# Patient Record
Sex: FEMALE | Race: BLACK OR AFRICAN AMERICAN | NOT HISPANIC OR LATINO | URBAN - METROPOLITAN AREA
[De-identification: names, ages, dates, MRNs, and addresses within clinical notes are randomized per-mention and may not be internally consistent; named-entity substitution may affect disease eponyms.]

---

## 2017-01-19 ENCOUNTER — OUTPATIENT (OUTPATIENT)
Dept: OUTPATIENT SERVICES | Facility: HOSPITAL | Age: 67
LOS: 1 days | End: 2017-01-19
Payer: MEDICARE

## 2017-01-19 PROCEDURE — 74174 CTA ABD&PLVS W/CONTRAST: CPT | Mod: 26

## 2017-01-19 PROCEDURE — 74174 CTA ABD&PLVS W/CONTRAST: CPT

## 2017-02-03 ENCOUNTER — OUTPATIENT (OUTPATIENT)
Dept: OUTPATIENT SERVICES | Facility: HOSPITAL | Age: 67
LOS: 1 days | End: 2017-02-03
Payer: MEDICARE

## 2017-02-03 PROCEDURE — 71046 X-RAY EXAM CHEST 2 VIEWS: CPT

## 2017-02-03 PROCEDURE — 71020: CPT | Mod: 26

## 2017-02-14 ENCOUNTER — APPOINTMENT (OUTPATIENT)
Dept: VASCULAR SURGERY | Facility: CLINIC | Age: 67
End: 2017-02-14

## 2017-02-14 DIAGNOSIS — I25.810 ATHEROSCLEROSIS OF CORONARY ARTERY BYPASS GRAFT(S) W/OUT ANGINA PECTORIS: ICD-10-CM

## 2017-02-16 PROBLEM — I25.810 CORONARY ARTERY DISEASE INVOLVING CORONARY BYPASS GRAFT OF NATIVE HEART WITHOUT ANGINA PECTORIS: Status: ACTIVE | Noted: 2017-02-16

## 2017-03-07 ENCOUNTER — FORM ENCOUNTER (OUTPATIENT)
Age: 67
End: 2017-03-07

## 2017-03-08 ENCOUNTER — OUTPATIENT (OUTPATIENT)
Dept: OUTPATIENT SERVICES | Facility: HOSPITAL | Age: 67
LOS: 1 days | End: 2017-03-08
Payer: MEDICARE

## 2017-03-08 PROCEDURE — 75574 CT ANGIO HRT W/3D IMAGE: CPT

## 2017-03-08 PROCEDURE — 75574 CT ANGIO HRT W/3D IMAGE: CPT | Mod: 26

## 2017-03-28 VITALS
HEART RATE: 92 BPM | SYSTOLIC BLOOD PRESSURE: 115 MMHG | DIASTOLIC BLOOD PRESSURE: 67 MMHG | OXYGEN SATURATION: 98 % | RESPIRATION RATE: 16 BRPM | WEIGHT: 113.98 LBS | HEIGHT: 64 IN | TEMPERATURE: 98 F

## 2017-03-28 NOTE — PATIENT PROFILE ADULT. - PMH
AAA (abdominal aortic aneurysm)    CAD (coronary artery disease)    COPD (chronic obstructive pulmonary disease)    History of seizures  1980  HLD (hyperlipidemia)    HTN (hypertension)

## 2017-03-29 ENCOUNTER — INPATIENT (INPATIENT)
Facility: HOSPITAL | Age: 67
LOS: 0 days | Discharge: HOME | DRG: 269 | End: 2017-03-30
Attending: SURGERY | Admitting: SURGERY
Payer: MEDICARE

## 2017-03-29 DIAGNOSIS — I71.4 ABDOMINAL AORTIC ANEURYSM, WITHOUT RUPTURE: ICD-10-CM

## 2017-03-29 DIAGNOSIS — Z86.79 PERSONAL HISTORY OF OTHER DISEASES OF THE CIRCULATORY SYSTEM: Chronic | ICD-10-CM

## 2017-03-29 DIAGNOSIS — Z90.49 ACQUIRED ABSENCE OF OTHER SPECIFIED PARTS OF DIGESTIVE TRACT: Chronic | ICD-10-CM

## 2017-03-29 LAB
ANION GAP SERPL CALC-SCNC: 8 MMOL/L — LOW (ref 9–16)
APTT BLD: 31.5 SEC — SIGNIFICANT CHANGE UP (ref 27.5–37.4)
BASE EXCESS BLDA CALC-SCNC: -3.2 MMOL/L — LOW (ref -2–3)
BASE EXCESS BLDA CALC-SCNC: -3.4 MMOL/L — LOW (ref -2–3)
BUN SERPL-MCNC: 12 MG/DL — SIGNIFICANT CHANGE UP (ref 7–23)
CA-I BLDA-SCNC: 0.99 MMOL/L — LOW (ref 1.1–1.3)
CA-I BLDA-SCNC: 1 MMOL/L — LOW (ref 1.1–1.3)
CALCIUM SERPL-MCNC: 7.6 MG/DL — LOW (ref 8.5–10.5)
CHLORIDE SERPL-SCNC: 106 MMOL/L — SIGNIFICANT CHANGE UP (ref 96–108)
CO2 SERPL-SCNC: 24 MMOL/L — SIGNIFICANT CHANGE UP (ref 22–31)
COHGB MFR BLDA: 1.2 % — SIGNIFICANT CHANGE UP
COHGB MFR BLDA: 1.5 % — SIGNIFICANT CHANGE UP
CREAT SERPL-MCNC: 0.71 MG/DL — SIGNIFICANT CHANGE UP (ref 0.5–1.3)
GAS PNL BLDA: SIGNIFICANT CHANGE UP
GAS PNL BLDA: SIGNIFICANT CHANGE UP
GLUCOSE SERPL-MCNC: 113 MG/DL — HIGH (ref 70–99)
HCO3 BLDA-SCNC: 21 MMOL/L — SIGNIFICANT CHANGE UP (ref 21–28)
HCO3 BLDA-SCNC: 21 MMOL/L — SIGNIFICANT CHANGE UP (ref 21–28)
HCT VFR BLD CALC: 27.5 % — LOW (ref 34.5–45)
HGB BLD-MCNC: 9.5 G/DL — LOW (ref 11.5–15.5)
HGB BLDA-MCNC: 9.1 G/DL — LOW (ref 11.5–15.5)
HGB BLDA-MCNC: 9.7 G/DL — LOW (ref 11.5–15.5)
INR BLD: 1.26 — HIGH (ref 0.88–1.16)
MCHC RBC-ENTMCNC: 31.1 PG — SIGNIFICANT CHANGE UP (ref 27–34)
MCHC RBC-ENTMCNC: 34.5 G/DL — SIGNIFICANT CHANGE UP (ref 32–36)
MCV RBC AUTO: 90.2 FL — SIGNIFICANT CHANGE UP (ref 80–100)
METHGB MFR BLDA: 0.3 % — SIGNIFICANT CHANGE UP
METHGB MFR BLDA: 0.6 % — SIGNIFICANT CHANGE UP
O2 CT VFR BLDA CALC: 13.3 ML/DL — SIGNIFICANT CHANGE UP (ref 15–23)
O2 CT VFR BLDA CALC: 14.3 ML/DL — SIGNIFICANT CHANGE UP (ref 15–23)
OXYHGB MFR BLDA: 97 % — SIGNIFICANT CHANGE UP (ref 94–100)
OXYHGB MFR BLDA: 98 % — SIGNIFICANT CHANGE UP (ref 94–100)
PCO2 BLDA: 33 MMHG — SIGNIFICANT CHANGE UP (ref 32–45)
PCO2 BLDA: 37 MMHG — SIGNIFICANT CHANGE UP (ref 32–45)
PH BLDA: 7.38 — SIGNIFICANT CHANGE UP (ref 7.35–7.45)
PH BLDA: 7.42 — SIGNIFICANT CHANGE UP (ref 7.35–7.45)
PLATELET # BLD AUTO: 146 K/UL — LOW (ref 150–400)
PO2 BLDA: 311 MMHG — HIGH (ref 83–108)
PO2 BLDA: 353 MMHG — HIGH (ref 83–108)
POTASSIUM BLDA-SCNC: 3.2 MMOL/L — LOW (ref 3.5–4.9)
POTASSIUM BLDA-SCNC: 3.3 MMOL/L — LOW (ref 3.5–4.9)
POTASSIUM SERPL-MCNC: 3.6 MMOL/L — SIGNIFICANT CHANGE UP (ref 3.5–5.3)
POTASSIUM SERPL-SCNC: 3.6 MMOL/L — SIGNIFICANT CHANGE UP (ref 3.5–5.3)
PROTHROM AB SERPL-ACNC: 14 SEC — HIGH (ref 9.8–12.7)
RBC # BLD: 3.05 M/UL — LOW (ref 3.8–5.2)
RBC # FLD: 14.4 % — SIGNIFICANT CHANGE UP (ref 10.3–16.9)
SAO2 % BLDA: 99 % — SIGNIFICANT CHANGE UP (ref 95–100)
SAO2 % BLDA: 99 % — SIGNIFICANT CHANGE UP (ref 95–100)
SODIUM BLDA-SCNC: 132 MMOL/L — LOW (ref 138–146)
SODIUM BLDA-SCNC: 137 MMOL/L — LOW (ref 138–146)
SODIUM SERPL-SCNC: 138 MMOL/L — SIGNIFICANT CHANGE UP (ref 135–145)
WBC # BLD: 8.7 K/UL — SIGNIFICANT CHANGE UP (ref 3.8–10.5)
WBC # FLD AUTO: 8.7 K/UL — SIGNIFICANT CHANGE UP (ref 3.8–10.5)

## 2017-03-29 PROCEDURE — 34825: CPT | Mod: GC

## 2017-03-29 PROCEDURE — 34803: CPT | Mod: GC

## 2017-03-29 PROCEDURE — 34812 OPN FEM ART EXPOS: CPT | Mod: 50,GC

## 2017-03-29 PROCEDURE — 75952: CPT | Mod: 26,GC

## 2017-03-29 PROCEDURE — 36200 PLACE CATHETER IN AORTA: CPT | Mod: 50,GC

## 2017-03-29 PROCEDURE — 93010 ELECTROCARDIOGRAM REPORT: CPT

## 2017-03-29 RX ORDER — DOCUSATE SODIUM 100 MG
100 CAPSULE ORAL THREE TIMES A DAY
Qty: 0 | Refills: 0 | Status: DISCONTINUED | OUTPATIENT
Start: 2017-03-29 | End: 2017-03-30

## 2017-03-29 RX ORDER — ALBUTEROL 90 UG/1
2 AEROSOL, METERED ORAL EVERY 6 HOURS
Qty: 0 | Refills: 0 | Status: DISCONTINUED | OUTPATIENT
Start: 2017-03-29 | End: 2017-03-30

## 2017-03-29 RX ORDER — SIMVASTATIN 20 MG/1
20 TABLET, FILM COATED ORAL AT BEDTIME
Qty: 0 | Refills: 0 | Status: DISCONTINUED | OUTPATIENT
Start: 2017-03-29 | End: 2017-03-30

## 2017-03-29 RX ORDER — MORPHINE SULFATE 50 MG/1
4 CAPSULE, EXTENDED RELEASE ORAL EVERY 4 HOURS
Qty: 0 | Refills: 0 | Status: DISCONTINUED | OUTPATIENT
Start: 2017-03-29 | End: 2017-03-29

## 2017-03-29 RX ORDER — POTASSIUM CHLORIDE 20 MEQ
40 PACKET (EA) ORAL ONCE
Qty: 0 | Refills: 0 | Status: COMPLETED | OUTPATIENT
Start: 2017-03-29 | End: 2017-03-29

## 2017-03-29 RX ORDER — AMLODIPINE BESYLATE 2.5 MG/1
5 TABLET ORAL DAILY
Qty: 0 | Refills: 0 | Status: DISCONTINUED | OUTPATIENT
Start: 2017-03-29 | End: 2017-03-30

## 2017-03-29 RX ORDER — FLUTICASONE PROPIONATE AND SALMETEROL 50; 250 UG/1; UG/1
1 POWDER ORAL; RESPIRATORY (INHALATION)
Qty: 0 | Refills: 0 | Status: DISCONTINUED | OUTPATIENT
Start: 2017-03-29 | End: 2017-03-30

## 2017-03-29 RX ORDER — ACETAMINOPHEN WITH CODEINE 300MG-30MG
2 TABLET ORAL EVERY 4 HOURS
Qty: 0 | Refills: 0 | Status: DISCONTINUED | OUTPATIENT
Start: 2017-03-29 | End: 2017-03-30

## 2017-03-29 RX ORDER — SODIUM CHLORIDE 9 MG/ML
1000 INJECTION, SOLUTION INTRAVENOUS
Qty: 0 | Refills: 0 | Status: DISCONTINUED | OUTPATIENT
Start: 2017-03-29 | End: 2017-03-30

## 2017-03-29 RX ORDER — CEFAZOLIN SODIUM 1 G
1000 VIAL (EA) INJECTION EVERY 8 HOURS
Qty: 0 | Refills: 0 | Status: COMPLETED | OUTPATIENT
Start: 2017-03-29 | End: 2017-03-29

## 2017-03-29 RX ORDER — ONDANSETRON 8 MG/1
4 TABLET, FILM COATED ORAL EVERY 6 HOURS
Qty: 0 | Refills: 0 | Status: DISCONTINUED | OUTPATIENT
Start: 2017-03-29 | End: 2017-03-30

## 2017-03-29 RX ORDER — ASPIRIN/CALCIUM CARB/MAGNESIUM 324 MG
81 TABLET ORAL DAILY
Qty: 0 | Refills: 0 | Status: DISCONTINUED | OUTPATIENT
Start: 2017-03-29 | End: 2017-03-30

## 2017-03-29 RX ADMIN — MORPHINE SULFATE 4 MILLIGRAM(S): 50 CAPSULE, EXTENDED RELEASE ORAL at 14:57

## 2017-03-29 RX ADMIN — Medication 100 MILLIGRAM(S): at 22:39

## 2017-03-29 RX ADMIN — Medication 100 MILLIGRAM(S): at 22:41

## 2017-03-29 RX ADMIN — MORPHINE SULFATE 4 MILLIGRAM(S): 50 CAPSULE, EXTENDED RELEASE ORAL at 15:12

## 2017-03-29 RX ADMIN — Medication 40 MILLIEQUIVALENT(S): at 18:17

## 2017-03-29 RX ADMIN — Medication 100 MILLIGRAM(S): at 15:59

## 2017-03-29 RX ADMIN — Medication 2 TABLET(S): at 22:56

## 2017-03-29 RX ADMIN — FLUTICASONE PROPIONATE AND SALMETEROL 1 DOSE(S): 50; 250 POWDER ORAL; RESPIRATORY (INHALATION) at 22:40

## 2017-03-29 NOTE — H&P ADULT - HISTORY OF PRESENT ILLNESS
68 yo F with PMH HTn, COPD, HLD, AAA (s/p prior tube graft repair) with L LIDYA aneurysm measuring 4.5cm.

## 2017-03-29 NOTE — PROGRESS NOTE ADULT - SUBJECTIVE AND OBJECTIVE BOX
Vascular Surgery Post-Op Note    Procedure: EVAR with coverage of left hypogastric artery and graft extension into the left EIA    Diagnosis/Indication: Left iliac aneurysm, previous open AAA repair    Surgeon: Dr. Burch      S: Pt has no complaints. Denies CP, SOB, LARSON, calf tenderness. Pain controlled with medication.    O:  T(C): 36.8, Max: 36.8 (03-29 @ 13:00)  T(F): 98.3, Max: 98.3 (03-29 @ 13:00)  HR: 79 (69 - 79)  BP: 141/78 (110/71 - 141/78)  RR: 16 (16 - 16)  SpO2: 100% (98% - 100%)                            9.5    8.7   )-----------( 146      ( 29 Mar 2017 13:32 )             27.5     29 Mar 2017 13:32    138    |  106    |  12     ----------------------------<  113    3.6     |  24     |  0.71     Ca    7.6        29 Mar 2017 13:32        Gen: NAD, resting comfortably in bed  C/V: NSR  Pulm: Non labored breathing, no respiratory distress  Abd: soft, non tender, non distended  Groins: bilateral incisions, C/D/I, no hematoma, no bleeding. Right groin dressing with small 1cm serosanguinous saturation at the center.   Extrem: warm, 2+DP pulse, 1+PT pulse bilat. Calfs soft, non-tender. Motor/sensory intact without deficits      A/P: 67y Female s/p above procedure  Diet: clears tonight  IVF - LR @85bpm  Pain/nausea control  ASA  Ancef x 2 post op  Home meds  Bedrest tonight  Solorio to gravity until midnight, then d/c, TOV in AM  AM labs

## 2017-03-29 NOTE — BRIEF OPERATIVE NOTE - OPERATION/FINDINGS
Procedure: EVAR with [place] [id number] main body, with 14x12 extension on R and 14x 10 extension on L into CORDELL [id number]. with coverage of hypogastric and extension into L EIA    Fluoro time 23 min  10gY    operative time 3 hours

## 2017-03-30 ENCOUNTER — TRANSCRIPTION ENCOUNTER (OUTPATIENT)
Age: 67
End: 2017-03-30

## 2017-03-30 VITALS
SYSTOLIC BLOOD PRESSURE: 133 MMHG | HEART RATE: 81 BPM | OXYGEN SATURATION: 97 % | RESPIRATION RATE: 16 BRPM | DIASTOLIC BLOOD PRESSURE: 71 MMHG

## 2017-03-30 LAB
ANION GAP SERPL CALC-SCNC: 7 MMOL/L — LOW (ref 9–16)
BUN SERPL-MCNC: 6 MG/DL — LOW (ref 7–23)
CALCIUM SERPL-MCNC: 8.4 MG/DL — LOW (ref 8.5–10.5)
CHLORIDE SERPL-SCNC: 104 MMOL/L — SIGNIFICANT CHANGE UP (ref 96–108)
CO2 SERPL-SCNC: 29 MMOL/L — SIGNIFICANT CHANGE UP (ref 22–31)
CREAT SERPL-MCNC: 0.66 MG/DL — SIGNIFICANT CHANGE UP (ref 0.5–1.3)
GLUCOSE SERPL-MCNC: 85 MG/DL — SIGNIFICANT CHANGE UP (ref 70–99)
HCT VFR BLD CALC: 31 % — LOW (ref 34.5–45)
HGB BLD-MCNC: 10.5 G/DL — LOW (ref 11.5–15.5)
MAGNESIUM SERPL-MCNC: 1.3 MG/DL — LOW (ref 1.6–2.4)
MCHC RBC-ENTMCNC: 31 PG — SIGNIFICANT CHANGE UP (ref 27–34)
MCHC RBC-ENTMCNC: 33.9 G/DL — SIGNIFICANT CHANGE UP (ref 32–36)
MCV RBC AUTO: 91.4 FL — SIGNIFICANT CHANGE UP (ref 80–100)
PHOSPHATE SERPL-MCNC: 2.7 MG/DL — SIGNIFICANT CHANGE UP (ref 2.5–4.5)
PLATELET # BLD AUTO: 173 K/UL — SIGNIFICANT CHANGE UP (ref 150–400)
POTASSIUM SERPL-MCNC: 3.7 MMOL/L — SIGNIFICANT CHANGE UP (ref 3.5–5.3)
POTASSIUM SERPL-SCNC: 3.7 MMOL/L — SIGNIFICANT CHANGE UP (ref 3.5–5.3)
RBC # BLD: 3.39 M/UL — LOW (ref 3.8–5.2)
RBC # FLD: 14.6 % — SIGNIFICANT CHANGE UP (ref 10.3–16.9)
SODIUM SERPL-SCNC: 140 MMOL/L — SIGNIFICANT CHANGE UP (ref 135–145)
WBC # BLD: 6.4 K/UL — SIGNIFICANT CHANGE UP (ref 3.8–10.5)
WBC # FLD AUTO: 6.4 K/UL — SIGNIFICANT CHANGE UP (ref 3.8–10.5)

## 2017-03-30 PROCEDURE — 36430 TRANSFUSION BLD/BLD COMPNT: CPT

## 2017-03-30 PROCEDURE — 83735 ASSAY OF MAGNESIUM: CPT

## 2017-03-30 PROCEDURE — C1768: CPT

## 2017-03-30 PROCEDURE — C1725: CPT

## 2017-03-30 PROCEDURE — 86901 BLOOD TYPING SEROLOGIC RH(D): CPT

## 2017-03-30 PROCEDURE — 84295 ASSAY OF SERUM SODIUM: CPT

## 2017-03-30 PROCEDURE — 85730 THROMBOPLASTIN TIME PARTIAL: CPT

## 2017-03-30 PROCEDURE — C1769: CPT

## 2017-03-30 PROCEDURE — C1889: CPT

## 2017-03-30 PROCEDURE — P9016: CPT

## 2017-03-30 PROCEDURE — 94640 AIRWAY INHALATION TREATMENT: CPT

## 2017-03-30 PROCEDURE — 82330 ASSAY OF CALCIUM: CPT

## 2017-03-30 PROCEDURE — 76000 FLUOROSCOPY <1 HR PHYS/QHP: CPT

## 2017-03-30 PROCEDURE — 93005 ELECTROCARDIOGRAM TRACING: CPT

## 2017-03-30 PROCEDURE — 85027 COMPLETE CBC AUTOMATED: CPT

## 2017-03-30 PROCEDURE — C1887: CPT

## 2017-03-30 PROCEDURE — 36415 COLL VENOUS BLD VENIPUNCTURE: CPT

## 2017-03-30 PROCEDURE — 86850 RBC ANTIBODY SCREEN: CPT

## 2017-03-30 PROCEDURE — C1874: CPT

## 2017-03-30 PROCEDURE — 84132 ASSAY OF SERUM POTASSIUM: CPT

## 2017-03-30 PROCEDURE — 86923 COMPATIBILITY TEST ELECTRIC: CPT

## 2017-03-30 PROCEDURE — 85018 HEMOGLOBIN: CPT

## 2017-03-30 PROCEDURE — C1894: CPT

## 2017-03-30 PROCEDURE — C1773: CPT

## 2017-03-30 PROCEDURE — 86900 BLOOD TYPING SEROLOGIC ABO: CPT

## 2017-03-30 PROCEDURE — 84100 ASSAY OF PHOSPHORUS: CPT

## 2017-03-30 PROCEDURE — 80048 BASIC METABOLIC PNL TOTAL CA: CPT

## 2017-03-30 PROCEDURE — 85610 PROTHROMBIN TIME: CPT

## 2017-03-30 RX ORDER — POTASSIUM CHLORIDE 20 MEQ
40 PACKET (EA) ORAL ONCE
Qty: 0 | Refills: 0 | Status: COMPLETED | OUTPATIENT
Start: 2017-03-30 | End: 2017-03-30

## 2017-03-30 RX ORDER — ACETAMINOPHEN WITH CODEINE 300MG-30MG
1 TABLET ORAL
Qty: 18 | Refills: 0 | OUTPATIENT
Start: 2017-03-30 | End: 2017-04-02

## 2017-03-30 RX ORDER — MAGNESIUM SULFATE 500 MG/ML
2 VIAL (ML) INJECTION ONCE
Qty: 0 | Refills: 0 | Status: COMPLETED | OUTPATIENT
Start: 2017-03-30 | End: 2017-03-30

## 2017-03-30 RX ADMIN — AMLODIPINE BESYLATE 5 MILLIGRAM(S): 2.5 TABLET ORAL at 06:28

## 2017-03-30 RX ADMIN — FLUTICASONE PROPIONATE AND SALMETEROL 1 DOSE(S): 50; 250 POWDER ORAL; RESPIRATORY (INHALATION) at 06:28

## 2017-03-30 RX ADMIN — Medication 100 MILLIGRAM(S): at 06:28

## 2017-03-30 RX ADMIN — Medication 50 GRAM(S): at 08:43

## 2017-03-30 RX ADMIN — Medication 2 TABLET(S): at 00:05

## 2017-03-30 RX ADMIN — Medication 2 TABLET(S): at 06:51

## 2017-03-30 RX ADMIN — Medication 81 MILLIGRAM(S): at 11:12

## 2017-03-30 RX ADMIN — Medication 2 TABLET(S): at 07:34

## 2017-03-30 RX ADMIN — Medication 40 MILLIEQUIVALENT(S): at 08:43

## 2017-03-30 NOTE — DISCHARGE NOTE ADULT - MEDICATION SUMMARY - MEDICATIONS TO TAKE
I will START or STAY ON the medications listed below when I get home from the hospital:    aspirin 81 mg oral tablet  -- 1 tab(s) by mouth once a day  -- Indication: For Circulation    acetaminophen-codeine 300 mg-30 mg oral tablet  -- 1 tab(s) by mouth every 4 hours, As Needed, Severe Pain (7 - 10) -for severe pain MDD:6 tabs  -- Indication: For pain-eprescribed    simvastatin 20 mg oral tablet  -- 1 tab(s) by mouth once a day (at bedtime)  -- Indication: For Cholesterol    Ventolin HFA 90 mcg/inh inhalation aerosol  -- 2 puff(s) inhaled 4 times a day, As Needed  -- Indication: For respiratory    Advair Diskus 100 mcg-50 mcg inhalation powder  -- 1 puff(s) inhaled 2 times a day  -- Indication: For respiratory    amLODIPine 5 mg oral tablet  -- 1 tab(s) by mouth once a day  -- Indication: For BP

## 2017-03-30 NOTE — PROGRESS NOTE ADULT - SUBJECTIVE AND OBJECTIVE BOX
o/n: ivan removed at midnight passed TOV 200cc  3/29: s/p EVAR with left hypo occlusion, POC ok    67yoF s/p EVAR with coverage of left hypogastric and graft extension into the left EIA 3/29  regular diet  asa  pain control  cont home meds  f/u am labs o/n: ivan removed at midnight passed TOV 200cc  3/29: s/p EVAR with left hypo occlusion, POC ok    Pt seen and examined at bedside, currently without complaint.  She reports that she has 20 steps to climb at home, and that she will try to walk this morning to see how she feels.  She notes that she's voided overnight without difficulty.    amLODIPine   Tablet 5  aspirin enteric coated 81        Vital Signs Last 24 Hrs  T(C): 36.4, Max: 36.8 (03-29 @ 13:00)  T(F): 97.5, Max: 98.3 (03-29 @ 13:00)  HR: 70 (65 - 89)  BP: 142/81 (110/71 - 153/75)  BP(mean): --  RR: 16 (16 - 16)  SpO2: 98% (98% - 100%)  I&O's Summary    I & Os for current day (as of 30 Mar 2017 08:32)  =============================================  IN: 3425 ml / OUT: 4620 ml / NET: -1195 ml        Gen: NAD, resting comfortably in bed  C/V: NSR  Pulm: Non labored breathing, no respiratory distress  Groins: bilateral incisions, with minimal s/s saturation on right groin dressing. Dressing removed, cleaned with chlorhexidine and replaced , no hematoma, no bleeding.   Extrem: warm, 2+DP pulse, 1+PT pulse bilat. Calfs soft, non-tender. Motor/sensory intact without deficits    LABS:                        10.5   6.4   )-----------( 173      ( 30 Mar 2017 06:39 )             31.0     30 Mar 2017 06:39    140    |  104    |  6      ----------------------------<  85     3.7     |  29     |  0.66     Ca    8.4        30 Mar 2017 06:39  Phos  2.7       30 Mar 2017 06:39  Mg     1.3       30 Mar 2017 06:39      PT/INR - ( 29 Mar 2017 13:32 )   PT: 14.0 sec;   INR: 1.26          PTT - ( 29 Mar 2017 13:32 )  PTT:31.5 sec    Radiology and Additional Studies:    Assessment and Plan:     67yoF s/p EVAR with coverage of left hypogastric and graft extension into the left EIA 3/29  regular diet  asa  pain control  cont home meds  f/u am labs  Home today

## 2017-03-30 NOTE — DISCHARGE NOTE ADULT - CARE PLAN
Principal Discharge DX:	AAA (abdominal aortic aneurysm)  Goal:	see below  Instructions for follow-up, activity and diet:	see below  Secondary Diagnosis:	CAD (coronary artery disease)  Secondary Diagnosis:	COPD (chronic obstructive pulmonary disease)  Secondary Diagnosis:	HLD (hyperlipidemia)  Secondary Diagnosis:	HTN (hypertension)  Secondary Diagnosis:	History of cholecystectomy

## 2017-03-30 NOTE — DISCHARGE NOTE ADULT - HOSPITAL COURSE
66 yo F with PMH HTn, COPD, HLD, AAA (s/p prior tube graft repair) with L LIDYA aneurysm measuring 4.5cm.     Had EVAR on 3/29/17. She did well post-op and was discharged when medically stable

## 2017-03-30 NOTE — DISCHARGE NOTE ADULT - PATIENT PORTAL LINK FT
“You can access the Follow[facility] Patient Portal, offered by [facility], by registering with the following website: [email or web address]”

## 2017-03-30 NOTE — DISCHARGE NOTE ADULT - CARE PROVIDER_API CALL
Priyank Burch), Surgery; Vascular Surgery  [address] [place] [address]  Phone: [phone]  Fax: [phone]

## 2017-03-30 NOTE — DISCHARGE NOTE ADULT - ADDITIONAL INSTRUCTIONS
Follow up with Dr. Burch in 1-2 weeks in office at [phone]. Remove dressings to shower with soap and water. Dry well. Dry gauze to incisions daily. Please call your doctor if you have a fever of over 101.9 or swelling/bleeding at incisions or puncture sites. NO heavy lifting. Eat small meals

## 2017-03-30 NOTE — DISCHARGE NOTE ADULT - SECONDARY DIAGNOSIS.
CAD (coronary artery disease) COPD (chronic obstructive pulmonary disease) HLD (hyperlipidemia) HTN (hypertension) History of cholecystectomy

## 2017-04-03 ENCOUNTER — MEDICATION RENEWAL (OUTPATIENT)
Age: 67
End: 2017-04-03

## 2017-04-03 DIAGNOSIS — Z86.79 PERSONAL HISTORY OF OTHER DISEASES OF THE CIRCULATORY SYSTEM: ICD-10-CM

## 2017-04-03 DIAGNOSIS — I71.4 ABDOMINAL AORTIC ANEURYSM, WITHOUT RUPTURE: ICD-10-CM

## 2017-04-03 DIAGNOSIS — I25.10 ATHEROSCLEROTIC HEART DISEASE OF NATIVE CORONARY ARTERY WITHOUT ANGINA PECTORIS: ICD-10-CM

## 2017-04-03 DIAGNOSIS — K59.09 OTHER CONSTIPATION: ICD-10-CM

## 2017-04-03 DIAGNOSIS — Z28.21 IMMUNIZATION NOT CARRIED OUT BECAUSE OF PATIENT REFUSAL: ICD-10-CM

## 2017-04-03 DIAGNOSIS — Z79.82 LONG TERM (CURRENT) USE OF ASPIRIN: ICD-10-CM

## 2017-04-03 DIAGNOSIS — Z88.0 ALLERGY STATUS TO PENICILLIN: ICD-10-CM

## 2017-04-03 DIAGNOSIS — I72.3 ANEURYSM OF ILIAC ARTERY: ICD-10-CM

## 2017-04-03 DIAGNOSIS — E78.5 HYPERLIPIDEMIA, UNSPECIFIED: ICD-10-CM

## 2017-04-03 DIAGNOSIS — I10 ESSENTIAL (PRIMARY) HYPERTENSION: ICD-10-CM

## 2017-04-03 DIAGNOSIS — F17.200 NICOTINE DEPENDENCE, UNSPECIFIED, UNCOMPLICATED: ICD-10-CM

## 2017-04-03 DIAGNOSIS — J44.9 CHRONIC OBSTRUCTIVE PULMONARY DISEASE, UNSPECIFIED: ICD-10-CM

## 2017-04-03 PROBLEM — Z87.898 PERSONAL HISTORY OF OTHER SPECIFIED CONDITIONS: Chronic | Status: ACTIVE | Noted: 2017-03-28

## 2017-04-11 ENCOUNTER — APPOINTMENT (OUTPATIENT)
Dept: VASCULAR SURGERY | Facility: CLINIC | Age: 67
End: 2017-04-11

## 2017-04-11 VITALS — DIASTOLIC BLOOD PRESSURE: 74 MMHG | HEART RATE: 84 BPM | OXYGEN SATURATION: 97 % | SYSTOLIC BLOOD PRESSURE: 110 MMHG

## 2017-08-15 ENCOUNTER — FORM ENCOUNTER (OUTPATIENT)
Age: 67
End: 2017-08-15

## 2017-08-16 ENCOUNTER — OUTPATIENT (OUTPATIENT)
Dept: OUTPATIENT SERVICES | Facility: HOSPITAL | Age: 67
LOS: 1 days | End: 2017-08-16
Payer: MEDICARE

## 2017-08-16 DIAGNOSIS — Z90.49 ACQUIRED ABSENCE OF OTHER SPECIFIED PARTS OF DIGESTIVE TRACT: Chronic | ICD-10-CM

## 2017-08-16 DIAGNOSIS — Z86.79 PERSONAL HISTORY OF OTHER DISEASES OF THE CIRCULATORY SYSTEM: Chronic | ICD-10-CM

## 2017-08-16 PROCEDURE — 71275 CT ANGIOGRAPHY CHEST: CPT

## 2017-08-16 PROCEDURE — 71275 CT ANGIOGRAPHY CHEST: CPT | Mod: 26

## 2017-10-24 ENCOUNTER — APPOINTMENT (OUTPATIENT)
Dept: VASCULAR SURGERY | Facility: CLINIC | Age: 67
End: 2017-10-24
Payer: COMMERCIAL

## 2017-10-24 VITALS — OXYGEN SATURATION: 98 % | DIASTOLIC BLOOD PRESSURE: 80 MMHG | HEART RATE: 82 BPM | SYSTOLIC BLOOD PRESSURE: 122 MMHG

## 2017-10-24 PROCEDURE — 93978 VASCULAR STUDY: CPT

## 2017-10-24 PROCEDURE — 99213 OFFICE O/P EST LOW 20 MIN: CPT | Mod: 25

## 2017-11-08 ENCOUNTER — OUTPATIENT (OUTPATIENT)
Dept: OUTPATIENT SERVICES | Facility: HOSPITAL | Age: 67
LOS: 1 days | End: 2017-11-08
Payer: MEDICARE

## 2017-11-08 DIAGNOSIS — Z86.79 PERSONAL HISTORY OF OTHER DISEASES OF THE CIRCULATORY SYSTEM: Chronic | ICD-10-CM

## 2017-11-08 DIAGNOSIS — Z90.49 ACQUIRED ABSENCE OF OTHER SPECIFIED PARTS OF DIGESTIVE TRACT: Chronic | ICD-10-CM

## 2017-11-08 PROCEDURE — 71275 CT ANGIOGRAPHY CHEST: CPT

## 2017-11-08 PROCEDURE — 71275 CT ANGIOGRAPHY CHEST: CPT | Mod: 26

## 2017-11-08 PROCEDURE — 74174 CTA ABD&PLVS W/CONTRAST: CPT | Mod: 26

## 2017-11-08 PROCEDURE — 74174 CTA ABD&PLVS W/CONTRAST: CPT

## 2018-04-11 ENCOUNTER — OTHER (OUTPATIENT)
Age: 68
End: 2018-04-11

## 2018-04-16 ENCOUNTER — FORM ENCOUNTER (OUTPATIENT)
Age: 68
End: 2018-04-16

## 2018-04-17 ENCOUNTER — APPOINTMENT (OUTPATIENT)
Dept: CT IMAGING | Facility: HOSPITAL | Age: 68
End: 2018-04-17
Payer: MEDICARE

## 2018-04-17 ENCOUNTER — OUTPATIENT (OUTPATIENT)
Dept: OUTPATIENT SERVICES | Facility: HOSPITAL | Age: 68
LOS: 1 days | End: 2018-04-17
Payer: MEDICARE

## 2018-04-17 ENCOUNTER — APPOINTMENT (OUTPATIENT)
Dept: CT IMAGING | Facility: HOSPITAL | Age: 68
End: 2018-04-17

## 2018-04-17 ENCOUNTER — APPOINTMENT (OUTPATIENT)
Dept: VASCULAR SURGERY | Facility: CLINIC | Age: 68
End: 2018-04-17
Payer: MEDICARE

## 2018-04-17 DIAGNOSIS — Z86.79 PERSONAL HISTORY OF OTHER DISEASES OF THE CIRCULATORY SYSTEM: Chronic | ICD-10-CM

## 2018-04-17 DIAGNOSIS — Z90.49 ACQUIRED ABSENCE OF OTHER SPECIFIED PARTS OF DIGESTIVE TRACT: Chronic | ICD-10-CM

## 2018-04-17 PROCEDURE — 74174 CTA ABD&PLVS W/CONTRAST: CPT

## 2018-04-17 PROCEDURE — 71275 CT ANGIOGRAPHY CHEST: CPT

## 2018-04-17 PROCEDURE — 74174 CTA ABD&PLVS W/CONTRAST: CPT | Mod: 26

## 2018-04-17 PROCEDURE — 71275 CT ANGIOGRAPHY CHEST: CPT | Mod: 26

## 2018-04-17 PROCEDURE — 99214 OFFICE O/P EST MOD 30 MIN: CPT

## 2018-05-02 ENCOUNTER — APPOINTMENT (OUTPATIENT)
Dept: CARDIOTHORACIC SURGERY | Facility: CLINIC | Age: 68
End: 2018-05-02
Payer: MEDICARE

## 2018-05-02 VITALS
HEART RATE: 91 BPM | WEIGHT: 115 LBS | OXYGEN SATURATION: 99 % | BODY MASS INDEX: 20.38 KG/M2 | SYSTOLIC BLOOD PRESSURE: 109 MMHG | HEIGHT: 63 IN | DIASTOLIC BLOOD PRESSURE: 77 MMHG | TEMPERATURE: 97.9 F | RESPIRATION RATE: 20 BRPM

## 2018-05-02 DIAGNOSIS — Z87.09 PERSONAL HISTORY OF OTHER DISEASES OF THE RESPIRATORY SYSTEM: ICD-10-CM

## 2018-05-02 DIAGNOSIS — F17.200 NICOTINE DEPENDENCE, UNSPECIFIED, UNCOMPLICATED: ICD-10-CM

## 2018-05-02 DIAGNOSIS — Z60.2 PROBLEMS RELATED TO LIVING ALONE: ICD-10-CM

## 2018-05-02 DIAGNOSIS — Z95.828 PRESENCE OF OTHER VASCULAR IMPLANTS AND GRAFTS: ICD-10-CM

## 2018-05-02 DIAGNOSIS — Z86.79 PERSONAL HISTORY OF OTHER DISEASES OF THE CIRCULATORY SYSTEM: ICD-10-CM

## 2018-05-02 PROCEDURE — 99205 OFFICE O/P NEW HI 60 MIN: CPT

## 2018-05-02 SDOH — SOCIAL STABILITY - SOCIAL INSECURITY: PROBLEMS RELATED TO LIVING ALONE: Z60.2

## 2018-05-03 PROBLEM — Z86.79 HISTORY OF CORONARY ARTERY DISEASE: Status: RESOLVED | Noted: 2018-05-03 | Resolved: 2018-05-03

## 2018-05-03 PROBLEM — Z87.09 HISTORY OF CHRONIC OBSTRUCTIVE LUNG DISEASE: Status: RESOLVED | Noted: 2018-05-03 | Resolved: 2018-05-03

## 2018-05-03 PROBLEM — Z95.828 HISTORY OF REPAIR OF ANEURYSM OF ABDOMINAL AORTA USING ENDOVASCULAR STENT GRAFT: Status: ACTIVE | Noted: 2018-05-03

## 2018-05-03 PROBLEM — F17.200 CURRENT SMOKER: Status: ACTIVE | Noted: 2018-05-03

## 2018-05-03 PROBLEM — Z60.2 LIVES ALONE WITH HELP AVAILABLE: Status: ACTIVE | Noted: 2018-05-03

## 2018-05-09 ENCOUNTER — APPOINTMENT (OUTPATIENT)
Dept: CARDIOTHORACIC SURGERY | Facility: CLINIC | Age: 68
End: 2018-05-09

## 2018-05-09 VITALS
BODY MASS INDEX: 20.55 KG/M2 | RESPIRATION RATE: 20 BRPM | OXYGEN SATURATION: 97 % | HEART RATE: 94 BPM | SYSTOLIC BLOOD PRESSURE: 141 MMHG | WEIGHT: 116 LBS | TEMPERATURE: 97.4 F | DIASTOLIC BLOOD PRESSURE: 71 MMHG

## 2018-05-17 VITALS
HEART RATE: 85 BPM | DIASTOLIC BLOOD PRESSURE: 74 MMHG | OXYGEN SATURATION: 100 % | RESPIRATION RATE: 18 BRPM | WEIGHT: 139.99 LBS | HEIGHT: 63 IN | SYSTOLIC BLOOD PRESSURE: 129 MMHG | TEMPERATURE: 98 F

## 2018-05-17 RX ORDER — CHLORHEXIDINE GLUCONATE 213 G/1000ML
1 SOLUTION TOPICAL ONCE
Qty: 0 | Refills: 0 | Status: DISCONTINUED | OUTPATIENT
Start: 2018-05-21 | End: 2018-05-21

## 2018-05-17 NOTE — H&P ADULT - NSHPLABSRESULTS_GEN_ALL_CORE
12.7   5.1   )-----------( 263      ( 21 May 2018 07:12 )             36.5   05-21    136  |  95<L>  |  13  ----------------------------<  118<H>  4.3   |  27  |  0.79    Ca    10.0      21 May 2018 07:12    TPro  9.5<H>  /  Alb  4.6  /  TBili  0.4  /  DBili  x   /  AST  24  /  ALT  15  /  AlkPhos  124<H>  05-21  PT/INR - ( 21 May 2018 07:12 )   PT: 11.0 sec;   INR: 0.99          PTT - ( 21 May 2018 07:12 )  PTT:31.4 sec    EKG: Sinus @ 83 BPM with Q waves in III and aVF, no acute ST-T wave changes

## 2018-05-17 NOTE — H&P ADULT - HISTORY OF PRESENT ILLNESS
SKELETON   68 Y F current smoker with pmh HTN, HLD, CAD, TIA (1999- Residual?), COPD (home O2?), AAA s/p open AAA repair by Dr Roberto in 2004 with further complications and degeneration, most recently s/p EVAR/AAA/CIAA with Dr Dickinson 3/29/2017 who presents to Dr. 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 Pt c/o LARSON after climbing 1-2 flights of stairs x _____  Pt denies CP, cough, hemoptysis, orthopnea, PND, N/V, weight changes, fever, chills.   CTA chest/abdomen/pelvix 4/17/2018 revealed descending ThAA with degeneration of the aorta at mid-descending thoracic segment measuring 5.7 cm with significant mural thrombus (previously measured 5.5 cm), widely patent celiac/SMA/renal arteries.  CTA coronaries 3/8/2017 showing Severely elevated calcium score (1191) 98th percentile with Nonobstructive coronary artery disease (see body of report for details). Normal left ventricular systolic function.     In light of patient’s risk factors, CCS angina class III symptoms, known CAD, pt is recommended for cardiac catheterization with possible intervention if medically indicated as part of pre-op testing for TAAA repair with Dr. Monson and Dr. Burch. 68 Y F current smoker with pmh HTN, HLD, CAD, TIA (1999- Residual L hand weakness), COPD (no home O2, no hospitalizations or intubations), AAA s/p open AAA repair by Dr Roberto in 2004 with further complications and degeneration, most recently s/p EVAR/AAA/CIAA with Dr Dickinson 3/29/2017 who presents to Dr. 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 Pt c/o LARSON after climbing 1-2 flights of stairs x few years.  Pt denies CP, cough, hemoptysis, orthopnea, PND, N/V, palpitations, LE edema, weight changes, fever, chills.  CTA chest/abdomen/pelvix 4/17/2018 revealed descending ThAA with degeneration of the aorta at mid-descending thoracic segment measuring 5.7 cm with significant mural thrombus (previously measured 5.5 cm), widely patent celiac/SMA/renal arteries. CTA coronaries 3/8/2017 showing Severely elevated calcium score (1191) 98th percentile with Nonobstructive coronary artery disease (see body of report for details). Normal left ventricular systolic function.  In light of patient’s risk factors, CCS angina class III symptoms, known CAD (non obstructive by CTA), pt is recommended for cardiac catheterization with possible intervention if medically indicated as part of pre-op testing for TAAA repair with Dr. Monson and Dr. Burch. 68 Y F current smoker with pmh HTN, HLD, CAD, TIA (1999- Residual L hand weakness), COPD (no home O2, no hospitalizations or intubations), AAA s/p open AAA repair by Dr Roberto in 2004 with further complications and degeneration, most recently s/p EVAR/AAA/CIAA with Dr Dickinson 3/29/2017 who presents to Dr. 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 CTA chest/abdomen/pelvix 4/17/2018 revealed descending ThAA with degeneration of the aorta at mid-descending thoracic segment measuring 5.7 cm with significant mural thrombus (previously measured 5.5 cm), widely patent celiac/SMA/renal arteries.   Pt c/o LARSON after climbing 1-2 flights of stairs x few years.  Resolved after resting for a few minutes.  Pt denies CP, cough, hemoptysis, orthopnea, PND, N/V, palpitations, LE edema, weight changes, fever, chills.   CTA coronaries 3/8/2017 showing Severely elevated calcium score (1191) 98th percentile with Nonobstructive coronary artery disease. Normal left ventricular systolic function.  In light of patient’s risk factors, CCS angina class III symptoms, known CAD (non obstructive by CTA), pt is recommended for cardiac catheterization with possible intervention if medically indicated as part of pre-op testing for TAAA repair with Dr. Monson and Dr. Burch.

## 2018-05-17 NOTE — H&P ADULT - NSHPSOCIALHISTORY_GEN_ALL_CORE
Current smoker, smokes ____  Denies ETOH and elicit drug use.   Lives alone and has home health aid. Current smoker, smokes 1ppd x 46 years (currently trying to quit w/ nicotine lozenges)   Denies ETOH and elicit drug use.   Lives alone and has home health aid.

## 2018-05-17 NOTE — H&P ADULT - PSH
History of abdominal aortic aneurysm (AAA)  2004  History of cholecystectomy    S/P AAA (abdominal aortic aneurysm) repair

## 2018-05-17 NOTE — H&P ADULT - ASSESSMENT
68 Y F current smoker with pmh HTN, HLD, CAD, TIA (1999- Residual L hand weakness), COPD (no home O2, no hospitalizations or intubations), AAA s/p open AAA repair by Dr Roberto in 2004 with further complications and degeneration, most recently s/p EVAR/AAA/CIAA with Dr Dickinson 3/29/2017who presents today for recommended cardiac catheterization with possible intervention if medically indicated as part of pre-op testing for TAAA repair with Dr. Monson and Dr. Burch.  Precath/consented  IVF NS @ 75 cc/hr  Loading to be d/w Dr. Tipton    Risks & benefits of procedure and alternative therapy have been explained to the patient including but not limited to: allergic reaction, bleeding w/possible need for blood transfusion, infection, renal and vascular compromise, limb damage, arrhythmia, stroke, vessel dissection/perforation, Myocardial infarction, emergent CABG. Informed consent obtained and in chart. 68 Y F current smoker with pmh HTN, HLD, CAD, TIA (1999- Residual L hand weakness), COPD (no home O2, no hospitalizations or intubations), AAA s/p open AAA repair by Dr Roberto in 2004 with further complications and degeneration, most recently s/p EVAR/AAA/CIAA with Dr Dickinson 3/29/2017who presents today for recommended cardiac catheterization with possible intervention if medically indicated as part of pre-op testing for TAAA repair with Dr. Monson and Dr. Burch.  Precath/consented  IVF NS @ 100 cc/hr  Loaded with ASA 81 mg PO x 1  Case d/w Dr. Tipton    Risks & benefits of procedure and alternative therapy have been explained to the patient including but not limited to: allergic reaction, bleeding w/possible need for blood transfusion, infection, renal and vascular compromise, limb damage, arrhythmia, stroke, vessel dissection/perforation, Myocardial infarction, emergent CABG. Informed consent obtained and in chart. 68 Y F current smoker with pmh HTN, HLD, CAD, TIA (1999- Residual L hand weakness), COPD (no home O2, no hospitalizations or intubations), AAA s/p open AAA repair by Dr Roberto in 2004 with further complications and degeneration, most recently s/p EVAR/AAA/CIAA with Dr Dickinson 3/29/2017who presents today for recommended cardiac catheterization with possible intervention if medically indicated as part of pre-op testing for TAAA repair with Dr. Monson and Dr. Burch.  ASA: II     Mallampati: IV  Precath/consented  IVF NS @ 100 cc/hr  Loaded with ASA 81 mg PO x 1  Case d/w Dr. Tipton    Risks & benefits of procedure and alternative therapy have been explained to the patient including but not limited to: allergic reaction, bleeding w/possible need for blood transfusion, infection, renal and vascular compromise, limb damage, arrhythmia, stroke, vessel dissection/perforation, Myocardial infarction, emergent CABG. Informed consent obtained and in chart.

## 2018-05-21 ENCOUNTER — OUTPATIENT (OUTPATIENT)
Dept: OUTPATIENT SERVICES | Facility: HOSPITAL | Age: 68
LOS: 1 days | Discharge: NURSING, REHAB OR LONG-TERM CARE | End: 2018-05-21
Payer: MEDICARE

## 2018-05-21 DIAGNOSIS — Z90.49 ACQUIRED ABSENCE OF OTHER SPECIFIED PARTS OF DIGESTIVE TRACT: Chronic | ICD-10-CM

## 2018-05-21 DIAGNOSIS — Z86.79 PERSONAL HISTORY OF OTHER DISEASES OF THE CIRCULATORY SYSTEM: Chronic | ICD-10-CM

## 2018-05-21 DIAGNOSIS — Z98.890 OTHER SPECIFIED POSTPROCEDURAL STATES: Chronic | ICD-10-CM

## 2018-05-21 LAB
ALBUMIN SERPL ELPH-MCNC: 4.6 G/DL — SIGNIFICANT CHANGE UP (ref 3.3–5)
ALP SERPL-CCNC: 124 U/L — HIGH (ref 40–120)
ALT FLD-CCNC: 15 U/L — SIGNIFICANT CHANGE UP (ref 10–45)
ANION GAP SERPL CALC-SCNC: 14 MMOL/L — SIGNIFICANT CHANGE UP (ref 5–17)
APPEARANCE UR: CLEAR — SIGNIFICANT CHANGE UP
APTT BLD: 31.4 SEC — SIGNIFICANT CHANGE UP (ref 27.5–37.4)
AST SERPL-CCNC: 24 U/L — SIGNIFICANT CHANGE UP (ref 10–40)
BASOPHILS NFR BLD AUTO: 0.8 % — SIGNIFICANT CHANGE UP (ref 0–2)
BILIRUB SERPL-MCNC: 0.4 MG/DL — SIGNIFICANT CHANGE UP (ref 0.2–1.2)
BILIRUB UR-MCNC: NEGATIVE — SIGNIFICANT CHANGE UP
BLD GP AB SCN SERPL QL: NEGATIVE — SIGNIFICANT CHANGE UP
BUN SERPL-MCNC: 13 MG/DL — SIGNIFICANT CHANGE UP (ref 7–23)
CALCIUM SERPL-MCNC: 10 MG/DL — SIGNIFICANT CHANGE UP (ref 8.4–10.5)
CHLORIDE SERPL-SCNC: 95 MMOL/L — LOW (ref 96–108)
CHOLEST SERPL-MCNC: 197 MG/DL — SIGNIFICANT CHANGE UP (ref 10–199)
CK MB CFR SERPL CALC: 2.9 NG/ML — SIGNIFICANT CHANGE UP (ref 0–6.7)
CO2 SERPL-SCNC: 27 MMOL/L — SIGNIFICANT CHANGE UP (ref 22–31)
COLOR SPEC: YELLOW — SIGNIFICANT CHANGE UP
CREAT SERPL-MCNC: 0.79 MG/DL — SIGNIFICANT CHANGE UP (ref 0.5–1.3)
CRP SERPL-MCNC: 1.27 MG/DL — HIGH (ref 0–0.4)
DIFF PNL FLD: (no result)
EOSINOPHIL NFR BLD AUTO: 1.4 % — SIGNIFICANT CHANGE UP (ref 0–6)
GLUCOSE SERPL-MCNC: 118 MG/DL — HIGH (ref 70–99)
GLUCOSE UR QL: NEGATIVE — SIGNIFICANT CHANGE UP
HBA1C BLD-MCNC: 5.6 % — SIGNIFICANT CHANGE UP (ref 4–5.6)
HCT VFR BLD CALC: 36.5 % — SIGNIFICANT CHANGE UP (ref 34.5–45)
HDLC SERPL-MCNC: 87 MG/DL — SIGNIFICANT CHANGE UP (ref 40–125)
HGB BLD-MCNC: 12.7 G/DL — SIGNIFICANT CHANGE UP (ref 11.5–15.5)
INR BLD: 0.99 — SIGNIFICANT CHANGE UP (ref 0.88–1.16)
KETONES UR-MCNC: NEGATIVE — SIGNIFICANT CHANGE UP
LEUKOCYTE ESTERASE UR-ACNC: NEGATIVE — SIGNIFICANT CHANGE UP
LIPID PNL WITH DIRECT LDL SERPL: 98 MG/DL — SIGNIFICANT CHANGE UP
LYMPHOCYTES # BLD AUTO: 34.9 % — SIGNIFICANT CHANGE UP (ref 13–44)
MCHC RBC-ENTMCNC: 31.6 PG — SIGNIFICANT CHANGE UP (ref 27–34)
MCHC RBC-ENTMCNC: 34.8 G/DL — SIGNIFICANT CHANGE UP (ref 32–36)
MCV RBC AUTO: 90.8 FL — SIGNIFICANT CHANGE UP (ref 80–100)
MONOCYTES NFR BLD AUTO: 5.1 % — SIGNIFICANT CHANGE UP (ref 2–14)
NEUTROPHILS NFR BLD AUTO: 57.8 % — SIGNIFICANT CHANGE UP (ref 43–77)
NITRITE UR-MCNC: NEGATIVE — SIGNIFICANT CHANGE UP
PH UR: 5 — SIGNIFICANT CHANGE UP (ref 5–8)
PLATELET # BLD AUTO: 263 K/UL — SIGNIFICANT CHANGE UP (ref 150–400)
POTASSIUM SERPL-MCNC: 4.3 MMOL/L — SIGNIFICANT CHANGE UP (ref 3.5–5.3)
POTASSIUM SERPL-SCNC: 4.3 MMOL/L — SIGNIFICANT CHANGE UP (ref 3.5–5.3)
PROT SERPL-MCNC: 9.5 G/DL — HIGH (ref 6–8.3)
PROT UR-MCNC: NEGATIVE MG/DL — SIGNIFICANT CHANGE UP
PROTHROM AB SERPL-ACNC: 11 SEC — SIGNIFICANT CHANGE UP (ref 9.8–12.7)
RBC # BLD: 4.02 M/UL — SIGNIFICANT CHANGE UP (ref 3.8–5.2)
RBC # FLD: 13.7 % — SIGNIFICANT CHANGE UP (ref 10.3–16.9)
RH IG SCN BLD-IMP: NEGATIVE — SIGNIFICANT CHANGE UP
SODIUM SERPL-SCNC: 136 MMOL/L — SIGNIFICANT CHANGE UP (ref 135–145)
SP GR SPEC: <=1.005 — SIGNIFICANT CHANGE UP (ref 1–1.03)
TOTAL CHOLESTEROL/HDL RATIO MEASUREMENT: 2.3 RATIO — LOW (ref 3.3–7.1)
TRIGL SERPL-MCNC: 60 MG/DL — SIGNIFICANT CHANGE UP (ref 10–149)
TSH SERPL-MCNC: 1.61 UIU/ML — SIGNIFICANT CHANGE UP (ref 0.35–4.94)
UROBILINOGEN FLD QL: 0.2 E.U./DL — SIGNIFICANT CHANGE UP
WBC # BLD: 5.1 K/UL — SIGNIFICANT CHANGE UP (ref 3.8–10.5)
WBC # FLD AUTO: 5.1 K/UL — SIGNIFICANT CHANGE UP (ref 3.8–10.5)

## 2018-05-21 PROCEDURE — 80061 LIPID PANEL: CPT

## 2018-05-21 PROCEDURE — 71045 X-RAY EXAM CHEST 1 VIEW: CPT | Mod: 26

## 2018-05-21 PROCEDURE — 86850 RBC ANTIBODY SCREEN: CPT

## 2018-05-21 PROCEDURE — 93010 ELECTROCARDIOGRAM REPORT: CPT

## 2018-05-21 PROCEDURE — C1887: CPT

## 2018-05-21 PROCEDURE — C1769: CPT

## 2018-05-21 PROCEDURE — C1894: CPT

## 2018-05-21 PROCEDURE — 85025 COMPLETE CBC W/AUTO DIFF WBC: CPT

## 2018-05-21 PROCEDURE — 80053 COMPREHEN METABOLIC PANEL: CPT

## 2018-05-21 PROCEDURE — 85730 THROMBOPLASTIN TIME PARTIAL: CPT

## 2018-05-21 PROCEDURE — 82553 CREATINE MB FRACTION: CPT

## 2018-05-21 PROCEDURE — C1889: CPT

## 2018-05-21 PROCEDURE — 86140 C-REACTIVE PROTEIN: CPT

## 2018-05-21 PROCEDURE — 85610 PROTHROMBIN TIME: CPT

## 2018-05-21 PROCEDURE — 83036 HEMOGLOBIN GLYCOSYLATED A1C: CPT

## 2018-05-21 PROCEDURE — 86901 BLOOD TYPING SEROLOGIC RH(D): CPT

## 2018-05-21 PROCEDURE — 86900 BLOOD TYPING SEROLOGIC ABO: CPT

## 2018-05-21 PROCEDURE — 71045 X-RAY EXAM CHEST 1 VIEW: CPT

## 2018-05-21 PROCEDURE — 84443 ASSAY THYROID STIM HORMONE: CPT

## 2018-05-21 PROCEDURE — 81001 URINALYSIS AUTO W/SCOPE: CPT

## 2018-05-21 PROCEDURE — 82550 ASSAY OF CK (CPK): CPT

## 2018-05-21 PROCEDURE — 93458 L HRT ARTERY/VENTRICLE ANGIO: CPT

## 2018-05-21 PROCEDURE — 93005 ELECTROCARDIOGRAM TRACING: CPT

## 2018-05-21 RX ORDER — SODIUM CHLORIDE 9 MG/ML
500 INJECTION INTRAMUSCULAR; INTRAVENOUS; SUBCUTANEOUS
Qty: 0 | Refills: 0 | Status: DISCONTINUED | OUTPATIENT
Start: 2018-05-21 | End: 2018-05-21

## 2018-05-21 RX ORDER — ALBUTEROL 90 UG/1
2 AEROSOL, METERED ORAL
Qty: 0 | Refills: 0 | COMMUNITY

## 2018-05-21 RX ORDER — TIOTROPIUM BROMIDE 18 UG/1
2 CAPSULE ORAL; RESPIRATORY (INHALATION)
Qty: 0 | Refills: 0 | COMMUNITY

## 2018-05-21 RX ORDER — ASPIRIN/CALCIUM CARB/MAGNESIUM 324 MG
81 TABLET ORAL ONCE
Qty: 0 | Refills: 0 | Status: COMPLETED | OUTPATIENT
Start: 2018-05-21 | End: 2018-05-21

## 2018-05-21 RX ORDER — FLUTICASONE PROPIONATE AND SALMETEROL 50; 250 UG/1; UG/1
1 POWDER ORAL; RESPIRATORY (INHALATION)
Qty: 0 | Refills: 0 | COMMUNITY

## 2018-05-21 RX ADMIN — Medication 81 MILLIGRAM(S): at 08:07

## 2018-05-21 RX ADMIN — SODIUM CHLORIDE 100 MILLILITER(S): 9 INJECTION INTRAMUSCULAR; INTRAVENOUS; SUBCUTANEOUS at 08:07

## 2018-05-21 NOTE — PROGRESS NOTE ADULT - SUBJECTIVE AND OBJECTIVE BOX
Interventional Cardiology PA SDA Discharge Note    Patient without complaints. Ambulated and voided without difficulties    Afebrile, VSS    Ext:    		Right Groin:   no    hematoma,    no bruit, dressing; C/D/I        Pulses:    intact RAD/DP/PT to baseline     A/P:    68 Y F current smoker with pmh HTN, HLD, CAD, TIA (1999- Residual L hand weakness), COPD (no home O2, no hospitalizations or intubations), AAA s/p open AAA repair by Dr Roberto in 2004 with further complications and degeneration, most recently s/p EVAR/AAA/CIAA with Dr Dickinson 3/29/2017 who presents to Dr. 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 CTA chest/abdomen/pelvix 4/17/2018 revealed descending ThAA with degeneration of the aorta at mid-descending thoracic segment measuring 5.7 cm with significant mural thrombus (previously measured 5.5 cm), widely patent celiac/SMA/renal arteries. Pt c/o LARSON after climbing 1-2 flights of stairs x few years.  Resolved after resting for a few minutes.  Pt denies CP, cough, hemoptysis, orthopnea, PND, N/V, palpitations, LE edema, weight changes, fever, chills.   CTA coronaries 3/8/2017 showing Severely elevated calcium score (1191) 98th percentile with Nonobstructive coronary artery disease. Normal left ventricular systolic function.In light of patient’s risk factors, CCS angina class III symptoms, known CAD (non obstructive by CTA), pt is recommended for cardiac catheterization with possible intervention if medically indicated as part of pre-op testing for TAAA repair with Dr. Monson and Dr. Burch. Pt is s/p cath which revealed LM normal, LCx patent fistual to LV, RCA patent fistula to LV EF 55%, EDP 10mmHg                  1.	Stable for discharge as per attending Dr. Tipton after bed rest, pt voids, groin stable and 30 minutes of ambulation.  2.	Follow-up with Cardiologist Dr. Tipton in 1-2 weeks  3.	Discharged forms signed and copies in chart

## 2018-05-22 DIAGNOSIS — R06.00 DYSPNEA, UNSPECIFIED: ICD-10-CM

## 2018-05-22 DIAGNOSIS — I10 ESSENTIAL (PRIMARY) HYPERTENSION: ICD-10-CM

## 2018-05-22 DIAGNOSIS — I69.934 MONOPLEGIA OF UPPER LIMB FOLLOWING UNSPECIFIED CEREBROVASCULAR DISEASE AFFECTING LEFT NON-DOMINANT SIDE: ICD-10-CM

## 2018-05-22 DIAGNOSIS — Z79.82 LONG TERM (CURRENT) USE OF ASPIRIN: ICD-10-CM

## 2018-05-22 DIAGNOSIS — Z88.5 ALLERGY STATUS TO NARCOTIC AGENT: ICD-10-CM

## 2018-05-22 DIAGNOSIS — Z88.0 ALLERGY STATUS TO PENICILLIN: ICD-10-CM

## 2018-05-22 DIAGNOSIS — R93.1 ABNORMAL FINDINGS ON DIAGNOSTIC IMAGING OF HEART AND CORONARY CIRCULATION: ICD-10-CM

## 2018-05-22 DIAGNOSIS — I20.8 OTHER FORMS OF ANGINA PECTORIS: ICD-10-CM

## 2018-05-22 DIAGNOSIS — E78.5 HYPERLIPIDEMIA, UNSPECIFIED: ICD-10-CM

## 2018-05-22 DIAGNOSIS — Z95.5 PRESENCE OF CORONARY ANGIOPLASTY IMPLANT AND GRAFT: ICD-10-CM

## 2018-05-22 DIAGNOSIS — I71.2 THORACIC AORTIC ANEURYSM, WITHOUT RUPTURE: ICD-10-CM

## 2018-05-31 ENCOUNTER — OUTPATIENT (OUTPATIENT)
Dept: OUTPATIENT SERVICES | Facility: HOSPITAL | Age: 68
LOS: 1 days | End: 2018-05-31
Payer: MEDICARE

## 2018-05-31 DIAGNOSIS — Z86.79 PERSONAL HISTORY OF OTHER DISEASES OF THE CIRCULATORY SYSTEM: Chronic | ICD-10-CM

## 2018-05-31 DIAGNOSIS — I71.6 THORACOABDOMINAL AORTIC ANEURYSM, WITHOUT RUPTURE: ICD-10-CM

## 2018-05-31 DIAGNOSIS — Z98.890 OTHER SPECIFIED POSTPROCEDURAL STATES: Chronic | ICD-10-CM

## 2018-05-31 DIAGNOSIS — Z90.49 ACQUIRED ABSENCE OF OTHER SPECIFIED PARTS OF DIGESTIVE TRACT: Chronic | ICD-10-CM

## 2018-05-31 PROCEDURE — 94726 PLETHYSMOGRAPHY LUNG VOLUMES: CPT

## 2018-05-31 PROCEDURE — 94760 N-INVAS EAR/PLS OXIMETRY 1: CPT

## 2018-05-31 PROCEDURE — 94729 DIFFUSING CAPACITY: CPT | Mod: 26

## 2018-05-31 PROCEDURE — 94726 PLETHYSMOGRAPHY LUNG VOLUMES: CPT | Mod: 26

## 2018-05-31 PROCEDURE — 94729 DIFFUSING CAPACITY: CPT

## 2018-05-31 PROCEDURE — 94010 BREATHING CAPACITY TEST: CPT | Mod: 26

## 2018-05-31 PROCEDURE — 94060 EVALUATION OF WHEEZING: CPT

## 2018-06-18 ENCOUNTER — FORM ENCOUNTER (OUTPATIENT)
Age: 68
End: 2018-06-18

## 2018-06-19 ENCOUNTER — OUTPATIENT (OUTPATIENT)
Dept: OUTPATIENT SERVICES | Facility: HOSPITAL | Age: 68
LOS: 1 days | End: 2018-06-19
Payer: MEDICARE

## 2018-06-19 ENCOUNTER — APPOINTMENT (OUTPATIENT)
Dept: CT IMAGING | Facility: HOSPITAL | Age: 68
End: 2018-06-19
Payer: MEDICARE

## 2018-06-19 DIAGNOSIS — Z90.49 ACQUIRED ABSENCE OF OTHER SPECIFIED PARTS OF DIGESTIVE TRACT: Chronic | ICD-10-CM

## 2018-06-19 DIAGNOSIS — Z86.79 PERSONAL HISTORY OF OTHER DISEASES OF THE CIRCULATORY SYSTEM: Chronic | ICD-10-CM

## 2018-06-19 DIAGNOSIS — Z98.890 OTHER SPECIFIED POSTPROCEDURAL STATES: Chronic | ICD-10-CM

## 2018-06-19 PROCEDURE — 70450 CT HEAD/BRAIN W/O DYE: CPT | Mod: 26

## 2018-06-19 PROCEDURE — 72100 X-RAY EXAM L-S SPINE 2/3 VWS: CPT

## 2018-06-19 PROCEDURE — 72100 X-RAY EXAM L-S SPINE 2/3 VWS: CPT | Mod: 26

## 2018-06-19 PROCEDURE — 70450 CT HEAD/BRAIN W/O DYE: CPT

## 2018-06-19 PROCEDURE — 71046 X-RAY EXAM CHEST 2 VIEWS: CPT

## 2018-06-19 PROCEDURE — 71046 X-RAY EXAM CHEST 2 VIEWS: CPT | Mod: 26

## 2018-07-25 ENCOUNTER — INPATIENT (INPATIENT)
Facility: HOSPITAL | Age: 68
LOS: 27 days | Discharge: NURSING, REHAB OR LONG-TERM CARE | DRG: 219 | End: 2018-08-22
Attending: THORACIC SURGERY (CARDIOTHORACIC VASCULAR SURGERY) | Admitting: THORACIC SURGERY (CARDIOTHORACIC VASCULAR SURGERY)
Payer: MEDICARE

## 2018-07-25 VITALS
WEIGHT: 112.22 LBS | DIASTOLIC BLOOD PRESSURE: 83 MMHG | SYSTOLIC BLOOD PRESSURE: 127 MMHG | HEIGHT: 63 IN | RESPIRATION RATE: 16 BRPM | HEART RATE: 86 BPM | OXYGEN SATURATION: 98 %

## 2018-07-25 DIAGNOSIS — Z86.79 PERSONAL HISTORY OF OTHER DISEASES OF THE CIRCULATORY SYSTEM: Chronic | ICD-10-CM

## 2018-07-25 DIAGNOSIS — Z90.49 ACQUIRED ABSENCE OF OTHER SPECIFIED PARTS OF DIGESTIVE TRACT: Chronic | ICD-10-CM

## 2018-07-25 DIAGNOSIS — Z98.890 OTHER SPECIFIED POSTPROCEDURAL STATES: Chronic | ICD-10-CM

## 2018-07-25 LAB
ALBUMIN SERPL ELPH-MCNC: 4.1 G/DL — SIGNIFICANT CHANGE UP (ref 3.3–5)
ALBUMIN SERPL ELPH-MCNC: 4.1 G/DL — SIGNIFICANT CHANGE UP (ref 3.3–5)
ALP SERPL-CCNC: 103 U/L — SIGNIFICANT CHANGE UP (ref 40–120)
ALP SERPL-CCNC: 103 U/L — SIGNIFICANT CHANGE UP (ref 40–120)
ALT FLD-CCNC: 9 U/L — LOW (ref 10–45)
ALT FLD-CCNC: 9 U/L — LOW (ref 10–45)
ANION GAP SERPL CALC-SCNC: 12 MMOL/L — SIGNIFICANT CHANGE UP (ref 5–17)
ANION GAP SERPL CALC-SCNC: 13 MMOL/L — SIGNIFICANT CHANGE UP (ref 5–17)
APTT BLD: 32.5 SEC — SIGNIFICANT CHANGE UP (ref 27.5–37.4)
AST SERPL-CCNC: 16 U/L — SIGNIFICANT CHANGE UP (ref 10–40)
AST SERPL-CCNC: 16 U/L — SIGNIFICANT CHANGE UP (ref 10–40)
BASOPHILS NFR BLD AUTO: 0.7 % — SIGNIFICANT CHANGE UP (ref 0–2)
BILIRUB DIRECT SERPL-MCNC: <0.2 MG/DL — SIGNIFICANT CHANGE UP (ref 0–0.2)
BILIRUB INDIRECT FLD-MCNC: >0.1 MG/DL — LOW (ref 0.2–1)
BILIRUB SERPL-MCNC: 0.3 MG/DL — SIGNIFICANT CHANGE UP (ref 0.2–1.2)
BILIRUB SERPL-MCNC: 0.3 MG/DL — SIGNIFICANT CHANGE UP (ref 0.2–1.2)
BUN SERPL-MCNC: 13 MG/DL — SIGNIFICANT CHANGE UP (ref 7–23)
BUN SERPL-MCNC: 14 MG/DL — SIGNIFICANT CHANGE UP (ref 7–23)
CALCIUM SERPL-MCNC: 9 MG/DL — SIGNIFICANT CHANGE UP (ref 8.4–10.5)
CALCIUM SERPL-MCNC: 9.3 MG/DL — SIGNIFICANT CHANGE UP (ref 8.4–10.5)
CHLORIDE SERPL-SCNC: 97 MMOL/L — SIGNIFICANT CHANGE UP (ref 96–108)
CHLORIDE SERPL-SCNC: 98 MMOL/L — SIGNIFICANT CHANGE UP (ref 96–108)
CK MB CFR SERPL CALC: 1.6 NG/ML — SIGNIFICANT CHANGE UP (ref 0–6.7)
CK SERPL-CCNC: 81 U/L — SIGNIFICANT CHANGE UP (ref 25–170)
CK SERPL-CCNC: 84 U/L — SIGNIFICANT CHANGE UP (ref 25–170)
CO2 SERPL-SCNC: 26 MMOL/L — SIGNIFICANT CHANGE UP (ref 22–31)
CO2 SERPL-SCNC: 27 MMOL/L — SIGNIFICANT CHANGE UP (ref 22–31)
CREAT SERPL-MCNC: 0.66 MG/DL — SIGNIFICANT CHANGE UP (ref 0.5–1.3)
CREAT SERPL-MCNC: 0.72 MG/DL — SIGNIFICANT CHANGE UP (ref 0.5–1.3)
EOSINOPHIL NFR BLD AUTO: 1.7 % — SIGNIFICANT CHANGE UP (ref 0–6)
GLUCOSE SERPL-MCNC: 147 MG/DL — HIGH (ref 70–99)
GLUCOSE SERPL-MCNC: 69 MG/DL — LOW (ref 70–99)
HCT VFR BLD CALC: 33.8 % — LOW (ref 34.5–45)
HGB BLD-MCNC: 11.5 G/DL — SIGNIFICANT CHANGE UP (ref 11.5–15.5)
INR BLD: 1.08 — SIGNIFICANT CHANGE UP (ref 0.88–1.16)
LYMPHOCYTES # BLD AUTO: 33.5 % — SIGNIFICANT CHANGE UP (ref 13–44)
MAGNESIUM SERPL-MCNC: 1.9 MG/DL — SIGNIFICANT CHANGE UP (ref 1.6–2.6)
MCHC RBC-ENTMCNC: 32.6 PG — SIGNIFICANT CHANGE UP (ref 27–34)
MCHC RBC-ENTMCNC: 34 G/DL — SIGNIFICANT CHANGE UP (ref 32–36)
MCV RBC AUTO: 95.8 FL — SIGNIFICANT CHANGE UP (ref 80–100)
MONOCYTES NFR BLD AUTO: 6.6 % — SIGNIFICANT CHANGE UP (ref 2–14)
NEUTROPHILS NFR BLD AUTO: 57.5 % — SIGNIFICANT CHANGE UP (ref 43–77)
NT-PROBNP SERPL-SCNC: 140 PG/ML — SIGNIFICANT CHANGE UP (ref 0–300)
PHOSPHATE SERPL-MCNC: 3.6 MG/DL — SIGNIFICANT CHANGE UP (ref 2.5–4.5)
PLATELET # BLD AUTO: 291 K/UL — SIGNIFICANT CHANGE UP (ref 150–400)
POTASSIUM SERPL-MCNC: 3.7 MMOL/L — SIGNIFICANT CHANGE UP (ref 3.5–5.3)
POTASSIUM SERPL-MCNC: 4.2 MMOL/L — SIGNIFICANT CHANGE UP (ref 3.5–5.3)
POTASSIUM SERPL-SCNC: 3.7 MMOL/L — SIGNIFICANT CHANGE UP (ref 3.5–5.3)
POTASSIUM SERPL-SCNC: 4.2 MMOL/L — SIGNIFICANT CHANGE UP (ref 3.5–5.3)
PROT SERPL-MCNC: 8.1 G/DL — SIGNIFICANT CHANGE UP (ref 6–8.3)
PROT SERPL-MCNC: 8.1 G/DL — SIGNIFICANT CHANGE UP (ref 6–8.3)
PROTHROM AB SERPL-ACNC: 12 SEC — SIGNIFICANT CHANGE UP (ref 9.8–12.7)
RBC # BLD: 3.53 M/UL — LOW (ref 3.8–5.2)
RBC # FLD: 13.8 % — SIGNIFICANT CHANGE UP (ref 10.3–16.9)
SODIUM SERPL-SCNC: 136 MMOL/L — SIGNIFICANT CHANGE UP (ref 135–145)
SODIUM SERPL-SCNC: 137 MMOL/L — SIGNIFICANT CHANGE UP (ref 135–145)
TROPONIN T SERPL-MCNC: <0.01 NG/ML — SIGNIFICANT CHANGE UP (ref 0–0.01)
WBC # BLD: 5.8 K/UL — SIGNIFICANT CHANGE UP (ref 3.8–10.5)
WBC # FLD AUTO: 5.8 K/UL — SIGNIFICANT CHANGE UP (ref 3.8–10.5)

## 2018-07-25 PROCEDURE — 62272 THER SPI PNXR DRG CSF: CPT | Mod: 53

## 2018-07-25 PROCEDURE — 93880 EXTRACRANIAL BILAT STUDY: CPT | Mod: 26

## 2018-07-25 PROCEDURE — 99223 1ST HOSP IP/OBS HIGH 75: CPT

## 2018-07-25 PROCEDURE — 62272 THER SPI PNXR DRG CSF: CPT | Mod: 59

## 2018-07-25 PROCEDURE — 71045 X-RAY EXAM CHEST 1 VIEW: CPT | Mod: 26

## 2018-07-25 RX ORDER — POTASSIUM CHLORIDE 20 MEQ
40 PACKET (EA) ORAL ONCE
Qty: 0 | Refills: 0 | Status: COMPLETED | OUTPATIENT
Start: 2018-07-25 | End: 2018-07-25

## 2018-07-25 RX ORDER — FENTANYL CITRATE 50 UG/ML
25 INJECTION INTRAVENOUS ONCE
Qty: 0 | Refills: 0 | Status: DISCONTINUED | OUTPATIENT
Start: 2018-07-25 | End: 2018-07-25

## 2018-07-25 RX ORDER — MIDAZOLAM HYDROCHLORIDE 1 MG/ML
1 INJECTION, SOLUTION INTRAMUSCULAR; INTRAVENOUS ONCE
Qty: 0 | Refills: 0 | Status: DISCONTINUED | OUTPATIENT
Start: 2018-07-25 | End: 2018-07-25

## 2018-07-25 RX ORDER — CHLORHEXIDINE GLUCONATE 213 G/1000ML
1 SOLUTION TOPICAL ONCE
Qty: 0 | Refills: 0 | Status: COMPLETED | OUTPATIENT
Start: 2018-07-25 | End: 2018-07-25

## 2018-07-25 RX ORDER — FAMOTIDINE 10 MG/ML
20 INJECTION INTRAVENOUS DAILY
Qty: 0 | Refills: 0 | Status: DISCONTINUED | OUTPATIENT
Start: 2018-07-25 | End: 2018-07-26

## 2018-07-25 RX ORDER — VANCOMYCIN HCL 1 G
1000 VIAL (EA) INTRAVENOUS ONCE
Qty: 0 | Refills: 0 | Status: COMPLETED | OUTPATIENT
Start: 2018-07-25 | End: 2018-07-25

## 2018-07-25 RX ORDER — DOCUSATE SODIUM 100 MG
100 CAPSULE ORAL THREE TIMES A DAY
Qty: 0 | Refills: 0 | Status: DISCONTINUED | OUTPATIENT
Start: 2018-07-25 | End: 2018-07-26

## 2018-07-25 RX ORDER — SENNA PLUS 8.6 MG/1
2 TABLET ORAL AT BEDTIME
Qty: 0 | Refills: 0 | Status: DISCONTINUED | OUTPATIENT
Start: 2018-07-25 | End: 2018-07-26

## 2018-07-25 RX ORDER — IPRATROPIUM/ALBUTEROL SULFATE 18-103MCG
3 AEROSOL WITH ADAPTER (GRAM) INHALATION EVERY 6 HOURS
Qty: 0 | Refills: 0 | Status: DISCONTINUED | OUTPATIENT
Start: 2018-07-25 | End: 2018-07-26

## 2018-07-25 RX ORDER — BUDESONIDE AND FORMOTEROL FUMARATE DIHYDRATE 160; 4.5 UG/1; UG/1
2 AEROSOL RESPIRATORY (INHALATION)
Qty: 0 | Refills: 0 | Status: DISCONTINUED | OUTPATIENT
Start: 2018-07-25 | End: 2018-07-26

## 2018-07-25 RX ORDER — CHLORHEXIDINE GLUCONATE 213 G/1000ML
1 SOLUTION TOPICAL ONCE
Qty: 0 | Refills: 0 | Status: COMPLETED | OUTPATIENT
Start: 2018-07-26 | End: 2018-07-26

## 2018-07-25 RX ORDER — MAGNESIUM OXIDE 400 MG ORAL TABLET 241.3 MG
400 TABLET ORAL ONCE
Qty: 0 | Refills: 0 | Status: COMPLETED | OUTPATIENT
Start: 2018-07-25 | End: 2018-07-25

## 2018-07-25 RX ORDER — CHLORHEXIDINE GLUCONATE 213 G/1000ML
10 SOLUTION TOPICAL ONCE
Qty: 0 | Refills: 0 | Status: COMPLETED | OUTPATIENT
Start: 2018-07-25 | End: 2018-07-26

## 2018-07-25 RX ORDER — SIMVASTATIN 20 MG/1
20 TABLET, FILM COATED ORAL AT BEDTIME
Qty: 0 | Refills: 0 | Status: DISCONTINUED | OUTPATIENT
Start: 2018-07-25 | End: 2018-07-26

## 2018-07-25 RX ADMIN — MIDAZOLAM HYDROCHLORIDE 1 MILLIGRAM(S): 1 INJECTION, SOLUTION INTRAMUSCULAR; INTRAVENOUS at 16:45

## 2018-07-25 RX ADMIN — BUDESONIDE AND FORMOTEROL FUMARATE DIHYDRATE 2 PUFF(S): 160; 4.5 AEROSOL RESPIRATORY (INHALATION) at 22:03

## 2018-07-25 RX ADMIN — Medication 100 MILLIGRAM(S): at 22:03

## 2018-07-25 RX ADMIN — Medication 40 MILLIEQUIVALENT(S): at 20:44

## 2018-07-25 RX ADMIN — Medication 250 MILLIGRAM(S): at 16:00

## 2018-07-25 RX ADMIN — CHLORHEXIDINE GLUCONATE 1 APPLICATION(S): 213 SOLUTION TOPICAL at 20:40

## 2018-07-25 RX ADMIN — FENTANYL CITRATE 25 MICROGRAM(S): 50 INJECTION INTRAVENOUS at 17:03

## 2018-07-25 RX ADMIN — MAGNESIUM OXIDE 400 MG ORAL TABLET 400 MILLIGRAM(S): 241.3 TABLET ORAL at 20:45

## 2018-07-25 RX ADMIN — SENNA PLUS 2 TABLET(S): 8.6 TABLET ORAL at 22:03

## 2018-07-25 RX ADMIN — FENTANYL CITRATE 25 MICROGRAM(S): 50 INJECTION INTRAVENOUS at 17:48

## 2018-07-25 NOTE — H&P ADULT - ASSESSMENT
68 year old female ith history of CAD, TIA in 1998, COPD, AAA s/p open AAA repair by Dr. Roberto in 2004, EVAR/AAA/CIAA with Dr. Burch on 3/29/17 who was referred to Dr. 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  She underwent CTA chest/abdomen/pelvis in 4/2018 which demonstrated descending thoracoabdominal aortic aneurysm wtih degeneration of the aorta at the mid-descending thoracic segment, measuring 5.7cm with significant mural thrombus (previously measuring 5.5cm) with widely patent celiac/SMA/renal arteries.  She completed outpatient pre-operative evaluation and was deemed a surgical candidate for thoracoabdominal aneurysm repair and admitted to [facility] 7[facility]18 under the care of Dr. Monson in anticipated of planned procedure on 7/26/18.     Neurovascular: No delirium, pain well managed on current regimen  -C/w PRNs for Pain control  -Monitor neuro status    Respiratory: Saturates well on room air.   -AM CXR stable, repeat in AM  -Encourage IS 10x/hour while awake, Cough and deep breathing exercises  -Monitor respiratory status via SpO2  -Continue to wean NC as tolerated    Cardiovascular:   -Monitor HR/BP/Tele    GI: Tolerating PO  -Prophylaxis: Protonix / Pepcid  -C/w bowel regimen    /Renal:   -BUN/Cr:   -Trend Cr on AM labs  -Replete electrolytes as needed    ID: Afebrile, asymptomatic  -WCC:  -Continue to monitor for SIRS/Sepsis syndrome while inpatient    Endo:  -A1C:  -TSH:    Heme:   -H/H:  -CBC, chem in AM  -DVT ppx: HSQ 5000 / 7500 u q8h / q12h and SCDs    Disposition: Home when medically appropriate. 68 year old female ith history of CAD, TIA in 1998, COPD, AAA s/p open AAA repair by Dr. Roberto in 2004, EVAR/AAA/CIAA with Dr. Burch on 3/29/17 who was referred to Dr. 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  She underwent CTA chest/abdomen/pelvis in 4/2018 which demonstrated descending thoracoabdominal aortic aneurysm wtih degeneration of the aorta at the mid-descending thoracic segment, measuring 5.7cm with significant mural thrombus (previously measuring 5.5cm) with widely patent celiac/SMA/renal arteries.  She completed outpatient pre-operative evaluation and was deemed a surgical candidate for thoracoabdominal aneurysm repair and admitted to [facility] on 7/25/18 under the care of Dr. Monson in anticipated of planned procedure on 7/26/18.     Plan:   -Admit to 9E for pre-op optimization.   -Unable to place lumbar drain at bedside, will place while in OR tomorrow AM.    -Pre-op blood on hold  -Consent signed and in chart  -Orders placed  -Resumed appropriate home medications  -DVT/GI ppx  -OR tomorrow 68 year old female ith history of CAD, TIA in 1998, COPD, AAA s/p open AAA repair by Dr. Roberto in 2004, EVAR/AAA/CIAA with Dr. Burch on 3/29/17 who was referred to Dr. 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  She underwent CTA chest/abdomen/pelvis in 4/2018 which demonstrated descending thoracoabdominal aortic aneurysm wtih degeneration of the aorta at the mid-descending thoracic segment, measuring 5.7cm with significant mural thrombus (previously measuring 5.5cm) with widely patent celiac/SMA/renal arteries.  She completed outpatient pre-operative evaluation and was deemed a surgical candidate for thoracoabdominal aneurysm repair and admitted to [facility] on 7/25/18 under the care of Dr. Monson in anticipated of planned procedure on 7/26/18.     Plan:   -Admit to 9E for pre-op optimization.   -Unable to place lumbar drain at bedside, will place while in OR tomorrow AM.    -Pre-op blood on hold  -Consent signed and in chart  -Orders placed  -Resumed appropriate home medications  -DVT ppx: SCDs placed.  Holding HSQ 2/2 pre-operative status  -GI ppx  -OR tomorrow

## 2018-07-25 NOTE — H&P ADULT - NSHPREVIEWOFSYSTEMS_GEN_ALL_CORE
Review of Systems  CONSTITUTIONAL:  Denies Fevers / chills, sweats, fatigue, weight loss, weight gain                                      NEURO:  Denies paresthesia, seizures, syncope, confusion                                                                                EYES:  Denies Blurry vision, discharge, pain, loss of vision                                                                                    ENMT:  Denies Difficulty hearing, vertigo, dysphagia, epistaxis, recent dental work                                       CV:  +LARSON, intermittent chest pain, see HPI; Denies palpitations, orthopnea                                                                                          RESPIRATORY:  Denies Wheezing, SOB, cough / sputum, hemoptysis                                                                GI:  Denies Nausea, vomiting, diarrhea, constipation, melena, difficulty swallowing                                               : Denies Hematuria, dysuria, urgency, incontinence                                                                                         MUSCULOSKELETAL:  Denies arthritis, joint swelling, muscle weakness                                                             SKIN/BREAST:  Denies rash, itching, hair loss, masses                                                                                            PSYCH:  Denies depression, anxiety, suicidal ideation                                                                                               HEME/LYMPH:  Denies bruises easily, enlarged lymph nodes, tender lymph nodes                                        ENDOCRINE:  Denies cold intolerance, heat intolerance, polydipsia

## 2018-07-25 NOTE — H&P ADULT - HISTORY OF PRESENT ILLNESS
This is a 68 year old female ith history of CAD, TIA in 1998, COPD, AAA s/p open This is a 68 year old female ith history of CAD, TIA in 1998, COPD, AAA s/p open AAA repair by Dr. Roberto in 2004 with complicated post-operative course and degenerations, s/p EVAR/AAA/CIAA This is a 68 year old female ith history of CAD, TIA in 1998, COPD, AAA s/p open AAA repair by Dr. Roberto in 2004, EVAR/AAA/CIAA with Dr. Burch on 3/29/17 who was referred to Dr. 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  She underwent CTA chest/abdomen/pelvis in 4/2018 which demonstrated descending thoracoabdominal aortic aneurysm wtih degeneration of the aorta at the mid-descending thoracic segment, measuring 5.7cm with significant mural thrombus (previously measuring 5.5cm) with widely patent celiac/SMA/renal arteries.  She completed outpatient pre-operative evaluation and was deemed a surgical candidate for thoracoabdominal aneurysm repair and admitted to [facility] on 7/25/18 under the care of Dr. Monson in anticipated of planned procedure on 7/26.  On admission, she states that she feels well overall without acute complaints at this time, though she continues to endorse LARSON, NYHA Class III (dyspneic after 1-2 flights of stairs) and intermittent chest discomfort, non-radiating, spontaneous in onset and resolution without exacerbating factors and relieved with rest.  Denies HA, AMS, active CP, palpitations, SOB, cough, hemoptysis, n/v/d, fever, chills, or recent hospitalizations.

## 2018-07-25 NOTE — PRE-OP CHECKLIST - SELECT TESTS ORDERED
CXR/CMP/Hepatic Function/Type and Screen/PT/PTT/INR/Urinalysis/EKG/POCT Blood Glucose CXR/Urinalysis/108/CMP/POCT Blood Glucose/EKG/Type and Screen/INR/Hepatic Function/PT/PTT

## 2018-07-25 NOTE — H&P ADULT - NSHPSOCIALHISTORY_GEN_ALL_CORE
SOCIAL HISTORY:  Smoker:  Yes- 1/2 ppd x 40 years   ETOH use:  NO    Ilicit Drug use:  YES / NO  Occupation:  Assisted device use (Cane / Walker):  Live with:

## 2018-07-25 NOTE — H&P ADULT - NSHPPHYSICALEXAM_GEN_ALL_CORE
Appearance: No acute distress.  Neurologic: AAOx3, no AMS or focal deficits.  Responds appropriately to verbal and physical stimuli; exhibits purposeful movement in all extremities.  Strength 5/5 in b/l UE/LE.   HEENT:   MMM, PERRLA, EOMI b/l  Neck: Supple, - JVD; - Carotid Bruit   Cardiovascular: RRR, S1/S2. No m/r/g.  Respiratory: No acute respiratory distress. CTA b/l, no w/r/r.   Gastrointestinal:  Soft, non-tender, non-distended, + BS.	  Skin: No rashes. No ecchymoses. No cyanosis.  Extremities: Exhibits normal range of motion, no clubbing, cyanosis or edema.  Vascular: Peripheral pulses palpable 2+ bilaterally.

## 2018-07-25 NOTE — H&P ADULT - NSHPLABSRESULTS_GEN_ALL_CORE
< from: CT Head No Cont (06.19.18 @ 14:16) >      IMPRESSION:-    1.  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    2.  No intracranial hemorrhage or mass.    < end of copied text >    < from: CT Angio Chest w/ IV Cont (04.17.18 @ 10:46) >    IMPRESSION:    Diffuse aneurysmal dilatation of the descending thoracic aorta, slightly   enlarged now measuring up to 5.7 cm, previously measured 5.5 cm.    Infrarenal abdominal aortic stent in place similar to the prior studies.    < end of copied text >

## 2018-07-25 NOTE — PROGRESS NOTE ADULT - SUBJECTIVE AND OBJECTIVE BOX
69yo with history of CAD, TIA, AAA s/p reair in 2004, EVAR/AAA/CIAA with Dr Solorzano 2017 now imaging showing descending aortic aneurysm with degenerated aorta with significant mural thrombus.  Pt's recent imaging showing enlarging descending aneurysm.      Admitted to ICU for pre-op workup Lumbar drain insertion.  Neurosurg attempted lumbar drain but was unable to place.   Pt now in ICU for awaiting surgery in AM. 69yo with history of CAD, TIA, AAA s/p repair in 2004  3/2017 form managment of left iliac artery aneurysm underwent with Dr Solorzano 2017  Recent imaging showing descending aortic aneurysm with degenerated aorta with significant mural thrombus.    Admitted to ICU for pre-op Lumbar drain insertion.  Neurosurg attempted lumbar drain but was unable to place.   Pt now in ICU for awaiting surgery in AM and lumbar drain insertion is planned to be completed in the OR 67yo with history of CAD, TIA, AAA s/p repair in 2004  3/2017 form managment of left iliac artery aneurysm underwent EVAR with coverage of left hypogastric artery and graft extension into the left EIA with Dr Solorzano  Recent imaging showing descending aortic aneurysm with degenerated aorta with significant mural thrombus.    Admitted to ICU for pre-op Lumbar drain insertion.  Neurosurg attempted lumbar drain but was unable to place.   Pt now in ICU for awaiting surgery in AM and lumbar drain insertion is planned to be completed in the OR

## 2018-07-26 ENCOUNTER — RESULT REVIEW (OUTPATIENT)
Age: 68
End: 2018-07-26

## 2018-07-26 ENCOUNTER — APPOINTMENT (OUTPATIENT)
Dept: CARDIOTHORACIC SURGERY | Facility: HOSPITAL | Age: 68
End: 2018-07-26
Payer: MEDICARE

## 2018-07-26 LAB
ALBUMIN SERPL ELPH-MCNC: 1.8 G/DL — LOW (ref 3.3–5)
ALBUMIN SERPL ELPH-MCNC: 2.6 G/DL — LOW (ref 3.3–5)
ALBUMIN SERPL ELPH-MCNC: 3.9 G/DL — SIGNIFICANT CHANGE UP (ref 3.3–5)
ALP SERPL-CCNC: 101 U/L — SIGNIFICANT CHANGE UP (ref 40–120)
ALP SERPL-CCNC: 47 U/L — SIGNIFICANT CHANGE UP (ref 40–120)
ALP SERPL-CCNC: 60 U/L — SIGNIFICANT CHANGE UP (ref 40–120)
ALT FLD-CCNC: 48 U/L — HIGH (ref 10–45)
ALT FLD-CCNC: 49 U/L — HIGH (ref 10–45)
ALT FLD-CCNC: SIGNIFICANT CHANGE UP U/L (ref 10–45)
ANION GAP SERPL CALC-SCNC: 14 MMOL/L — SIGNIFICANT CHANGE UP (ref 5–17)
ANION GAP SERPL CALC-SCNC: 14 MMOL/L — SIGNIFICANT CHANGE UP (ref 5–17)
ANION GAP SERPL CALC-SCNC: 16 MMOL/L — SIGNIFICANT CHANGE UP (ref 5–17)
APTT BLD: 28.9 SEC — SIGNIFICANT CHANGE UP (ref 27.5–37.4)
APTT BLD: 33.2 SEC — SIGNIFICANT CHANGE UP (ref 27.5–37.4)
APTT BLD: 38.7 SEC — HIGH (ref 27.5–37.4)
AST SERPL-CCNC: 108 U/L — HIGH (ref 10–40)
AST SERPL-CCNC: 108 U/L — HIGH (ref 10–40)
AST SERPL-CCNC: SIGNIFICANT CHANGE UP U/L (ref 10–40)
BASE EXCESS BLDV CALC-SCNC: -0.5 MMOL/L — SIGNIFICANT CHANGE UP
BASE EXCESS BLDV CALC-SCNC: 0.5 MMOL/L — SIGNIFICANT CHANGE UP
BILIRUB SERPL-MCNC: 0.4 MG/DL — SIGNIFICANT CHANGE UP (ref 0.2–1.2)
BILIRUB SERPL-MCNC: 2.9 MG/DL — HIGH (ref 0.2–1.2)
BILIRUB SERPL-MCNC: 3.1 MG/DL — HIGH (ref 0.2–1.2)
BUN SERPL-MCNC: 10 MG/DL — SIGNIFICANT CHANGE UP (ref 7–23)
BUN SERPL-MCNC: 13 MG/DL — SIGNIFICANT CHANGE UP (ref 7–23)
BUN SERPL-MCNC: 13 MG/DL — SIGNIFICANT CHANGE UP (ref 7–23)
CALCIUM SERPL-MCNC: 8 MG/DL — LOW (ref 8.4–10.5)
CALCIUM SERPL-MCNC: 8.2 MG/DL — LOW (ref 8.4–10.5)
CALCIUM SERPL-MCNC: 9.4 MG/DL — SIGNIFICANT CHANGE UP (ref 8.4–10.5)
CHLORIDE SERPL-SCNC: 102 MMOL/L — SIGNIFICANT CHANGE UP (ref 96–108)
CHLORIDE SERPL-SCNC: 104 MMOL/L — SIGNIFICANT CHANGE UP (ref 96–108)
CHLORIDE SERPL-SCNC: 99 MMOL/L — SIGNIFICANT CHANGE UP (ref 96–108)
CO2 SERPL-SCNC: 23 MMOL/L — SIGNIFICANT CHANGE UP (ref 22–31)
CO2 SERPL-SCNC: 24 MMOL/L — SIGNIFICANT CHANGE UP (ref 22–31)
CO2 SERPL-SCNC: 25 MMOL/L — SIGNIFICANT CHANGE UP (ref 22–31)
CREAT SERPL-MCNC: 0.62 MG/DL — SIGNIFICANT CHANGE UP (ref 0.5–1.3)
CREAT SERPL-MCNC: 0.85 MG/DL — SIGNIFICANT CHANGE UP (ref 0.5–1.3)
CREAT SERPL-MCNC: 0.91 MG/DL — SIGNIFICANT CHANGE UP (ref 0.5–1.3)
FIBRINOGEN PPP-MCNC: 239 MG/DL — LOW (ref 258–438)
FIBRINOGEN PPP-MCNC: 271 MG/DL — SIGNIFICANT CHANGE UP (ref 258–438)
GAS PNL BLDA: SIGNIFICANT CHANGE UP
GAS PNL BLDA: SIGNIFICANT CHANGE UP
GAS PNL BLDV: SIGNIFICANT CHANGE UP
GLUCOSE BLDC GLUCOMTR-MCNC: 108 MG/DL — HIGH (ref 70–99)
GLUCOSE BLDC GLUCOMTR-MCNC: 182 MG/DL — HIGH (ref 70–99)
GLUCOSE SERPL-MCNC: 112 MG/DL — HIGH (ref 70–99)
GLUCOSE SERPL-MCNC: 130 MG/DL — HIGH (ref 70–99)
GLUCOSE SERPL-MCNC: 186 MG/DL — HIGH (ref 70–99)
HCO3 BLDV-SCNC: 26 MMOL/L — SIGNIFICANT CHANGE UP (ref 20–27)
HCO3 BLDV-SCNC: 27 MMOL/L — SIGNIFICANT CHANGE UP (ref 20–27)
HCT VFR BLD CALC: 32.1 % — LOW (ref 34.5–45)
HCT VFR BLD CALC: 34.1 % — LOW (ref 34.5–45)
HCT VFR BLD CALC: 34.6 % — SIGNIFICANT CHANGE UP (ref 34.5–45)
HGB BLD-MCNC: 11.1 G/DL — LOW (ref 11.5–15.5)
HGB BLD-MCNC: 11.7 G/DL — SIGNIFICANT CHANGE UP (ref 11.5–15.5)
HGB BLD-MCNC: 12 G/DL — SIGNIFICANT CHANGE UP (ref 11.5–15.5)
INR BLD: 1.07 — SIGNIFICANT CHANGE UP (ref 0.88–1.16)
INR BLD: 1.18 — HIGH (ref 0.88–1.16)
INR BLD: 1.35 — HIGH (ref 0.88–1.16)
LACTATE SERPL-SCNC: 3 MMOL/L — HIGH (ref 0.5–2)
LACTATE SERPL-SCNC: 4.2 MMOL/L — CRITICAL HIGH (ref 0.5–2)
MAGNESIUM SERPL-MCNC: 1.1 MG/DL — LOW (ref 1.6–2.6)
MAGNESIUM SERPL-MCNC: 1.1 MG/DL — LOW (ref 1.6–2.6)
MAGNESIUM SERPL-MCNC: 1.9 MG/DL — SIGNIFICANT CHANGE UP (ref 1.6–2.6)
MCHC RBC-ENTMCNC: 29.8 PG — SIGNIFICANT CHANGE UP (ref 27–34)
MCHC RBC-ENTMCNC: 30.2 PG — SIGNIFICANT CHANGE UP (ref 27–34)
MCHC RBC-ENTMCNC: 32.1 PG — SIGNIFICANT CHANGE UP (ref 27–34)
MCHC RBC-ENTMCNC: 33.8 G/DL — SIGNIFICANT CHANGE UP (ref 32–36)
MCHC RBC-ENTMCNC: 34.6 G/DL — SIGNIFICANT CHANGE UP (ref 32–36)
MCHC RBC-ENTMCNC: 35.2 G/DL — SIGNIFICANT CHANGE UP (ref 32–36)
MCV RBC AUTO: 85.7 FL — SIGNIFICANT CHANGE UP (ref 80–100)
MCV RBC AUTO: 86.1 FL — SIGNIFICANT CHANGE UP (ref 80–100)
MCV RBC AUTO: 95.1 FL — SIGNIFICANT CHANGE UP (ref 80–100)
PCO2 BLDV: 47 MMHG — SIGNIFICANT CHANGE UP (ref 41–51)
PCO2 BLDV: 52 MMHG — HIGH (ref 41–51)
PH BLDV: 7.33 — SIGNIFICANT CHANGE UP (ref 7.32–7.43)
PH BLDV: 7.35 — SIGNIFICANT CHANGE UP (ref 7.32–7.43)
PHOSPHATE SERPL-MCNC: 3.8 MG/DL — SIGNIFICANT CHANGE UP (ref 2.5–4.5)
PHOSPHATE SERPL-MCNC: 4.5 MG/DL — SIGNIFICANT CHANGE UP (ref 2.5–4.5)
PHOSPHATE SERPL-MCNC: 4.7 MG/DL — HIGH (ref 2.5–4.5)
PLATELET # BLD AUTO: 270 K/UL — SIGNIFICANT CHANGE UP (ref 150–400)
PLATELET # BLD AUTO: 58 K/UL — LOW (ref 150–400)
PLATELET # BLD AUTO: 98 K/UL — LOW (ref 150–400)
PO2 BLDV: 52 MMHG — SIGNIFICANT CHANGE UP
PO2 BLDV: 61 MMHG — SIGNIFICANT CHANGE UP
POTASSIUM SERPL-MCNC: 3.6 MMOL/L — SIGNIFICANT CHANGE UP (ref 3.5–5.3)
POTASSIUM SERPL-MCNC: 3.7 MMOL/L — SIGNIFICANT CHANGE UP (ref 3.5–5.3)
POTASSIUM SERPL-MCNC: SIGNIFICANT CHANGE UP MMOL/L (ref 3.5–5.3)
POTASSIUM SERPL-SCNC: 3.6 MMOL/L — SIGNIFICANT CHANGE UP (ref 3.5–5.3)
POTASSIUM SERPL-SCNC: 3.7 MMOL/L — SIGNIFICANT CHANGE UP (ref 3.5–5.3)
POTASSIUM SERPL-SCNC: SIGNIFICANT CHANGE UP MMOL/L (ref 3.5–5.3)
PROT SERPL-MCNC: 3.5 G/DL — LOW (ref 6–8.3)
PROT SERPL-MCNC: 4.6 G/DL — LOW (ref 6–8.3)
PROT SERPL-MCNC: 8.1 G/DL — SIGNIFICANT CHANGE UP (ref 6–8.3)
PROTHROM AB SERPL-ACNC: 11.9 SEC — SIGNIFICANT CHANGE UP (ref 9.8–12.7)
PROTHROM AB SERPL-ACNC: 13.1 SEC — HIGH (ref 9.8–12.7)
PROTHROM AB SERPL-ACNC: 15.1 SEC — HIGH (ref 9.8–12.7)
RBC # BLD: 3.64 M/UL — LOW (ref 3.8–5.2)
RBC # BLD: 3.73 M/UL — LOW (ref 3.8–5.2)
RBC # BLD: 3.98 M/UL — SIGNIFICANT CHANGE UP (ref 3.8–5.2)
RBC # FLD: 13.7 % — SIGNIFICANT CHANGE UP (ref 10.3–16.9)
RBC # FLD: 14.9 % — SIGNIFICANT CHANGE UP (ref 10.3–16.9)
RBC # FLD: 14.9 % — SIGNIFICANT CHANGE UP (ref 10.3–16.9)
SAO2 % BLDV: 81 % — SIGNIFICANT CHANGE UP
SAO2 % BLDV: 89 % — SIGNIFICANT CHANGE UP
SODIUM SERPL-SCNC: 138 MMOL/L — SIGNIFICANT CHANGE UP (ref 135–145)
SODIUM SERPL-SCNC: 141 MMOL/L — SIGNIFICANT CHANGE UP (ref 135–145)
SODIUM SERPL-SCNC: 142 MMOL/L — SIGNIFICANT CHANGE UP (ref 135–145)
WBC # BLD: 4.3 K/UL — SIGNIFICANT CHANGE UP (ref 3.8–10.5)
WBC # BLD: 4.6 K/UL — SIGNIFICANT CHANGE UP (ref 3.8–10.5)
WBC # BLD: 4.9 K/UL — SIGNIFICANT CHANGE UP (ref 3.8–10.5)
WBC # FLD AUTO: 4.3 K/UL — SIGNIFICANT CHANGE UP (ref 3.8–10.5)
WBC # FLD AUTO: 4.6 K/UL — SIGNIFICANT CHANGE UP (ref 3.8–10.5)
WBC # FLD AUTO: 4.9 K/UL — SIGNIFICANT CHANGE UP (ref 3.8–10.5)

## 2018-07-26 PROCEDURE — 35631 BPG AOR-CELIAC-MSN-RENAL: CPT | Mod: 62,GC

## 2018-07-26 PROCEDURE — 99292 CRITICAL CARE ADDL 30 MIN: CPT

## 2018-07-26 PROCEDURE — 71045 X-RAY EXAM CHEST 1 VIEW: CPT | Mod: 26,77

## 2018-07-26 PROCEDURE — 35226 REPAIR BLOOD VESSEL DIR LXTR: CPT | Mod: 62,59

## 2018-07-26 PROCEDURE — 35226 REPAIR BLOOD VESSEL DIR LXTR: CPT | Mod: 62,59,GC

## 2018-07-26 PROCEDURE — 99291 CRITICAL CARE FIRST HOUR: CPT

## 2018-07-26 PROCEDURE — 35631 BPG AOR-CELIAC-MSN-RENAL: CPT | Mod: 50,62,59

## 2018-07-26 PROCEDURE — 74018 RADEX ABDOMEN 1 VIEW: CPT | Mod: 26

## 2018-07-26 PROCEDURE — 71045 X-RAY EXAM CHEST 1 VIEW: CPT | Mod: 26

## 2018-07-26 PROCEDURE — 33877 THORACOABDOMINAL GRAFT: CPT | Mod: 62

## 2018-07-26 PROCEDURE — 33877 THORACOABDOMINAL GRAFT: CPT | Mod: 62,GC

## 2018-07-26 RX ORDER — POTASSIUM CHLORIDE 20 MEQ
20 PACKET (EA) ORAL
Qty: 0 | Refills: 0 | Status: COMPLETED | OUTPATIENT
Start: 2018-07-26 | End: 2018-07-26

## 2018-07-26 RX ORDER — DEXTROSE 50 % IN WATER 50 %
25 SYRINGE (ML) INTRAVENOUS
Qty: 0 | Refills: 0 | Status: DISCONTINUED | OUTPATIENT
Start: 2018-07-26 | End: 2018-08-12

## 2018-07-26 RX ORDER — CHLORHEXIDINE GLUCONATE 213 G/1000ML
5 SOLUTION TOPICAL EVERY 4 HOURS
Qty: 0 | Refills: 0 | Status: DISCONTINUED | OUTPATIENT
Start: 2018-07-26 | End: 2018-08-06

## 2018-07-26 RX ORDER — PROPOFOL 10 MG/ML
5 INJECTION, EMULSION INTRAVENOUS
Qty: 1000 | Refills: 0 | Status: DISCONTINUED | OUTPATIENT
Start: 2018-07-26 | End: 2018-07-30

## 2018-07-26 RX ORDER — CIPROFLOXACIN LACTATE 400MG/40ML
VIAL (ML) INTRAVENOUS
Qty: 0 | Refills: 0 | Status: DISCONTINUED | OUTPATIENT
Start: 2018-07-27 | End: 2018-07-30

## 2018-07-26 RX ORDER — DEXMEDETOMIDINE HYDROCHLORIDE IN 0.9% SODIUM CHLORIDE 4 UG/ML
0.2 INJECTION INTRAVENOUS
Qty: 200 | Refills: 0 | Status: DISCONTINUED | OUTPATIENT
Start: 2018-07-26 | End: 2018-07-31

## 2018-07-26 RX ORDER — ATORVASTATIN CALCIUM 80 MG/1
20 TABLET, FILM COATED ORAL AT BEDTIME
Qty: 0 | Refills: 0 | Status: DISCONTINUED | OUTPATIENT
Start: 2018-07-26 | End: 2018-08-22

## 2018-07-26 RX ORDER — SODIUM CHLORIDE 9 MG/ML
1000 INJECTION INTRAMUSCULAR; INTRAVENOUS; SUBCUTANEOUS
Qty: 0 | Refills: 0 | Status: DISCONTINUED | OUTPATIENT
Start: 2018-07-26 | End: 2018-08-15

## 2018-07-26 RX ORDER — VANCOMYCIN HCL 1 G
1000 VIAL (EA) INTRAVENOUS EVERY 12 HOURS
Qty: 0 | Refills: 0 | Status: COMPLETED | OUTPATIENT
Start: 2018-07-26 | End: 2018-07-28

## 2018-07-26 RX ORDER — MAGNESIUM SULFATE 500 MG/ML
2 VIAL (ML) INJECTION ONCE
Qty: 0 | Refills: 0 | Status: COMPLETED | OUTPATIENT
Start: 2018-07-26 | End: 2018-07-26

## 2018-07-26 RX ORDER — BUPIVACAINE 13.3 MG/ML
20 INJECTION, SUSPENSION, LIPOSOMAL INFILTRATION ONCE
Qty: 0 | Refills: 0 | Status: DISCONTINUED | OUTPATIENT
Start: 2018-07-26 | End: 2018-07-26

## 2018-07-26 RX ORDER — CIPROFLOXACIN LACTATE 400MG/40ML
400 VIAL (ML) INTRAVENOUS EVERY 12 HOURS
Qty: 0 | Refills: 0 | Status: DISCONTINUED | OUTPATIENT
Start: 2018-07-27 | End: 2018-07-30

## 2018-07-26 RX ORDER — DEXTROSE 50 % IN WATER 50 %
50 SYRINGE (ML) INTRAVENOUS
Qty: 0 | Refills: 0 | Status: DISCONTINUED | OUTPATIENT
Start: 2018-07-26 | End: 2018-08-12

## 2018-07-26 RX ORDER — METRONIDAZOLE 500 MG
500 TABLET ORAL EVERY 8 HOURS
Qty: 0 | Refills: 0 | Status: DISCONTINUED | OUTPATIENT
Start: 2018-07-26 | End: 2018-07-27

## 2018-07-26 RX ORDER — INSULIN HUMAN 100 [IU]/ML
1 INJECTION, SOLUTION SUBCUTANEOUS
Qty: 100 | Refills: 0 | Status: DISCONTINUED | OUTPATIENT
Start: 2018-07-26 | End: 2018-07-27

## 2018-07-26 RX ORDER — CIPROFLOXACIN LACTATE 400MG/40ML
400 VIAL (ML) INTRAVENOUS ONCE
Qty: 0 | Refills: 0 | Status: COMPLETED | OUTPATIENT
Start: 2018-07-26 | End: 2018-07-27

## 2018-07-26 RX ORDER — NOREPINEPHRINE BITARTRATE/D5W 8 MG/250ML
0.05 PLASTIC BAG, INJECTION (ML) INTRAVENOUS
Qty: 8 | Refills: 0 | Status: DISCONTINUED | OUTPATIENT
Start: 2018-07-26 | End: 2018-07-27

## 2018-07-26 RX ORDER — HEPARIN SODIUM 5000 [USP'U]/ML
5000 INJECTION INTRAVENOUS; SUBCUTANEOUS EVERY 12 HOURS
Qty: 0 | Refills: 0 | Status: DISCONTINUED | OUTPATIENT
Start: 2018-07-27 | End: 2018-07-27

## 2018-07-26 RX ORDER — CALCIUM GLUCONATE 100 MG/ML
2 VIAL (ML) INTRAVENOUS ONCE
Qty: 0 | Refills: 0 | Status: COMPLETED | OUTPATIENT
Start: 2018-07-26 | End: 2018-07-26

## 2018-07-26 RX ORDER — PANTOPRAZOLE SODIUM 20 MG/1
40 TABLET, DELAYED RELEASE ORAL DAILY
Qty: 0 | Refills: 0 | Status: DISCONTINUED | OUTPATIENT
Start: 2018-07-26 | End: 2018-08-07

## 2018-07-26 RX ORDER — CLEVIDIPINE BUTYRATE 50MG/100ML
1 VIAL (ML) INTRAVENOUS
Qty: 25 | Refills: 0 | Status: DISCONTINUED | OUTPATIENT
Start: 2018-07-26 | End: 2018-07-28

## 2018-07-26 RX ADMIN — Medication 250 MILLIGRAM(S): at 23:25

## 2018-07-26 RX ADMIN — CHLORHEXIDINE GLUCONATE 5 MILLILITER(S): 213 SOLUTION TOPICAL at 23:15

## 2018-07-26 RX ADMIN — CHLORHEXIDINE GLUCONATE 10 MILLILITER(S): 213 SOLUTION TOPICAL at 05:14

## 2018-07-26 RX ADMIN — CHLORHEXIDINE GLUCONATE 1 APPLICATION(S): 213 SOLUTION TOPICAL at 05:15

## 2018-07-26 RX ADMIN — Medication 3 MILLILITER(S): at 00:12

## 2018-07-26 RX ADMIN — Medication 200 GRAM(S): at 21:00

## 2018-07-26 RX ADMIN — Medication 100 MILLIGRAM(S): at 05:14

## 2018-07-26 RX ADMIN — Medication 3 MILLILITER(S): at 05:14

## 2018-07-26 RX ADMIN — Medication 50 GRAM(S): at 23:20

## 2018-07-26 RX ADMIN — Medication 100 MILLIEQUIVALENT(S): at 23:25

## 2018-07-26 NOTE — PROGRESS NOTE ADULT - SUBJECTIVE AND OBJECTIVE BOX
Date of surgery : 7/26/2018    Surgeon : Rashad Monson    Anesthesia : Miguelina    Procedure :  Open Thoraco-abdominal aortic replacement with separate re- implantation of meso-renals.  (Bypass to bilateral renal, SMA, and celiacs    Pump Time :      201 min Aortic X -Clamp :         173min                               EF :     Pre OP :         nl                         Post OP :      nl                                           Airway :      Difficult Airway :                   [ ] Yes     [x ] No      ETT             Size :                         Lip Line :                           Ventilator setting :              [x ] ON          [ x] BS +           [40 ETCO2       Mode: AC/ CMV (Assist Control/ Continuous Mandatory Ventilation)  RR (machine): 14  TV (machine): 450  FiO2: 50  PEEP: 5  ITime: 1  MAP: 10  PIP: 28          Lines :      [ x] PA catheter      [x ] Radial Arterial Line              [ x ] Right              [  ] Left       [ x] Femoral Arterial Line          [x  ] Right              [  ] Left      [x] Central Line   IJ                     [ x ] Right              [  ] Left      [ ] Femoral Central line            [  ] Right              [  ] Left     Tubes :      Blakes :                                      [  ] Med.             [  ] Pleural      Chest Tubes :                           [  ] Med.             [ 3 ] Pleural   (2 pleural, 1 subdiaphragm    Pacing     Wires :             [   ] Atrial                  [   ]  Venticular      Pacer Setting :                   Threshold  :                Sensitivity :       Intake     Drips :   Levo 0.02 --OFF      IVF : 6000ml      Albumin :     Product :             [ 13  ]  PRBC       [ 2 ] Platelet     [ 5] FFP          [  20 ] Cryoprecipitate                                  [   ]  Cell saver                                  [   ]  Hemostatic agent :                                  [   ]   Factors :       Output      EBL :      Urine : 1400      Antibiotics : Vancomycin   ICU Vital Signs Last 24 Hrs  T(C): 36.6 (07-26-18 @ 05:00), Max: 36.6 (07-25-18 @ 22:35)  T(F): 97.9 (07-26-18 @ 05:00), Max: 97.9 (07-25-18 @ 22:35)  HR: 92 (07-26-18 @ 22:00) (68 - 92)  BP: 131/74 (07-26-18 @ 07:00) (131/74 - 151/87)  BP(mean): 112 (07-26-18 @ 06:00) (89 - 113)  ABP: 130/60 (07-26-18 @ 22:00) (126/56 - 134/60)  ABP(mean): 92 (07-26-18 @ 22:00) (82 - 92)  RR: 14 (07-26-18 @ 22:00) (12 - 18)  SpO2: 100% (07-26-18 @ 22:00) (94% - 100%)    I&O's Summary    25 Jul 2018 07:01  -  26 Jul 2018 07:00  --------------------------------------------------------  IN: 590 mL / OUT: 2160 mL / NET: -1570 mL    26 Jul 2018 07:01  -  26 Jul 2018 22:12  --------------------------------------------------------  IN: 839 mL / OUT: 680 mL / NET: 159 mL      ABG - ( 26 Jul 2018 20:28 )  pH: 7.37  /  pCO2: 42    /  pO2: 240   / HCO3: 24    / Base Excess: -1.1  /  SaO2: 99                            11.1   4.3   )-----------( 58       ( 26 Jul 2018 20:29 )             32.1     26 Jul 2018 20:29    141    |  104    |  13     ----------------------------<  130    3.6     |  23     |  0.85     Ca    8.0        26 Jul 2018 20:29  Phos  4.5       26 Jul 2018 20:29  Mg     1.1       26 Jul 2018 20:29    TPro  3.5    /  Alb  1.8    /  TBili  2.9    /  DBili  x      /  AST  108    /  ALT  48     /  AlkPhos  47     26 Jul 2018 20:29    PT/INR - ( 26 Jul 2018 20:29 )   PT: 15.1 sec;   INR: 1.35     PTT - ( 26 Jul 2018 20:29 )  PTT:38.7 sec    MEDICATIONS  (STANDING):  atorvastatin 20 milliGRAM(s) Oral at bedtime  calcium gluconate IVPB 2 Gram(s) IV Intermittent once  chlorhexidine 0.12% Liquid 5 milliLiter(s) Swish and Spit every 4 hours  clevidipine Infusion 1 mG/Hr IV Continuous <Continuous>  insulin Infusion 1 Unit(s)/Hr IV Continuous <Continuous>  magnesium sulfate  IVPB 2 Gram(s) IV Intermittent once  norepinephrine Infusion 0.05 MICROgram(s)/kG/Min IV Continuous <Continuous>  pantoprazole  Injectable 40 milliGRAM(s) IV Push daily  potassium chloride  20 mEq/100 mL IVPB 20 milliEquivalent(s) IV Intermittent every 1 hour  propofol Infusion 5 MICROgram(s)/kG/Min IV Continuous <Continuous>  vancomycin  IVPB 1000 milliGRAM(s) IV Intermittent every 12 hours    THORACOABDOMINAL ANEURYS  THORACOABDOMINAL ANEURYSM  COPD (chronic obstructive pulmonary disease)  HLD (hyperlipidemia)  HTN (hypertension)  AAA (abdominal aortic aneurysm)  CAD (coronary artery disease)  Thoracic aortic aneurysm without rupture  Thoracoabdominal surgical exposure  S/P AAA (abdominal aortic aneurysm) repair  History of abdominal aortic aneurysm (AAA)  History of cholecystectomy      PHYSICAL EXAM:  Gen : intubated sedated   Respiratory: decreased in the bases  Cardiovascular: S1 and S2, RRR, no M/G/R  Gastrointestinal: BS+, soft, NT/ND  Extremities: No peripheral edema  Vascular: 2+ peripheral pulses  Incision: clean dry/ no sign of infection  Lines: no sign of infection

## 2018-07-26 NOTE — BRIEF OPERATIVE NOTE - PRE-OP DX
Thoracic aortic aneurysm without rupture  07/26/2018    Active  Alie Fagan Thoracoabdominal aortic aneurysm  07/26/2018    Active  Alie Fagan

## 2018-07-26 NOTE — PROGRESS NOTE ADULT - ASSESSMENT
69yo with history of CAD, TIA,  open AAA s/p repair in 2004 and 3/2017  EVAR with coverage of left hypogastric artery and graft extension into the left EIA with Dr Solorzano for management of left iliac artery aneurysm.   Recent imaging showing descending aortic aneurysm with degenerated aorta with significant mural thrombus.        CT done in April 2018 : Diffusely aneurysmal descending thoracic aorta increased in size, with thick atheromatous plaques and mural thrombus.  Suprarenal abdominal aorta was also aneurysmal and increased in size. Patent Celiac and SMA and renals.  Infra-renal endoluminal stent with non-occlusive thrombus.     Pt today underwent open TAAA repair with descending and supra-renal aorta, bypass of SMA, Celiac and bilateral renals.  Multiple blood products.  Lumbar drain inserted pre-op for ISP monitoring      Problems  1. s/p TAAA repair with bypass of SMA, celiac and bilateral renals  2. Hemodynamic instability   3. High risk for spinal cord ischemia -- LD management      Plan   Neuro -- lumbar drain management per protocol  Draining 10cc/hr to keep ISP < 10  Avoiding sedation so we can assess neuro status  Goal MAP 80-90 first few hours if bleeding not observe will increase MAP per protocol   CVS - hemodynamic support, monitor for arrhythmia.  Monitor for bleeding  wean pressors as tolerated  chest tube management  Pulm - vent weaning as tolerated   CXR with left lung contusion as expected.  S/p Bronch in the OR.   GI - GI proph., concern for abdominal malperfusion in the OR, but on arrival abdominal exam shows + BS, lactate improving.    - monitor UOP  Endo - glycemic control  Heme - correct coagulapathy, monitor for bleeding  ID - periop antibiotics.       Critical post op.    Critical care time spent  70 min

## 2018-07-26 NOTE — BRIEF OPERATIVE NOTE - PROCEDURE
<<-----Click on this checkbox to enter Procedure Thoracoabdominal surgical exposure  07/26/2018  for repair of thoracoabdominal arotic aneurysm with bypass of celiac , SMA, and bilateral renal arteries.  Active  [id number]

## 2018-07-27 LAB
ALBUMIN SERPL ELPH-MCNC: 2.4 G/DL — LOW (ref 3.3–5)
ALBUMIN SERPL ELPH-MCNC: 2.7 G/DL — LOW (ref 3.3–5)
ALBUMIN SERPL ELPH-MCNC: 2.7 G/DL — LOW (ref 3.3–5)
ALBUMIN SERPL ELPH-MCNC: 2.8 G/DL — LOW (ref 3.3–5)
ALBUMIN SERPL ELPH-MCNC: 2.9 G/DL — LOW (ref 3.3–5)
ALBUMIN SERPL ELPH-MCNC: 3.1 G/DL — LOW (ref 3.3–5)
ALP SERPL-CCNC: 66 U/L — SIGNIFICANT CHANGE UP (ref 40–120)
ALP SERPL-CCNC: 71 U/L — SIGNIFICANT CHANGE UP (ref 40–120)
ALP SERPL-CCNC: 71 U/L — SIGNIFICANT CHANGE UP (ref 40–120)
ALP SERPL-CCNC: 75 U/L — SIGNIFICANT CHANGE UP (ref 40–120)
ALT FLD-CCNC: 44 U/L — SIGNIFICANT CHANGE UP (ref 10–45)
ALT FLD-CCNC: 50 U/L — HIGH (ref 10–45)
ALT FLD-CCNC: 53 U/L — HIGH (ref 10–45)
ALT FLD-CCNC: 54 U/L — HIGH (ref 10–45)
ALT FLD-CCNC: SIGNIFICANT CHANGE UP U/L (ref 10–45)
ALT FLD-CCNC: SIGNIFICANT CHANGE UP U/L (ref 10–45)
ANION GAP SERPL CALC-SCNC: 12 MMOL/L — SIGNIFICANT CHANGE UP (ref 5–17)
ANION GAP SERPL CALC-SCNC: 13 MMOL/L — SIGNIFICANT CHANGE UP (ref 5–17)
ANION GAP SERPL CALC-SCNC: 13 MMOL/L — SIGNIFICANT CHANGE UP (ref 5–17)
ANION GAP SERPL CALC-SCNC: 14 MMOL/L — SIGNIFICANT CHANGE UP (ref 5–17)
ANION GAP SERPL CALC-SCNC: 15 MMOL/L — SIGNIFICANT CHANGE UP (ref 5–17)
APPEARANCE UR: CLEAR — SIGNIFICANT CHANGE UP
APTT BLD: 29.2 SEC — SIGNIFICANT CHANGE UP (ref 27.5–37.4)
APTT BLD: 29.5 SEC — SIGNIFICANT CHANGE UP (ref 27.5–37.4)
APTT BLD: 30.4 SEC — SIGNIFICANT CHANGE UP (ref 27.5–37.4)
APTT BLD: 30.9 SEC — SIGNIFICANT CHANGE UP (ref 27.5–37.4)
AST SERPL-CCNC: 102 U/L — HIGH (ref 10–40)
AST SERPL-CCNC: 126 U/L — HIGH (ref 10–40)
AST SERPL-CCNC: 126 U/L — HIGH (ref 10–40)
AST SERPL-CCNC: 129 U/L — HIGH (ref 10–40)
AST SERPL-CCNC: SIGNIFICANT CHANGE UP U/L (ref 10–40)
AST SERPL-CCNC: SIGNIFICANT CHANGE UP U/L (ref 10–40)
BASE EXCESS BLDA CALC-SCNC: -1.4 MMOL/L — SIGNIFICANT CHANGE UP (ref -2–3)
BASE EXCESS BLDV CALC-SCNC: -2.3 MMOL/L — SIGNIFICANT CHANGE UP
BASE EXCESS BLDV CALC-SCNC: -2.4 MMOL/L — SIGNIFICANT CHANGE UP
BASE EXCESS BLDV CALC-SCNC: 1.4 MMOL/L — SIGNIFICANT CHANGE UP
BILIRUB SERPL-MCNC: 1.8 MG/DL — HIGH (ref 0.2–1.2)
BILIRUB SERPL-MCNC: 2.3 MG/DL — HIGH (ref 0.2–1.2)
BILIRUB SERPL-MCNC: 2.4 MG/DL — HIGH (ref 0.2–1.2)
BILIRUB SERPL-MCNC: 2.4 MG/DL — HIGH (ref 0.2–1.2)
BILIRUB SERPL-MCNC: 2.8 MG/DL — HIGH (ref 0.2–1.2)
BILIRUB SERPL-MCNC: 3.2 MG/DL — HIGH (ref 0.2–1.2)
BILIRUB UR-MCNC: NEGATIVE — SIGNIFICANT CHANGE UP
BUN SERPL-MCNC: 16 MG/DL — SIGNIFICANT CHANGE UP (ref 7–23)
BUN SERPL-MCNC: 17 MG/DL — SIGNIFICANT CHANGE UP (ref 7–23)
BUN SERPL-MCNC: 18 MG/DL — SIGNIFICANT CHANGE UP (ref 7–23)
BUN SERPL-MCNC: 19 MG/DL — SIGNIFICANT CHANGE UP (ref 7–23)
BUN SERPL-MCNC: 20 MG/DL — SIGNIFICANT CHANGE UP (ref 7–23)
BUN SERPL-MCNC: 22 MG/DL — SIGNIFICANT CHANGE UP (ref 7–23)
BUN SERPL-MCNC: 27 MG/DL — HIGH (ref 7–23)
CALCIUM SERPL-MCNC: 8.2 MG/DL — LOW (ref 8.4–10.5)
CALCIUM SERPL-MCNC: 8.5 MG/DL — SIGNIFICANT CHANGE UP (ref 8.4–10.5)
CALCIUM SERPL-MCNC: 8.5 MG/DL — SIGNIFICANT CHANGE UP (ref 8.4–10.5)
CALCIUM SERPL-MCNC: 8.7 MG/DL — SIGNIFICANT CHANGE UP (ref 8.4–10.5)
CALCIUM SERPL-MCNC: 8.8 MG/DL — SIGNIFICANT CHANGE UP (ref 8.4–10.5)
CHLORIDE SERPL-SCNC: 100 MMOL/L — SIGNIFICANT CHANGE UP (ref 96–108)
CHLORIDE SERPL-SCNC: 96 MMOL/L — SIGNIFICANT CHANGE UP (ref 96–108)
CHLORIDE SERPL-SCNC: 97 MMOL/L — SIGNIFICANT CHANGE UP (ref 96–108)
CHLORIDE SERPL-SCNC: 98 MMOL/L — SIGNIFICANT CHANGE UP (ref 96–108)
CHLORIDE SERPL-SCNC: 98 MMOL/L — SIGNIFICANT CHANGE UP (ref 96–108)
CHLORIDE SERPL-SCNC: 99 MMOL/L — SIGNIFICANT CHANGE UP (ref 96–108)
CHLORIDE SERPL-SCNC: 99 MMOL/L — SIGNIFICANT CHANGE UP (ref 96–108)
CO2 SERPL-SCNC: 23 MMOL/L — SIGNIFICANT CHANGE UP (ref 22–31)
CO2 SERPL-SCNC: 24 MMOL/L — SIGNIFICANT CHANGE UP (ref 22–31)
CO2 SERPL-SCNC: 25 MMOL/L — SIGNIFICANT CHANGE UP (ref 22–31)
COLOR SPEC: YELLOW — SIGNIFICANT CHANGE UP
CREAT SERPL-MCNC: 0.99 MG/DL — SIGNIFICANT CHANGE UP (ref 0.5–1.3)
CREAT SERPL-MCNC: 1.09 MG/DL — SIGNIFICANT CHANGE UP (ref 0.5–1.3)
CREAT SERPL-MCNC: 1.19 MG/DL — SIGNIFICANT CHANGE UP (ref 0.5–1.3)
CREAT SERPL-MCNC: 1.24 MG/DL — SIGNIFICANT CHANGE UP (ref 0.5–1.3)
CREAT SERPL-MCNC: 1.33 MG/DL — HIGH (ref 0.5–1.3)
CREAT SERPL-MCNC: 1.4 MG/DL — HIGH (ref 0.5–1.3)
CREAT SERPL-MCNC: 1.71 MG/DL — HIGH (ref 0.5–1.3)
DIFF PNL FLD: ABNORMAL
GAS PNL BLDA: SIGNIFICANT CHANGE UP
GAS PNL BLDV: SIGNIFICANT CHANGE UP
GLUCOSE BLDC GLUCOMTR-MCNC: 106 MG/DL — HIGH (ref 70–99)
GLUCOSE BLDC GLUCOMTR-MCNC: 107 MG/DL — HIGH (ref 70–99)
GLUCOSE BLDC GLUCOMTR-MCNC: 115 MG/DL — HIGH (ref 70–99)
GLUCOSE BLDC GLUCOMTR-MCNC: 120 MG/DL — HIGH (ref 70–99)
GLUCOSE BLDC GLUCOMTR-MCNC: 122 MG/DL — HIGH (ref 70–99)
GLUCOSE BLDC GLUCOMTR-MCNC: 129 MG/DL — HIGH (ref 70–99)
GLUCOSE BLDC GLUCOMTR-MCNC: 189 MG/DL — HIGH (ref 70–99)
GLUCOSE BLDC GLUCOMTR-MCNC: 216 MG/DL — HIGH (ref 70–99)
GLUCOSE BLDC GLUCOMTR-MCNC: 235 MG/DL — HIGH (ref 70–99)
GLUCOSE BLDC GLUCOMTR-MCNC: 271 MG/DL — HIGH (ref 70–99)
GLUCOSE BLDC GLUCOMTR-MCNC: 49 MG/DL — LOW (ref 70–99)
GLUCOSE BLDC GLUCOMTR-MCNC: 61 MG/DL — LOW (ref 70–99)
GLUCOSE BLDC GLUCOMTR-MCNC: 62 MG/DL — LOW (ref 70–99)
GLUCOSE SERPL-MCNC: 108 MG/DL — HIGH (ref 70–99)
GLUCOSE SERPL-MCNC: 117 MG/DL — HIGH (ref 70–99)
GLUCOSE SERPL-MCNC: 124 MG/DL — HIGH (ref 70–99)
GLUCOSE SERPL-MCNC: 137 MG/DL — HIGH (ref 70–99)
GLUCOSE SERPL-MCNC: 206 MG/DL — HIGH (ref 70–99)
GLUCOSE SERPL-MCNC: 286 MG/DL — HIGH (ref 70–99)
GLUCOSE SERPL-MCNC: 65 MG/DL — LOW (ref 70–99)
GLUCOSE UR QL: NEGATIVE — SIGNIFICANT CHANGE UP
HBV SURFACE AG SER-ACNC: SIGNIFICANT CHANGE UP
HCO3 BLDA-SCNC: 24 MMOL/L — SIGNIFICANT CHANGE UP (ref 21–28)
HCO3 BLDV-SCNC: 24 MMOL/L — SIGNIFICANT CHANGE UP (ref 20–27)
HCO3 BLDV-SCNC: 25 MMOL/L — SIGNIFICANT CHANGE UP (ref 20–27)
HCO3 BLDV-SCNC: 27 MMOL/L — SIGNIFICANT CHANGE UP (ref 20–27)
HCT VFR BLD CALC: 35.2 % — SIGNIFICANT CHANGE UP (ref 34.5–45)
HCT VFR BLD CALC: 36.4 % — SIGNIFICANT CHANGE UP (ref 34.5–45)
HCT VFR BLD CALC: 37.3 % — SIGNIFICANT CHANGE UP (ref 34.5–45)
HCT VFR BLD CALC: 37.3 % — SIGNIFICANT CHANGE UP (ref 34.5–45)
HCV AB S/CO SERPL IA: 0.12 S/CO — SIGNIFICANT CHANGE UP
HCV AB SERPL-IMP: SIGNIFICANT CHANGE UP
HGB BLD-MCNC: 12.6 G/DL — SIGNIFICANT CHANGE UP (ref 11.5–15.5)
HGB BLD-MCNC: 13 G/DL — SIGNIFICANT CHANGE UP (ref 11.5–15.5)
HGB BLD-MCNC: 13.3 G/DL — SIGNIFICANT CHANGE UP (ref 11.5–15.5)
HGB BLD-MCNC: 13.4 G/DL — SIGNIFICANT CHANGE UP (ref 11.5–15.5)
HIV 1+2 AB+HIV1 P24 AG SERPL QL IA: SIGNIFICANT CHANGE UP
INR BLD: 0.98 — SIGNIFICANT CHANGE UP (ref 0.88–1.16)
INR BLD: 1.02 — SIGNIFICANT CHANGE UP (ref 0.88–1.16)
INR BLD: 1.04 — SIGNIFICANT CHANGE UP (ref 0.88–1.16)
INR BLD: 1.16 — SIGNIFICANT CHANGE UP (ref 0.88–1.16)
KETONES UR-MCNC: NEGATIVE — SIGNIFICANT CHANGE UP
LACTATE SERPL-SCNC: 1.4 MMOL/L — SIGNIFICANT CHANGE UP (ref 0.5–2)
LACTATE SERPL-SCNC: 1.9 MMOL/L — SIGNIFICANT CHANGE UP (ref 0.5–2)
LACTATE SERPL-SCNC: 2.3 MMOL/L — HIGH (ref 0.5–2)
LACTATE SERPL-SCNC: 2.8 MMOL/L — HIGH (ref 0.5–2)
LACTATE SERPL-SCNC: 3.4 MMOL/L — HIGH (ref 0.5–2)
LEUKOCYTE ESTERASE UR-ACNC: NEGATIVE — SIGNIFICANT CHANGE UP
MAGNESIUM SERPL-MCNC: 1.8 MG/DL — SIGNIFICANT CHANGE UP (ref 1.6–2.6)
MAGNESIUM SERPL-MCNC: 2 MG/DL — SIGNIFICANT CHANGE UP (ref 1.6–2.6)
MAGNESIUM SERPL-MCNC: 2.2 MG/DL — SIGNIFICANT CHANGE UP (ref 1.6–2.6)
MAGNESIUM SERPL-MCNC: 2.5 MG/DL — SIGNIFICANT CHANGE UP (ref 1.6–2.6)
MCHC RBC-ENTMCNC: 29.8 PG — SIGNIFICANT CHANGE UP (ref 27–34)
MCHC RBC-ENTMCNC: 30.4 PG — SIGNIFICANT CHANGE UP (ref 27–34)
MCHC RBC-ENTMCNC: 30.4 PG — SIGNIFICANT CHANGE UP (ref 27–34)
MCHC RBC-ENTMCNC: 30.7 PG — SIGNIFICANT CHANGE UP (ref 27–34)
MCHC RBC-ENTMCNC: 35.7 G/DL — SIGNIFICANT CHANGE UP (ref 32–36)
MCHC RBC-ENTMCNC: 35.7 G/DL — SIGNIFICANT CHANGE UP (ref 32–36)
MCHC RBC-ENTMCNC: 35.8 G/DL — SIGNIFICANT CHANGE UP (ref 32–36)
MCHC RBC-ENTMCNC: 35.9 G/DL — SIGNIFICANT CHANGE UP (ref 32–36)
MCV RBC AUTO: 83.5 FL — SIGNIFICANT CHANGE UP (ref 80–100)
MCV RBC AUTO: 85 FL — SIGNIFICANT CHANGE UP (ref 80–100)
MCV RBC AUTO: 85.4 FL — SIGNIFICANT CHANGE UP (ref 80–100)
MCV RBC AUTO: 85.4 FL — SIGNIFICANT CHANGE UP (ref 80–100)
NITRITE UR-MCNC: NEGATIVE — SIGNIFICANT CHANGE UP
PCO2 BLDA: 42 MMHG — SIGNIFICANT CHANGE UP (ref 32–45)
PCO2 BLDV: 46 MMHG — SIGNIFICANT CHANGE UP (ref 41–51)
PCO2 BLDV: 48 MMHG — SIGNIFICANT CHANGE UP (ref 41–51)
PCO2 BLDV: 55 MMHG — HIGH (ref 41–51)
PH BLDA: 7.37 — SIGNIFICANT CHANGE UP (ref 7.35–7.45)
PH BLDV: 7.28 — LOW (ref 7.32–7.43)
PH BLDV: 7.33 — SIGNIFICANT CHANGE UP (ref 7.32–7.43)
PH BLDV: 7.37 — SIGNIFICANT CHANGE UP (ref 7.32–7.43)
PH UR: 6.5 — SIGNIFICANT CHANGE UP (ref 5–8)
PHOSPHATE SERPL-MCNC: 4.6 MG/DL — HIGH (ref 2.5–4.5)
PHOSPHATE SERPL-MCNC: 5.4 MG/DL — HIGH (ref 2.5–4.5)
PHOSPHATE SERPL-MCNC: 5.6 MG/DL — HIGH (ref 2.5–4.5)
PHOSPHATE SERPL-MCNC: 5.7 MG/DL — HIGH (ref 2.5–4.5)
PLATELET # BLD AUTO: 27 K/UL — LOW (ref 150–400)
PLATELET # BLD AUTO: 30 K/UL — LOW (ref 150–400)
PLATELET # BLD AUTO: 38 K/UL — LOW (ref 150–400)
PLATELET # BLD AUTO: 70 K/UL — LOW (ref 150–400)
PO2 BLDA: 194 MMHG — HIGH (ref 83–108)
PO2 BLDV: 40 MMHG — SIGNIFICANT CHANGE UP
PO2 BLDV: 44 MMHG — SIGNIFICANT CHANGE UP
PO2 BLDV: 58 MMHG — SIGNIFICANT CHANGE UP
POTASSIUM SERPL-MCNC: 3.9 MMOL/L — SIGNIFICANT CHANGE UP (ref 3.5–5.3)
POTASSIUM SERPL-MCNC: 4.2 MMOL/L — SIGNIFICANT CHANGE UP (ref 3.5–5.3)
POTASSIUM SERPL-MCNC: 4.4 MMOL/L — SIGNIFICANT CHANGE UP (ref 3.5–5.3)
POTASSIUM SERPL-MCNC: 4.5 MMOL/L — SIGNIFICANT CHANGE UP (ref 3.5–5.3)
POTASSIUM SERPL-MCNC: SIGNIFICANT CHANGE UP MMOL/L (ref 3.5–5.3)
POTASSIUM SERPL-SCNC: 3.9 MMOL/L — SIGNIFICANT CHANGE UP (ref 3.5–5.3)
POTASSIUM SERPL-SCNC: 4.2 MMOL/L — SIGNIFICANT CHANGE UP (ref 3.5–5.3)
POTASSIUM SERPL-SCNC: 4.4 MMOL/L — SIGNIFICANT CHANGE UP (ref 3.5–5.3)
POTASSIUM SERPL-SCNC: 4.5 MMOL/L — SIGNIFICANT CHANGE UP (ref 3.5–5.3)
POTASSIUM SERPL-SCNC: SIGNIFICANT CHANGE UP MMOL/L (ref 3.5–5.3)
PROT SERPL-MCNC: 5.1 G/DL — LOW (ref 6–8.3)
PROT SERPL-MCNC: 5.1 G/DL — LOW (ref 6–8.3)
PROT SERPL-MCNC: 5.4 G/DL — LOW (ref 6–8.3)
PROT SERPL-MCNC: 5.6 G/DL — LOW (ref 6–8.3)
PROT UR-MCNC: 100 MG/DL
PROTHROM AB SERPL-ACNC: 10.9 SEC — SIGNIFICANT CHANGE UP (ref 9.8–12.7)
PROTHROM AB SERPL-ACNC: 11.3 SEC — SIGNIFICANT CHANGE UP (ref 9.8–12.7)
PROTHROM AB SERPL-ACNC: 11.5 SEC — SIGNIFICANT CHANGE UP (ref 9.8–12.7)
PROTHROM AB SERPL-ACNC: 12.9 SEC — HIGH (ref 9.8–12.7)
RBC # BLD: 4.14 M/UL — SIGNIFICANT CHANGE UP (ref 3.8–5.2)
RBC # BLD: 4.36 M/UL — SIGNIFICANT CHANGE UP (ref 3.8–5.2)
RBC # BLD: 4.37 M/UL — SIGNIFICANT CHANGE UP (ref 3.8–5.2)
RBC # BLD: 4.37 M/UL — SIGNIFICANT CHANGE UP (ref 3.8–5.2)
RBC # FLD: 15.4 % — SIGNIFICANT CHANGE UP (ref 10.3–16.9)
RBC # FLD: 15.5 % — SIGNIFICANT CHANGE UP (ref 10.3–16.9)
RBC # FLD: 15.5 % — SIGNIFICANT CHANGE UP (ref 10.3–16.9)
RBC # FLD: 15.7 % — SIGNIFICANT CHANGE UP (ref 10.3–16.9)
SAO2 % BLDA: 99 % — SIGNIFICANT CHANGE UP (ref 95–100)
SAO2 % BLDV: 74 % — SIGNIFICANT CHANGE UP
SAO2 % BLDV: 77 % — SIGNIFICANT CHANGE UP
SAO2 % BLDV: 85 % — SIGNIFICANT CHANGE UP
SODIUM SERPL-SCNC: 134 MMOL/L — LOW (ref 135–145)
SODIUM SERPL-SCNC: 134 MMOL/L — LOW (ref 135–145)
SODIUM SERPL-SCNC: 135 MMOL/L — SIGNIFICANT CHANGE UP (ref 135–145)
SODIUM SERPL-SCNC: 135 MMOL/L — SIGNIFICANT CHANGE UP (ref 135–145)
SODIUM SERPL-SCNC: 137 MMOL/L — SIGNIFICANT CHANGE UP (ref 135–145)
SODIUM SERPL-SCNC: 137 MMOL/L — SIGNIFICANT CHANGE UP (ref 135–145)
SODIUM SERPL-SCNC: 138 MMOL/L — SIGNIFICANT CHANGE UP (ref 135–145)
SP GR SPEC: 1.01 — SIGNIFICANT CHANGE UP (ref 1–1.03)
UROBILINOGEN FLD QL: 0.2 E.U./DL — SIGNIFICANT CHANGE UP
WBC # BLD: 5.9 K/UL — SIGNIFICANT CHANGE UP (ref 3.8–10.5)
WBC # BLD: 6.2 K/UL — SIGNIFICANT CHANGE UP (ref 3.8–10.5)
WBC # BLD: 8.2 K/UL — SIGNIFICANT CHANGE UP (ref 3.8–10.5)
WBC # BLD: 8.7 K/UL — SIGNIFICANT CHANGE UP (ref 3.8–10.5)
WBC # FLD AUTO: 5.9 K/UL — SIGNIFICANT CHANGE UP (ref 3.8–10.5)
WBC # FLD AUTO: 6.2 K/UL — SIGNIFICANT CHANGE UP (ref 3.8–10.5)
WBC # FLD AUTO: 8.2 K/UL — SIGNIFICANT CHANGE UP (ref 3.8–10.5)
WBC # FLD AUTO: 8.7 K/UL — SIGNIFICANT CHANGE UP (ref 3.8–10.5)

## 2018-07-27 PROCEDURE — 71045 X-RAY EXAM CHEST 1 VIEW: CPT | Mod: 26

## 2018-07-27 PROCEDURE — 99291 CRITICAL CARE FIRST HOUR: CPT

## 2018-07-27 PROCEDURE — 93306 TTE W/DOPPLER COMPLETE: CPT | Mod: 26

## 2018-07-27 PROCEDURE — 74018 RADEX ABDOMEN 1 VIEW: CPT | Mod: 26

## 2018-07-27 PROCEDURE — 99292 CRITICAL CARE ADDL 30 MIN: CPT

## 2018-07-27 RX ORDER — POTASSIUM CHLORIDE 20 MEQ
20 PACKET (EA) ORAL ONCE
Qty: 0 | Refills: 0 | Status: COMPLETED | OUTPATIENT
Start: 2018-07-27 | End: 2018-07-27

## 2018-07-27 RX ORDER — SODIUM CHLORIDE 9 MG/ML
1000 INJECTION, SOLUTION INTRAVENOUS
Qty: 0 | Refills: 0 | Status: DISCONTINUED | OUTPATIENT
Start: 2018-07-27 | End: 2018-08-04

## 2018-07-27 RX ORDER — METRONIDAZOLE 500 MG
TABLET ORAL
Qty: 0 | Refills: 0 | Status: DISCONTINUED | OUTPATIENT
Start: 2018-07-27 | End: 2018-08-03

## 2018-07-27 RX ORDER — ONDANSETRON 8 MG/1
4 TABLET, FILM COATED ORAL ONCE
Qty: 0 | Refills: 0 | Status: COMPLETED | OUTPATIENT
Start: 2018-07-27 | End: 2018-07-27

## 2018-07-27 RX ORDER — INSULIN LISPRO 100/ML
VIAL (ML) SUBCUTANEOUS EVERY 4 HOURS
Qty: 0 | Refills: 0 | Status: DISCONTINUED | OUTPATIENT
Start: 2018-07-27 | End: 2018-07-29

## 2018-07-27 RX ORDER — MIDAZOLAM HYDROCHLORIDE 1 MG/ML
1 INJECTION, SOLUTION INTRAMUSCULAR; INTRAVENOUS ONCE
Qty: 0 | Refills: 0 | Status: DISCONTINUED | OUTPATIENT
Start: 2018-07-27 | End: 2018-07-27

## 2018-07-27 RX ORDER — FENTANYL CITRATE 50 UG/ML
12.5 INJECTION INTRAVENOUS ONCE
Qty: 0 | Refills: 0 | Status: DISCONTINUED | OUTPATIENT
Start: 2018-07-27 | End: 2018-07-27

## 2018-07-27 RX ORDER — DEXTROSE 50 % IN WATER 50 %
25 SYRINGE (ML) INTRAVENOUS ONCE
Qty: 0 | Refills: 0 | Status: COMPLETED | OUTPATIENT
Start: 2018-07-27 | End: 2018-07-27

## 2018-07-27 RX ORDER — DEXTROSE 50 % IN WATER 50 %
15 SYRINGE (ML) INTRAVENOUS ONCE
Qty: 0 | Refills: 0 | Status: DISCONTINUED | OUTPATIENT
Start: 2018-07-27 | End: 2018-08-12

## 2018-07-27 RX ORDER — INSULIN LISPRO 100/ML
VIAL (ML) SUBCUTANEOUS
Qty: 0 | Refills: 0 | Status: DISCONTINUED | OUTPATIENT
Start: 2018-07-27 | End: 2018-07-27

## 2018-07-27 RX ORDER — MAGNESIUM SULFATE 500 MG/ML
2 VIAL (ML) INJECTION ONCE
Qty: 0 | Refills: 0 | Status: COMPLETED | OUTPATIENT
Start: 2018-07-27 | End: 2018-07-27

## 2018-07-27 RX ORDER — METRONIDAZOLE 500 MG
500 TABLET ORAL ONCE
Qty: 0 | Refills: 0 | Status: COMPLETED | OUTPATIENT
Start: 2018-07-27 | End: 2018-07-27

## 2018-07-27 RX ORDER — METRONIDAZOLE 500 MG
500 TABLET ORAL EVERY 8 HOURS
Qty: 0 | Refills: 0 | Status: DISCONTINUED | OUTPATIENT
Start: 2018-07-28 | End: 2018-08-03

## 2018-07-27 RX ORDER — ACETAMINOPHEN 500 MG
1000 TABLET ORAL ONCE
Qty: 0 | Refills: 0 | Status: COMPLETED | OUTPATIENT
Start: 2018-07-27 | End: 2018-07-27

## 2018-07-27 RX ORDER — GLUCAGON INJECTION, SOLUTION 0.5 MG/.1ML
1 INJECTION, SOLUTION SUBCUTANEOUS ONCE
Qty: 0 | Refills: 0 | Status: DISCONTINUED | OUTPATIENT
Start: 2018-07-27 | End: 2018-08-12

## 2018-07-27 RX ORDER — FENTANYL CITRATE 50 UG/ML
25 INJECTION INTRAVENOUS ONCE
Qty: 0 | Refills: 0 | Status: DISCONTINUED | OUTPATIENT
Start: 2018-07-27 | End: 2018-07-27

## 2018-07-27 RX ADMIN — CHLORHEXIDINE GLUCONATE 5 MILLILITER(S): 213 SOLUTION TOPICAL at 06:19

## 2018-07-27 RX ADMIN — Medication 1 ENEMA: at 09:11

## 2018-07-27 RX ADMIN — Medication 400 MILLIGRAM(S): at 19:56

## 2018-07-27 RX ADMIN — FENTANYL CITRATE 12.5 MICROGRAM(S): 50 INJECTION INTRAVENOUS at 01:22

## 2018-07-27 RX ADMIN — ONDANSETRON 4 MILLIGRAM(S): 8 TABLET, FILM COATED ORAL at 17:33

## 2018-07-27 RX ADMIN — Medication 100 MILLIEQUIVALENT(S): at 10:00

## 2018-07-27 RX ADMIN — FENTANYL CITRATE 25 MICROGRAM(S): 50 INJECTION INTRAVENOUS at 13:00

## 2018-07-27 RX ADMIN — Medication 250 MILLIGRAM(S): at 09:33

## 2018-07-27 RX ADMIN — Medication 25 MILLILITER(S): at 08:30

## 2018-07-27 RX ADMIN — Medication 1000 MILLIGRAM(S): at 06:19

## 2018-07-27 RX ADMIN — CHLORHEXIDINE GLUCONATE 5 MILLILITER(S): 213 SOLUTION TOPICAL at 22:08

## 2018-07-27 RX ADMIN — Medication 200 MILLIGRAM(S): at 00:47

## 2018-07-27 RX ADMIN — PANTOPRAZOLE SODIUM 40 MILLIGRAM(S): 20 TABLET, DELAYED RELEASE ORAL at 11:59

## 2018-07-27 RX ADMIN — Medication 400 MILLIGRAM(S): at 05:32

## 2018-07-27 RX ADMIN — CHLORHEXIDINE GLUCONATE 5 MILLILITER(S): 213 SOLUTION TOPICAL at 11:59

## 2018-07-27 RX ADMIN — Medication 100 MILLIGRAM(S): at 23:58

## 2018-07-27 RX ADMIN — FENTANYL CITRATE 12.5 MICROGRAM(S): 50 INJECTION INTRAVENOUS at 00:33

## 2018-07-27 RX ADMIN — Medication 1000 MILLIGRAM(S): at 20:25

## 2018-07-27 RX ADMIN — Medication 100 MILLIEQUIVALENT(S): at 00:43

## 2018-07-27 RX ADMIN — Medication 50 GRAM(S): at 04:53

## 2018-07-27 RX ADMIN — Medication 200 MILLIGRAM(S): at 11:59

## 2018-07-27 RX ADMIN — DEXMEDETOMIDINE HYDROCHLORIDE IN 0.9% SODIUM CHLORIDE 2.54 MICROGRAM(S)/KG/HR: 4 INJECTION INTRAVENOUS at 21:14

## 2018-07-27 RX ADMIN — Medication 200 MILLIGRAM(S): at 23:00

## 2018-07-27 RX ADMIN — CHLORHEXIDINE GLUCONATE 5 MILLILITER(S): 213 SOLUTION TOPICAL at 18:32

## 2018-07-27 RX ADMIN — CHLORHEXIDINE GLUCONATE 5 MILLILITER(S): 213 SOLUTION TOPICAL at 13:12

## 2018-07-27 RX ADMIN — MIDAZOLAM HYDROCHLORIDE 1 MILLIGRAM(S): 1 INJECTION, SOLUTION INTRAMUSCULAR; INTRAVENOUS at 10:00

## 2018-07-27 RX ADMIN — Medication 2 MG/HR: at 04:44

## 2018-07-27 RX ADMIN — Medication 500 MILLIGRAM(S): at 06:20

## 2018-07-27 RX ADMIN — Medication 500 MILLIGRAM(S): at 13:13

## 2018-07-27 RX ADMIN — Medication 250 MILLIGRAM(S): at 21:00

## 2018-07-27 RX ADMIN — DEXMEDETOMIDINE HYDROCHLORIDE IN 0.9% SODIUM CHLORIDE 2.54 MICROGRAM(S)/KG/HR: 4 INJECTION INTRAVENOUS at 00:30

## 2018-07-27 RX ADMIN — CHLORHEXIDINE GLUCONATE 5 MILLILITER(S): 213 SOLUTION TOPICAL at 06:18

## 2018-07-27 RX ADMIN — FENTANYL CITRATE 25 MICROGRAM(S): 50 INJECTION INTRAVENOUS at 12:30

## 2018-07-27 NOTE — PROGRESS NOTE ADULT - ASSESSMENT
Patient with descending thoracoabdominal aortic aneurysm with significant mural thrombus (previously measuring 5.5cm). Now s/p Open thoraco-abdominal aortic aneurysm repair, with separate reimplantation of Celiac, SMA, & Renals. General Surgery consulted for suspicion of ischemic colitis. No acute changes to exam.    Plan per CTICU with vascular  Will continue serial abdominal exams and monitoring  If exam significantly changes, lactate rises or new need for pressors, will consider surgical evaluation for ischemic colitis  Continuing to plan for sigmoidoscopy tomorrow at bedside for further evaluation    Case discussed with chief resident

## 2018-07-27 NOTE — PROGRESS NOTE ADULT - SUBJECTIVE AND OBJECTIVE BOX
INTERVAL HPI/OVERNIGHT EVENTS:    POD# 1 Open TAAA w/replacement of descending aorta - w/separate reimplantation of mesorenals (celiac/SMA/bilateral renal bypass)  Prior open & endovascular abdominal aortic repair  Lumbar drain placed intraop 7/26  EF 60%    Abx: Cipro/Flagyl; Vanco periop prophy    69yo Female with Hx CAD, TIA, HTN, COPD, seizures, AAA s/p open AAA repair (Dr. Roberto '04), EVAR/AAA/CIAA (Dr. 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    CTa C/A/P 4/2018: descending thoracoabdominal aortic aneurysm w/degeneration of the aorta at the mid-descending thoracic segment, (5.7cm) w/significant mural thrombus (previously 5.5cm) with widely patent celiac/SMA/renal arteries.      for elective procedure 7/26 - lumbar drain placed on OR - intraop 13 U pRBC/2 platelets/5 FFP/20 Cryo  post-op - awake and following simple commands - moving all extremities   lumbar drain 10cc/h per protocol; mean BP 90  s/p Flex sig today - no ischemia of colon    patient remains intubated  no acute events reported through the day  handoff/signout received from daytime intensivist    PMHx includes but is not limited to:   Hx seizures: 1980  COPD   HLD   HTN   AAA (abdominal aortic aneurysm) s/p interventions as above  CAD  History of cholecystectomy    ICU Vital Signs Last 24 Hrs  T(C): 36.6 (27 Jul 2018 19:00), Max: 36.6 (27 Jul 2018 17:00)  T(F): 97.9 (27 Jul 2018 19:00), Max: 97.9 (27 Jul 2018 19:00)  HR: 74 (27 Jul 2018 20:00) (54 - 98) sinus  BP: 148/68 (27 Jul 2018 00:31) (148/68 - 148/68)  BP(mean): 94 (27 Jul 2018 00:31) (94 - 94)  ABP: 144/66 (27 Jul 2018 20:00) (124/56 - 160/70)  ABP(mean): 96 (27 Jul 2018 20:00) (82 - 114)  SpO2: 100% (27 Jul 2018 20:00) (100% - 100%) Vent 40%/PEEP 5    Qtts:   Cleviprex 1  Precedex 0.3    I&O's Summary    26 Jul 2018 07:01  -  27 Jul 2018 07:00  --------------------------------------------------------  IN: 2192.1 mL / OUT: 2880 mL / NET: -687.9 mL    27 Jul 2018 07:01  -  27 Jul 2018 20:42  --------------------------------------------------------  IN: 1050.4 mL / OUT: 2130 mL / NET: -1079.6 mL    Vent settings: AC 18/450/40/8    Physical Exam    Heart - regular (-)rub/gallop  Lungs - CTA anterior (-)rales/rhonchi/wheeze;   Abd - (+)BS but dec; soft NTND (-)r/r/g  Ext - warm to touch; no cyanosis/clubbing  Chest - op bandage in place  Back - op bandage in place; recent change by NS  Neuro - pupils reactive; following simple commands - moving all extremities  Skin - no rash    LABS:                        13.4   8.7   )-----------( 30       ( 27 Jul 2018 16:10 )             37.3     07-27    134<L>  |  96  |  22  ----------------------------<  108<H>  4.4   |  25  |  1.40<H>    Ca    8.8      27 Jul 2018 16:10  Phos  5.6     07-27  Mg     2.2     07-27    TPro  5.4<L>  /  Alb  2.7<L>  /  TBili  2.4<H>  /  DBili  x   /  AST  126<H>  /  ALT  50<H>  /  AlkPhos  75  07-27    PT/INR - ( 27 Jul 2018 16:10 )   PT: 11.5 sec;   INR: 1.04     PTT - ( 27 Jul 2018 16:10 )  PTT:29.5 sec    ABG - ( 27 Jul 2018 16:07 ) 7.44/38/133/99    RADIOLOGY & ADDITIONAL STUDIES and Chart reviewed    remains intubated - to remain intubated for the next 24-48hours    General surgery and GI consulted and following  suspicion of ischemic colitis noted - flex sig performed and negative  rectal tube initially placed - out now (no output)  I have spent/provided stated minutes of critical care time to this patient: 60 min INTERVAL HPI/OVERNIGHT EVENTS:    POD# 1 Open TAAA w/replacement of descending aorta - w/separate reimplantation of mesorenals (celiac/SMA/bilateral renal bypass)  Prior open & endovascular abdominal aortic repair  Lumbar drain placed intraop 7/26  EF 60%    Abx: Cipro/Flagyl; Vanco periop prophy    69yo Female with Hx CAD, TIA, HTN, COPD, seizures, AAA s/p open AAA repair (Dr. Roberto '04), EVAR/AAA/CIAA (Dr. Burhc 3/29/17) with known descending thoracic aortic aneurysm (5.4cm).    CTa C/A/P 4/2018: descending thoracoabdominal aortic aneurysm w/degeneration of the aorta at the mid-descending thoracic segment, (5.7cm) w/significant mural thrombus (previously 5.5cm) with widely patent celiac/SMA/renal arteries.      for elective procedure 7/26 - lumbar drain placed on OR - intraop 13 U pRBC/2 platelets/5 FFP/20 Cryo  post-op - awake and following simple commands - moving all extremities   lumbar drain 10cc/h per protocol; mean BP 90  s/p Flex sig today - no ischemia of colon    patient remains intubated  no acute events reported through the day  handoff/signout received from daytime intensivist    PMHx includes but is not limited to:   Hx seizures: 1980  COPD   HLD   HTN   AAA (abdominal aortic aneurysm) s/p interventions as above  CAD  History of cholecystectomy    ICU Vital Signs Last 24 Hrs  T(C): 36.6 (27 Jul 2018 19:00), Max: 36.6 (27 Jul 2018 17:00)  T(F): 97.9 (27 Jul 2018 19:00), Max: 97.9 (27 Jul 2018 19:00)  HR: 74 (27 Jul 2018 20:00) (54 - 98) sinus  BP: 148/68 (27 Jul 2018 00:31) (148/68 - 148/68)  BP(mean): 94 (27 Jul 2018 00:31) (94 - 94)  ABP: 144/66 (27 Jul 2018 20:00) (124/56 - 160/70)  ABP(mean): 96 (27 Jul 2018 20:00) (82 - 114)  SpO2: 100% (27 Jul 2018 20:00) (100% - 100%) Vent 40%/PEEP 5    Qtts:   Cleviprex 1  Precedex 0.3    I&O's Summary    26 Jul 2018 07:01  -  27 Jul 2018 07:00  --------------------------------------------------------  IN: 2192.1 mL / OUT: 2880 mL / NET: -687.9 mL    27 Jul 2018 07:01  -  27 Jul 2018 20:42  --------------------------------------------------------  IN: 1050.4 mL / OUT: 2130 mL / NET: -1079.6 mL    Vent settings: AC 18/450/40/8    Physical Exam    Heart - regular (-)rub/gallop  Lungs - CTA anterior (-)rales/rhonchi/wheeze;   Abd - (+)BS but dec; soft NTND (-)r/r/g  Ext - warm to touch; no cyanosis/clubbing  Chest - op bandage in place  Back - op bandage in place; recent change by NS  Neuro - pupils reactive; following simple commands - moving all extremities  Skin - no rash    LABS:                        13.4   8.7   )-----------( 30       ( 27 Jul 2018 16:10 )             37.3     07-27    134<L>  |  96  |  22  ----------------------------<  108<H>  4.4   |  25  |  1.40<H>    Ca    8.8      27 Jul 2018 16:10  Phos  5.6     07-27  Mg     2.2     07-27    TPro  5.4<L>  /  Alb  2.7<L>  /  TBili  2.4<H>  /  DBili  x   /  AST  126<H>  /  ALT  50<H>  /  AlkPhos  75  07-27    PT/INR - ( 27 Jul 2018 16:10 )   PT: 11.5 sec;   INR: 1.04     PTT - ( 27 Jul 2018 16:10 )  PTT:29.5 sec    ABG - ( 27 Jul 2018 16:07 ) 7.44/38/133/99    RADIOLOGY & ADDITIONAL STUDIES and Chart reviewed    Patient with enlarging thoracoabdominal aneurysm on serial imaging s/p OR as above    1. Vascular/neuro  awake/alert and following simple commands  ongoing neuro/vascular checks   lumbar drain - 10cc/hour per protocol and lumbar pressure <10 (currently 7)  Maintain HOB 30 degrees; OGT to ILWS  lumbar drain to remain in place/draining (not capped) for 24-48 hours per CTS    2. CV  maintain MAP 90 per protocol  currently on Cleviprex  goals of BP/MAP reviewed with staff    3. Pulm  remains intubated - per directive of CTS - to remain intubated for the next 24-48hours  plan BIPAP for 3 nights once extubated (per protocol)    3. GI - (+)BM intraop  concern for possible  ischemic colitis noted - flex sig performed and negative  General surgery and GI consulted and following  rectal tube initially placed - out now (no output)  presumptively on Cipro/Flagyl in light of concerns of ischemic colitis  LA normalized 1.4    4. Heme  thrombocytopenia 30 - prior 38  coags normal  serial labs and monitor closely  d/w CTS - will transfuse 2 platelet  No ASA/SC heparin while lumbar drain in place    d/w patient/staff/neurosurgery PA/CTS    I have spent/provided stated minutes of additional critical care time to this patient: 60 min

## 2018-07-27 NOTE — PROGRESS NOTE ADULT - ASSESSMENT
Imp: POD 1 thoracoabdominal aneurysm repair with reimplantation of visceral vessels.  Concern for ischemic colitis with BM on OR table.  Rec:  1. sigmoidoscopy today  2. If no signs of ischemia, continue ICU care  3. If ischemia present, may need colectomy.  4. On vanco, cipro, flagyl.

## 2018-07-27 NOTE — PROGRESS NOTE ADULT - SUBJECTIVE AND OBJECTIVE BOX
HPI:  This is a 68 year old female ith history of CAD, TIA in 1998, COPD, AAA s/p open AAA repair by Dr. Roberto in 2004, EVAR/AAA/CIAA with Dr. Burch on 3/29/17 who was referred to Dr. 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  She underwent CTA chest/abdomen/pelvis in 4/2018 which demonstrated descending thoracoabdominal aortic aneurysm wtih degeneration of the aorta at the mid-descending thoracic segment, measuring 5.7cm with significant mural thrombus (previously measuring 5.5cm) with widely patent celiac/SMA/renal arteries.  She completed outpatient pre-operative evaluation and was deemed a surgical candidate for thoracoabdominal aneurysm repair and admitted to [facility] on 7/25/18 under the care of Dr. Monson s/p surgery.  Called to do flex sig to rule out ischemic colitis      REVIEW OF SYSTEMS:  intubated alert and responsive but unable to speak    PAST MEDICAL & SURGICAL HISTORY:  History of seizures: 1980  COPD (chronic obstructive pulmonary disease)  HLD (hyperlipidemia)  HTN (hypertension)  AAA (abdominal aortic aneurysm)  CAD (coronary artery disease)  S/P AAA (abdominal aortic aneurysm) repair  History of abdominal aortic aneurysm (AAA): 2004  History of cholecystectomy      FAMILY HISTORY:  No pertinent family history in first degree relatives      SOCIAL HISTORY:  Smoking Status: [ ] Current, [ ] Former, [ ] Never  Pack Years:    MEDICATIONS:  MEDICATIONS  (STANDING):  atorvastatin 20 milliGRAM(s) Oral at bedtime  chlorhexidine 0.12% Liquid 5 milliLiter(s) Swish and Spit every 4 hours  ciprofloxacin   IVPB      ciprofloxacin   IVPB 400 milliGRAM(s) IV Intermittent every 12 hours  clevidipine Infusion 1 mG/Hr (2 mL/Hr) IV Continuous <Continuous>  dexmedetomidine Infusion 0.2 MICROgram(s)/kG/Hr (2.545 mL/Hr) IV Continuous <Continuous>  dextrose 50% Injectable 50 milliLiter(s) IV Push every 15 minutes  dextrose 50% Injectable 25 milliLiter(s) IV Push every 15 minutes  dextrose 50% Injectable 50 milliLiter(s) IV Push every 15 minutes  dextrose 50% Injectable 25 milliLiter(s) IV Push every 15 minutes  insulin Infusion 1 Unit(s)/Hr (1 mL/Hr) IV Continuous <Continuous>  metroNIDAZOLE    Tablet 500 milliGRAM(s) Oral every 8 hours  midazolam Injectable 1 milliGRAM(s) IV Push once  norepinephrine Infusion 0.05 MICROgram(s)/kG/Min (4.772 mL/Hr) IV Continuous <Continuous>  pantoprazole  Injectable 40 milliGRAM(s) IV Push daily  propofol Infusion 5 MICROgram(s)/kG/Min (1.527 mL/Hr) IV Continuous <Continuous>  sodium chloride 0.9%. 1000 milliLiter(s) (10 mL/Hr) IV Continuous <Continuous>  vancomycin  IVPB 1000 milliGRAM(s) IV Intermittent every 12 hours    MEDICATIONS  (PRN):      Allergies    morphine (Hives; Urticaria)  penicillin (Hives)    Intolerances        Vital Signs Last 24 Hrs  T(C): 35.9 (27 Jul 2018 09:22), Max: 35.9 (26 Jul 2018 22:00)  T(F): 96.6 (27 Jul 2018 09:22), Max: 96.6 (26 Jul 2018 22:00)  HR: 54 (27 Jul 2018 09:00) (54 - 98)  BP: 148/68 (27 Jul 2018 00:31) (148/68 - 148/68)  BP(mean): 94 (27 Jul 2018 00:31) (94 - 94)  RR: 18 (27 Jul 2018 09:00) (14 - 18)  SpO2: 100% (27 Jul 2018 09:00) (100% - 100%)    07-26 @ 07:01 - 07-27 @ 07:00  --------------------------------------------------------  IN: 2192.1 mL / OUT: 2880 mL / NET: -687.9 mL    07-27 @ 07:01 - 07-27 @ 09:58  --------------------------------------------------------  IN: 49.2 mL / OUT: 350 mL / NET: -300.8 mL      Mode: AC/ CMV (Assist Control/ Continuous Mandatory Ventilation)  RR (machine): 18  TV (machine): 450  FiO2: 60  PEEP: 10  ITime: 1  MAP: 15  PIP: 23      PHYSICAL EXAM:    General: Well developed; well nourished; in no acute distress intubated, on vent  HEENT: MMM, conjunctiva and sclera clear  Gastrointestinal: Soft, non-tender non-distended; Normal bowel sounds; No rebound or guarding  Extremities: Normal range of motion, No clubbing, cyanosis or edema  Neurological: Alert   Skin: Warm and dry. No obvious rash      LABS:                        13.3   5.9   )-----------( 70       ( 27 Jul 2018 02:18 )             37.3     07-27    138  |  99  |  19  ----------------------------<  65<L>  3.9   |  25  |  1.24    Ca    8.5      27 Jul 2018 07:41  Phos  4.6     07-27  Mg     1.8     07-27    TPro  5.4<L>  /  Alb  2.7<L>  /  TBili  2.4<H>  /  DBili  x   /  AST  129<H>  /  ALT  54<H>  /  AlkPhos  71  07-27          RADIOLOGY & ADDITIONAL STUDIES:

## 2018-07-27 NOTE — PROGRESS NOTE ADULT - SUBJECTIVE AND OBJECTIVE BOX
Subjective:  Intubated, On Precedex Following verbal orders, Complaining from LLQ pain    Vital Signs Last 24 Hrs  T(C): 35.9 (27 Jul 2018 05:00), Max: 35.9 (26 Jul 2018 22:00)  T(F): 96.6 (27 Jul 2018 05:00), Max: 96.6 (26 Jul 2018 22:00)  HR: 56 (27 Jul 2018 06:00) (56 - 98)  BP: 148/68 (27 Jul 2018 00:31) (131/74 - 148/68)  BP(mean): 94 (27 Jul 2018 00:31) (94 - 94)  RR: 18 (27 Jul 2018 06:00) (14 - 18)  SpO2: 100% (27 Jul 2018 06:00) (98% - 100%)    Physical Exam:  General: Intubated,   Pulmonary: Nonlabored breathing, no respiratory distress  Cardiovascular: NSR  Abdominal: soft, NT/ND, obese      LABS:                        13.3   5.9   )-----------( 70       ( 27 Jul 2018 02:18 )             37.3     07-27    137  |  99  |  17  ----------------------------<  206<H>  see note   |  23  |  1.09    Ca    8.8      27 Jul 2018 04:04  Phos  4.6     07-27  Mg     1.8     07-27    TPro  5.4<L>  /  Alb  3.1<L>  /  TBili  2.8<H>  /  DBili  x   /  AST  see note  /  ALT  see note  /  AlkPhos  66  07-27    PT/INR - ( 27 Jul 2018 02:19 )   PT: 11.3 sec;   INR: 1.02          PTT - ( 27 Jul 2018 02:19 )  PTT:30.9 sec  CAPILLARY BLOOD GLUCOSE      POCT Blood Glucose.: 115 mg/dL (27 Jul 2018 05:48)  POCT Blood Glucose.: 189 mg/dL (27 Jul 2018 03:55)  POCT Blood Glucose.: 216 mg/dL (27 Jul 2018 03:07)  POCT Blood Glucose.: 271 mg/dL (27 Jul 2018 01:47)  POCT Blood Glucose.: 235 mg/dL (27 Jul 2018 00:02)  POCT Blood Glucose.: 182 mg/dL (26 Jul 2018 21:57)      LIVER FUNCTIONS - ( 27 Jul 2018 04:04 )  Alb: 3.1 g/dL / Pro: 5.4 g/dL / ALK PHOS: 66 U/L / ALT: see note U/L / AST: see note U/L / GGT: x Subjective:  Intubated, On Precedex Following verbal orders and able to communicate, pointing at LLQ when asked if in pain    Vital Signs Last 24 Hrs  T(C): 35.9 (27 Jul 2018 05:00), Max: 35.9 (26 Jul 2018 22:00)  T(F): 96.6 (27 Jul 2018 05:00), Max: 96.6 (26 Jul 2018 22:00)  HR: 56 (27 Jul 2018 06:00) (56 - 98)  BP: 148/68 (27 Jul 2018 00:31) (131/74 - 148/68)  BP(mean): 94 (27 Jul 2018 00:31) (94 - 94)  RR: 18 (27 Jul 2018 06:00) (14 - 18)  SpO2: 100% (27 Jul 2018 06:00) (98% - 100%)    Physical Exam:  General: Intubated, NG tube in place with 100ml Biliary drainage  Pulmonary:  Mechanically ventilated, 3 Chest tube in place (Left side) with serosanguinous output    Abdominal:   Soft, Not distended, LLQ tenderness  Rectal tube in place with no output  Solorio's catheter in place, UOP 75-250ml/hr for the past 6 hours      LABS:                        13.3   5.9   )-----------( 70       ( 27 Jul 2018 02:18 )             37.3     07-27    137  |  99  |  17  ----------------------------<  206<H>  see note   |  23  |  1.09    Ca    8.8      27 Jul 2018 04:04  Phos  4.6     07-27  Mg     1.8     07-27    TPro  5.4<L>  /  Alb  3.1<L>  /  TBili  2.8<H>  /  DBili  x   /  AST  see note  /  ALT  see note  /  AlkPhos  66  07-27    PT/INR - ( 27 Jul 2018 02:19 )   PT: 11.3 sec;   INR: 1.02          PTT - ( 27 Jul 2018 02:19 )  PTT:30.9 sec  CAPILLARY BLOOD GLUCOSE      POCT Blood Glucose.: 115 mg/dL (27 Jul 2018 05:48)  POCT Blood Glucose.: 189 mg/dL (27 Jul 2018 03:55)  POCT Blood Glucose.: 216 mg/dL (27 Jul 2018 03:07)  POCT Blood Glucose.: 271 mg/dL (27 Jul 2018 01:47)  POCT Blood Glucose.: 235 mg/dL (27 Jul 2018 00:02)  POCT Blood Glucose.: 182 mg/dL (26 Jul 2018 21:57)      LIVER FUNCTIONS - ( 27 Jul 2018 04:04 )  Alb: 3.1 g/dL / Pro: 5.4 g/dL / ALK PHOS: 66 U/L / ALT: see note U/L / AST: see note U/L / GGT: x

## 2018-07-27 NOTE — PROGRESS NOTE ADULT - SUBJECTIVE AND OBJECTIVE BOX
INTERVAL EVENTS: No acute changes since prior surgical note. Off precedex, still on clevidipine.     ciprofloxacin   IVPB      ciprofloxacin   IVPB 400 milliGRAM(s) IV Intermittent every 12 hours  clevidipine Infusion 1 mG/Hr IV Continuous <Continuous>  metroNIDAZOLE    Tablet 500 milliGRAM(s) Oral every 8 hours  norepinephrine Infusion 0.05 MICROgram(s)/kG/Min IV Continuous <Continuous>  vancomycin  IVPB 1000 milliGRAM(s) IV Intermittent every 12 hours      Vital Signs Last 24 Hrs  T(C): 35.8 (27 Jul 2018 01:00), Max: 36.6 (26 Jul 2018 05:00)  T(F): 96.4 (27 Jul 2018 01:00), Max: 97.9 (26 Jul 2018 05:00)  HR: 72 (27 Jul 2018 02:00) (68 - 98)  BP: 148/68 (27 Jul 2018 00:30) (131/74 - 148/68)  BP(mean): 94 (27 Jul 2018 00:30) (89 - 112)  RR: 14 (27 Jul 2018 02:00) (14 - 18)  SpO2: 100% (27 Jul 2018 02:00) (94% - 100%)  I&O's Detail    25 Jul 2018 07:01  -  26 Jul 2018 07:00  --------------------------------------------------------  IN:    IV PiggyBack: 250 mL    Oral Fluid: 340 mL  Total IN: 590 mL    OUT:    Indwelling Catheter - Urethral: 2160 mL  Total OUT: 2160 mL    Total NET: -1570 mL      26 Jul 2018 07:01  -  27 Jul 2018 02:37  --------------------------------------------------------  IN:    clevidipine Infusion: 115 mL    FFP (Fresh Frozen Plasma): 319 mL    IV PiggyBack: 800 mL    Packed Red Blood Cells: 300 mL    Platelets - Single Donor: 200 mL  Total IN: 1734 mL    OUT:    Chest Tube: 290 mL    Chest Tube: 130 mL    Drain: 75 mL    Indwelling Catheter - Urethral: 1225 mL    Voided: 250 mL  Total OUT: 1970 mL    Total NET: -236 mL          General: NAD, resting comfortably in bed, intubated  HEENT: PORSCHE  CV: NSR  Pulm: ventilated, no respiratory distress, 3 chest tubes in place with serosanguinous drainage  Abd: soft, nontender, nondistended, no grimacing, rectal tube in place, NGT with dark brown output  : Solorio in place with straw colored urine  Extrem: WWP, no edema, no calf tenderness, R radial a line, R femoral a line      LABS:                        12.0   4.9   )-----------( 98       ( 26 Jul 2018 22:02 )             34.1     07-26    142  |  102  |  13  ----------------------------<  186<H>  3.7   |  24  |  0.91    Ca    8.2<L>      26 Jul 2018 22:02  Phos  4.7     07-26  Mg     1.1     07-26    TPro  4.6<L>  /  Alb  2.6<L>  /  TBili  3.1<H>  /  DBili  x   /  AST  108<H>  /  ALT  49<H>  /  AlkPhos  60  07-26    PT/INR - ( 26 Jul 2018 22:02 )   PT: 13.1 sec;   INR: 1.18          PTT - ( 26 Jul 2018 22:02 )  PTT:33.2 sec      RADIOLOGY & ADDITIONAL STUDIES:

## 2018-07-27 NOTE — PROGRESS NOTE ADULT - ASSESSMENT
68 year old female ith history of CAD, TIA in 1998, COPD, AAA s/p open AAA repair in 2004, EVAR/CIAA, with extension into L hypogastric & DIONNE in 3/29/17, CTA chest/abdomen/pelvis in 4/2018 which demonstrated descending thoracoabdominal aortic aneurysm with degeneration of the aorta at the mid-descending thoracic segment, measuring 5.7cm with significant mural thrombus (previously measuring 5.5cm) with widely patent celiac/SMA/renal arteries 68 year old female with history of CAD, TIA in 1998, COPD, AAA s/p open AAA repair in 2004, EVAR/CIAA, with extension into L hypogastric & DIONNE in 3/29/17, Presented for a descending thoracoabdominal aortic aneurysm repair (aneurysm 5.7 with mural thrombus and widely patent Celiac and SMA), Day 1 S/p Thoracoabdominal aortic aneurysm repair, patient had a bowel movements on the table, post op labs are remarkable for elevated lactate, General surgery consulted for concern of ischemic colitis.    She was left intubated after the procedure, Still blood pressure with no pressors requirement over night, Abdomen is Soft, not distended with LLQ tenderness, Rectal tube in place with no output, Lactate is 3.4 this morning.    Plan:  - Keep NG tube to LWS  - Cont IV antibiotic, Patient is currently on Ciprofloxacin, Flagyl and Vancomycin  - Keep rectal tube in place  - Planned for Sigmoidoscopy with  today to evaluate for ischemic colitis and the thickness of the involved segment.  - Rest of plan by the ICU team  - Will discuss with Dr.Ansari Mistry [facility]  Gen Surg 68 year old female with history of CAD, TIA in 1998, COPD, AAA s/p open AAA repair in 2004, EVAR/CIAA, with extension into L hypogastric & DIONNE in 3/29/17, Presented for a descending thoracoabdominal aortic aneurysm repair (aneurysm 5.7 with mural thrombus and widely patent Celiac and SMA), Day 1 S/p Thoracoabdominal aortic aneurysm repair with reimplantation of mesenteric vessels , post op labs are remarkable for elevated lactate, General surgery consulted for concern of ischemic colitis.    She was left intubated after the procedure, Still blood pressure with no pressors requirement over night, Abdomen is Soft, not distended with LLQ tenderness, Rectal tube in place with no output, Lactate is 3.4 this morning.    Plan:  - Keep NG tube to LWS  - Cont IV antibiotic, Patient is currently on Ciprofloxacin, Flagyl and Vancomycin  - Keep rectal tube in place  - Planned for Sigmoidoscopy with  today to evaluate for ischemic colitis and the thickness of the involved segment.  - Rest of plan by the ICU team  - Will discuss with Dr.Ansari Mistry [facility]  Gen Surg

## 2018-07-27 NOTE — PROGRESS NOTE ADULT - SUBJECTIVE AND OBJECTIVE BOX
In CTICU, intubated but awake and communicative.  Denies abdominal pain.  Rectal tube scan non-bloody stool  AFVSS No pressors  Abd soft, ND. Mild TTP LLQ    Lactate slightly increased from rosie overnight  Cr. slightly increased

## 2018-07-27 NOTE — PROGRESS NOTE ADULT - ASSESSMENT
Flex sig done = normal stools and normal mucosa from mid descending colon , + hemorrhoids  no evidence for ischemia

## 2018-07-27 NOTE — PROGRESS NOTE ADULT - SUBJECTIVE AND OBJECTIVE BOX
CTICU  CRITICAL  CARE  attending     Hand off received @ 7a					   Pertinent clinical, laboratory, radiographic, hemodynamic, echocardiographic, respiratory data, microbiologic data and chart were reviewed and analyzed frequently throughout the course of the day and night  Patient seen and examined with CTS/ SH attending at bedside    Pt is a 68y , Female, post op day # 1 s/p Open TAAA with replacement of descending aorta; separete re-implantation for meso-renals; celiac/SMA/Bilateral renal bypass;  LD in the OR    today:    stays intubated  ISP kept around 10;  Flex sig in the am; no ischemia up to the level of proximal kimberly colon  Cleviprex  maintaining MAP>90  Slight bump in S Cr to 1.3        FAMILY HISTORY:  No pertinent family history in first degree relatives  PAST MEDICAL & SURGICAL HISTORY:  History of seizures: 1980  COPD (chronic obstructive pulmonary disease)  HLD (hyperlipidemia)  HTN (hypertension)  AAA (abdominal aortic aneurysm)  CAD (coronary artery disease)  S/P AAA (abdominal aortic aneurysm) repair  History of abdominal aortic aneurysm (AAA): 2004  History of cholecystectomy    Patient is a 68y old  Female who presents with a chief complaint of Descending thoracic aortic aneurysm (25 Jul 2018 17:14)      14 system review was unremarkable  acute changes include acute respiratory failure  Vital signs, hemodynamic and respiratory parameters were reviewed from the bedside nursing flowsheet.  ICU Vital Signs Last 24 Hrs  T(C): 35.9 (27 Jul 2018 09:22), Max: 35.9 (26 Jul 2018 22:00)  T(F): 96.6 (27 Jul 2018 09:22), Max: 96.6 (26 Jul 2018 22:00)  HR: 64 (27 Jul 2018 16:00) (54 - 98)  BP: 148/68 (27 Jul 2018 00:31) (148/68 - 148/68)  BP(mean): 94 (27 Jul 2018 00:31) (94 - 94)  ABP: 144/66 (27 Jul 2018 16:00) (124/56 - 160/70)  ABP(mean): 102 (27 Jul 2018 16:00) (82 - 114)  RR: 18 (27 Jul 2018 16:00) (14 - 18)  SpO2: 100% (27 Jul 2018 16:00) (100% - 100%)    Adult Advanced Hemodynamics Last 24 Hrs  CVP(mm Hg): 11 (27 Jul 2018 16:00) (9 - 15)  CVP(cm H2O): --  CO: 2.8 (27 Jul 2018 05:00) (2.8 - 6.4)  CI: 1.6 (27 Jul 2018 05:00) (1.6 - 3.6)  PA: --  PA(mean): --  PCWP: --  SVR: 2111 (27 Jul 2018 05:00) (2111 - 2111)  SVRI: 3695 (27 Jul 2018 05:00) (3695 - 3695)  PVR: --  PVRI: --, ABG - ( 27 Jul 2018 16:07 )  pH, Arterial: 7.44  pH, Blood: x     /  pCO2: 38    /  pO2: 133   / HCO3: 26    / Base Excess: 1.5   /  SaO2: 99                Mode: AC/ CMV (Assist Control/ Continuous Mandatory Ventilation)  RR (machine): 18  TV (machine): 450  FiO2: 50  PEEP: 8  ITime: 1  MAP: 16  PIP: 25    Intake and output was reviewed and the fluid balance was calculated  Daily     Daily   I&O's Summary    26 Jul 2018 07:01  -  27 Jul 2018 07:00  --------------------------------------------------------  IN: 2192.1 mL / OUT: 2880 mL / NET: -687.9 mL    27 Jul 2018 07:01  -  27 Jul 2018 16:35  --------------------------------------------------------  IN: 742.8 mL / OUT: 1565 mL / NET: -822.2 mL        All lines and drain sites were assessed  Glycemic trend was reviewedCAPILLARY BLOOD GLUCOSE      POCT Blood Glucose.: 106 mg/dL (27 Jul 2018 15:59)    No acute change in mental status  Auscultation of the chest reveals equal bs  Abdomen is soft  Extremities are warm and well perfused  Wounds appear clean and unremarkable  Antibiotics are periop    labs  CBC Full  -  ( 27 Jul 2018 10:54 )  WBC Count : 6.2 K/uL  Hemoglobin : 13.0 g/dL  Hematocrit : 36.4 %  Platelet Count - Automated : 38 K/uL  Mean Cell Volume : 83.5 fL  Mean Cell Hemoglobin : 29.8 pg  Mean Cell Hemoglobin Concentration : 35.7 g/dL  Auto Neutrophil # : x  Auto Lymphocyte # : x  Auto Monocyte # : x  Auto Eosinophil # : x  Auto Basophil # : x  Auto Neutrophil % : x  Auto Lymphocyte % : x  Auto Monocyte % : x  Auto Eosinophil % : x  Auto Basophil % : x    07-27    135  |  98  |  20  ----------------------------<  117<H>  4.2   |  25  |  1.33<H>    Ca    8.8      27 Jul 2018 10:54  Phos  5.4     07-27  Mg     2.5     07-27    TPro  5.1<L>  /  Alb  2.9<L>  /  TBili  2.3<H>  /  DBili  x   /  AST  126<H>  /  ALT  53<H>  /  AlkPhos  66  07-27    PT/INR - ( 27 Jul 2018 10:54 )   PT: 10.9 sec;   INR: 0.98          PTT - ( 27 Jul 2018 10:54 )  PTT:30.4 sec  The current medications were reviewed   MEDICATIONS  (STANDING):  atorvastatin 20 milliGRAM(s) Oral at bedtime  chlorhexidine 0.12% Liquid 5 milliLiter(s) Swish and Spit every 4 hours  ciprofloxacin   IVPB      ciprofloxacin   IVPB 400 milliGRAM(s) IV Intermittent every 12 hours  clevidipine Infusion 1 mG/Hr (2 mL/Hr) IV Continuous <Continuous>  dexmedetomidine Infusion 0.2 MICROgram(s)/kG/Hr (2.545 mL/Hr) IV Continuous <Continuous>  dextrose 5%. 1000 milliLiter(s) (50 mL/Hr) IV Continuous <Continuous>  dextrose 50% Injectable 50 milliLiter(s) IV Push every 15 minutes  dextrose 50% Injectable 25 milliLiter(s) IV Push every 15 minutes  dextrose 50% Injectable 50 milliLiter(s) IV Push every 15 minutes  dextrose 50% Injectable 25 milliLiter(s) IV Push every 15 minutes  insulin Infusion 1 Unit(s)/Hr (1 mL/Hr) IV Continuous <Continuous>  insulin lispro (HumaLOG) corrective regimen sliding scale   SubCutaneous every 2 hours  metroNIDAZOLE    Tablet 500 milliGRAM(s) Oral every 8 hours  norepinephrine Infusion 0.05 MICROgram(s)/kG/Min (4.772 mL/Hr) IV Continuous <Continuous>  pantoprazole  Injectable 40 milliGRAM(s) IV Push daily  propofol Infusion 5 MICROgram(s)/kG/Min (1.527 mL/Hr) IV Continuous <Continuous>  sodium chloride 0.9%. 1000 milliLiter(s) (10 mL/Hr) IV Continuous <Continuous>  vancomycin  IVPB 1000 milliGRAM(s) IV Intermittent every 12 hours    MEDICATIONS  (PRN):  dextrose 40% Gel 15 Gram(s) Oral once PRN Blood Glucose LESS THAN 70 milliGRAM(s)/deciliter  glucagon  Injectable 1 milliGRAM(s) IntraMuscular once PRN Glucose LESS THAN 70 milligrams/deciliter       PROBLEM LIST/ ASSESSMENT:  HEALTH ISSUES - PROBLEM Dx:    hemodynamic instability  acute renal injury  fluid overload ( TACO)        ,   Patient is a 68y old  Female who presents with a chief complaint of Descending thoracic aortic aneurysm (25 Jul 2018 17:14)     s/p Open TAAA with replacement of descending aorta; separete re-implantation for meso-renals; celiac/SMA/Bilateral renal bypass;        My plan includes :  close hemodynamic, ventilatory and drain monitoring and management per post op routine    Monitor for arrhythmias and monitor parameters for organ perfusion  monitor neurologic status  Head of the bed should remain elevated to 45 deg .   chest PT and IS will be encouraged  monitor adequacy of oxygenation and ventilation and attempt to wean oxygen  Nutritional goals will be met using po eventually , ensure adequate caloric intake and montior the same  Stress ulcer and VTE prophylaxis will be achieved    Glycemic control is satisfactory  Electrolytes have been repleted as necessary and wound care has been carried out. Pain control has been achieved.   agressive physical therapy and early mobility and ambulation goals will be met   The family was updated about the course and plan  CRITICAL CARE TIME SPENT in evaluation and management, reassessments, review and interpretation of labs and x-rays, ventilator and hemodynamic management, formulating a plan and coordinating care: ___90____ MIN.  Time does not include procedural time.    CTICU ATTENDING     					    Roby Mueller MD

## 2018-07-28 LAB
ALBUMIN SERPL ELPH-MCNC: 2.8 G/DL — LOW (ref 3.3–5)
ALBUMIN SERPL ELPH-MCNC: 3.1 G/DL — LOW (ref 3.3–5)
ALBUMIN SERPL ELPH-MCNC: 3.2 G/DL — LOW (ref 3.3–5)
ALBUMIN SERPL ELPH-MCNC: 3.5 G/DL — SIGNIFICANT CHANGE UP (ref 3.3–5)
ALP SERPL-CCNC: 69 U/L — SIGNIFICANT CHANGE UP (ref 40–120)
ALP SERPL-CCNC: 70 U/L — SIGNIFICANT CHANGE UP (ref 40–120)
ALP SERPL-CCNC: 75 U/L — SIGNIFICANT CHANGE UP (ref 40–120)
ALP SERPL-CCNC: 87 U/L — SIGNIFICANT CHANGE UP (ref 40–120)
ALT FLD-CCNC: 40 U/L — SIGNIFICANT CHANGE UP (ref 10–45)
ALT FLD-CCNC: 40 U/L — SIGNIFICANT CHANGE UP (ref 10–45)
ALT FLD-CCNC: 41 U/L — SIGNIFICANT CHANGE UP (ref 10–45)
ALT FLD-CCNC: 42 U/L — SIGNIFICANT CHANGE UP (ref 10–45)
ANION GAP SERPL CALC-SCNC: 13 MMOL/L — SIGNIFICANT CHANGE UP (ref 5–17)
ANION GAP SERPL CALC-SCNC: 15 MMOL/L — SIGNIFICANT CHANGE UP (ref 5–17)
ANION GAP SERPL CALC-SCNC: 17 MMOL/L — SIGNIFICANT CHANGE UP (ref 5–17)
ANION GAP SERPL CALC-SCNC: 18 MMOL/L — HIGH (ref 5–17)
APTT BLD: 28.9 SEC — SIGNIFICANT CHANGE UP (ref 27.5–37.4)
APTT BLD: 31.1 SEC — SIGNIFICANT CHANGE UP (ref 27.5–37.4)
APTT BLD: 31.3 SEC — SIGNIFICANT CHANGE UP (ref 27.5–37.4)
APTT BLD: 31.8 SEC — SIGNIFICANT CHANGE UP (ref 27.5–37.4)
AST SERPL-CCNC: 84 U/L — HIGH (ref 10–40)
AST SERPL-CCNC: 85 U/L — HIGH (ref 10–40)
AST SERPL-CCNC: 91 U/L — HIGH (ref 10–40)
AST SERPL-CCNC: 91 U/L — HIGH (ref 10–40)
BILIRUB DIRECT SERPL-MCNC: 0.4 MG/DL — HIGH (ref 0–0.2)
BILIRUB DIRECT SERPL-MCNC: 0.6 MG/DL — HIGH (ref 0–0.2)
BILIRUB INDIRECT FLD-MCNC: 1.4 MG/DL — HIGH (ref 0.2–1)
BILIRUB INDIRECT FLD-MCNC: 2 MG/DL — HIGH (ref 0.2–1)
BILIRUB SERPL-MCNC: 1.8 MG/DL — HIGH (ref 0.2–1.2)
BILIRUB SERPL-MCNC: 2 MG/DL — HIGH (ref 0.2–1.2)
BILIRUB SERPL-MCNC: 2.6 MG/DL — HIGH (ref 0.2–1.2)
BILIRUB SERPL-MCNC: 4.7 MG/DL — HIGH (ref 0.2–1.2)
BLD GP AB SCN SERPL QL: NEGATIVE — SIGNIFICANT CHANGE UP
BUN SERPL-MCNC: 30 MG/DL — HIGH (ref 7–23)
BUN SERPL-MCNC: 34 MG/DL — HIGH (ref 7–23)
BUN SERPL-MCNC: 34 MG/DL — HIGH (ref 7–23)
BUN SERPL-MCNC: 39 MG/DL — HIGH (ref 7–23)
CALCIUM SERPL-MCNC: 8.5 MG/DL — SIGNIFICANT CHANGE UP (ref 8.4–10.5)
CALCIUM SERPL-MCNC: 8.9 MG/DL — SIGNIFICANT CHANGE UP (ref 8.4–10.5)
CALCIUM SERPL-MCNC: 8.9 MG/DL — SIGNIFICANT CHANGE UP (ref 8.4–10.5)
CALCIUM SERPL-MCNC: 9.2 MG/DL — SIGNIFICANT CHANGE UP (ref 8.4–10.5)
CHLORIDE SERPL-SCNC: 90 MMOL/L — LOW (ref 96–108)
CHLORIDE SERPL-SCNC: 91 MMOL/L — LOW (ref 96–108)
CHLORIDE SERPL-SCNC: 94 MMOL/L — LOW (ref 96–108)
CHLORIDE SERPL-SCNC: 94 MMOL/L — LOW (ref 96–108)
CO2 SERPL-SCNC: 24 MMOL/L — SIGNIFICANT CHANGE UP (ref 22–31)
CO2 SERPL-SCNC: 24 MMOL/L — SIGNIFICANT CHANGE UP (ref 22–31)
CO2 SERPL-SCNC: 25 MMOL/L — SIGNIFICANT CHANGE UP (ref 22–31)
CO2 SERPL-SCNC: 25 MMOL/L — SIGNIFICANT CHANGE UP (ref 22–31)
CREAT SERPL-MCNC: 1.94 MG/DL — HIGH (ref 0.5–1.3)
CREAT SERPL-MCNC: 2.06 MG/DL — HIGH (ref 0.5–1.3)
CREAT SERPL-MCNC: 2.1 MG/DL — HIGH (ref 0.5–1.3)
CREAT SERPL-MCNC: 2.1 MG/DL — HIGH (ref 0.5–1.3)
GAS PNL BLDA: SIGNIFICANT CHANGE UP
GLUCOSE BLDC GLUCOMTR-MCNC: 107 MG/DL — HIGH (ref 70–99)
GLUCOSE BLDC GLUCOMTR-MCNC: 110 MG/DL — HIGH (ref 70–99)
GLUCOSE BLDC GLUCOMTR-MCNC: 131 MG/DL — HIGH (ref 70–99)
GLUCOSE BLDC GLUCOMTR-MCNC: 137 MG/DL — HIGH (ref 70–99)
GLUCOSE BLDC GLUCOMTR-MCNC: 143 MG/DL — HIGH (ref 70–99)
GLUCOSE SERPL-MCNC: 115 MG/DL — HIGH (ref 70–99)
GLUCOSE SERPL-MCNC: 126 MG/DL — HIGH (ref 70–99)
GLUCOSE SERPL-MCNC: 132 MG/DL — HIGH (ref 70–99)
GLUCOSE SERPL-MCNC: 149 MG/DL — HIGH (ref 70–99)
HBA1C BLD-MCNC: 5.6 % — SIGNIFICANT CHANGE UP (ref 4–5.6)
HBV SURFACE AB SER-ACNC: REACTIVE — SIGNIFICANT CHANGE UP
HCT VFR BLD CALC: 30.5 % — LOW (ref 34.5–45)
HCT VFR BLD CALC: 30.9 % — LOW (ref 34.5–45)
HCT VFR BLD CALC: 31.2 % — LOW (ref 34.5–45)
HCT VFR BLD CALC: 32.5 % — LOW (ref 34.5–45)
HCT VFR BLD CALC: 34.6 % — SIGNIFICANT CHANGE UP (ref 34.5–45)
HCT VFR BLD CALC: 35.9 % — SIGNIFICANT CHANGE UP (ref 34.5–45)
HGB BLD-MCNC: 10.9 G/DL — LOW (ref 11.5–15.5)
HGB BLD-MCNC: 10.9 G/DL — LOW (ref 11.5–15.5)
HGB BLD-MCNC: 11.1 G/DL — LOW (ref 11.5–15.5)
HGB BLD-MCNC: 11.3 G/DL — LOW (ref 11.5–15.5)
HGB BLD-MCNC: 12.4 G/DL — SIGNIFICANT CHANGE UP (ref 11.5–15.5)
HGB BLD-MCNC: 12.6 G/DL — SIGNIFICANT CHANGE UP (ref 11.5–15.5)
INR BLD: 1.05 — SIGNIFICANT CHANGE UP (ref 0.88–1.16)
INR BLD: 1.07 — SIGNIFICANT CHANGE UP (ref 0.88–1.16)
INR BLD: 1.09 — SIGNIFICANT CHANGE UP (ref 0.88–1.16)
INR BLD: 1.12 — SIGNIFICANT CHANGE UP (ref 0.88–1.16)
LACTATE SERPL-SCNC: 1.6 MMOL/L — SIGNIFICANT CHANGE UP (ref 0.5–2)
LACTATE SERPL-SCNC: 1.8 MMOL/L — SIGNIFICANT CHANGE UP (ref 0.5–2)
LG PLATELETS BLD QL AUTO: PRESENT — SIGNIFICANT CHANGE UP
MAGNESIUM SERPL-MCNC: 1.9 MG/DL — SIGNIFICANT CHANGE UP (ref 1.6–2.6)
MAGNESIUM SERPL-MCNC: 2.2 MG/DL — SIGNIFICANT CHANGE UP (ref 1.6–2.6)
MAGNESIUM SERPL-MCNC: 2.7 MG/DL — HIGH (ref 1.6–2.6)
MANUAL SMEAR VERIFICATION: SIGNIFICANT CHANGE UP
MCHC RBC-ENTMCNC: 30 PG — SIGNIFICANT CHANGE UP (ref 27–34)
MCHC RBC-ENTMCNC: 30 PG — SIGNIFICANT CHANGE UP (ref 27–34)
MCHC RBC-ENTMCNC: 30.1 PG — SIGNIFICANT CHANGE UP (ref 27–34)
MCHC RBC-ENTMCNC: 30.2 PG — SIGNIFICANT CHANGE UP (ref 27–34)
MCHC RBC-ENTMCNC: 30.2 PG — SIGNIFICANT CHANGE UP (ref 27–34)
MCHC RBC-ENTMCNC: 30.3 PG — SIGNIFICANT CHANGE UP (ref 27–34)
MCHC RBC-ENTMCNC: 34.8 G/DL — SIGNIFICANT CHANGE UP (ref 32–36)
MCHC RBC-ENTMCNC: 34.9 G/DL — SIGNIFICANT CHANGE UP (ref 32–36)
MCHC RBC-ENTMCNC: 35.1 G/DL — SIGNIFICANT CHANGE UP (ref 32–36)
MCHC RBC-ENTMCNC: 35.7 G/DL — SIGNIFICANT CHANGE UP (ref 32–36)
MCHC RBC-ENTMCNC: 35.8 G/DL — SIGNIFICANT CHANGE UP (ref 32–36)
MCHC RBC-ENTMCNC: 35.9 G/DL — SIGNIFICANT CHANGE UP (ref 32–36)
MCV RBC AUTO: 83.5 FL — SIGNIFICANT CHANGE UP (ref 80–100)
MCV RBC AUTO: 84.2 FL — SIGNIFICANT CHANGE UP (ref 80–100)
MCV RBC AUTO: 84.7 FL — SIGNIFICANT CHANGE UP (ref 80–100)
MCV RBC AUTO: 86.1 FL — SIGNIFICANT CHANGE UP (ref 80–100)
MCV RBC AUTO: 86.2 FL — SIGNIFICANT CHANGE UP (ref 80–100)
MCV RBC AUTO: 86.2 FL — SIGNIFICANT CHANGE UP (ref 80–100)
PHOSPHATE SERPL-MCNC: 5.6 MG/DL — HIGH (ref 2.5–4.5)
PHOSPHATE SERPL-MCNC: 6.1 MG/DL — HIGH (ref 2.5–4.5)
PHOSPHATE SERPL-MCNC: 6.5 MG/DL — HIGH (ref 2.5–4.5)
PLAT MORPH BLD: ABNORMAL
PLATELET # BLD AUTO: 155 K/UL — SIGNIFICANT CHANGE UP (ref 150–400)
PLATELET # BLD AUTO: 34 K/UL — LOW (ref 150–400)
PLATELET # BLD AUTO: 55 K/UL — LOW (ref 150–400)
PLATELET # BLD AUTO: 62 K/UL — LOW (ref 150–400)
PLATELET # BLD AUTO: 78 K/UL — LOW (ref 150–400)
PLATELET # BLD AUTO: 93 K/UL — LOW (ref 150–400)
POTASSIUM SERPL-MCNC: 3.7 MMOL/L — SIGNIFICANT CHANGE UP (ref 3.5–5.3)
POTASSIUM SERPL-MCNC: 4 MMOL/L — SIGNIFICANT CHANGE UP (ref 3.5–5.3)
POTASSIUM SERPL-MCNC: 4.3 MMOL/L — SIGNIFICANT CHANGE UP (ref 3.5–5.3)
POTASSIUM SERPL-MCNC: 4.3 MMOL/L — SIGNIFICANT CHANGE UP (ref 3.5–5.3)
POTASSIUM SERPL-SCNC: 3.7 MMOL/L — SIGNIFICANT CHANGE UP (ref 3.5–5.3)
POTASSIUM SERPL-SCNC: 4 MMOL/L — SIGNIFICANT CHANGE UP (ref 3.5–5.3)
POTASSIUM SERPL-SCNC: 4.3 MMOL/L — SIGNIFICANT CHANGE UP (ref 3.5–5.3)
POTASSIUM SERPL-SCNC: 4.3 MMOL/L — SIGNIFICANT CHANGE UP (ref 3.5–5.3)
PROT SERPL-MCNC: 5.3 G/DL — LOW (ref 6–8.3)
PROT SERPL-MCNC: 5.6 G/DL — LOW (ref 6–8.3)
PROT SERPL-MCNC: 6.4 G/DL — SIGNIFICANT CHANGE UP (ref 6–8.3)
PROT SERPL-MCNC: 6.5 G/DL — SIGNIFICANT CHANGE UP (ref 6–8.3)
PROTHROM AB SERPL-ACNC: 11.7 SEC — SIGNIFICANT CHANGE UP (ref 9.8–12.7)
PROTHROM AB SERPL-ACNC: 11.9 SEC — SIGNIFICANT CHANGE UP (ref 9.8–12.7)
PROTHROM AB SERPL-ACNC: 12.1 SEC — SIGNIFICANT CHANGE UP (ref 9.8–12.7)
PROTHROM AB SERPL-ACNC: 12.5 SEC — SIGNIFICANT CHANGE UP (ref 9.8–12.7)
RBC # BLD: 3.6 M/UL — LOW (ref 3.8–5.2)
RBC # BLD: 3.62 M/UL — LOW (ref 3.8–5.2)
RBC # BLD: 3.7 M/UL — LOW (ref 3.8–5.2)
RBC # BLD: 3.77 M/UL — LOW (ref 3.8–5.2)
RBC # BLD: 4.11 M/UL — SIGNIFICANT CHANGE UP (ref 3.8–5.2)
RBC # BLD: 4.17 M/UL — SIGNIFICANT CHANGE UP (ref 3.8–5.2)
RBC # FLD: 15.5 % — SIGNIFICANT CHANGE UP (ref 10.3–16.9)
RBC # FLD: 15.6 % — SIGNIFICANT CHANGE UP (ref 10.3–16.9)
RBC # FLD: 15.8 % — SIGNIFICANT CHANGE UP (ref 10.3–16.9)
RBC # FLD: 16 % — SIGNIFICANT CHANGE UP (ref 10.3–16.9)
RBC # FLD: 16 % — SIGNIFICANT CHANGE UP (ref 10.3–16.9)
RBC # FLD: 16.1 % — SIGNIFICANT CHANGE UP (ref 10.3–16.9)
RBC BLD AUTO: SIGNIFICANT CHANGE UP
RH IG SCN BLD-IMP: NEGATIVE — SIGNIFICANT CHANGE UP
SODIUM SERPL-SCNC: 131 MMOL/L — LOW (ref 135–145)
SODIUM SERPL-SCNC: 132 MMOL/L — LOW (ref 135–145)
SODIUM SERPL-SCNC: 133 MMOL/L — LOW (ref 135–145)
SODIUM SERPL-SCNC: 134 MMOL/L — LOW (ref 135–145)
WBC # BLD: 12.2 K/UL — HIGH (ref 3.8–10.5)
WBC # BLD: 5.1 K/UL — SIGNIFICANT CHANGE UP (ref 3.8–10.5)
WBC # BLD: 6.8 K/UL — SIGNIFICANT CHANGE UP (ref 3.8–10.5)
WBC # BLD: 6.9 K/UL — SIGNIFICANT CHANGE UP (ref 3.8–10.5)
WBC # BLD: 8.9 K/UL — SIGNIFICANT CHANGE UP (ref 3.8–10.5)
WBC # BLD: 9 K/UL — SIGNIFICANT CHANGE UP (ref 3.8–10.5)
WBC # FLD AUTO: 12.2 K/UL — HIGH (ref 3.8–10.5)
WBC # FLD AUTO: 5.1 K/UL — SIGNIFICANT CHANGE UP (ref 3.8–10.5)
WBC # FLD AUTO: 6.8 K/UL — SIGNIFICANT CHANGE UP (ref 3.8–10.5)
WBC # FLD AUTO: 6.9 K/UL — SIGNIFICANT CHANGE UP (ref 3.8–10.5)
WBC # FLD AUTO: 8.9 K/UL — SIGNIFICANT CHANGE UP (ref 3.8–10.5)
WBC # FLD AUTO: 9 K/UL — SIGNIFICANT CHANGE UP (ref 3.8–10.5)

## 2018-07-28 PROCEDURE — 99291 CRITICAL CARE FIRST HOUR: CPT

## 2018-07-28 PROCEDURE — 99292 CRITICAL CARE ADDL 30 MIN: CPT

## 2018-07-28 PROCEDURE — 71045 X-RAY EXAM CHEST 1 VIEW: CPT | Mod: 26,59

## 2018-07-28 PROCEDURE — 72131 CT LUMBAR SPINE W/O DYE: CPT | Mod: 26

## 2018-07-28 RX ORDER — PHENYLEPHRINE HYDROCHLORIDE 10 MG/ML
0.5 INJECTION INTRAVENOUS
Qty: 40 | Refills: 0 | Status: DISCONTINUED | OUTPATIENT
Start: 2018-07-28 | End: 2018-07-30

## 2018-07-28 RX ORDER — CALCIUM GLUCONATE 100 MG/ML
2 VIAL (ML) INTRAVENOUS ONCE
Qty: 0 | Refills: 0 | Status: COMPLETED | OUTPATIENT
Start: 2018-07-28 | End: 2018-07-28

## 2018-07-28 RX ORDER — OXYCODONE AND ACETAMINOPHEN 5; 325 MG/1; MG/1
1 TABLET ORAL EVERY 6 HOURS
Qty: 0 | Refills: 0 | Status: DISCONTINUED | OUTPATIENT
Start: 2018-07-28 | End: 2018-07-28

## 2018-07-28 RX ORDER — CALCIUM GLUCONATE 100 MG/ML
1 VIAL (ML) INTRAVENOUS ONCE
Qty: 0 | Refills: 0 | Status: COMPLETED | OUTPATIENT
Start: 2018-07-28 | End: 2018-07-28

## 2018-07-28 RX ORDER — FENTANYL CITRATE 50 UG/ML
25 INJECTION INTRAVENOUS ONCE
Qty: 0 | Refills: 0 | Status: DISCONTINUED | OUTPATIENT
Start: 2018-07-28 | End: 2018-07-28

## 2018-07-28 RX ORDER — ALBUMIN HUMAN 25 %
250 VIAL (ML) INTRAVENOUS ONCE
Qty: 0 | Refills: 0 | Status: COMPLETED | OUTPATIENT
Start: 2018-07-28 | End: 2018-07-28

## 2018-07-28 RX ORDER — OXYCODONE AND ACETAMINOPHEN 5; 325 MG/1; MG/1
2 TABLET ORAL EVERY 6 HOURS
Qty: 0 | Refills: 0 | Status: DISCONTINUED | OUTPATIENT
Start: 2018-07-28 | End: 2018-07-28

## 2018-07-28 RX ORDER — POTASSIUM CHLORIDE 20 MEQ
20 PACKET (EA) ORAL ONCE
Qty: 0 | Refills: 0 | Status: COMPLETED | OUTPATIENT
Start: 2018-07-28 | End: 2018-07-28

## 2018-07-28 RX ORDER — MAGNESIUM SULFATE 500 MG/ML
2 VIAL (ML) INJECTION ONCE
Qty: 0 | Refills: 0 | Status: COMPLETED | OUTPATIENT
Start: 2018-07-28 | End: 2018-07-28

## 2018-07-28 RX ADMIN — ATORVASTATIN CALCIUM 20 MILLIGRAM(S): 80 TABLET, FILM COATED ORAL at 23:17

## 2018-07-28 RX ADMIN — Medication 100 MILLIGRAM(S): at 06:00

## 2018-07-28 RX ADMIN — Medication 200 GRAM(S): at 17:42

## 2018-07-28 RX ADMIN — CHLORHEXIDINE GLUCONATE 5 MILLILITER(S): 213 SOLUTION TOPICAL at 15:40

## 2018-07-28 RX ADMIN — Medication 125 MILLILITER(S): at 03:52

## 2018-07-28 RX ADMIN — Medication 200 GRAM(S): at 12:15

## 2018-07-28 RX ADMIN — FENTANYL CITRATE 25 MICROGRAM(S): 50 INJECTION INTRAVENOUS at 17:00

## 2018-07-28 RX ADMIN — CHLORHEXIDINE GLUCONATE 5 MILLILITER(S): 213 SOLUTION TOPICAL at 07:16

## 2018-07-28 RX ADMIN — Medication 200 GRAM(S): at 04:32

## 2018-07-28 RX ADMIN — CHLORHEXIDINE GLUCONATE 5 MILLILITER(S): 213 SOLUTION TOPICAL at 03:19

## 2018-07-28 RX ADMIN — Medication 100 MILLIGRAM(S): at 15:41

## 2018-07-28 RX ADMIN — CHLORHEXIDINE GLUCONATE 5 MILLILITER(S): 213 SOLUTION TOPICAL at 17:42

## 2018-07-28 RX ADMIN — CHLORHEXIDINE GLUCONATE 5 MILLILITER(S): 213 SOLUTION TOPICAL at 11:16

## 2018-07-28 RX ADMIN — PANTOPRAZOLE SODIUM 40 MILLIGRAM(S): 20 TABLET, DELAYED RELEASE ORAL at 11:16

## 2018-07-28 RX ADMIN — Medication 200 MILLIGRAM(S): at 23:56

## 2018-07-28 RX ADMIN — Medication 100 MILLIGRAM(S): at 22:52

## 2018-07-28 RX ADMIN — CHLORHEXIDINE GLUCONATE 5 MILLILITER(S): 213 SOLUTION TOPICAL at 22:44

## 2018-07-28 RX ADMIN — Medication 50 GRAM(S): at 04:22

## 2018-07-28 RX ADMIN — FENTANYL CITRATE 25 MICROGRAM(S): 50 INJECTION INTRAVENOUS at 17:06

## 2018-07-28 RX ADMIN — Medication 125 MILLILITER(S): at 11:00

## 2018-07-28 RX ADMIN — Medication 200 MILLIGRAM(S): at 11:16

## 2018-07-28 RX ADMIN — Medication 250 MILLIGRAM(S): at 09:23

## 2018-07-28 NOTE — PROGRESS NOTE ADULT - SUBJECTIVE AND OBJECTIVE BOX
CTICU  CRITICAL  CARE  attending     Hand off received @ 7a					   Pertinent clinical, laboratory, radiographic, hemodynamic, echocardiographic, respiratory data, microbiologic data and chart were reviewed and analyzed frequently throughout the course of the day and night  Patient seen and examined with CTS/ SH attending at bedside  Pt is a 68y , Female, pod # 2 s/p Open TAAA with replacement of descending aorta; separete re-implantation for meso-renals; celiac/SMA/Bilateral renal bypass;  LD in the OR      LD in the OR    today:    concern for LLE weakness; slight improvement through the course of the day  CT lumbar spine to rule out hematoma at LD insertion site  MAP maintained above 110; Hgb optimized to 12  s/p platelet transfusions x 2  Full vent support  F/u by Vascular surgery and neuro Sx         , FAMILY HISTORY:  No pertinent family history in first degree relatives  PAST MEDICAL & SURGICAL HISTORY:  History of seizures: 1980  COPD (chronic obstructive pulmonary disease)  HLD (hyperlipidemia)  HTN (hypertension)  AAA (abdominal aortic aneurysm)  CAD (coronary artery disease)  S/P AAA (abdominal aortic aneurysm) repair  History of abdominal aortic aneurysm (AAA): 2004  History of cholecystectomy    Patient is a 68y old  Female who presents with a chief complaint of Descending thoracic aortic aneurysm (25 Jul 2018 17:14)      14 system review was unremarkable  acute changes include acute respiratory failure  Vital signs, hemodynamic and respiratory parameters were reviewed from the bedside nursing flowsheet.  ICU Vital Signs Last 24 Hrs  T(C): 37.2 (28 Jul 2018 17:11), Max: 37.2 (28 Jul 2018 17:00)  T(F): 98.9 (28 Jul 2018 17:11), Max: 98.9 (28 Jul 2018 17:00)  HR: 88 (28 Jul 2018 21:00) (65 - 98)  BP: 156/77 (28 Jul 2018 21:00) (156/77 - 156/77)  BP(mean): 109 (28 Jul 2018 21:00) (109 - 109)  ABP: 148/68 (28 Jul 2018 21:00) (122/56 - 190/76)  ABP(mean): 100 (28 Jul 2018 21:00) (80 - 122)  RR: 18 (28 Jul 2018 21:00) (18 - 18)  SpO2: 100% (28 Jul 2018 21:00) (98% - 100%)    Adult Advanced Hemodynamics Last 24 Hrs  CVP(mm Hg): 23 (28 Jul 2018 17:00) (11 - 23)  CVP(cm H2O): --  CO: --  CI: --  PA: 49/25 (28 Jul 2018 21:00) (30/19 - 54/27)  PA(mean): 35 (28 Jul 2018 21:00) (24 - 39)  PCWP: --  SVR: --  SVRI: --  PVR: --  PVRI: --, ABG - ( 28 Jul 2018 18:21 )  pH, Arterial: 7.49  pH, Blood: x     /  pCO2: 35    /  pO2: 136   / HCO3: 26    / Base Excess: 3.0   /  SaO2: 99                Mode: AC/ CMV (Assist Control/ Continuous Mandatory Ventilation)  RR (machine): 18  TV (machine): 450  FiO2: 60  PEEP: 8  ITime: 1  MAP: 12  PIP: 26    Intake and output was reviewed and the fluid balance was calculated  Daily     Daily   I&O's Summary    27 Jul 2018 07:01  -  28 Jul 2018 07:00  --------------------------------------------------------  IN: 2531 mL / OUT: 3425 mL / NET: -894 mL    28 Jul 2018 07:01  -  28 Jul 2018 21:26  --------------------------------------------------------  IN: 1906.8 mL / OUT: 3080 mL / NET: -1173.2 mL        All lines and drain sites were assessed  [facility]      POCT Blood Glucose.: 131 mg/dL (28 Jul 2018 18:04)    No acute change in mental status  Auscultation of the chest reveals equal bs  Abdomen is soft  Extremities are warm and well perfused  Wounds appear clean and unremarkable  Antibiotics are periop    labs  CBC Full  -  ( 28 Jul 2018 18:17 )  WBC Count : 5.1 K/uL  Hemoglobin : 11.3 g/dL  Hematocrit : 32.5 %  Platelet Count - Automated : 62 K/uL  Mean Cell Volume : 86.2 fL  Mean Cell Hemoglobin : 30.0 pg  Mean Cell Hemoglobin Concentration : 34.8 g/dL  Auto Neutrophil # : x  Auto Lymphocyte # : x  Auto Monocyte # : x  Auto Eosinophil # : x  Auto Basophil # : x  Auto Neutrophil % : x  Auto Lymphocyte % : x  Auto Monocyte % : x  Auto Eosinophil % : x  Auto Basophil % : x    07-28    131<L>  |  90<L>  |  34<H>  ----------------------------<  126<H>  4.0   |  24  |  2.10<H>    Ca    8.9      28 Jul 2018 15:14  Phos  6.5     07-28  Mg     2.7     07-28    TPro  6.4  /  Alb  3.5  /  TBili  2.6<H>  /  DBili  0.6<H>  /  AST  91<H>  /  ALT  40  /  AlkPhos  75  07-28    PT/INR - ( 28 Jul 2018 15:14 )   PT: 11.7 sec;   INR: 1.05          PTT - ( 28 Jul 2018 15:14 )  PTT:31.8 sec  The current medications were reviewed   MEDICATIONS  (STANDING):  atorvastatin 20 milliGRAM(s) Oral at bedtime  chlorhexidine 0.12% Liquid 5 milliLiter(s) Swish and Spit every 4 hours  ciprofloxacin   IVPB      ciprofloxacin   IVPB 400 milliGRAM(s) IV Intermittent every 12 hours  dexmedetomidine Infusion 0.2 MICROgram(s)/kG/Hr (2.545 mL/Hr) IV Continuous <Continuous>  dextrose 5%. 1000 milliLiter(s) (50 mL/Hr) IV Continuous <Continuous>  dextrose 50% Injectable 50 milliLiter(s) IV Push every 15 minutes  dextrose 50% Injectable 25 milliLiter(s) IV Push every 15 minutes  dextrose 50% Injectable 50 milliLiter(s) IV Push every 15 minutes  dextrose 50% Injectable 25 milliLiter(s) IV Push every 15 minutes  insulin lispro (HumaLOG) corrective regimen sliding scale   SubCutaneous every 4 hours  metroNIDAZOLE  IVPB 500 milliGRAM(s) IV Intermittent every 8 hours  metroNIDAZOLE  IVPB      pantoprazole  Injectable 40 milliGRAM(s) IV Push daily  phenylephrine    Infusion 0.5 MICROgram(s)/kG/Min (9.544 mL/Hr) IV Continuous <Continuous>  propofol Infusion 5 MICROgram(s)/kG/Min (1.527 mL/Hr) IV Continuous <Continuous>  sodium chloride 0.9%. 1000 milliLiter(s) (10 mL/Hr) IV Continuous <Continuous>    MEDICATIONS  (PRN):  dextrose 40% Gel 15 Gram(s) Oral once PRN Blood Glucose LESS THAN 70 milliGRAM(s)/deciliter  glucagon  Injectable 1 milliGRAM(s) IntraMuscular once PRN Glucose LESS THAN 70 milligrams/deciliter       PROBLEM LIST/ ASSESSMENT:  HEALTH ISSUES - PROBLEM Dx:    Hemodynamic instability  acute LLE weakness  resp failure with hypoxia  thrombocytopenia        ,   Patient is a 68y old  Female who presents with a chief complaint of Descending thoracic aortic aneurysm (25 Jul 2018 17:14)     s/p Open TAAA with replacement of descending aorta; separate re-implantation for meso-renals; celiac/SMA/Bilateral renal bypass;  acute changes include acute respiratory failure    My plan includes :  close hemodynamic, ventilatory and drain monitoring and management per post op routine    Monitor for arrhythmias and monitor parameters for organ perfusion  monitor neurologic status  Head of the bed should remain elevated to 45 deg .   chest PT and IS will be encouraged  monitor adequacy of oxygenation and ventilation and attempt to wean oxygen  Nutritional goals will be met using po eventually , ensure adequate caloric intake and montior the same  Stress ulcer and VTE prophylaxis will be achieved    Glycemic control is satisfactory  Electrolytes have been repleted as necessary and wound care has been carried out. Pain control has been achieved.   agressive physical therapy and early mobility and ambulation goals will be met   The family was updated about the course and plan  CRITICAL CARE TIME SPENT in evaluation and management, reassessments, review and interpretation of labs and x-rays, ventilator and hemodynamic management, formulating a plan and coordinating care: ___90____ MIN.  Time does not include procedural time.  CTICU ATTENDING     					    Roby Mueller MD

## 2018-07-28 NOTE — PROGRESS NOTE ADULT - SUBJECTIVE AND OBJECTIVE BOX
c/o incisional pain  Flex sig without ischemia yesterday  Abd soft, mild dist, minimal tenderness  OGT 350cc/24h bilious    Less concern for ischemic colitis  Plan per CTICU

## 2018-07-28 NOTE — PROGRESS NOTE ADULT - SUBJECTIVE AND OBJECTIVE BOX
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; separate re-implantation for meso-renals; celiac/SMA/Bilateral renal bypass;  Lumbar Drain in the OR     HEALTH ISSUES - PROBLEM Dx:         FAMILY HISTORY:  No pertinent family history in first degree relatives  PAST MEDICAL & SURGICAL HISTORY:  History of seizures: 1980  COPD (chronic obstructive pulmonary disease)  HLD (hyperlipidemia)  HTN (hypertension)  AAA (abdominal aortic aneurysm)  CAD (coronary artery disease)  S/P AAA (abdominal aortic aneurysm) repair  History of abdominal aortic aneurysm (AAA): 2004  History of cholecystectomy      14 system review was unremarkable  acute changes include acute respiratory failure    Vital signs, hemodynamic and respiratory parameters were reviewed from the bedside nursing flowsheet.  ICU Vital Signs Last 24 Hrs  T(C): 37.2 (28 Jul 2018 17:11), Max: 37.2 (28 Jul 2018 17:00)  T(F): 98.9 (28 Jul 2018 17:11), Max: 98.9 (28 Jul 2018 17:00)  HR: 83 (29 Jul 2018 01:01) (65 - 98)  BP: 145/84 (29 Jul 2018 01:00) (145/84 - 156/77)  BP(mean): 102 (29 Jul 2018 01:00) (102 - 109)  ABP: 150/74 (29 Jul 2018 01:00) (134/54 - 190/76)  ABP(mean): 104 (29 Jul 2018 01:00) (84 - 122)  RR: 18 (29 Jul 2018 01:00) (17 - 18)  SpO2: 100% (29 Jul 2018 01:01) (98% - 100%)    Adult Advanced Hemodynamics Last 24 Hrs  CVP(mm Hg): 15 (28 Jul 2018 22:00) (11 - 23)  CVP(cm H2O): --  CO: --  CI: --  PA: 50/26 (29 Jul 2018 01:00) (36/17 - 54/27)  PA(mean): 36 (29 Jul 2018 01:00) (26 - 39)  PCWP: --  SVR: --  SVRI: --  PVR: --  PVRI: --, ABG - ( 28 Jul 2018 22:31 )  pH, Arterial: 7.47  pH, Blood: x     /  pCO2: 37    /  pO2: 152   / HCO3: 26    / Base Excess: 2.8   /  SaO2: 99                Mode: AC/ CMV (Assist Control/ Continuous Mandatory Ventilation)  RR (machine): 18  TV (machine): 450  FiO2: 50  PEEP: 8  ITime: 1  MAP: 13  PIP: 25    Intake and output was reviewed and the fluid balance was calculated  Daily     Daily   I&O's Summary    27 Jul 2018 07:01  -  28 Jul 2018 07:00  --------------------------------------------------------  IN: 2531 mL / OUT: 3425 mL / NET: -894 mL    28 Jul 2018 07:01  -  29 Jul 2018 01:06  --------------------------------------------------------  IN: 2443.2 mL / OUT: 3920 mL / NET: -1476.8 mL        All lines and drain sites were assessed  [facility]      POCT Blood Glucose.: 137 mg/dL (28 Jul 2018 22:21)        Exam:   Gen: NAD   Neuro: Mental status: arousable and follows commands; right leg: able to lift against gravity; left leg: able to bend at the knee, but unable to lift the left against gravity  Lungs: Auscultation of the chest reveals equal bs  Abd: soft, nt/nd  Ext: warm and well perfused  Wound: appears clean and unremarkable  Antibiotics are periop      labs  CBC Full  -  ( 28 Jul 2018 22:32 )  WBC Count : 12.2 K/uL  Hemoglobin : 12.4 g/dL  Hematocrit : 34.6 %  Platelet Count - Automated : 93 K/uL  Mean Cell Volume : 84.2 fL  Mean Cell Hemoglobin : 30.2 pg  Mean Cell Hemoglobin Concentration : 35.8 g/dL  Auto Neutrophil # : x  Auto Lymphocyte # : x  Auto Monocyte # : x  Auto Eosinophil # : x  Auto Basophil # : x  Auto Neutrophil % : x  Auto Lymphocyte % : x  Auto Monocyte % : x  Auto Eosinophil % : x  Auto Basophil % : x    07-28    133<L>  |  91<L>  |  39<H>  ----------------------------<  132<H>  3.7   |  24  |  2.10<H>    Ca    9.2      28 Jul 2018 22:32  Phos  5.6     07-28  Mg     2.2     07-28    TPro  6.5  /  Alb  3.2<L>  /  TBili  4.7<H>  /  DBili  x   /  AST  91<H>  /  ALT  40  /  AlkPhos  87  07-28    PT/INR - ( 28 Jul 2018 22:32 )   PT: 11.9 sec;   INR: 1.07          PTT - ( 28 Jul 2018 22:32 )  PTT:31.1 sec    The current medications were reviewed   MEDICATIONS  (STANDING):  atorvastatin 20 milliGRAM(s) Oral at bedtime  chlorhexidine 0.12% Liquid 5 milliLiter(s) Swish and Spit every 4 hours  ciprofloxacin   IVPB      ciprofloxacin   IVPB 400 milliGRAM(s) IV Intermittent every 12 hours  dexmedetomidine Infusion 0.2 MICROgram(s)/kG/Hr (2.545 mL/Hr) IV Continuous <Continuous>  dextrose 5%. 1000 milliLiter(s) (50 mL/Hr) IV Continuous <Continuous>  dextrose 50% Injectable 50 milliLiter(s) IV Push every 15 minutes  dextrose 50% Injectable 25 milliLiter(s) IV Push every 15 minutes  dextrose 50% Injectable 50 milliLiter(s) IV Push every 15 minutes  dextrose 50% Injectable 25 milliLiter(s) IV Push every 15 minutes  insulin lispro (HumaLOG) corrective regimen sliding scale   SubCutaneous every 4 hours  metroNIDAZOLE  IVPB 500 milliGRAM(s) IV Intermittent every 8 hours  metroNIDAZOLE  IVPB      pantoprazole  Injectable 40 milliGRAM(s) IV Push daily  phenylephrine    Infusion 0.5 MICROgram(s)/kG/Min (9.544 mL/Hr) IV Continuous <Continuous>  propofol Infusion 5 MICROgram(s)/kG/Min (1.527 mL/Hr) IV Continuous <Continuous>  sodium chloride 0.9%. 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 Remains on Guero at 0.8;   -ISP around 6-7--> will drain CSF 10cc/hr for now; maintain MAP >110 and Hb >12  -on Cipro/flagyl as well for possible GI coverage   -Close hemodynamic, ventilatory and drain monitoring and management per post op routine  -Monitor for arrhythmias and monitor parameters for organ perfusion  -Monitor neurologic status  -Head of the bed should remain elevated to 45 deg .   -Chest PT and IS will be encouraged  -Monitor adequacy of oxygenation and ventilation and attempt to wean oxygen  -Nutritional goals will be met using po eventually , ensure adequate caloric intake and montior the same  -Stress ulcer and VTE prophylaxis will be achieved    -Glycemic control is satisfactory  -Electrolytes have been repleated as necessary and wound care has been carried out. Pain control has been achieved.   -Agressive physical therapy and early mobility and ambulation goals will be met   The family was updated about the course and plan    CRITICAL CARE TIME SPENT in evaluation and management, reassessments, review and interpretation of labs and x-rays, ventilator and hemodynamic management, formulating a plan and coordinating care: ___35____ MIN.  Time does not include procedural time.      CTICU ATTENDING:      		  Martín Vanegas MD

## 2018-07-29 LAB
ALBUMIN SERPL ELPH-MCNC: 3.4 G/DL — SIGNIFICANT CHANGE UP (ref 3.3–5)
ALBUMIN SERPL ELPH-MCNC: 3.5 G/DL — SIGNIFICANT CHANGE UP (ref 3.3–5)
ALBUMIN SERPL ELPH-MCNC: 3.7 G/DL — SIGNIFICANT CHANGE UP (ref 3.3–5)
ALP SERPL-CCNC: 79 U/L — SIGNIFICANT CHANGE UP (ref 40–120)
ALP SERPL-CCNC: 85 U/L — SIGNIFICANT CHANGE UP (ref 40–120)
ALP SERPL-CCNC: 86 U/L — SIGNIFICANT CHANGE UP (ref 40–120)
ALT FLD-CCNC: 31 U/L — SIGNIFICANT CHANGE UP (ref 10–45)
ALT FLD-CCNC: 33 U/L — SIGNIFICANT CHANGE UP (ref 10–45)
ALT FLD-CCNC: 42 U/L — SIGNIFICANT CHANGE UP (ref 10–45)
ANION GAP SERPL CALC-SCNC: 16 MMOL/L — SIGNIFICANT CHANGE UP (ref 5–17)
ANION GAP SERPL CALC-SCNC: 23 MMOL/L — HIGH (ref 5–17)
ANION GAP SERPL CALC-SCNC: 28 MMOL/L — HIGH (ref 5–17)
APTT BLD: 30.3 SEC — SIGNIFICANT CHANGE UP (ref 27.5–37.4)
APTT BLD: 33.1 SEC — SIGNIFICANT CHANGE UP (ref 27.5–37.4)
APTT BLD: 42.9 SEC — HIGH (ref 27.5–37.4)
AST SERPL-CCNC: 72 U/L — HIGH (ref 10–40)
AST SERPL-CCNC: 81 U/L — HIGH (ref 10–40)
AST SERPL-CCNC: 95 U/L — HIGH (ref 10–40)
BASE EXCESS BLDA CALC-SCNC: -3.6 MMOL/L — LOW (ref -2–3)
BILIRUB DIRECT SERPL-MCNC: 2.4 MG/DL — HIGH (ref 0–0.2)
BILIRUB DIRECT SERPL-MCNC: 3.1 MG/DL — HIGH (ref 0–0.2)
BILIRUB INDIRECT FLD-MCNC: 1.7 MG/DL — HIGH (ref 0.2–1)
BILIRUB INDIRECT FLD-MCNC: 2 MG/DL — HIGH (ref 0.2–1)
BILIRUB SERPL-MCNC: 4.1 MG/DL — HIGH (ref 0.2–1.2)
BILIRUB SERPL-MCNC: 4.8 MG/DL — HIGH (ref 0.2–1.2)
BILIRUB SERPL-MCNC: 5.1 MG/DL — HIGH (ref 0.2–1.2)
BUN SERPL-MCNC: 40 MG/DL — HIGH (ref 7–23)
BUN SERPL-MCNC: 47 MG/DL — HIGH (ref 7–23)
BUN SERPL-MCNC: 47 MG/DL — HIGH (ref 7–23)
CALCIUM SERPL-MCNC: 8.6 MG/DL — SIGNIFICANT CHANGE UP (ref 8.4–10.5)
CALCIUM SERPL-MCNC: 9 MG/DL — SIGNIFICANT CHANGE UP (ref 8.4–10.5)
CALCIUM SERPL-MCNC: 9.2 MG/DL — SIGNIFICANT CHANGE UP (ref 8.4–10.5)
CHLORIDE SERPL-SCNC: 89 MMOL/L — LOW (ref 96–108)
CHLORIDE SERPL-SCNC: 91 MMOL/L — LOW (ref 96–108)
CHLORIDE SERPL-SCNC: 92 MMOL/L — LOW (ref 96–108)
CO2 SERPL-SCNC: 18 MMOL/L — LOW (ref 22–31)
CO2 SERPL-SCNC: 19 MMOL/L — LOW (ref 22–31)
CO2 SERPL-SCNC: 23 MMOL/L — SIGNIFICANT CHANGE UP (ref 22–31)
CREAT SERPL-MCNC: 2.11 MG/DL — HIGH (ref 0.5–1.3)
CREAT SERPL-MCNC: 2.16 MG/DL — HIGH (ref 0.5–1.3)
CREAT SERPL-MCNC: 2.21 MG/DL — HIGH (ref 0.5–1.3)
GAS PNL BLDA: SIGNIFICANT CHANGE UP
GAS PNL BLDV: SIGNIFICANT CHANGE UP
GLUCOSE BLDC GLUCOMTR-MCNC: 112 MG/DL — HIGH (ref 70–99)
GLUCOSE BLDC GLUCOMTR-MCNC: 132 MG/DL — HIGH (ref 70–99)
GLUCOSE BLDC GLUCOMTR-MCNC: 138 MG/DL — HIGH (ref 70–99)
GLUCOSE BLDC GLUCOMTR-MCNC: 74 MG/DL — SIGNIFICANT CHANGE UP (ref 70–99)
GLUCOSE SERPL-MCNC: 102 MG/DL — HIGH (ref 70–99)
GLUCOSE SERPL-MCNC: 105 MG/DL — HIGH (ref 70–99)
GLUCOSE SERPL-MCNC: 183 MG/DL — HIGH (ref 70–99)
HCO3 BLDA-SCNC: 22 MMOL/L — SIGNIFICANT CHANGE UP (ref 21–28)
HCT VFR BLD CALC: 31 % — LOW (ref 34.5–45)
HCT VFR BLD CALC: 32.6 % — LOW (ref 34.5–45)
HCT VFR BLD CALC: 34.7 % — SIGNIFICANT CHANGE UP (ref 34.5–45)
HCT VFR BLD CALC: 34.7 % — SIGNIFICANT CHANGE UP (ref 34.5–45)
HCT VFR BLD CALC: 36.2 % — SIGNIFICANT CHANGE UP (ref 34.5–45)
HGB BLD-MCNC: 10.3 G/DL — LOW (ref 11.5–15.5)
HGB BLD-MCNC: 11.3 G/DL — LOW (ref 11.5–15.5)
HGB BLD-MCNC: 11.8 G/DL — SIGNIFICANT CHANGE UP (ref 11.5–15.5)
HGB BLD-MCNC: 12.1 G/DL — SIGNIFICANT CHANGE UP (ref 11.5–15.5)
HGB BLD-MCNC: 12.3 G/DL — SIGNIFICANT CHANGE UP (ref 11.5–15.5)
INR BLD: 1.11 — SIGNIFICANT CHANGE UP (ref 0.88–1.16)
INR BLD: 1.27 — HIGH (ref 0.88–1.16)
INR BLD: 1.85 — HIGH (ref 0.88–1.16)
LACTATE SERPL-SCNC: 2 MMOL/L — SIGNIFICANT CHANGE UP (ref 0.5–2)
LACTATE SERPL-SCNC: 6.5 MMOL/L — CRITICAL HIGH (ref 0.5–2)
LACTATE SERPL-SCNC: 7.8 MMOL/L — CRITICAL HIGH (ref 0.5–2)
LACTATE SERPL-SCNC: 8.9 MMOL/L — CRITICAL HIGH (ref 0.5–2)
MAGNESIUM SERPL-MCNC: 2 MG/DL — SIGNIFICANT CHANGE UP (ref 1.6–2.6)
MAGNESIUM SERPL-MCNC: 2.1 MG/DL — SIGNIFICANT CHANGE UP (ref 1.6–2.6)
MCHC RBC-ENTMCNC: 29.7 PG — SIGNIFICANT CHANGE UP (ref 27–34)
MCHC RBC-ENTMCNC: 29.8 PG — SIGNIFICANT CHANGE UP (ref 27–34)
MCHC RBC-ENTMCNC: 30 PG — SIGNIFICANT CHANGE UP (ref 27–34)
MCHC RBC-ENTMCNC: 30.3 PG — SIGNIFICANT CHANGE UP (ref 27–34)
MCHC RBC-ENTMCNC: 30.4 PG — SIGNIFICANT CHANGE UP (ref 27–34)
MCHC RBC-ENTMCNC: 33.2 G/DL — SIGNIFICANT CHANGE UP (ref 32–36)
MCHC RBC-ENTMCNC: 33.4 G/DL — SIGNIFICANT CHANGE UP (ref 32–36)
MCHC RBC-ENTMCNC: 34 G/DL — SIGNIFICANT CHANGE UP (ref 32–36)
MCHC RBC-ENTMCNC: 34.7 G/DL — SIGNIFICANT CHANGE UP (ref 32–36)
MCHC RBC-ENTMCNC: 35.4 G/DL — SIGNIFICANT CHANGE UP (ref 32–36)
MCV RBC AUTO: 84.6 FL — SIGNIFICANT CHANGE UP (ref 80–100)
MCV RBC AUTO: 87.4 FL — SIGNIFICANT CHANGE UP (ref 80–100)
MCV RBC AUTO: 88.9 FL — SIGNIFICANT CHANGE UP (ref 80–100)
MCV RBC AUTO: 89.4 FL — SIGNIFICANT CHANGE UP (ref 80–100)
MCV RBC AUTO: 89.6 FL — SIGNIFICANT CHANGE UP (ref 80–100)
PCO2 BLDA: 39 MMHG — SIGNIFICANT CHANGE UP (ref 32–45)
PH BLDA: 7.36 — SIGNIFICANT CHANGE UP (ref 7.35–7.45)
PHOSPHATE SERPL-MCNC: 5.4 MG/DL — HIGH (ref 2.5–4.5)
PHOSPHATE SERPL-MCNC: 6.7 MG/DL — HIGH (ref 2.5–4.5)
PLATELET # BLD AUTO: 29 K/UL — LOW (ref 150–400)
PLATELET # BLD AUTO: 35 K/UL — LOW (ref 150–400)
PLATELET # BLD AUTO: 54 K/UL — LOW (ref 150–400)
PLATELET # BLD AUTO: 63 K/UL — LOW (ref 150–400)
PLATELET # BLD AUTO: 75 K/UL — LOW (ref 150–400)
PO2 BLDA: 68 MMHG — LOW (ref 83–108)
POTASSIUM SERPL-MCNC: 4.1 MMOL/L — SIGNIFICANT CHANGE UP (ref 3.5–5.3)
POTASSIUM SERPL-MCNC: 4.3 MMOL/L — SIGNIFICANT CHANGE UP (ref 3.5–5.3)
POTASSIUM SERPL-MCNC: 4.5 MMOL/L — SIGNIFICANT CHANGE UP (ref 3.5–5.3)
POTASSIUM SERPL-SCNC: 4.1 MMOL/L — SIGNIFICANT CHANGE UP (ref 3.5–5.3)
POTASSIUM SERPL-SCNC: 4.3 MMOL/L — SIGNIFICANT CHANGE UP (ref 3.5–5.3)
POTASSIUM SERPL-SCNC: 4.5 MMOL/L — SIGNIFICANT CHANGE UP (ref 3.5–5.3)
PROT SERPL-MCNC: 5.9 G/DL — LOW (ref 6–8.3)
PROT SERPL-MCNC: 6.2 G/DL — SIGNIFICANT CHANGE UP (ref 6–8.3)
PROT SERPL-MCNC: 6.3 G/DL — SIGNIFICANT CHANGE UP (ref 6–8.3)
PROTHROM AB SERPL-ACNC: 12.3 SEC — SIGNIFICANT CHANGE UP (ref 9.8–12.7)
PROTHROM AB SERPL-ACNC: 14.2 SEC — HIGH (ref 9.8–12.7)
PROTHROM AB SERPL-ACNC: 20.8 SEC — HIGH (ref 9.8–12.7)
RBC # BLD: 3.46 M/UL — LOW (ref 3.8–5.2)
RBC # BLD: 3.73 M/UL — LOW (ref 3.8–5.2)
RBC # BLD: 3.88 M/UL — SIGNIFICANT CHANGE UP (ref 3.8–5.2)
RBC # BLD: 4.07 M/UL — SIGNIFICANT CHANGE UP (ref 3.8–5.2)
RBC # BLD: 4.1 M/UL — SIGNIFICANT CHANGE UP (ref 3.8–5.2)
RBC # FLD: 16.2 % — SIGNIFICANT CHANGE UP (ref 10.3–16.9)
RBC # FLD: 16.4 % — SIGNIFICANT CHANGE UP (ref 10.3–16.9)
RBC # FLD: 16.5 % — SIGNIFICANT CHANGE UP (ref 10.3–16.9)
RBC # FLD: 16.5 % — SIGNIFICANT CHANGE UP (ref 10.3–16.9)
RBC # FLD: 16.7 % — SIGNIFICANT CHANGE UP (ref 10.3–16.9)
SAO2 % BLDA: 90 % — LOW (ref 95–100)
SODIUM SERPL-SCNC: 130 MMOL/L — LOW (ref 135–145)
SODIUM SERPL-SCNC: 133 MMOL/L — LOW (ref 135–145)
SODIUM SERPL-SCNC: 136 MMOL/L — SIGNIFICANT CHANGE UP (ref 135–145)
WBC # BLD: 12.3 K/UL — HIGH (ref 3.8–10.5)
WBC # BLD: 14.7 K/UL — HIGH (ref 3.8–10.5)
WBC # BLD: 15.6 K/UL — HIGH (ref 3.8–10.5)
WBC # BLD: 7.2 K/UL — SIGNIFICANT CHANGE UP (ref 3.8–10.5)
WBC # BLD: 9.7 K/UL — SIGNIFICANT CHANGE UP (ref 3.8–10.5)
WBC # FLD AUTO: 12.3 K/UL — HIGH (ref 3.8–10.5)
WBC # FLD AUTO: 14.7 K/UL — HIGH (ref 3.8–10.5)
WBC # FLD AUTO: 15.6 K/UL — HIGH (ref 3.8–10.5)
WBC # FLD AUTO: 7.2 K/UL — SIGNIFICANT CHANGE UP (ref 3.8–10.5)
WBC # FLD AUTO: 9.7 K/UL — SIGNIFICANT CHANGE UP (ref 3.8–10.5)

## 2018-07-29 PROCEDURE — 31725 CLEARANCE OF AIRWAYS: CPT

## 2018-07-29 PROCEDURE — 71045 X-RAY EXAM CHEST 1 VIEW: CPT | Mod: 26,77

## 2018-07-29 PROCEDURE — 71045 X-RAY EXAM CHEST 1 VIEW: CPT | Mod: 26

## 2018-07-29 PROCEDURE — 99292 CRITICAL CARE ADDL 30 MIN: CPT | Mod: 25

## 2018-07-29 PROCEDURE — 99292 CRITICAL CARE ADDL 30 MIN: CPT

## 2018-07-29 PROCEDURE — 71275 CT ANGIOGRAPHY CHEST: CPT | Mod: 26

## 2018-07-29 PROCEDURE — 99291 CRITICAL CARE FIRST HOUR: CPT | Mod: 25

## 2018-07-29 PROCEDURE — 74174 CTA ABD&PLVS W/CONTRAST: CPT | Mod: 26

## 2018-07-29 RX ORDER — INSULIN LISPRO 100/ML
VIAL (ML) SUBCUTANEOUS
Qty: 0 | Refills: 0 | Status: DISCONTINUED | OUTPATIENT
Start: 2018-07-29 | End: 2018-08-12

## 2018-07-29 RX ORDER — ALBUMIN HUMAN 25 %
250 VIAL (ML) INTRAVENOUS ONCE
Qty: 0 | Refills: 0 | Status: COMPLETED | OUTPATIENT
Start: 2018-07-29 | End: 2018-07-29

## 2018-07-29 RX ORDER — MIDAZOLAM HYDROCHLORIDE 1 MG/ML
1 INJECTION, SOLUTION INTRAMUSCULAR; INTRAVENOUS ONCE
Qty: 0 | Refills: 0 | Status: DISCONTINUED | OUTPATIENT
Start: 2018-07-29 | End: 2018-07-29

## 2018-07-29 RX ORDER — CALCIUM GLUCONATE 100 MG/ML
2 VIAL (ML) INTRAVENOUS ONCE
Qty: 0 | Refills: 0 | Status: COMPLETED | OUTPATIENT
Start: 2018-07-29 | End: 2018-07-29

## 2018-07-29 RX ORDER — FENTANYL CITRATE 50 UG/ML
25 INJECTION INTRAVENOUS ONCE
Qty: 0 | Refills: 0 | Status: DISCONTINUED | OUTPATIENT
Start: 2018-07-29 | End: 2018-07-29

## 2018-07-29 RX ORDER — FUROSEMIDE 40 MG
20 TABLET ORAL ONCE
Qty: 0 | Refills: 0 | Status: COMPLETED | OUTPATIENT
Start: 2018-07-29 | End: 2018-07-29

## 2018-07-29 RX ORDER — ALBUMIN HUMAN 25 %
50 VIAL (ML) INTRAVENOUS
Qty: 0 | Refills: 0 | Status: COMPLETED | OUTPATIENT
Start: 2018-07-29 | End: 2018-07-29

## 2018-07-29 RX ORDER — ACETAMINOPHEN 500 MG
1000 TABLET ORAL ONCE
Qty: 0 | Refills: 0 | Status: COMPLETED | OUTPATIENT
Start: 2018-07-29 | End: 2018-07-29

## 2018-07-29 RX ORDER — SODIUM BICARBONATE 1 MEQ/ML
50 SYRINGE (ML) INTRAVENOUS ONCE
Qty: 0 | Refills: 0 | Status: COMPLETED | OUTPATIENT
Start: 2018-07-29 | End: 2018-07-29

## 2018-07-29 RX ORDER — CALCIUM GLUCONATE 100 MG/ML
1 VIAL (ML) INTRAVENOUS ONCE
Qty: 0 | Refills: 0 | Status: COMPLETED | OUTPATIENT
Start: 2018-07-29 | End: 2018-07-29

## 2018-07-29 RX ORDER — CALCIUM GLUCONATE 100 MG/ML
2 VIAL (ML) INTRAVENOUS ONCE
Qty: 0 | Refills: 0 | Status: COMPLETED | OUTPATIENT
Start: 2018-07-29 | End: 2018-07-30

## 2018-07-29 RX ORDER — VASOPRESSIN 20 [USP'U]/ML
0.03 INJECTION INTRAVENOUS
Qty: 100 | Refills: 0 | Status: DISCONTINUED | OUTPATIENT
Start: 2018-07-29 | End: 2018-08-03

## 2018-07-29 RX ORDER — ONDANSETRON 8 MG/1
4 TABLET, FILM COATED ORAL ONCE
Qty: 0 | Refills: 0 | Status: COMPLETED | OUTPATIENT
Start: 2018-07-29 | End: 2018-07-29

## 2018-07-29 RX ADMIN — CHLORHEXIDINE GLUCONATE 5 MILLILITER(S): 213 SOLUTION TOPICAL at 10:27

## 2018-07-29 RX ADMIN — MIDAZOLAM HYDROCHLORIDE 1 MILLIGRAM(S): 1 INJECTION, SOLUTION INTRAMUSCULAR; INTRAVENOUS at 17:44

## 2018-07-29 RX ADMIN — Medication 125 MILLILITER(S): at 04:27

## 2018-07-29 RX ADMIN — Medication 1000 MILLIGRAM(S): at 04:26

## 2018-07-29 RX ADMIN — Medication 400 MILLIGRAM(S): at 03:48

## 2018-07-29 RX ADMIN — FENTANYL CITRATE 25 MICROGRAM(S): 50 INJECTION INTRAVENOUS at 10:07

## 2018-07-29 RX ADMIN — MIDAZOLAM HYDROCHLORIDE 1 MILLIGRAM(S): 1 INJECTION, SOLUTION INTRAMUSCULAR; INTRAVENOUS at 19:41

## 2018-07-29 RX ADMIN — Medication 100 MILLIGRAM(S): at 06:55

## 2018-07-29 RX ADMIN — PANTOPRAZOLE SODIUM 40 MILLIGRAM(S): 20 TABLET, DELAYED RELEASE ORAL at 11:10

## 2018-07-29 RX ADMIN — CHLORHEXIDINE GLUCONATE 5 MILLILITER(S): 213 SOLUTION TOPICAL at 02:32

## 2018-07-29 RX ADMIN — FENTANYL CITRATE 25 MICROGRAM(S): 50 INJECTION INTRAVENOUS at 09:26

## 2018-07-29 RX ADMIN — ONDANSETRON 4 MILLIGRAM(S): 8 TABLET, FILM COATED ORAL at 02:00

## 2018-07-29 RX ADMIN — CHLORHEXIDINE GLUCONATE 5 MILLILITER(S): 213 SOLUTION TOPICAL at 17:44

## 2018-07-29 RX ADMIN — Medication 200 MILLIGRAM(S): at 23:50

## 2018-07-29 RX ADMIN — Medication 200 MILLIGRAM(S): at 11:09

## 2018-07-29 RX ADMIN — Medication 200 GRAM(S): at 18:48

## 2018-07-29 RX ADMIN — CHLORHEXIDINE GLUCONATE 5 MILLILITER(S): 213 SOLUTION TOPICAL at 14:10

## 2018-07-29 RX ADMIN — Medication 100 MILLIGRAM(S): at 14:10

## 2018-07-29 RX ADMIN — Medication 50 MILLILITER(S): at 03:43

## 2018-07-29 RX ADMIN — Medication 125 MILLILITER(S): at 21:30

## 2018-07-29 RX ADMIN — Medication 125 MILLILITER(S): at 20:04

## 2018-07-29 RX ADMIN — Medication 100 MILLIEQUIVALENT(S): at 00:25

## 2018-07-29 RX ADMIN — Medication 200 GRAM(S): at 15:45

## 2018-07-29 RX ADMIN — CHLORHEXIDINE GLUCONATE 5 MILLILITER(S): 213 SOLUTION TOPICAL at 06:55

## 2018-07-29 RX ADMIN — Medication 200 GRAM(S): at 04:38

## 2018-07-29 RX ADMIN — Medication 50 MILLILITER(S): at 03:15

## 2018-07-29 RX ADMIN — Medication 100 MILLIGRAM(S): at 23:49

## 2018-07-29 RX ADMIN — FENTANYL CITRATE 25 MICROGRAM(S): 50 INJECTION INTRAVENOUS at 10:27

## 2018-07-29 RX ADMIN — Medication 50 MILLIEQUIVALENT(S): at 18:49

## 2018-07-29 RX ADMIN — Medication 20 MILLIGRAM(S): at 18:35

## 2018-07-29 RX ADMIN — Medication 125 MILLILITER(S): at 19:41

## 2018-07-29 RX ADMIN — Medication 50 MILLIEQUIVALENT(S): at 14:30

## 2018-07-29 RX ADMIN — Medication 125 MILLILITER(S): at 19:42

## 2018-07-29 RX ADMIN — Medication 125 MILLILITER(S): at 20:48

## 2018-07-29 NOTE — PROGRESS NOTE ADULT - ASSESSMENT
67yo F s/p Thoracoadominal aortic aneurysm repair on 7/26/18    MAP goals and spinal drain goals as per CT surgery  Frequent neuro checks    Patient seen with chief resident and attending.

## 2018-07-29 NOTE — PROGRESS NOTE ADULT - SUBJECTIVE AND OBJECTIVE BOX
critical care attending addendum    patient returned from CTa - prelim reading per radiology - grafts patent; no endoleak    CTS updated - full panel of labs sent on return to ICU  cont to monitor closely  follow-up official report/reading

## 2018-07-29 NOTE — PROGRESS NOTE ADULT - SUBJECTIVE AND OBJECTIVE BOX
INTERVAL HPI/OVERNIGHT EVENTS:    POD#3 Open TAAA w/replacement of descending aorta - w/separate reimplantation of mesorenals (celiac/SMA/bilateral renal bypass)  Prior open & endovascular abdominal aortic repair  Lumbar drain placed intraop 7/26  EF 60%    Abx: Cipro/Flagyl    69yo Female with Hx CAD, TIA, HTN, COPD, seizures, AAA s/p open AAA repair (Dr. Roberto '04), EVAR/AAA/CIAA (Dr. 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    CTa C/A/P 4/2018: descending thoracoabdominal aortic aneurysm w/degeneration of the aorta at the mid-descending thoracic segment, (5.7cm) w/significant mural thrombus (previously 5.5cm) with widely patent celiac/SMA/renal arteries.      for elective procedure 7/26 - lumbar drain placed on OR - intraop 13 U pRBC/2 platelets/5 FFP/20 Cryo  post-op - awake and following simple commands - moving all extremities   lumbar drain 10cc/h per protocol; mean BP 90  s/p Flex sig today - no ischemia of colon    patient remains intubated  handoff/signout received from daytime intensivist  patient on Vaso/Guero. abdomen distended/no BS auscultated on exam  inc in LA noted 7.8 - albumin given - short interval labs sent - only 2 albumin of 3 ordered given - repeat LA 8.9  neuro/LE exam unchanged  when asked - patient shakes head "no" on palpation and when asked if having pain in Abdomen    PMHx includes but is not limited to:   Hx seizures: 1980  COPD   HLD   HTN   AAA (abdominal aortic aneurysm) s/p interventions as above  CAD  History of cholecystectomy    ICU Vital Signs Last 24 Hrs  T(C): 36.2 (29 Jul 2018 20:41), Max: 38.9 (29 Jul 2018 03:00)  T(F): 97.1 (29 Jul 2018 20:41), Max: 102 (29 Jul 2018 03:00)  HR: 90 (29 Jul 2018 20:00) (80 - 120) sinus  BP: 149/85 (29 Jul 2018 06:00) (145/84 - 154/87)  BP(mean): 105 (29 Jul 2018 06:00) (102 - 106)  ABP: 148/80 (29 Jul 2018 20:00) (138/72 - 184/72)  ABP(mean): 108 (29 Jul 2018 20:00) (98 - 120)  SpO2: 94% (29 Jul 2018 20:00) (85% - 100%) Vent 60%/PEEP 8    Qtts:   Precedex - on hold  Vaso 2  Guero 3    I&O's Summary    28 Jul 2018 07:01  -  29 Jul 2018 07:00  --------------------------------------------------------  IN: 3345.8 mL / OUT: 5026 mL / NET: -1680.2 mL    29 Jul 2018 07:01  -  29 Jul 2018 21:04  --------------------------------------------------------  IN: 2595.4 mL / OUT: 1259 mL / NET: 1336.4 mL    Vent settings: AC 18/450/60/8    Mode: AC/ CMV (Assist Control/ Continuous Mandatory Ventilation)    Physical Exam    Heart - regular (-)rub/gallop  Lungs - dec BS bases (-)wheeze/rhonchi  Abd - distended - no guarding/rebound on exam - tympanic; no mottling  Ext - warm to touch; no cyanosis/clubbing -   Chest - op bandage in place  Neuro - pupils reactive - moving all extremities to command ; non-focal  Skin - no rash    LABS:                        11.8   15.6  )-----------( 54       ( 29 Jul 2018 20:07 )             34.7     07-29    136  |  89<L>  |  47<H>  ----------------------------<  105<H>  4.3   |  19<L>  |  2.21<H>    Ca    9.2      29 Jul 2018 20:07  Phos  6.7     07-29  Mg     2.1     07-29    TPro  5.9<L>  /  Alb  3.7  /  TBili  4.1<H>  /  DBili  2.4<H>  /  AST  81<H>  /  ALT  31  /  AlkPhos  79  07-29    PT/INR - ( 29 Jul 2018 13:12 )   PT: 14.2 sec;   INR: 1.27     PTT - ( 29 Jul 2018 13:12 )  PTT:33.1 sec    ABG - ( 29 Jul 2018 20:06 ) 7.33/35/93 (BE -6.7)    RADIOLOGY & ADDITIONAL STUDIES: reviewed    Patient with enlarging thoracoabdominal aneurysm on serial imaging s/p OR as above    1. Vascular/neuro  awake/alert and following simple commands. turned precedex off to ensure optimal physical exam  ongoing neuro/vascular checks   events of 7/28 noted - weakness LLE relative to RLE - team inc the lumbar drainage to 12cc/h for several hours - improvement noted; CT - no hematoma  lumbar drain back to 10cc/hour per protocol and lumbar pressure <10 (currently 10)  Maintain HOB 30 degrees; OGT to ILWS    2. CV  maintain -110   currently on Vaso/Guero - attempt to limit pressors ; volume resuscitation  goals of BP/MAP reviewed with staff    3. Pulm  remains intubated - per directive of CTS -   serial ABG - titrate vent to optimize oxygenation and ventilation  plan BIPAP for 3 nights once extubated (per protocol)    3. GI - (+)BM intraop  concern for possible  ischemic colitis noted - flex sig performed and negative  General surgery and GI consulted and following closely  presumptively on Cipro/Flagyl   LA normalized prior - now increasing. concern for grafts/ischemia. volume resuscitation  CTS/Vascular and General surgery contacted and details of labs/exam/infusions etc reviewed - plan CTa chest/Abd/Pelvis tonight to assess grafts and bowel - ordered and radiology contacted    4. Renal  Inc Cr now 2.21  non-oliguric  in light of potentially life threatening ischemia - CTa to be performed  monitor UO/Lytes and Cr  renally adjust meds    d/w CTS/Vascular and general surgery - all in agreement and await CT scan results    I have spent/provided stated minutes of critical care time to this patient: 60 min

## 2018-07-29 NOTE — CHART NOTE - NSCHARTNOTEFT_GEN_A_CORE
GENERAL SURGERY PGY4 NOTE    Called to bedside as pt's lactate elevated to 7.8 and then 8.9 this evening. WBC uptrending to 15. from 14.7. Pt continues to be on phenylephrine drip and vasopressin drip. She continues to be intubated but is now off all sedation    ICU Vital Signs Last 24 Hrs  T(C): 36.2 (29 Jul 2018 20:41), Max: 38.9 (29 Jul 2018 03:00)  T(F): 97.1 (29 Jul 2018 20:41), Max: 102 (29 Jul 2018 03:00)  HR: 96 (29 Jul 2018 21:14) (80 - 120)  BP: 149/85 (29 Jul 2018 06:00) (145/84 - 154/87)  BP(mean): 105 (29 Jul 2018 06:00) (102 - 106)  ABP: 144/78 (29 Jul 2018 21:14) (138/72 - 184/72)  ABP(mean): 106 (29 Jul 2018 21:14) (98 - 120)  RR: 18 (29 Jul 2018 21:14) (16 - 24)  SpO2: 96% (29 Jul 2018 21:14) (85% - 100%)    Exam:   Gen: Intubated  Abd: softly distended. 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 Pt with uptrending lactate, WBC, Cr. Concern for downed bypasses as source of rising lactate and worsening labs   -Agree with Vascular surgery plan to obtain stat CTA Chest/Abd/Pelvis to assess  -Serial abdominal exams  -Surgery to follow closely  -D/W Dr. Flores GENERAL SURGERY PGY4 NOTE    Called to bedside as pt's lactate elevated to 7.8 and then 8.9 this evening. WBC uptrending to 15. from 14.7. Pt continues to be on phenylephrine drip and vasopressin drip. She continues to be intubated but is now off all sedation. +1x Non bloody BM earlier today.    ICU Vital Signs Last 24 Hrs  T(C): 36.2 (29 Jul 2018 20:41), Max: 38.9 (29 Jul 2018 03:00)  T(F): 97.1 (29 Jul 2018 20:41), Max: 102 (29 Jul 2018 03:00)  HR: 96 (29 Jul 2018 21:14) (80 - 120)  BP: 149/85 (29 Jul 2018 06:00) (145/84 - 154/87)  BP(mean): 105 (29 Jul 2018 06:00) (102 - 106)  ABP: 144/78 (29 Jul 2018 21:14) (138/72 - 184/72)  ABP(mean): 106 (29 Jul 2018 21:14) (98 - 120)  RR: 18 (29 Jul 2018 21:14) (16 - 24)  SpO2: 96% (29 Jul 2018 21:14) (85% - 100%)    Exam:   Gen: Intubated  Abd: softly distended. 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 Pt with uptrending lactate, WBC, Cr. Concern for downed bypasses as source of rising lactate and worsening labs   -Agree with Vascular surgery plan to obtain stat CTA Chest/Abd/Pelvis to assess  -Serial abdominal exams  -Would continue broad spectrum antibiotics  -Surgery to follow closely  -D/W Dr. Flores GENERAL SURGERY PGY4 NOTE    Called to bedside as pt's lactate elevated to 7.8 and then 8.9 this evening. WBC uptrending to 15. from 14.7. Pt continues to be on phenylephrine drip and vasopressin drip. She continues to be intubated but is now off all sedation. +1x Non bloody BM earlier today.    ICU Vital Signs Last 24 Hrs  T(C): 36.2 (29 Jul 2018 20:41), Max: 38.9 (29 Jul 2018 03:00)  T(F): 97.1 (29 Jul 2018 20:41), Max: 102 (29 Jul 2018 03:00)  HR: 96 (29 Jul 2018 21:14) (80 - 120)  BP: 149/85 (29 Jul 2018 06:00) (145/84 - 154/87)  BP(mean): 105 (29 Jul 2018 06:00) (102 - 106)  ABP: 144/78 (29 Jul 2018 21:14) (138/72 - 184/72)  ABP(mean): 106 (29 Jul 2018 21:14) (98 - 120)  RR: 18 (29 Jul 2018 21:14) (16 - 24)  SpO2: 96% (29 Jul 2018 21:14) (85% - 100%)    Exam:   Gen: Intubated  Abd: softly distended. 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 Pt with uptrending lactate, WBC, Cr. Concern for downed bypasses as source of rising lactate and worsening labs   -Agree with Vascular surgery plan to obtain stat CTA Chest/Abd/Pelvis to assess  -Serial abdominal exams  -Would continue broad spectrum antibiotics  -Surgery to follow closely  -D/W Dr. Flores    ADDENDUM 11:45PM  CTA Chest/Abd/Pelvis performed. Scan reviewed with in-house attending radiologist on call. No evidence of pneumoperitoneum, portal venous gas, or pneumatosis intestinalis. No evidence of colitis. Some simple ascites in the pelvis. Notable also for mild intrahepatic biliary dilation but normal CBD. Also notable for periportal edema. Final radiology read pending.  -Plan to continue IVF, Antibiotics  -Serial Abdominal exams  -Trend Lactate, CBC, Chem  -Surgery to follow closely  -D/W Dr. Flores GENERAL SURGERY PGY4 NOTE    Called to bedside as pt's lactate elevated to 7.8 and then 8.9 this evening. WBC uptrending to 15. from 14.7. Pt continues to be on phenylephrine drip and vasopressin drip. She continues to be intubated but is now off all sedation. +1x Non bloody BM earlier today.    ICU Vital Signs Last 24 Hrs  T(C): 36.2 (29 Jul 2018 20:41), Max: 38.9 (29 Jul 2018 03:00)  T(F): 97.1 (29 Jul 2018 20:41), Max: 102 (29 Jul 2018 03:00)  HR: 96 (29 Jul 2018 21:14) (80 - 120)  BP: 149/85 (29 Jul 2018 06:00) (145/84 - 154/87)  BP(mean): 105 (29 Jul 2018 06:00) (102 - 106)  ABP: 144/78 (29 Jul 2018 21:14) (138/72 - 184/72)  ABP(mean): 106 (29 Jul 2018 21:14) (98 - 120)  RR: 18 (29 Jul 2018 21:14) (16 - 24)  SpO2: 96% (29 Jul 2018 21:14) (85% - 100%)    Exam:   Gen: Intubated  Abd: softly distended. 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 Pt with uptrending lactate, WBC, Cr. Concern for downed bypasses as source of rising lactate and worsening labs   -Agree with Vascular surgery plan to obtain stat CTA Chest/Abd/Pelvis to assess  -Serial abdominal exams  -Would continue broad spectrum antibiotics  -Surgery to follow closely  -D/W Dr. Flores    ADDENDUM 11:45PM  CTA Chest/Abd/Pelvis performed. Scan reviewed with in-house attending radiologist on call. No evidence of pneumoperitoneum, portal venous gas, or pneumatosis intestinalis. All bypasses appear patent. No evidence of colitis. Some simple ascites in the pelvis. Notable also for mild intrahepatic biliary dilation but normal CBD. Also notable for periportal edema. Final radiology read pending.  -Plan to continue IVF, Antibiotics  -Serial Abdominal exams  -Trend Lactate, CBC, Chem  -Surgery to follow closely  -D/W Dr. Flores GENERAL SURGERY PGY4 NOTE    Called to bedside as pt's lactate elevated to 7.8 and then 8.9 this evening. WBC uptrending to 15. from 14.7. Pt continues to be on phenylephrine drip and vasopressin drip. She continues to be intubated but is now off all sedation. +1x Non bloody BM earlier today.    ICU Vital Signs Last 24 Hrs  T(C): 36.2 (29 Jul 2018 20:41), Max: 38.9 (29 Jul 2018 03:00)  T(F): 97.1 (29 Jul 2018 20:41), Max: 102 (29 Jul 2018 03:00)  HR: 96 (29 Jul 2018 21:14) (80 - 120)  BP: 149/85 (29 Jul 2018 06:00) (145/84 - 154/87)  BP(mean): 105 (29 Jul 2018 06:00) (102 - 106)  ABP: 144/78 (29 Jul 2018 21:14) (138/72 - 184/72)  ABP(mean): 106 (29 Jul 2018 21:14) (98 - 120)  RR: 18 (29 Jul 2018 21:14) (16 - 24)  SpO2: 96% (29 Jul 2018 21:14) (85% - 100%)    Exam:   Gen: Intubated  Abd: softly distended. 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 Pt with uptrending lactate, WBC, Cr. Concern for downed bypasses as source of rising lactate and worsening labs   -Agree with Vascular surgery plan to obtain stat CTA Chest/Abd/Pelvis to assess  -Serial abdominal exams  -Would continue broad spectrum antibiotics  -Surgery to follow closely  -D/W Dr. Flores    ADDENDUM 11:45PM  CTA Chest/Abd/Pelvis performed. Scan reviewed with in-house attending radiologist on call. No evidence of pneumoperitoneum, portal venous gas, or pneumatosis intestinalis. All bypasses appear patent. No evidence of colitis. Some simple ascites in the pelvis. Notable also for mild intrahepatic biliary dilation but normal CBD. Also notable for periportal edema. Final radiology read pending. Patient's abdomen at this time continues to be softly distended, Nontender. No guarding elicited.  -Plan to continue IVF, Antibiotics  -Serial Abdominal exams  -Trend Lactate, CBC, Chem  -Surgery to follow closely  -D/W Dr. Flores GENERAL SURGERY PGY4 NOTE    Called to bedside as pt's lactate elevated to 7.8 and then 8.9 this evening. WBC uptrending to 15. from 14.7. Pt continues to be on phenylephrine drip and vasopressin drip. She continues to be intubated but is now off all sedation. +1x Non bloody BM earlier today.    ICU Vital Signs Last 24 Hrs  T(C): 36.2 (29 Jul 2018 20:41), Max: 38.9 (29 Jul 2018 03:00)  T(F): 97.1 (29 Jul 2018 20:41), Max: 102 (29 Jul 2018 03:00)  HR: 96 (29 Jul 2018 21:14) (80 - 120)  BP: 149/85 (29 Jul 2018 06:00) (145/84 - 154/87)  BP(mean): 105 (29 Jul 2018 06:00) (102 - 106)  ABP: 144/78 (29 Jul 2018 21:14) (138/72 - 184/72)  ABP(mean): 106 (29 Jul 2018 21:14) (98 - 120)  RR: 18 (29 Jul 2018 21:14) (16 - 24)  SpO2: 96% (29 Jul 2018 21:14) (85% - 100%)    Exam:   Gen: Intubated  Abd: softly distended. 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 Pt with uptrending lactate, WBC, Cr. Concern for downed bypasses as source of rising lactate and worsening labs   -Agree with Vascular surgery plan to obtain stat CTA Chest/Abd/Pelvis to assess  -Serial abdominal exams  -Would continue broad spectrum antibiotics  -Surgery to follow closely  -D/W Dr. Flores    ADDENDUM 11:45PM  CTA Chest/Abd/Pelvis performed. Scan reviewed with in-house attending radiologist on call. No evidence of pneumoperitoneum, portal venous gas, or pneumatosis intestinalis. Renal, celiac, and SMA bypasses appear patent. No evidence of colitis. Some simple ascites in the pelvis. Notable also for mild intrahepatic biliary dilation but normal CBD. Also notable for periportal edema. Final radiology read pending. Patient's abdomen at this time continues to be softly distended, Nontender. No guarding elicited.  -Plan to continue IVF, Antibiotics  -Serial Abdominal exams  -Trend Lactate, CBC, Chem  -Surgery to follow closely  -D/W Dr. Flores

## 2018-07-29 NOTE — PROGRESS NOTE ADULT - SUBJECTIVE AND OBJECTIVE BOX
Intubated in CTICU  No BMs overnight  On pressors to keep MAP high.  Tm 102  Anxious with breathing tube.  Denies abdominal pain  Abd soft, minimal distention, tender at left sided incision    lactate 2.0    Unclear source of fever; could be pulmonary. Less concern for bowel ischemia.  Continue IV antibiotics empirically

## 2018-07-29 NOTE — PROGRESS NOTE ADULT - SUBJECTIVE AND OBJECTIVE BOX
ciprofloxacin   IVPB   ciprofloxacin   IVPB 400  metroNIDAZOLE  IVPB 500  metroNIDAZOLE  IVPB   ciprofloxacin   IVPB   ciprofloxacin   IVPB 400  metroNIDAZOLE  IVPB 500  metroNIDAZOLE  IVPB   phenylephrine    Infusion 0.5      Allergies    morphine (Hives; Urticaria)  penicillin (Hives)    Intolerances        Vital Signs Last 24 Hrs  T(C): 36.7 (29 Jul 2018 17:02), Max: 38.9 (29 Jul 2018 03:00)  T(F): 98 (29 Jul 2018 17:02), Max: 102 (29 Jul 2018 03:00)  HR: 92 (29 Jul 2018 17:28) (80 - 120)  BP: 149/85 (29 Jul 2018 06:00) (145/84 - 156/77)  BP(mean): 105 (29 Jul 2018 06:00) (102 - 109)  RR: 22 (29 Jul 2018 17:00) (16 - 24)  SpO2: 100% (29 Jul 2018 17:28) (85% - 100%)  I&O's Summary    28 Jul 2018 07:01  -  29 Jul 2018 07:00  --------------------------------------------------------  IN: 3345.8 mL / OUT: 5026 mL / NET: -1680.2 mL    29 Jul 2018 07:01  -  29 Jul 2018 17:53  --------------------------------------------------------  IN: 1305.6 mL / OUT: 939 mL / NET: 366.6 mL        Physical Exam:  General: intubated and sedated  Pulmonary: intubated, no respiratory distress  Cardiovascular: tachy S1 S2  Abdominal: softly distended, with hypoactive bowel sounds, non tender, incision dressings C/D/I  Extremities: warm, well perfused, no edema. Lower extremities: RLE 4/5 and LLE 3/5 strength   Pulses: bilateral 2+ DP pulses        LABS:                        12.1   14.7  )-----------( 75       ( 29 Jul 2018 13:12 )             36.2     07-29    133<L>  |  92<L>  |  47<H>  ----------------------------<  183<H>  4.5   |  18<L>  |  2.16<H>    Ca    8.6      29 Jul 2018 13:12  Phos  6.7     07-29  Mg     2.1     07-29    TPro  6.3  /  Alb  3.5  /  TBili  5.1<H>  /  DBili  3.1<H>  /  AST  72<H>  /  ALT  33  /  AlkPhos  86  07-29    PT/INR - ( 29 Jul 2018 13:12 )   PT: 14.2 sec;   INR: 1.27          PTT - ( 29 Jul 2018 13:12 )  PTT:33.1 sec

## 2018-07-29 NOTE — PROGRESS NOTE ADULT - SUBJECTIVE AND OBJECTIVE BOX
CTICU  CRITICAL  CARE  attending     Hand off received @ 7a					   Pertinent clinical, laboratory, radiographic, hemodynamic, echocardiographic, respiratory data, microbiologic data and chart were reviewed and analyzed frequently throughout the course of the day and night  Patient seen and examined with CTS/ SH attending at bedside    Pt is a 68y , Female,  pod # 3 s/p Open TAAA with replacement of descending aorta; separete re-implantation for meso-renals; celiac/SMA/Bilateral renal bypass;  LD in the OR      LD in the OR    today:    Bedside bronch with aspiration of bloody mucus from left bronchus;  ISP maintained around 10  MAP maintained above 110; Hgb optimized to 12  Full vent support; stable oxygenation on 60% Fio2  F/u by Vascular surgery and neuro Sx         , FAMILY HISTORY:  No pertinent family history in first degree relatives  PAST MEDICAL & SURGICAL HISTORY:  History of seizures: 1980  COPD (chronic obstructive pulmonary disease)  HLD (hyperlipidemia)  HTN (hypertension)  AAA (abdominal aortic aneurysm)  CAD (coronary artery disease)  S/P AAA (abdominal aortic aneurysm) repair  History of abdominal aortic aneurysm (AAA): 2004  History of cholecystectomy    Patient is a 68y old  Female who presents with a chief complaint of Descending thoracic aortic aneurysm (25 Jul 2018 17:14)      14 system review was unremarkable  acute changes include acute respiratory failure  Vital signs, hemodynamic and respiratory parameters were reviewed from the bedside nursing flowsheet.  ICU Vital Signs Last 24 Hrs  T(C): 36.7 (29 Jul 2018 17:02), Max: 38.9 (29 Jul 2018 03:00)  T(F): 98 (29 Jul 2018 17:02), Max: 102 (29 Jul 2018 03:00)  HR: 96 (29 Jul 2018 16:00) (80 - 120)  BP: 149/85 (29 Jul 2018 06:00) (145/84 - 156/77)  BP(mean): 105 (29 Jul 2018 06:00) (102 - 109)  ABP: 156/84 (29 Jul 2018 16:00) (138/72 - 184/72)  ABP(mean): 114 (29 Jul 2018 16:00) (98 - 120)  RR: 20 (29 Jul 2018 16:00) (16 - 24)  SpO2: 100% (29 Jul 2018 16:00) (85% - 100%)    Adult Advanced Hemodynamics Last 24 Hrs  CVP(mm Hg): 17 (29 Jul 2018 09:00) (12 - 17)  CVP(cm H2O): --  CO: --  CI: --  PA: 62/35 (29 Jul 2018 11:00) (47/22 - 64/34)  PA(mean): 48 (29 Jul 2018 11:00) (33 - 50)  PCWP: --  SVR: --  SVRI: --  PVR: --  PVRI: --, ABG - ( 29 Jul 2018 14:51 )  pH, Arterial: 7.39  pH, Blood: x     /  pCO2: 37    /  pO2: 83    / HCO3: 22    / Base Excess: -2.5  /  SaO2: 95                Mode: AC/ CMV (Assist Control/ Continuous Mandatory Ventilation)  RR (machine): 18  TV (machine): 450  FiO2: 50  PEEP: 8  ITime: 1  MAP: 14  PIP: 33    Intake and output was reviewed and the fluid balance was calculated  Daily     Daily   I&O's Summary    28 Jul 2018 07:01  -  29 Jul 2018 07:00  --------------------------------------------------------  IN: 3345.8 mL / OUT: 5026 mL / NET: -1680.2 mL    29 Jul 2018 07:01  -  29 Jul 2018 17:16  --------------------------------------------------------  IN: 1229.8 mL / OUT: 864 mL / NET: 365.8 mL        All lines and drain sites were assessed  Glycemic trend was reviewedCAPILLARY BLOOD GLUCOSE      POCT Blood Glucose.: 138 mg/dL (29 Jul 2018 16:07)    No acute change in mental status  Auscultation of the chest reveals equal bs  Abdomen is soft  Extremities are warm and well perfused  Wounds appear clean and unremarkable  Antibiotics are periop    labs  CBC Full  -  ( 29 Jul 2018 13:12 )  WBC Count : 14.7 K/uL  Hemoglobin : 12.1 g/dL  Hematocrit : 36.2 %  Platelet Count - Automated : 75 K/uL  Mean Cell Volume : 88.9 fL  Mean Cell Hemoglobin : 29.7 pg  Mean Cell Hemoglobin Concentration : 33.4 g/dL  Auto Neutrophil # : x  Auto Lymphocyte # : x  Auto Monocyte # : x  Auto Eosinophil # : x  Auto Basophil # : x  Auto Neutrophil % : x  Auto Lymphocyte % : x  Auto Monocyte % : x  Auto Eosinophil % : x  Auto Basophil % : x    07-29    133<L>  |  92<L>  |  47<H>  ----------------------------<  183<H>  4.5   |  18<L>  |  2.16<H>    Ca    8.6      29 Jul 2018 13:12  Phos  6.7     07-29  Mg     2.1     07-29    TPro  6.3  /  Alb  3.5  /  TBili  5.1<H>  /  DBili  3.1<H>  /  AST  72<H>  /  ALT  33  /  AlkPhos  86  07-29    PT/INR - ( 29 Jul 2018 13:12 )   PT: 14.2 sec;   INR: 1.27          PTT - ( 29 Jul 2018 13:12 )  PTT:33.1 sec  The current medications were reviewed   MEDICATIONS  (STANDING):  atorvastatin 20 milliGRAM(s) Oral at bedtime  chlorhexidine 0.12% Liquid 5 milliLiter(s) Swish and Spit every 4 hours  ciprofloxacin   IVPB      ciprofloxacin   IVPB 400 milliGRAM(s) IV Intermittent every 12 hours  dexmedetomidine Infusion 0.2 MICROgram(s)/kG/Hr (2.545 mL/Hr) IV Continuous <Continuous>  dextrose 5%. 1000 milliLiter(s) (50 mL/Hr) IV Continuous <Continuous>  dextrose 50% Injectable 50 milliLiter(s) IV Push every 15 minutes  dextrose 50% Injectable 25 milliLiter(s) IV Push every 15 minutes  dextrose 50% Injectable 50 milliLiter(s) IV Push every 15 minutes  dextrose 50% Injectable 25 milliLiter(s) IV Push every 15 minutes  insulin lispro (HumaLOG) corrective regimen sliding scale   SubCutaneous Before meals and at bedtime  metroNIDAZOLE  IVPB 500 milliGRAM(s) IV Intermittent every 8 hours  metroNIDAZOLE  IVPB      pantoprazole  Injectable 40 milliGRAM(s) IV Push daily  phenylephrine    Infusion 0.5 MICROgram(s)/kG/Min (9.544 mL/Hr) IV Continuous <Continuous>  propofol Infusion 5 MICROgram(s)/kG/Min (1.527 mL/Hr) IV Continuous <Continuous>  sodium chloride 0.9%. 1000 milliLiter(s) (10 mL/Hr) IV Continuous <Continuous>  vasopressin Infusion 0.033 Unit(s)/Min (2 mL/Hr) IV Continuous <Continuous>    MEDICATIONS  (PRN):  dextrose 40% Gel 15 Gram(s) Oral once PRN Blood Glucose LESS THAN 70 milliGRAM(s)/deciliter  glucagon  Injectable 1 milliGRAM(s) IntraMuscular once PRN Glucose LESS THAN 70 milligrams/deciliter       PROBLEM LIST/ ASSESSMENT:  HEALTH ISSUES - PROBLEM Dx:  Hemodynamic instability  acute LLE weakness  resp failure with hypoxia  thrombocytopenia        ,Patient is a 68y old  Female who presents with a chief complaint of Descending thoracic aortic aneurysm (25 Jul 2018 17:14)     s/p Open TAAA with replacement of descending aorta; separete re-implantation for meso-renals; celiac/SMA/Bilateral renal bypass;      My plan includes :  close hemodynamic, ventilatory and drain monitoring and management per post op routine    Monitor for arrhythmias and monitor parameters for organ perfusion  monitor neurologic status  Head of the bed should remain elevated to 45 deg .   chest PT and IS will be encouraged  monitor adequacy of oxygenation and ventilation and attempt to wean oxygen  Nutritional goals will be met using po eventually , ensure adequate caloric intake and montior the same  Stress ulcer and VTE prophylaxis will be achieved    Glycemic control is satisfactory  Electrolytes have been repleted as necessary and wound care has been carried out. Pain control has been achieved.   agressive physical therapy and early mobility and ambulation goals will be met   The family was updated about the course and plan  CRITICAL CARE TIME SPENT in evaluation and management, reassessments, review and interpretation of labs and x-rays, ventilator and hemodynamic management, formulating a plan and coordinating care: ___90____ MIN.  Time does not include procedural time.  CTICU ATTENDING     					    Roby Mueller MD

## 2018-07-30 LAB
ALBUMIN SERPL ELPH-MCNC: 3.6 G/DL — SIGNIFICANT CHANGE UP (ref 3.3–5)
ALBUMIN SERPL ELPH-MCNC: 3.7 G/DL — SIGNIFICANT CHANGE UP (ref 3.3–5)
ALBUMIN SERPL ELPH-MCNC: 3.8 G/DL — SIGNIFICANT CHANGE UP (ref 3.3–5)
ALBUMIN SERPL ELPH-MCNC: 4.1 G/DL — SIGNIFICANT CHANGE UP (ref 3.3–5)
ALP SERPL-CCNC: 66 U/L — SIGNIFICANT CHANGE UP (ref 40–120)
ALP SERPL-CCNC: 76 U/L — SIGNIFICANT CHANGE UP (ref 40–120)
ALP SERPL-CCNC: 76 U/L — SIGNIFICANT CHANGE UP (ref 40–120)
ALP SERPL-CCNC: 78 U/L — SIGNIFICANT CHANGE UP (ref 40–120)
ALP SERPL-CCNC: 80 U/L — SIGNIFICANT CHANGE UP (ref 40–120)
ALP SERPL-CCNC: 80 U/L — SIGNIFICANT CHANGE UP (ref 40–120)
ALT FLD-CCNC: 27 U/L — SIGNIFICANT CHANGE UP (ref 10–45)
ALT FLD-CCNC: 32 U/L — SIGNIFICANT CHANGE UP (ref 10–45)
ALT FLD-CCNC: 38 U/L — SIGNIFICANT CHANGE UP (ref 10–45)
ALT FLD-CCNC: 46 U/L — HIGH (ref 10–45)
ALT FLD-CCNC: 57 U/L — HIGH (ref 10–45)
ALT FLD-CCNC: 67 U/L — HIGH (ref 10–45)
ANION GAP SERPL CALC-SCNC: 19 MMOL/L — HIGH (ref 5–17)
ANION GAP SERPL CALC-SCNC: 19 MMOL/L — HIGH (ref 5–17)
ANION GAP SERPL CALC-SCNC: 22 MMOL/L — HIGH (ref 5–17)
ANION GAP SERPL CALC-SCNC: 23 MMOL/L — HIGH (ref 5–17)
ANION GAP SERPL CALC-SCNC: 24 MMOL/L — HIGH (ref 5–17)
ANION GAP SERPL CALC-SCNC: 25 MMOL/L — HIGH (ref 5–17)
APTT BLD: 104 SEC — HIGH (ref 27.5–37.4)
APTT BLD: 30.7 SEC — SIGNIFICANT CHANGE UP (ref 27.5–37.4)
APTT BLD: 30.7 SEC — SIGNIFICANT CHANGE UP (ref 27.5–37.4)
APTT BLD: 31.6 SEC — SIGNIFICANT CHANGE UP (ref 27.5–37.4)
APTT BLD: 34.1 SEC — SIGNIFICANT CHANGE UP (ref 27.5–37.4)
AST SERPL-CCNC: 103 U/L — HIGH (ref 10–40)
AST SERPL-CCNC: 123 U/L — HIGH (ref 10–40)
AST SERPL-CCNC: 143 U/L — HIGH (ref 10–40)
AST SERPL-CCNC: 165 U/L — HIGH (ref 10–40)
AST SERPL-CCNC: 73 U/L — HIGH (ref 10–40)
AST SERPL-CCNC: 85 U/L — HIGH (ref 10–40)
BILIRUB SERPL-MCNC: 5 MG/DL — HIGH (ref 0.2–1.2)
BILIRUB SERPL-MCNC: 5.2 MG/DL — HIGH (ref 0.2–1.2)
BILIRUB SERPL-MCNC: 5.3 MG/DL — HIGH (ref 0.2–1.2)
BILIRUB SERPL-MCNC: 5.8 MG/DL — HIGH (ref 0.2–1.2)
BILIRUB SERPL-MCNC: 6.5 MG/DL — HIGH (ref 0.2–1.2)
BILIRUB SERPL-MCNC: 6.5 MG/DL — HIGH (ref 0.2–1.2)
BUN SERPL-MCNC: 52 MG/DL — HIGH (ref 7–23)
BUN SERPL-MCNC: 59 MG/DL — HIGH (ref 7–23)
BUN SERPL-MCNC: 60 MG/DL — HIGH (ref 7–23)
BUN SERPL-MCNC: 65 MG/DL — HIGH (ref 7–23)
BUN SERPL-MCNC: 74 MG/DL — HIGH (ref 7–23)
BUN SERPL-MCNC: 79 MG/DL — HIGH (ref 7–23)
CALCIUM SERPL-MCNC: 8.5 MG/DL — SIGNIFICANT CHANGE UP (ref 8.4–10.5)
CALCIUM SERPL-MCNC: 8.6 MG/DL — SIGNIFICANT CHANGE UP (ref 8.4–10.5)
CALCIUM SERPL-MCNC: 8.6 MG/DL — SIGNIFICANT CHANGE UP (ref 8.4–10.5)
CALCIUM SERPL-MCNC: 8.8 MG/DL — SIGNIFICANT CHANGE UP (ref 8.4–10.5)
CALCIUM SERPL-MCNC: 8.8 MG/DL — SIGNIFICANT CHANGE UP (ref 8.4–10.5)
CALCIUM SERPL-MCNC: 9.1 MG/DL — SIGNIFICANT CHANGE UP (ref 8.4–10.5)
CHLORIDE SERPL-SCNC: 89 MMOL/L — LOW (ref 96–108)
CHLORIDE SERPL-SCNC: 90 MMOL/L — LOW (ref 96–108)
CHLORIDE SERPL-SCNC: 91 MMOL/L — LOW (ref 96–108)
CHLORIDE SERPL-SCNC: 92 MMOL/L — LOW (ref 96–108)
CHLORIDE SERPL-SCNC: 93 MMOL/L — LOW (ref 96–108)
CHLORIDE SERPL-SCNC: 95 MMOL/L — LOW (ref 96–108)
CO2 SERPL-SCNC: 21 MMOL/L — LOW (ref 22–31)
CO2 SERPL-SCNC: 21 MMOL/L — LOW (ref 22–31)
CO2 SERPL-SCNC: 22 MMOL/L — SIGNIFICANT CHANGE UP (ref 22–31)
CO2 SERPL-SCNC: 22 MMOL/L — SIGNIFICANT CHANGE UP (ref 22–31)
CO2 SERPL-SCNC: 24 MMOL/L — SIGNIFICANT CHANGE UP (ref 22–31)
CO2 SERPL-SCNC: 24 MMOL/L — SIGNIFICANT CHANGE UP (ref 22–31)
CREAT SERPL-MCNC: 2.32 MG/DL — HIGH (ref 0.5–1.3)
CREAT SERPL-MCNC: 2.38 MG/DL — HIGH (ref 0.5–1.3)
CREAT SERPL-MCNC: 2.45 MG/DL — HIGH (ref 0.5–1.3)
CREAT SERPL-MCNC: 2.62 MG/DL — HIGH (ref 0.5–1.3)
CREAT SERPL-MCNC: 2.81 MG/DL — HIGH (ref 0.5–1.3)
CREAT SERPL-MCNC: 2.95 MG/DL — HIGH (ref 0.5–1.3)
FIBRINOGEN PPP-MCNC: 296 MG/DL — SIGNIFICANT CHANGE UP (ref 258–438)
GAS PNL BLDA: SIGNIFICANT CHANGE UP
GAS PNL BLDV: SIGNIFICANT CHANGE UP
GAS PNL BLDV: SIGNIFICANT CHANGE UP
GLUCOSE BLDC GLUCOMTR-MCNC: 136 MG/DL — HIGH (ref 70–99)
GLUCOSE BLDC GLUCOMTR-MCNC: 143 MG/DL — HIGH (ref 70–99)
GLUCOSE BLDC GLUCOMTR-MCNC: 146 MG/DL — HIGH (ref 70–99)
GLUCOSE BLDC GLUCOMTR-MCNC: 163 MG/DL — HIGH (ref 70–99)
GLUCOSE BLDC GLUCOMTR-MCNC: 169 MG/DL — HIGH (ref 70–99)
GLUCOSE BLDC GLUCOMTR-MCNC: 19 MG/DL — CRITICAL LOW (ref 70–99)
GLUCOSE SERPL-MCNC: 118 MG/DL — HIGH (ref 70–99)
GLUCOSE SERPL-MCNC: 138 MG/DL — HIGH (ref 70–99)
GLUCOSE SERPL-MCNC: 146 MG/DL — HIGH (ref 70–99)
GLUCOSE SERPL-MCNC: 148 MG/DL — HIGH (ref 70–99)
GLUCOSE SERPL-MCNC: 180 MG/DL — HIGH (ref 70–99)
GLUCOSE SERPL-MCNC: 207 MG/DL — HIGH (ref 70–99)
GRAM STN FLD: SIGNIFICANT CHANGE UP
HCT VFR BLD CALC: 34.5 % — SIGNIFICANT CHANGE UP (ref 34.5–45)
HCT VFR BLD CALC: 34.6 % — SIGNIFICANT CHANGE UP (ref 34.5–45)
HCT VFR BLD CALC: 37.2 % — SIGNIFICANT CHANGE UP (ref 34.5–45)
HCT VFR BLD CALC: 38.1 % — SIGNIFICANT CHANGE UP (ref 34.5–45)
HCT VFR BLD CALC: 38.6 % — SIGNIFICANT CHANGE UP (ref 34.5–45)
HGB BLD-MCNC: 12.2 G/DL — SIGNIFICANT CHANGE UP (ref 11.5–15.5)
HGB BLD-MCNC: 12.2 G/DL — SIGNIFICANT CHANGE UP (ref 11.5–15.5)
HGB BLD-MCNC: 12.7 G/DL — SIGNIFICANT CHANGE UP (ref 11.5–15.5)
HGB BLD-MCNC: 12.9 G/DL — SIGNIFICANT CHANGE UP (ref 11.5–15.5)
HGB BLD-MCNC: 13.3 G/DL — SIGNIFICANT CHANGE UP (ref 11.5–15.5)
INR BLD: 1.63 — HIGH (ref 0.88–1.16)
INR BLD: 1.64 — HIGH (ref 0.88–1.16)
INR BLD: 1.64 — HIGH (ref 0.88–1.16)
INR BLD: 1.66 — HIGH (ref 0.88–1.16)
INR BLD: 1.67 — HIGH (ref 0.88–1.16)
LACTATE SERPL-SCNC: 1.9 MMOL/L — SIGNIFICANT CHANGE UP (ref 0.5–2)
LACTATE SERPL-SCNC: 2 MMOL/L — SIGNIFICANT CHANGE UP (ref 0.5–2)
LACTATE SERPL-SCNC: 2.2 MMOL/L — HIGH (ref 0.5–2)
LACTATE SERPL-SCNC: 3.2 MMOL/L — HIGH (ref 0.5–2)
LACTATE SERPL-SCNC: 3.6 MMOL/L — HIGH (ref 0.5–2)
LACTATE SERPL-SCNC: 4.4 MMOL/L — CRITICAL HIGH (ref 0.5–2)
MAGNESIUM SERPL-MCNC: 1.9 MG/DL — SIGNIFICANT CHANGE UP (ref 1.6–2.6)
MAGNESIUM SERPL-MCNC: 2 MG/DL — SIGNIFICANT CHANGE UP (ref 1.6–2.6)
MAGNESIUM SERPL-MCNC: 2.1 MG/DL — SIGNIFICANT CHANGE UP (ref 1.6–2.6)
MAGNESIUM SERPL-MCNC: 2.8 MG/DL — HIGH (ref 1.6–2.6)
MAGNESIUM SERPL-MCNC: 2.9 MG/DL — HIGH (ref 1.6–2.6)
MAGNESIUM SERPL-MCNC: 3 MG/DL — HIGH (ref 1.6–2.6)
MCHC RBC-ENTMCNC: 29.7 PG — SIGNIFICANT CHANGE UP (ref 27–34)
MCHC RBC-ENTMCNC: 29.8 PG — SIGNIFICANT CHANGE UP (ref 27–34)
MCHC RBC-ENTMCNC: 30 PG — SIGNIFICANT CHANGE UP (ref 27–34)
MCHC RBC-ENTMCNC: 30.3 PG — SIGNIFICANT CHANGE UP (ref 27–34)
MCHC RBC-ENTMCNC: 30.6 PG — SIGNIFICANT CHANGE UP (ref 27–34)
MCHC RBC-ENTMCNC: 33.9 G/DL — SIGNIFICANT CHANGE UP (ref 32–36)
MCHC RBC-ENTMCNC: 34.1 G/DL — SIGNIFICANT CHANGE UP (ref 32–36)
MCHC RBC-ENTMCNC: 34.5 G/DL — SIGNIFICANT CHANGE UP (ref 32–36)
MCHC RBC-ENTMCNC: 35.3 G/DL — SIGNIFICANT CHANGE UP (ref 32–36)
MCHC RBC-ENTMCNC: 35.4 G/DL — SIGNIFICANT CHANGE UP (ref 32–36)
MCV RBC AUTO: 85.9 FL — SIGNIFICANT CHANGE UP (ref 80–100)
MCV RBC AUTO: 86.5 FL — SIGNIFICANT CHANGE UP (ref 80–100)
MCV RBC AUTO: 86.5 FL — SIGNIFICANT CHANGE UP (ref 80–100)
MCV RBC AUTO: 87.8 FL — SIGNIFICANT CHANGE UP (ref 80–100)
MCV RBC AUTO: 87.9 FL — SIGNIFICANT CHANGE UP (ref 80–100)
PHOSPHATE SERPL-MCNC: 4.1 MG/DL — SIGNIFICANT CHANGE UP (ref 2.5–4.5)
PHOSPHATE SERPL-MCNC: 5 MG/DL — HIGH (ref 2.5–4.5)
PHOSPHATE SERPL-MCNC: 5.6 MG/DL — HIGH (ref 2.5–4.5)
PHOSPHATE SERPL-MCNC: 7.3 MG/DL — HIGH (ref 2.5–4.5)
PHOSPHATE SERPL-MCNC: 7.6 MG/DL — HIGH (ref 2.5–4.5)
PHOSPHATE SERPL-MCNC: 8 MG/DL — HIGH (ref 2.5–4.5)
PLATELET # BLD AUTO: 121 K/UL — LOW (ref 150–400)
PLATELET # BLD AUTO: 22 K/UL — LOW (ref 150–400)
PLATELET # BLD AUTO: 45 K/UL — LOW (ref 150–400)
PLATELET # BLD AUTO: 68 K/UL — LOW (ref 150–400)
PLATELET # BLD AUTO: 73 K/UL — LOW (ref 150–400)
POTASSIUM SERPL-MCNC: 3.5 MMOL/L — SIGNIFICANT CHANGE UP (ref 3.5–5.3)
POTASSIUM SERPL-MCNC: 3.8 MMOL/L — SIGNIFICANT CHANGE UP (ref 3.5–5.3)
POTASSIUM SERPL-MCNC: 3.8 MMOL/L — SIGNIFICANT CHANGE UP (ref 3.5–5.3)
POTASSIUM SERPL-MCNC: 4 MMOL/L — SIGNIFICANT CHANGE UP (ref 3.5–5.3)
POTASSIUM SERPL-MCNC: 4.3 MMOL/L — SIGNIFICANT CHANGE UP (ref 3.5–5.3)
POTASSIUM SERPL-MCNC: 4.3 MMOL/L — SIGNIFICANT CHANGE UP (ref 3.5–5.3)
POTASSIUM SERPL-SCNC: 3.5 MMOL/L — SIGNIFICANT CHANGE UP (ref 3.5–5.3)
POTASSIUM SERPL-SCNC: 3.8 MMOL/L — SIGNIFICANT CHANGE UP (ref 3.5–5.3)
POTASSIUM SERPL-SCNC: 3.8 MMOL/L — SIGNIFICANT CHANGE UP (ref 3.5–5.3)
POTASSIUM SERPL-SCNC: 4 MMOL/L — SIGNIFICANT CHANGE UP (ref 3.5–5.3)
POTASSIUM SERPL-SCNC: 4.3 MMOL/L — SIGNIFICANT CHANGE UP (ref 3.5–5.3)
POTASSIUM SERPL-SCNC: 4.3 MMOL/L — SIGNIFICANT CHANGE UP (ref 3.5–5.3)
PROT SERPL-MCNC: 5.4 G/DL — LOW (ref 6–8.3)
PROT SERPL-MCNC: 5.5 G/DL — LOW (ref 6–8.3)
PROT SERPL-MCNC: 5.6 G/DL — LOW (ref 6–8.3)
PROT SERPL-MCNC: 5.8 G/DL — LOW (ref 6–8.3)
PROT SERPL-MCNC: 5.9 G/DL — LOW (ref 6–8.3)
PROT SERPL-MCNC: 6.1 G/DL — SIGNIFICANT CHANGE UP (ref 6–8.3)
PROTHROM AB SERPL-ACNC: 18.3 SEC — HIGH (ref 9.8–12.7)
PROTHROM AB SERPL-ACNC: 18.4 SEC — HIGH (ref 9.8–12.7)
PROTHROM AB SERPL-ACNC: 18.4 SEC — HIGH (ref 9.8–12.7)
PROTHROM AB SERPL-ACNC: 18.6 SEC — HIGH (ref 9.8–12.7)
PROTHROM AB SERPL-ACNC: 18.7 SEC — HIGH (ref 9.8–12.7)
RBC # BLD: 3.99 M/UL — SIGNIFICANT CHANGE UP (ref 3.8–5.2)
RBC # BLD: 4.03 M/UL — SIGNIFICANT CHANGE UP (ref 3.8–5.2)
RBC # BLD: 4.23 M/UL — SIGNIFICANT CHANGE UP (ref 3.8–5.2)
RBC # BLD: 4.34 M/UL — SIGNIFICANT CHANGE UP (ref 3.8–5.2)
RBC # BLD: 4.46 M/UL — SIGNIFICANT CHANGE UP (ref 3.8–5.2)
RBC # FLD: 15.6 % — SIGNIFICANT CHANGE UP (ref 10.3–16.9)
RBC # FLD: 15.8 % — SIGNIFICANT CHANGE UP (ref 10.3–16.9)
RBC # FLD: 15.9 % — SIGNIFICANT CHANGE UP (ref 10.3–16.9)
RBC # FLD: 16.1 % — SIGNIFICANT CHANGE UP (ref 10.3–16.9)
RBC # FLD: 16.1 % — SIGNIFICANT CHANGE UP (ref 10.3–16.9)
SODIUM SERPL-SCNC: 134 MMOL/L — LOW (ref 135–145)
SODIUM SERPL-SCNC: 135 MMOL/L — SIGNIFICANT CHANGE UP (ref 135–145)
SODIUM SERPL-SCNC: 135 MMOL/L — SIGNIFICANT CHANGE UP (ref 135–145)
SODIUM SERPL-SCNC: 137 MMOL/L — SIGNIFICANT CHANGE UP (ref 135–145)
SODIUM SERPL-SCNC: 137 MMOL/L — SIGNIFICANT CHANGE UP (ref 135–145)
SODIUM SERPL-SCNC: 138 MMOL/L — SIGNIFICANT CHANGE UP (ref 135–145)
WBC # BLD: 11.9 K/UL — HIGH (ref 3.8–10.5)
WBC # BLD: 12 K/UL — HIGH (ref 3.8–10.5)
WBC # BLD: 12.1 K/UL — HIGH (ref 3.8–10.5)
WBC # BLD: 12.9 K/UL — HIGH (ref 3.8–10.5)
WBC # BLD: 14.2 K/UL — HIGH (ref 3.8–10.5)
WBC # FLD AUTO: 11.9 K/UL — HIGH (ref 3.8–10.5)
WBC # FLD AUTO: 12 K/UL — HIGH (ref 3.8–10.5)
WBC # FLD AUTO: 12.1 K/UL — HIGH (ref 3.8–10.5)
WBC # FLD AUTO: 12.9 K/UL — HIGH (ref 3.8–10.5)
WBC # FLD AUTO: 14.2 K/UL — HIGH (ref 3.8–10.5)

## 2018-07-30 PROCEDURE — 71045 X-RAY EXAM CHEST 1 VIEW: CPT | Mod: 26

## 2018-07-30 PROCEDURE — 99291 CRITICAL CARE FIRST HOUR: CPT

## 2018-07-30 PROCEDURE — 99292 CRITICAL CARE ADDL 30 MIN: CPT

## 2018-07-30 RX ORDER — FENTANYL CITRATE 50 UG/ML
25 INJECTION INTRAVENOUS ONCE
Qty: 0 | Refills: 0 | Status: DISCONTINUED | OUTPATIENT
Start: 2018-07-30 | End: 2018-07-30

## 2018-07-30 RX ORDER — CALCIUM GLUCONATE 100 MG/ML
2 VIAL (ML) INTRAVENOUS ONCE
Qty: 0 | Refills: 0 | Status: COMPLETED | OUTPATIENT
Start: 2018-07-30 | End: 2018-07-30

## 2018-07-30 RX ORDER — FENTANYL CITRATE 50 UG/ML
25 INJECTION INTRAVENOUS EVERY 6 HOURS
Qty: 0 | Refills: 0 | Status: DISCONTINUED | OUTPATIENT
Start: 2018-07-30 | End: 2018-08-01

## 2018-07-30 RX ORDER — ACETAMINOPHEN 500 MG
1000 TABLET ORAL ONCE
Qty: 0 | Refills: 0 | Status: COMPLETED | OUTPATIENT
Start: 2018-07-30 | End: 2018-07-31

## 2018-07-30 RX ORDER — FUROSEMIDE 40 MG
20 TABLET ORAL ONCE
Qty: 0 | Refills: 0 | Status: COMPLETED | OUTPATIENT
Start: 2018-07-30 | End: 2018-07-30

## 2018-07-30 RX ORDER — CIPROFLOXACIN LACTATE 400MG/40ML
400 VIAL (ML) INTRAVENOUS EVERY 24 HOURS
Qty: 0 | Refills: 0 | Status: DISCONTINUED | OUTPATIENT
Start: 2018-07-30 | End: 2018-08-03

## 2018-07-30 RX ORDER — LIDOCAINE 4 G/100G
1 CREAM TOPICAL DAILY
Qty: 0 | Refills: 0 | Status: DISCONTINUED | OUTPATIENT
Start: 2018-07-30 | End: 2018-08-22

## 2018-07-30 RX ORDER — MAGNESIUM SULFATE 500 MG/ML
2 VIAL (ML) INJECTION ONCE
Qty: 0 | Refills: 0 | Status: COMPLETED | OUTPATIENT
Start: 2018-07-30 | End: 2018-07-30

## 2018-07-30 RX ORDER — ONDANSETRON 8 MG/1
4 TABLET, FILM COATED ORAL ONCE
Qty: 0 | Refills: 0 | Status: COMPLETED | OUTPATIENT
Start: 2018-07-30 | End: 2018-07-30

## 2018-07-30 RX ORDER — CHLORHEXIDINE GLUCONATE 213 G/1000ML
1 SOLUTION TOPICAL DAILY
Qty: 0 | Refills: 0 | Status: DISCONTINUED | OUTPATIENT
Start: 2018-07-30 | End: 2018-08-16

## 2018-07-30 RX ORDER — ALBUMIN HUMAN 25 %
250 VIAL (ML) INTRAVENOUS ONCE
Qty: 0 | Refills: 0 | Status: COMPLETED | OUTPATIENT
Start: 2018-07-30 | End: 2018-07-30

## 2018-07-30 RX ADMIN — Medication 100 MILLIGRAM(S): at 13:33

## 2018-07-30 RX ADMIN — ATORVASTATIN CALCIUM 20 MILLIGRAM(S): 80 TABLET, FILM COATED ORAL at 21:14

## 2018-07-30 RX ADMIN — CHLORHEXIDINE GLUCONATE 5 MILLILITER(S): 213 SOLUTION TOPICAL at 21:14

## 2018-07-30 RX ADMIN — CHLORHEXIDINE GLUCONATE 5 MILLILITER(S): 213 SOLUTION TOPICAL at 13:33

## 2018-07-30 RX ADMIN — Medication 100 MILLIGRAM(S): at 06:14

## 2018-07-30 RX ADMIN — Medication 2: at 07:21

## 2018-07-30 RX ADMIN — LIDOCAINE 1 PATCH: 4 CREAM TOPICAL at 00:40

## 2018-07-30 RX ADMIN — CHLORHEXIDINE GLUCONATE 5 MILLILITER(S): 213 SOLUTION TOPICAL at 00:02

## 2018-07-30 RX ADMIN — CHLORHEXIDINE GLUCONATE 5 MILLILITER(S): 213 SOLUTION TOPICAL at 18:07

## 2018-07-30 RX ADMIN — CHLORHEXIDINE GLUCONATE 5 MILLILITER(S): 213 SOLUTION TOPICAL at 10:14

## 2018-07-30 RX ADMIN — Medication 100 MILLIGRAM(S): at 21:14

## 2018-07-30 RX ADMIN — Medication 125 MILLILITER(S): at 11:28

## 2018-07-30 RX ADMIN — Medication 200 GRAM(S): at 00:58

## 2018-07-30 RX ADMIN — Medication 20 MILLIGRAM(S): at 05:46

## 2018-07-30 RX ADMIN — PANTOPRAZOLE SODIUM 40 MILLIGRAM(S): 20 TABLET, DELAYED RELEASE ORAL at 11:03

## 2018-07-30 RX ADMIN — Medication 20 MILLIGRAM(S): at 19:05

## 2018-07-30 RX ADMIN — CHLORHEXIDINE GLUCONATE 5 MILLILITER(S): 213 SOLUTION TOPICAL at 05:46

## 2018-07-30 RX ADMIN — Medication 200 MILLIGRAM(S): at 22:10

## 2018-07-30 RX ADMIN — FENTANYL CITRATE 25 MICROGRAM(S): 50 INJECTION INTRAVENOUS at 13:00

## 2018-07-30 RX ADMIN — FENTANYL CITRATE 25 MICROGRAM(S): 50 INJECTION INTRAVENOUS at 13:23

## 2018-07-30 RX ADMIN — FENTANYL CITRATE 25 MICROGRAM(S): 50 INJECTION INTRAVENOUS at 10:33

## 2018-07-30 RX ADMIN — CHLORHEXIDINE GLUCONATE 1 APPLICATION(S): 213 SOLUTION TOPICAL at 11:02

## 2018-07-30 RX ADMIN — Medication 20 MILLIGRAM(S): at 11:28

## 2018-07-30 RX ADMIN — ONDANSETRON 4 MILLIGRAM(S): 8 TABLET, FILM COATED ORAL at 19:39

## 2018-07-30 RX ADMIN — FENTANYL CITRATE 25 MICROGRAM(S): 50 INJECTION INTRAVENOUS at 20:48

## 2018-07-30 RX ADMIN — Medication 50 GRAM(S): at 05:46

## 2018-07-30 RX ADMIN — LIDOCAINE 1 PATCH: 4 CREAM TOPICAL at 12:39

## 2018-07-30 RX ADMIN — FENTANYL CITRATE 25 MICROGRAM(S): 50 INJECTION INTRAVENOUS at 10:14

## 2018-07-30 RX ADMIN — Medication 200 GRAM(S): at 12:40

## 2018-07-30 RX ADMIN — FENTANYL CITRATE 25 MICROGRAM(S): 50 INJECTION INTRAVENOUS at 20:18

## 2018-07-30 NOTE — PROGRESS NOTE ADULT - SUBJECTIVE AND OBJECTIVE BOX
Patient was seen and examined at bedside, intubated. Alert and awake, moving all extremities spontaneously. LD clamped, draining 10 cc /hr without difficulties. dressing was mildly saturated, no active leakage noted. No fluids/discharge on expression surrounding the LD insertion site. Dressing was changed and reinforced. Drain unclamped, drainage visualized. patient tolerated procedure well. Continue LD drainage as per CT surgery/primary team. currently draining 10cc/hr. Plan to cap tomorrow, which was communicated by primary team. Please contact neurosurgery for any questions/concerns regarding LD or any LD malfunction.     T(C): 36.4 (07-30-18 @ 13:00), Max: 36.7 (07-29-18 @ 17:02)  HR: 74 (07-30-18 @ 16:00) (70 - 108)  BP: --  RR: 20 (07-30-18 @ 16:00) (18 - 23)  SpO2: 100% (07-30-18 @ 16:00) (91% - 100%)  Wt(kg): --    CBC Full  -  ( 30 Jul 2018 11:01 )  WBC Count : 12.9 K/uL  Hemoglobin : 12.9 g/dL  Hematocrit : 38.1 %  Platelet Count - Automated : 22 K/uL  Mean Cell Volume : 87.8 fL  Mean Cell Hemoglobin : 29.7 pg  Mean Cell Hemoglobin Concentration : 33.9 g/dL  Auto Neutrophil # : x  Auto Lymphocyte # : x  Auto Monocyte # : x  Auto Eosinophil # : x  Auto Basophil # : x  Auto Neutrophil % : x  Auto Lymphocyte % : x  Auto Monocyte % : x  Auto Eosinophil % : x  Auto Basophil % : x    07-30    135  |  92<L>  |  65<H>  ----------------------------<  138<H>  3.8   |  24  |  2.62<H>    Ca    8.8      30 Jul 2018 11:01  Phos  5.6     07-30  Mg     3.0     07-30    TPro  5.6<L>  /  Alb  3.6  /  TBili  5.2<H>  /  DBili  x   /  AST  123<H>  /  ALT  46<H>  /  AlkPhos  78  07-30    PT/INR - ( 30 Jul 2018 11:01 )   PT: 18.3 sec;   INR: 1.63          PTT - ( 30 Jul 2018 11:01 )  PTT:31.6 sec

## 2018-07-30 NOTE — PROGRESS NOTE ADULT - ASSESSMENT
68 year old female with history of CAD, TIA in 1998, COPD, AAA s/p open AAA repair in 2004, EVAR/CIAA, with extension into L hypogastric & DIONNE in 3/29/17, Presented for a descending thoracoabdominal aortic aneurysm repair (aneurysm 5.7 with mural thrombus and widely patent Celiac and SMA), Day 1 S/p Thoracoabdominal aortic aneurysm repair with reimplantation of mesenteric vessels , post op labs are remarkable for elevated lactate, General surgery consulted for concern of ischemic colitis.    CTA showed patent graft (final read pending), lactate is down trending and pressor requirements are down trending. patient seems to be clinically better, UO picked up to 30cc/hr, wbc still trending up. (14)    Serial abd exam Q4, if worsening will probably need laparotomy   Plan as per primary team for now  Surgery team 1C will follow 68 year old female with history of CAD, TIA in 1998, COPD, AAA s/p open AAA repair in 2004, EVAR/CIAA, with extension into L hypogastric & DIONNE in 3/29/17, Presented for a descending thoracoabdominal aortic aneurysm repair (aneurysm 5.7 with mural thrombus and widely patent Celiac and SMA),  s/p Thoracoabdominal aortic aneurysm repair with reimplantation of mesenteric vessels 7/26,  post op labs are remarkable for elevated lactate, General surgery consulted for concern of ischemic colitis - flex sigmoidoscopy was done, no evidence of Ischaemic colitis.     CTA showed patent graft (final read pending), lactate is down trending and pressor requirements are down trending. patient seems to be clinically better, UO picked up to 30cc/hr, wbc still trending up. (14)    Serial abd exam Q4, if worsening will probably need laparotomy   Plan as per primary team for now  Surgery team 1C will follow       Addendum @ 3pm     Patient examined for serial abdominal exam, denied abdominal pain. No peritoneal sign. MAP > 65. 68 year old female with history of CAD, TIA in 1998, COPD, AAA s/p open AAA repair in 2004, EVAR/CIAA, with extension into L hypogastric & DIONNE in 3/29/17, Presented for a descending thoracoabdominal aortic aneurysm repair (aneurysm 5.7 with mural thrombus and widely patent Celiac and SMA),  s/p Thoracoabdominal aortic aneurysm repair with reimplantation of mesenteric vessels 7/26,  post op labs are remarkable for elevated lactate, General surgery consulted for concern of ischemic colitis - flex sigmoidoscopy was done, no evidence of Ischaemic colitis.     CTA showed patent graft (final read pending), lactate is down trending and pressor requirements are down trending. patient seems to be clinically better, UO picked up to 30cc/hr, wbc still trending up. (14)    Serial abd exam Q4, if worsening will probably need laparotomy   Plan as per primary team for now  Surgery team 1C will follow       Addendum @ 3pm     Patient examined for serial abdominal exam, denied abdominal pain. No peritoneal sign. MAP > 65. Lactate trending down to 2.2 and WBC trending down to 12.

## 2018-07-30 NOTE — DIETITIAN INITIAL EVALUATION ADULT. - OTHER INFO
69y/o F s/o open TAAA w/ replacement of descending aorta - w/separate reimplantation of mesorenals. Concern for possible ischemic colitis noted; flex sig performed and negative. Pt seen intubated on full vent. IVF KVO and vaso infusing. OGT in place to suction; per RN output is minimal and not note worthy. Per d/w PA and RN, unsure if to feed patient today; team to discuss further. Once medically feasible recommend EN initiation; discussed with PA and recs entered for when EN can be initiated. RD to follow.

## 2018-07-30 NOTE — PROGRESS NOTE ADULT - SUBJECTIVE AND OBJECTIVE BOX
SUBJECTIVE: Patient seen and examined bedside, responding to verbal command and denied any abdominal pain, as per RN, having BM, smear with some amount of red blood however unsure if its coming from stool. No nausea/vomiting around the NGT. UO around 10-15cc but the last hour improving to 30cc.   Off phenylephrine, on epinephrine 1cc/hr    ciprofloxacin   IVPB      ciprofloxacin   IVPB 400 milliGRAM(s) IV Intermittent every 12 hours  metroNIDAZOLE  IVPB 500 milliGRAM(s) IV Intermittent every 8 hours  metroNIDAZOLE  IVPB      phenylephrine    Infusion 0.5 MICROgram(s)/kG/Min IV Continuous <Continuous>      Vital Signs Last 24 Hrs  T(C): 35.9 (30 Jul 2018 05:00), Max: 36.7 (29 Jul 2018 17:02)  T(F): 96.7 (30 Jul 2018 05:00), Max: 98 (29 Jul 2018 17:02)  HR: 73 (30 Jul 2018 08:30) (72 - 120)  BP: --  BP(mean): --  RR: 18 (30 Jul 2018 07:00) (18 - 24)  SpO2: 100% (30 Jul 2018 08:30) (89% - 100%)  I&O's Detail    29 Jul 2018 07:01  -  30 Jul 2018 07:00  --------------------------------------------------------  IN:    Albumin 5%  - 250 mL: 1250 mL    dexmedetomidine Infusion: 87 mL    IV PiggyBack: 1000 mL    Packed Red Blood Cells: 650 mL    phenylephrine   Infusion: 703.6 mL    Platelets - Single Donor: 695 mL    sodium chloride 0.9%.: 240 mL    vasopressin Infusion: 43 mL  Total IN: 4668.6 mL    OUT:    Chest Tube: 154 mL    Chest Tube: 60 mL    Chest Tube: 230 mL    Drain: 340 mL    Indwelling Catheter - Urethral: 1280 mL    Nasoenteral Tube: 20 mL  Total OUT: 2084 mL    Total NET: 2584.6 mL      30 Jul 2018 07:01  -  30 Jul 2018 08:57  --------------------------------------------------------  IN:    sodium chloride 0.9%.: 10 mL    vasopressin Infusion: 1 mL  Total IN: 11 mL    OUT:    Chest Tube: 50 mL  Total OUT: 50 mL    Total NET: -39 mL          General: NAD, resting comfortably in bed, intubated, not sedated  C/V: NSR  Pulm: intubated, decrease breath sounds over the left and clear on the right   Abd: soft, distended, no guarding, no peritoneal sign  Extrem: extremities were cold to touch        LABS:                        13.3   14.2  )-----------( 68       ( 30 Jul 2018 05:14 )             38.6     07-30    135  |  90<L>  |  60<H>  ----------------------------<  180<H>  4.3   |  22  |  2.45<H>    Ca    8.5      30 Jul 2018 05:14  Phos  7.3     07-30  Mg     2.1     07-30    TPro  5.9<L>  /  Alb  3.8  /  TBili  6.5<H>  /  DBili  x   /  AST  103<H>  /  ALT  38  /  AlkPhos  80  07-30    PT/INR - ( 30 Jul 2018 05:14 )   PT: 18.4 sec;   INR: 1.64          PTT - ( 30 Jul 2018 05:14 )  PTT:34.1 sec      RADIOLOGY & ADDITIONAL STUDIES: Status Post :  Thoracoabdominal aortic aneurysm repair with reimplantation of mesenteric vessels 7/26, POD4.     SUBJECTIVE: Patient seen and examined bedside, responding to verbal command and denied any abdominal pain, as per RN, having BM, smear with some amount of red blood however unsure if its coming from stool. No nausea/vomiting around the NGT. UO around 10-15cc but the last hour improving to 30cc.   Off phenylephrine, on epinephrine 1cc/hr    ciprofloxacin   IVPB      ciprofloxacin   IVPB 400 milliGRAM(s) IV Intermittent every 12 hours  metroNIDAZOLE  IVPB 500 milliGRAM(s) IV Intermittent every 8 hours  metroNIDAZOLE  IVPB      phenylephrine    Infusion 0.5 MICROgram(s)/kG/Min IV Continuous <Continuous>      Vital Signs Last 24 Hrs  T(C): 35.9 (30 Jul 2018 05:00), Max: 36.7 (29 Jul 2018 17:02)  T(F): 96.7 (30 Jul 2018 05:00), Max: 98 (29 Jul 2018 17:02)  HR: 73 (30 Jul 2018 08:30) (72 - 120)  BP: --  BP(mean): --  RR: 18 (30 Jul 2018 07:00) (18 - 24)  SpO2: 100% (30 Jul 2018 08:30) (89% - 100%)  I&O's Detail    29 Jul 2018 07:01  -  30 Jul 2018 07:00  --------------------------------------------------------  IN:    Albumin 5%  - 250 mL: 1250 mL    dexmedetomidine Infusion: 87 mL    IV PiggyBack: 1000 mL    Packed Red Blood Cells: 650 mL    phenylephrine   Infusion: 703.6 mL    Platelets - Single Donor: 695 mL    sodium chloride 0.9%.: 240 mL    vasopressin Infusion: 43 mL  Total IN: 4668.6 mL    OUT:    Chest Tube: 154 mL    Chest Tube: 60 mL    Chest Tube: 230 mL    Drain: 340 mL    Indwelling Catheter - Urethral: 1280 mL    Nasoenteral Tube: 20 mL  Total OUT: 2084 mL    Total NET: 2584.6 mL      30 Jul 2018 07:01  -  30 Jul 2018 08:57  --------------------------------------------------------  IN:    sodium chloride 0.9%.: 10 mL    vasopressin Infusion: 1 mL  Total IN: 11 mL    OUT:    Chest Tube: 50 mL  Total OUT: 50 mL    Total NET: -39 mL          General: NAD, resting comfortably in bed, intubated, not sedated  C/V: NSR  Pulm: intubated, decrease breath sounds over the left and clear on the right   Abd: soft, distended, no guarding, no peritoneal sign  Extrem: extremities were cold to touch        LABS:                        13.3   14.2  )-----------( 68       ( 30 Jul 2018 05:14 )             38.6     07-30    135  |  90<L>  |  60<H>  ----------------------------<  180<H>  4.3   |  22  |  2.45<H>    Ca    8.5      30 Jul 2018 05:14  Phos  7.3     07-30  Mg     2.1     07-30    TPro  5.9<L>  /  Alb  3.8  /  TBili  6.5<H>  /  DBili  x   /  AST  103<H>  /  ALT  38  /  AlkPhos  80  07-30    PT/INR - ( 30 Jul 2018 05:14 )   PT: 18.4 sec;   INR: 1.64          PTT - ( 30 Jul 2018 05:14 )  PTT:34.1 sec      RADIOLOGY & ADDITIONAL STUDIES:

## 2018-07-30 NOTE — PROGRESS NOTE ADULT - ASSESSMENT
68 year old female with history of CAD, TIA in 1998, COPD, AAA s/p open AAA repair in 2004, EVAR/CIAA, with extension into L hypogastric & DIONNE in 3/29/17, Presented for a descending thoracoabdominal aortic aneurysm repair (aneurysm 5.7 with mural thrombus and widely patent Celiac and SMA),    S/p Thoracoabdominal aortic aneurysm repair with reimplantation of mesenteric vessels , post op labs are remarkable for elevated lactate, General surgery consulted for concern of ischemic colitis, which was ruled out with colonoscopy.  CTA done over the weekend, shows patent graft and mesenteric vessels, Patient lactic acidosis can be due to increase pressors requirement, patient is doing better now, Decreasing pressors requirement and downtrending lactate, Abdomen is still distended with LLQ tenderness    Plan:  - Cont fluid resuscitation and wean off pressors if tolerated  - Follow up lactate  - Frequent vascular and neurochecks  - Serial abdominal US  - Call Vascular surgery for any questions

## 2018-07-30 NOTE — CHART NOTE - NSCHARTNOTEFT_GEN_A_CORE
GENERAL SURGERY PGY4 NOTE    Pt Lactate downtrending overnight. Pt now is off phenylephrine and continues on vasopressin. Continues to make urine output. Continues to be intubated and off sedation. s/p 2U PRBC and 2U PLT    ICU Vital Signs Last 24 Hrs  T(C): 36.5 (30 Jul 2018 01:00), Max: 38.6 (29 Jul 2018 05:00)  T(F): 97.7 (30 Jul 2018 01:00), Max: 101.5 (29 Jul 2018 05:00)  HR: 84 (30 Jul 2018 04:00) (84 - 120)  BP: 149/85 (29 Jul 2018 06:00) (149/85 - 149/85)  BP(mean): 105 (29 Jul 2018 06:00) (105 - 105)  ABP: 144/78 (30 Jul 2018 04:00) (134/68 - 176/82)  ABP(mean): 108 (30 Jul 2018 04:00) (96 - 120)  RR: 18 (30 Jul 2018 04:00) (16 - 24)  SpO2: 94% (30 Jul 2018 04:00) (85% - 100%)    Gen: NAD  Abd: Softly distended, NT      68F s/p open repair of thoracoabdominal aneurysm with bypasses to bilateral renal arteries, celiac artery and SMA 7/25/2018 (POD4). Lactate now downtrending, exam stable.  -Would continue broad spectrum antibiotics  -Continue NPO/IVF Hydration  -Surgery to follow GENERAL SURGERY PGY4 NOTE    Pt Lactate downtrending overnight. Pt now is off phenylephrine and continues on vasopressin. Continues to make urine output. Continues to be intubated and off sedation. s/p 2U PRBC and 2U PLT    ICU Vital Signs Last 24 Hrs  T(C): 36.5 (30 Jul 2018 01:00), Max: 38.6 (29 Jul 2018 05:00)  T(F): 97.7 (30 Jul 2018 01:00), Max: 101.5 (29 Jul 2018 05:00)  HR: 84 (30 Jul 2018 04:00) (84 - 120)  BP: 149/85 (29 Jul 2018 06:00) (149/85 - 149/85)  BP(mean): 105 (29 Jul 2018 06:00) (105 - 105)  ABP: 144/78 (30 Jul 2018 04:00) (134/68 - 176/82)  ABP(mean): 108 (30 Jul 2018 04:00) (96 - 120)  RR: 18 (30 Jul 2018 04:00) (16 - 24)  SpO2: 94% (30 Jul 2018 04:00) (85% - 100%)    Gen: NAD  Abd: Softly distended, NT      68F s/p open repair of thoracoabdominal aneurysm with bypasses to bilateral renal arteries, celiac artery and SMA 7/25/2018 (POD5). Lactate now downtrending, exam stable.  -Would continue broad spectrum antibiotics  -Continue NPO/IVF Hydration  -Surgery to follow

## 2018-07-30 NOTE — PROGRESS NOTE ADULT - SUBJECTIVE AND OBJECTIVE BOX
CTICU  CRITICAL  CARE  attending     Hand off received 					   Pertinent clinical, laboratory, radiographic, hemodynamic, echocardiographic, respiratory data, microbiologic data and chart were reviewed and analyzed frequently throughout the course of the day and night  Patient seen and examined with CTS/ SH attending at bedside    This is a 68 year old female ith history of CAD, TIA in , COPD, AAA s/p open AAA repair by Dr. Roberto in , EVAR/AAA/CIAA with Dr. Burch on 3/29/17 who was referred to Dr. 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  She underwent CTA chest/abdomen/pelvis in 2018 which demonstrated descending thoracoabdominal aortic aneurysm wtih degeneration of the aorta at the mid-descending thoracic segment, measuring 5.7cm with significant mural thrombus (previously measuring 5.5cm) with widely patent celiac/SMA/renal arteries.  She completed outpatient pre-operative evaluation and was deemed a surgical candidate for thoracoabdominal aneurysm repair and admitted to [facility] on 18 under the care of Dr. Monson in anticipated of planned procedure on .  On admission, she states that she feels well overall without acute complaints at this time, though she continues to endorse LARSON, NYHA Class III (dyspneic after 1-2 flights of stairs) and intermittent chest discomfort, non-radiating, spontaneous in onset and resolution without exacerbating factors and relieved with rest.  Denies HA, AMS, active CP, palpitations, SOB, cough, hemoptysis, n/v/d, fever, chills, or recent hospitalizations.      FAMILY HISTORY:  No pertinent family history in first degree relatives  PAST MEDICAL & SURGICAL HISTORY:  History of seizures:   COPD (chronic obstructive pulmonary disease)  HLD (hyperlipidemia)  HTN (hypertension)  AAA (abdominal aortic aneurysm)  CAD (coronary artery disease)  S/P AAA (abdominal aortic aneurysm) repair  History of abdominal aortic aneurysm (AAA):   History of cholecystectomy    Patient is a 68y old  Female who presents with a chief complaint of Descending thoracic aortic aneurysm (2018 17:14)      14 system review was unremarkable  acute changes include acute respiratory failure  Vital signs, hemodynamic and respiratory parameters were reviewed from the bedside nursing flow sheet.  ICU Vital Signs Last 24 Hrs  T(C): 36 (2018 20:42), Max: 36.5 (2018 01:00)  T(F): 96.8 (2018 20:42), Max: 97.7 (2018 01:00)  HR: 74 (2018 23:00) (68 - 94)  BP: --  BP(mean): --  ABP: 118/72 (2018 23:00) (118/72 - 166/76)  ABP(mean): 104 (2018 23:00) (84 - 134)  RR: 20 (2018 23:00) (18 - 24)  SpO2: 100% (2018 23:00) (91% - 100%)    Adult Advanced Hemodynamics Last 24 Hrs  CVP(mm Hg): 14 (:) (11 - 24)  CVP(cm H2O): --  CO: --  CI: --  PA: --  PA(mean): --  PCWP: --  SVR: --  SVRI: --  PVR: --  PVRI: --, ABG - ( 2018 21:34 )  pH, Arterial: 7.56  pH, Blood: x     /  pCO2: 27    /  pO2: 110   / HCO3: 24    / Base Excess: 2.4   /  SaO2: 98                Mode: AC/ CMV (Assist Control/ Continuous Mandatory Ventilation)  RR (machine): 20  TV (machine): 450  FiO2: 50  PEEP: 8  ITime: 1.4  MAP: 12  PIP: 22    Intake and output was reviewed and the fluid balance was calculated  Daily     Daily Weight in k.3 (2018 11:49)  I&O's Summary    :  -  2018 07:00  --------------------------------------------------------  IN: 4668.6 mL / OUT: 2084 mL / NET: 2584.6 mL    :  -  2018 23:37  --------------------------------------------------------  IN: 1446 mL / OUT: 1535 mL / NET: -89 mL        All lines and drain sites were assessed  [facility]      POCT Blood Glucose.: 146 mg/dL (2018 21:18)    NEURO: No acute change in mental status. PUPILS 2 mm ( REACTIVE).  MOVES all 4 extremities.  CVS: S1, S2, No S3.  RESPIRATORY: Auscultation of the chest reveals equal bibasilar  rales.  GI: Abdomen is soft. No tenderness. + Bowel sounds.  Extremities are warm and relatively well perfused. + Digital cyanosis.  Wounds appear clean and unremarkable  Antibiotics are cipro and falgyl.    labs  CBC Full  -  ( 2018 21:37 )  WBC Count : 11.9 K/uL  Hemoglobin : 12.2 g/dL  Hematocrit : 34.6 %  Platelet Count - Automated : 45 K/uL  Mean Cell Volume : 85.9 fL  Mean Cell Hemoglobin : 30.3 pg  Mean Cell Hemoglobin Concentration : 35.3 g/dL  Auto Neutrophil # : x  Auto Lymphocyte # : x  Auto Monocyte # : x  Auto Eosinophil # : x  Auto Basophil # : x  Auto Neutrophil % : x  Auto Lymphocyte % : x  Auto Monocyte % : x  Auto Eosinophil % : x  Auto Basophil % : x    07-30    137  |  93<L>  |  79<H>  ----------------------------<  148<H>  3.5   |  22  |  2.95<H>    Ca    8.6      2018 21:37  Phos  4.1     07-30  Mg     2.8     07-30    TPro  5.5<L>  /  Alb  3.7  /  TBili  5.8<H>  /  DBili  x   /  AST  165<H>  /  ALT  67<H>  /  AlkPhos  76  07-30    PT/INR - ( 2018 21:37 )   PT: 18.6 sec;   INR: 1.66          PTT - ( 2018 21:37 )  PTT:30.7 sec  The current medications were reviewed   MEDICATIONS  (STANDING):  acetaminophen  IVPB. 1000 milliGRAM(s) IV Intermittent once  atorvastatin 20 milliGRAM(s) Oral at bedtime  chlorhexidine 0.12% Liquid 5 milliLiter(s) Swish and Spit every 4 hours  chlorhexidine 2% Cloths 1 Application(s) Topical daily  ciprofloxacin   IVPB 400 milliGRAM(s) IV Intermittent every 24 hours  dexmedetomidine Infusion 0.2 MICROgram(s)/kG/Hr (2.545 mL/Hr) IV Continuous <Continuous>  dextrose 5%. 1000 milliLiter(s) (50 mL/Hr) IV Continuous <Continuous>  dextrose 50% Injectable 50 milliLiter(s) IV Push every 15 minutes  dextrose 50% Injectable 25 milliLiter(s) IV Push every 15 minutes  dextrose 50% Injectable 50 milliLiter(s) IV Push every 15 minutes  dextrose 50% Injectable 25 milliLiter(s) IV Push every 15 minutes  insulin lispro (HumaLOG) corrective regimen sliding scale   SubCutaneous Before meals and at bedtime  lidocaine   Patch 1 Patch Transdermal daily  metroNIDAZOLE  IVPB 500 milliGRAM(s) IV Intermittent every 8 hours  metroNIDAZOLE  IVPB      pantoprazole  Injectable 40 milliGRAM(s) IV Push daily  sodium chloride 0.9%. 1000 milliLiter(s) (10 mL/Hr) IV Continuous <Continuous>  vasopressin Infusion 0.033 Unit(s)/Min (2 mL/Hr) IV Continuous <Continuous>    MEDICATIONS  (PRN):  dextrose 40% Gel 15 Gram(s) Oral once PRN Blood Glucose LESS THAN 70 milliGRAM(s)/deciliter  fentaNYL    Injectable 25 MICROGram(s) IV Push every 6 hours PRN Severe Pain (7 - 10)  glucagon  Injectable 1 milliGRAM(s) IntraMuscular once PRN Glucose LESS THAN 70 milligrams/deciliter       PROBLEM LIST/ ASSESSMENT:  HEALTH ISSUES - PROBLEM Dx:        Patient is a 68y old  Female who presents with Descending thoracic aortic aneurysm   S/P Replacement of thoraco abdominal aorta.  S/P reimplantation of celiac artery, SMA, JOSÉ MIGUEL, bilateral renal arteries.  Hemodynamically stable.  + cyanosis of fingertips.  Renal  insufficiency.   Borderline urine output.  Good oxygenation.  Decreasing metabolic acidosis.  Overall satisfactory progress.        My plan includes :  Close hemodynamic, ventilatory and drain monitoring and management.  Wean vent as tolerated.  Continue IV flagyl and cipro.  D/C vasopressin. (+ Acral cyanosis).  Monitor for arrhythmias and monitor parameters for organ perfusion  Monitor neurologic status  Head of the bed should remain elevated to 45 deg .   Chest PT and IS will be encouraged  Monitor adequacy of oxygenation and ventilation and attempt to wean oxygen  Nutritional goals will be met using po eventually , ensure adequate caloric intake and monitor  the same  Stress ulcer and VTE prophylaxis will be achieved    Glycemic control is satisfactory  Electrolytes have been repleted as necessary and wound care has been carried out. Pain control has been achieved.   Aggressive  physical therapy and early mobility and ambulation goals will be met   The family was updated about the course and plan  CRITICAL CARE TIME SPENT in evaluation and management, reassessments, review and interpretation of labs and x-rays, ventilator and hemodynamic management, formulating a plan and coordinating care: ___30____ MIN.  Time does not include procedural time.  CTICU ATTENDING     					    Norberto Holbrook MD

## 2018-07-30 NOTE — PROGRESS NOTE ADULT - SUBJECTIVE AND OBJECTIVE BOX
Transient lactic acidosis yesterday on high doses of pressors.  With lowering of MAP goal, has improved, although u/o is a bit low.  Intubated, awake.  Denies abdominal pain.  AFVSS  Abd soft, mild dist, non-tended except near left flank incision    CT with open grafts. No obvious areas of bowel ischemia. Some ascites and brody-portal edema    I suspect lactic acidosis was due to high doses of pressors.  Continue to monitor, trend lactate.  If clinically worsens, warrants laparotomy.  On antibiotics.

## 2018-07-30 NOTE — PROGRESS NOTE ADULT - SUBJECTIVE AND OBJECTIVE BOX
Subjective:  responding to verbal command and denied any abdominal pain, Following command, Had 1 small BM with no blood. NG tube output 200ml with no blood. UO decreased overnight, making 30ml/hr since the morning    Vital Signs Last 24 Hrs  T(C): 36.4 (30 Jul 2018 13:00), Max: 36.7 (29 Jul 2018 17:02)  T(F): 97.5 (30 Jul 2018 13:00), Max: 98 (29 Jul 2018 17:02)  HR: 74 (30 Jul 2018 16:00) (70 - 108)  BP: --  BP(mean): --  RR: 20 (30 Jul 2018 16:00) (18 - 23)  SpO2: 100% (30 Jul 2018 16:00) (91% - 100%)    Physical Exam:  General: intubated and sedated, Following command and responding.  Pulmonary: intubated, no respiratory distress  Cardiovascular: tachy S1 S2  Abdominal: softly distended, with hypoactive bowel sounds, LLQ tenderness, Incision C/D/I  Extremities: warm, well perfused, no edema. Lower extremities: RLE 4/5 and LLE 3/5 strength   Pulses: bilateral 2+ DP pulses        LABS:                        12.9   12.9  )-----------( 22       ( 30 Jul 2018 11:01 )             38.1     07-30    135  |  92<L>  |  65<H>  ----------------------------<  138<H>  3.8   |  24  |  2.62<H>    Ca    8.8      30 Jul 2018 11:01  Phos  5.6     07-30  Mg     3.0     07-30    TPro  5.6<L>  /  Alb  3.6  /  TBili  5.2<H>  /  DBili  x   /  AST  123<H>  /  ALT  46<H>  /  AlkPhos  78  07-30    PT/INR - ( 30 Jul 2018 11:01 )   PT: 18.3 sec;   INR: 1.63          PTT - ( 30 Jul 2018 11:01 )  PTT:31.6 sec  CAPILLARY BLOOD GLUCOSE      POCT Blood Glucose.: 136 mg/dL (30 Jul 2018 10:49)  POCT Blood Glucose.: 169 mg/dL (30 Jul 2018 06:38)  POCT Blood Glucose.: 163 mg/dL (30 Jul 2018 06:34)  POCT Blood Glucose.: 19 mg/dL (30 Jul 2018 06:30)  POCT Blood Glucose.: 74 mg/dL (29 Jul 2018 21:43)      LIVER FUNCTIONS - ( 30 Jul 2018 11:01 )  Alb: 3.6 g/dL / Pro: 5.6 g/dL / ALK PHOS: 78 U/L / ALT: 46 U/L / AST: 123 U/L / GGT: x           ABO Interpretation: B (07-29 @ 19:55)

## 2018-07-30 NOTE — PROGRESS NOTE ADULT - SUBJECTIVE AND OBJECTIVE BOX
CTICU  CRITICAL  CARE  attending     Hand off received @ 7a					   Pertinent clinical, laboratory, radiographic, hemodynamic, echocardiographic, respiratory data, microbiologic data and chart were reviewed and analyzed frequently throughout the course of the day and night  Patient seen and examined with CTS/ SH attending at bedside  Pt is a 68y , Female, pod # 4 s/p Open TAAA with replacement of descending aorta; separate re-implantation for meso-renals; celiac/SMA/Bilateral renal bypass;  LD in the OR    LD placement in the OR    Last 24 hrs:    lactic acidosis; concern for graft thrombosis  CT Abd/Pelvis with contrast: All grafts open; no endo leak     Overnight:    Volume resuscitated  yesterdays events (lactic acidosis) thought to be precipitated by relative intravascular depletion + use of pressors  lactate normalizing    Currently:    Vent dependant  Fio2 50%  Awake and non focal  ISP 10-12  moving both LE's; R>L      , FAMILY HISTORY:  No pertinent family history in first degree relatives  PAST MEDICAL & SURGICAL HISTORY:  History of seizures:   COPD (chronic obstructive pulmonary disease)  HLD (hyperlipidemia)  HTN (hypertension)  AAA (abdominal aortic aneurysm)  CAD (coronary artery disease)  S/P AAA (abdominal aortic aneurysm) repair  History of abdominal aortic aneurysm (AAA):   History of cholecystectomy    Patient is a 68y old  Female who presents with a chief complaint of Descending thoracic aortic aneurysm (2018 17:14)      14 system review was unremarkable  acute changes include acute respiratory failure  Vital signs, hemodynamic and respiratory parameters were reviewed from the bedside nursing flowsheet.  ICU Vital Signs Last 24 Hrs  T(C): 36.4 (2018 13:00), Max: 36.7 (2018 17:02)  T(F): 97.5 (2018 13:00), Max: 98 (2018 17:02)  HR: 74 (2018 14:00) (70 - 108)  BP: --  BP(mean): --  ABP: 140/68 (2018 14:00) (122/60 - 172/84)  ABP(mean): 96 (2018 14:00) (84 - 118)  RR: 20 (2018 14:00) (18 - 23)  SpO2: 100% (2018 14:00) (91% - 100%)    Adult Advanced Hemodynamics Last 24 Hrs  CVP(mm Hg): 20 (2018 14:00) (11 - 24)  CVP(cm H2O): --  CO: --  CI: --  PA: --  PA(mean): --  PCWP: --  SVR: --  SVRI: --  PVR: --  PVRI: --, ABG - ( 2018 10:58 )  pH, Arterial: 7.50  pH, Blood: x     /  pCO2: 33    /  pO2: 88    / HCO3: 25    / Base Excess: 2.3   /  SaO2: 96                Mode: AC/ CMV (Assist Control/ Continuous Mandatory Ventilation)  RR (machine): 20  TV (machine): 450  FiO2: 50  PEEP: 8  ITime: 1.4  MAP: 15  PIP: 23    Intake and output was reviewed and the fluid balance was calculated  Daily     Daily Weight in k.3 (2018 11:49)  I&O's Summary    2018 07:  -  2018 07:00  --------------------------------------------------------  IN: 4668.6 mL / OUT: 2084 mL / NET: 2584.6 mL    2018 07:  -  2018 14:05  --------------------------------------------------------  IN: 1027 mL / OUT: 575 mL / NET: 452 mL        All lines and drain sites were assessed  [facility]      POCT Blood Glucose.: 136 mg/dL (2018 10:49)    No acute change in mental status  Auscultation of the chest reveals equal bs  Abdomen is soft  Extremities are warm and well perfused  Wounds appear clean and unremarkable  Antibiotics are periop    labs  CBC Full  -  ( 2018 11:01 )  WBC Count : 12.9 K/uL  Hemoglobin : 12.9 g/dL  Hematocrit : 38.1 %  Platelet Count - Automated : 22 K/uL  Mean Cell Volume : 87.8 fL  Mean Cell Hemoglobin : 29.7 pg  Mean Cell Hemoglobin Concentration : 33.9 g/dL  Auto Neutrophil # : x  Auto Lymphocyte # : x  Auto Monocyte # : x  Auto Eosinophil # : x  Auto Basophil # : x  Auto Neutrophil % : x  Auto Lymphocyte % : x  Auto Monocyte % : x  Auto Eosinophil % : x  Auto Basophil % : x        135  |  92<L>  |  65<H>  ----------------------------<  138<H>  3.8   |  24  |  2.62<H>    Ca    8.8      2018 11:01  Phos  5.6       Mg     3.0         TPro  5.6<L>  /  Alb  3.6  /  TBili  5.2<H>  /  DBili  x   /  AST  123<H>  /  ALT  46<H>  /  AlkPhos  78      PT/INR - ( 2018 11:01 )   PT: 18.3 sec;   INR: 1.63          PTT - ( 2018 11:01 )  PTT:31.6 sec  The current medications were reviewed   MEDICATIONS  (STANDING):  atorvastatin 20 milliGRAM(s) Oral at bedtime  chlorhexidine 0.12% Liquid 5 milliLiter(s) Swish and Spit every 4 hours  chlorhexidine 2% Cloths 1 Application(s) Topical daily  ciprofloxacin   IVPB 400 milliGRAM(s) IV Intermittent every 24 hours  dexmedetomidine Infusion 0.2 MICROgram(s)/kG/Hr (2.545 mL/Hr) IV Continuous <Continuous>  dextrose 5%. 1000 milliLiter(s) (50 mL/Hr) IV Continuous <Continuous>  dextrose 50% Injectable 50 milliLiter(s) IV Push every 15 minutes  dextrose 50% Injectable 25 milliLiter(s) IV Push every 15 minutes  dextrose 50% Injectable 50 milliLiter(s) IV Push every 15 minutes  dextrose 50% Injectable 25 milliLiter(s) IV Push every 15 minutes  insulin lispro (HumaLOG) corrective regimen sliding scale   SubCutaneous Before meals and at bedtime  lidocaine   Patch 1 Patch Transdermal daily  metroNIDAZOLE  IVPB 500 milliGRAM(s) IV Intermittent every 8 hours  metroNIDAZOLE  IVPB      pantoprazole  Injectable 40 milliGRAM(s) IV Push daily  phenylephrine    Infusion 0.5 MICROgram(s)/kG/Min (9.544 mL/Hr) IV Continuous <Continuous>  propofol Infusion 5 MICROgram(s)/kG/Min (1.527 mL/Hr) IV Continuous <Continuous>  sodium chloride 0.9%. 1000 milliLiter(s) (10 mL/Hr) IV Continuous <Continuous>  vasopressin Infusion 0.033 Unit(s)/Min (2 mL/Hr) IV Continuous <Continuous>    MEDICATIONS  (PRN):  dextrose 40% Gel 15 Gram(s) Oral once PRN Blood Glucose LESS THAN 70 milliGRAM(s)/deciliter  glucagon  Injectable 1 milliGRAM(s) IntraMuscular once PRN Glucose LESS THAN 70 milligrams/deciliter       PROBLEM LIST/ ASSESSMENT:  HEALTH ISSUES - PROBLEM Dx:    acute resp failure with hypoxia  hemodynamic stability  lactic acidosis        ,   Patient is a 68y old  Female who presents with a chief complaint of Descending thoracic aortic aneurysm (2018 17:14)     s/p  Open TAAA with replacement of descending aorta; separate re-implantation for meso-renals; celiac/SMA/Bilateral renal bypass;    acute changes include acute respiratory failure    My plan includes :  close hemodynamic, ventilatory and drain monitoring and management per post op routine    Monitor for arrhythmias and monitor parameters for organ perfusion  monitor neurologic status  Head of the bed should remain elevated to 45 deg .   chest PT and IS will be encouraged  monitor adequacy of oxygenation and ventilation and attempt to wean oxygen  Nutritional goals will be met using po eventually , ensure adequate caloric intake and montior the same  Stress ulcer and VTE prophylaxis will be achieved    Glycemic control is satisfactory  Electrolytes have been repleted as necessary and wound care has been carried out. Pain control has been achieved.   agressive physical therapy and early mobility and ambulation goals will be met   The family was updated about the course and plan  CRITICAL CARE TIME SPENT in evaluation and management, reassessments, review and interpretation of labs and x-rays, ventilator and hemodynamic management, formulating a plan and coordinating care: ___90____ MIN.  Time does not include procedural time.  CTICU ATTENDING     					    Roby Mueller M.D.

## 2018-07-30 NOTE — DIETITIAN INITIAL EVALUATION ADULT. - NS AS NUTRI INTERV ENTERAL NUTRITION
Once medically feasible, initiate EN with route per MD; Jevity 1.2 with goal rate of 50ml/hr x 24hr + 1x prostat (1200ml TV, 1540kcal, 81g)/Composition/Rate/Route Once medically feasible, initiate EN with route per MD; Jevity 1.2 with goal rate of 50ml/hr x 24hr + 1x prostat (1200ml TV, 1540kcal, 81g, 968ml water)/Rate/Route/Composition

## 2018-07-31 LAB
ALBUMIN SERPL ELPH-MCNC: 3.3 G/DL — SIGNIFICANT CHANGE UP (ref 3.3–5)
ALBUMIN SERPL ELPH-MCNC: 3.8 G/DL — SIGNIFICANT CHANGE UP (ref 3.3–5)
ALBUMIN SERPL ELPH-MCNC: 3.8 G/DL — SIGNIFICANT CHANGE UP (ref 3.3–5)
ALBUMIN SERPL ELPH-MCNC: 4 G/DL — SIGNIFICANT CHANGE UP (ref 3.3–5)
ALP SERPL-CCNC: 73 U/L — SIGNIFICANT CHANGE UP (ref 40–120)
ALP SERPL-CCNC: 77 U/L — SIGNIFICANT CHANGE UP (ref 40–120)
ALP SERPL-CCNC: 78 U/L — SIGNIFICANT CHANGE UP (ref 40–120)
ALP SERPL-CCNC: 82 U/L — SIGNIFICANT CHANGE UP (ref 40–120)
ALT FLD-CCNC: 111 U/L — HIGH (ref 10–45)
ALT FLD-CCNC: 76 U/L — HIGH (ref 10–45)
ALT FLD-CCNC: 92 U/L — HIGH (ref 10–45)
ALT FLD-CCNC: 97 U/L — HIGH (ref 10–45)
ANION GAP SERPL CALC-SCNC: 17 MMOL/L — SIGNIFICANT CHANGE UP (ref 5–17)
ANION GAP SERPL CALC-SCNC: 19 MMOL/L — HIGH (ref 5–17)
ANION GAP SERPL CALC-SCNC: 19 MMOL/L — HIGH (ref 5–17)
ANION GAP SERPL CALC-SCNC: 20 MMOL/L — HIGH (ref 5–17)
ANISOCYTOSIS BLD QL: SLIGHT — SIGNIFICANT CHANGE UP
APTT BLD: 28.2 SEC — SIGNIFICANT CHANGE UP (ref 27.5–37.4)
APTT BLD: 29.6 SEC — SIGNIFICANT CHANGE UP (ref 27.5–37.4)
APTT BLD: 29.9 SEC — SIGNIFICANT CHANGE UP (ref 27.5–37.4)
APTT BLD: 30.4 SEC — SIGNIFICANT CHANGE UP (ref 27.5–37.4)
AST SERPL-CCNC: 185 U/L — HIGH (ref 10–40)
AST SERPL-CCNC: 201 U/L — HIGH (ref 10–40)
AST SERPL-CCNC: 221 U/L — HIGH (ref 10–40)
AST SERPL-CCNC: 236 U/L — HIGH (ref 10–40)
BASE EXCESS BLDV CALC-SCNC: 3.8 MMOL/L — SIGNIFICANT CHANGE UP
BILIRUB DIRECT SERPL-MCNC: 2.9 MG/DL — HIGH (ref 0–0.2)
BILIRUB INDIRECT FLD-MCNC: 2.3 MG/DL — HIGH (ref 0.2–1)
BILIRUB SERPL-MCNC: 5.2 MG/DL — HIGH (ref 0.2–1.2)
BILIRUB SERPL-MCNC: 5.4 MG/DL — HIGH (ref 0.2–1.2)
BILIRUB SERPL-MCNC: 5.6 MG/DL — HIGH (ref 0.2–1.2)
BILIRUB SERPL-MCNC: 5.6 MG/DL — HIGH (ref 0.2–1.2)
BUN SERPL-MCNC: 77 MG/DL — HIGH (ref 7–23)
BUN SERPL-MCNC: 82 MG/DL — HIGH (ref 7–23)
BUN SERPL-MCNC: 84 MG/DL — HIGH (ref 7–23)
BUN SERPL-MCNC: 84 MG/DL — HIGH (ref 7–23)
CALCIUM SERPL-MCNC: 8.4 MG/DL — SIGNIFICANT CHANGE UP (ref 8.4–10.5)
CALCIUM SERPL-MCNC: 8.4 MG/DL — SIGNIFICANT CHANGE UP (ref 8.4–10.5)
CALCIUM SERPL-MCNC: 8.8 MG/DL — SIGNIFICANT CHANGE UP (ref 8.4–10.5)
CALCIUM SERPL-MCNC: 9.3 MG/DL — SIGNIFICANT CHANGE UP (ref 8.4–10.5)
CHLORIDE SERPL-SCNC: 92 MMOL/L — LOW (ref 96–108)
CHLORIDE SERPL-SCNC: 94 MMOL/L — LOW (ref 96–108)
CO2 SERPL-SCNC: 24 MMOL/L — SIGNIFICANT CHANGE UP (ref 22–31)
CREAT SERPL-MCNC: 2.97 MG/DL — HIGH (ref 0.5–1.3)
CREAT SERPL-MCNC: 3 MG/DL — HIGH (ref 0.5–1.3)
CREAT SERPL-MCNC: 3.02 MG/DL — HIGH (ref 0.5–1.3)
CREAT SERPL-MCNC: 3.08 MG/DL — HIGH (ref 0.5–1.3)
CULTURE RESULTS: SIGNIFICANT CHANGE UP
D DIMER BLD IA.RAPID-MCNC: HIGH NG/ML DDU
FIBRINOGEN PPP-MCNC: 257 MG/DL — LOW (ref 258–438)
GAS PNL BLDA: SIGNIFICANT CHANGE UP
GAS PNL BLDV: SIGNIFICANT CHANGE UP
GLUCOSE BLDC GLUCOMTR-MCNC: 129 MG/DL — HIGH (ref 70–99)
GLUCOSE BLDC GLUCOMTR-MCNC: 158 MG/DL — HIGH (ref 70–99)
GLUCOSE BLDC GLUCOMTR-MCNC: 173 MG/DL — HIGH (ref 70–99)
GLUCOSE BLDC GLUCOMTR-MCNC: 60 MG/DL — LOW (ref 70–99)
GLUCOSE BLDC GLUCOMTR-MCNC: 77 MG/DL — SIGNIFICANT CHANGE UP (ref 70–99)
GLUCOSE SERPL-MCNC: 133 MG/DL — HIGH (ref 70–99)
GLUCOSE SERPL-MCNC: 135 MG/DL — HIGH (ref 70–99)
GLUCOSE SERPL-MCNC: 146 MG/DL — HIGH (ref 70–99)
GLUCOSE SERPL-MCNC: 182 MG/DL — HIGH (ref 70–99)
HAPTOGLOB SERPL-MCNC: <10 MG/DL — LOW (ref 34–200)
HCO3 BLDV-SCNC: 29 MMOL/L — HIGH (ref 20–27)
HCT VFR BLD CALC: 33.7 % — LOW (ref 34.5–45)
HCT VFR BLD CALC: 33.9 % — LOW (ref 34.5–45)
HCT VFR BLD CALC: 35 % — SIGNIFICANT CHANGE UP (ref 34.5–45)
HCT VFR BLD CALC: 36.2 % — SIGNIFICANT CHANGE UP (ref 34.5–45)
HGB BLD-MCNC: 11.3 G/DL — LOW (ref 11.5–15.5)
HGB BLD-MCNC: 11.9 G/DL — SIGNIFICANT CHANGE UP (ref 11.5–15.5)
HGB BLD-MCNC: 12 G/DL — SIGNIFICANT CHANGE UP (ref 11.5–15.5)
HGB BLD-MCNC: 12.3 G/DL — SIGNIFICANT CHANGE UP (ref 11.5–15.5)
INR BLD: 1.41 — HIGH (ref 0.88–1.16)
INR BLD: 1.53 — HIGH (ref 0.88–1.16)
INR BLD: 1.63 — HIGH (ref 0.88–1.16)
INR BLD: 1.66 — HIGH (ref 0.88–1.16)
LACTATE SERPL-SCNC: 1.6 MMOL/L — SIGNIFICANT CHANGE UP (ref 0.5–2)
LACTATE SERPL-SCNC: 1.6 MMOL/L — SIGNIFICANT CHANGE UP (ref 0.5–2)
LACTATE SERPL-SCNC: 1.8 MMOL/L — SIGNIFICANT CHANGE UP (ref 0.5–2)
LACTATE SERPL-SCNC: 1.9 MMOL/L — SIGNIFICANT CHANGE UP (ref 0.5–2)
LDH SERPL L TO P-CCNC: 986 U/L — HIGH (ref 50–242)
LYMPHOCYTES # BLD AUTO: 6 % — LOW (ref 13–44)
MAGNESIUM SERPL-MCNC: 2.7 MG/DL — HIGH (ref 1.6–2.6)
MAGNESIUM SERPL-MCNC: 2.7 MG/DL — HIGH (ref 1.6–2.6)
MAGNESIUM SERPL-MCNC: 2.8 MG/DL — HIGH (ref 1.6–2.6)
MANUAL SMEAR VERIFICATION: SIGNIFICANT CHANGE UP
MCHC RBC-ENTMCNC: 29.6 PG — SIGNIFICANT CHANGE UP (ref 27–34)
MCHC RBC-ENTMCNC: 29.9 PG — SIGNIFICANT CHANGE UP (ref 27–34)
MCHC RBC-ENTMCNC: 29.9 PG — SIGNIFICANT CHANGE UP (ref 27–34)
MCHC RBC-ENTMCNC: 30.3 PG — SIGNIFICANT CHANGE UP (ref 27–34)
MCHC RBC-ENTMCNC: 33.5 G/DL — SIGNIFICANT CHANGE UP (ref 32–36)
MCHC RBC-ENTMCNC: 34 G/DL — SIGNIFICANT CHANGE UP (ref 32–36)
MCHC RBC-ENTMCNC: 34 G/DL — SIGNIFICANT CHANGE UP (ref 32–36)
MCHC RBC-ENTMCNC: 35.4 G/DL — SIGNIFICANT CHANGE UP (ref 32–36)
MCV RBC AUTO: 85.6 FL — SIGNIFICANT CHANGE UP (ref 80–100)
MCV RBC AUTO: 87.1 FL — SIGNIFICANT CHANGE UP (ref 80–100)
MCV RBC AUTO: 88.1 FL — SIGNIFICANT CHANGE UP (ref 80–100)
MCV RBC AUTO: 89.2 FL — SIGNIFICANT CHANGE UP (ref 80–100)
METAMYELOCYTES # FLD: 2 % — HIGH
MICROCYTES BLD QL: SLIGHT — SIGNIFICANT CHANGE UP
MYELOCYTES NFR BLD: 2 % — HIGH
NEUTROPHILS NFR BLD AUTO: 80 % — HIGH (ref 43–77)
NEUTS BAND # BLD: 10 % — SIGNIFICANT CHANGE UP
PCO2 BLDV: 45 MMHG — SIGNIFICANT CHANGE UP (ref 41–51)
PF4 HEPARIN CMPLX AB SER-ACNC: NEGATIVE — SIGNIFICANT CHANGE UP
PF4 HEPARIN CMPLX AB SERPL QL IA: 0.2 ABS — SIGNIFICANT CHANGE UP
PH BLDV: 7.42 — SIGNIFICANT CHANGE UP (ref 7.32–7.43)
PHOSPHATE SERPL-MCNC: 3.3 MG/DL — SIGNIFICANT CHANGE UP (ref 2.5–4.5)
PHOSPHATE SERPL-MCNC: 3.5 MG/DL — SIGNIFICANT CHANGE UP (ref 2.5–4.5)
PHOSPHATE SERPL-MCNC: 3.8 MG/DL — SIGNIFICANT CHANGE UP (ref 2.5–4.5)
PLAT MORPH BLD: NORMAL — SIGNIFICANT CHANGE UP
PLATELET # BLD AUTO: 14 K/UL — CRITICAL LOW (ref 150–400)
PLATELET # BLD AUTO: 18 K/UL — CRITICAL LOW (ref 150–400)
PLATELET # BLD AUTO: 21 K/UL — LOW (ref 150–400)
PLATELET # BLD AUTO: 27 K/UL — LOW (ref 150–400)
PO2 BLDV: 42 MMHG — SIGNIFICANT CHANGE UP
POLYCHROMASIA BLD QL SMEAR: SLIGHT — SIGNIFICANT CHANGE UP
POTASSIUM SERPL-MCNC: 3.3 MMOL/L — LOW (ref 3.5–5.3)
POTASSIUM SERPL-MCNC: 3.3 MMOL/L — LOW (ref 3.5–5.3)
POTASSIUM SERPL-MCNC: 3.6 MMOL/L — SIGNIFICANT CHANGE UP (ref 3.5–5.3)
POTASSIUM SERPL-MCNC: 4 MMOL/L — SIGNIFICANT CHANGE UP (ref 3.5–5.3)
POTASSIUM SERPL-SCNC: 3.3 MMOL/L — LOW (ref 3.5–5.3)
POTASSIUM SERPL-SCNC: 3.3 MMOL/L — LOW (ref 3.5–5.3)
POTASSIUM SERPL-SCNC: 3.6 MMOL/L — SIGNIFICANT CHANGE UP (ref 3.5–5.3)
POTASSIUM SERPL-SCNC: 4 MMOL/L — SIGNIFICANT CHANGE UP (ref 3.5–5.3)
PROT SERPL-MCNC: 5.5 G/DL — LOW (ref 6–8.3)
PROT SERPL-MCNC: 5.7 G/DL — LOW (ref 6–8.3)
PROT SERPL-MCNC: 5.8 G/DL — LOW (ref 6–8.3)
PROT SERPL-MCNC: 6 G/DL — SIGNIFICANT CHANGE UP (ref 6–8.3)
PROTHROM AB SERPL-ACNC: 15.8 SEC — HIGH (ref 9.8–12.7)
PROTHROM AB SERPL-ACNC: 17.1 SEC — HIGH (ref 9.8–12.7)
PROTHROM AB SERPL-ACNC: 18.3 SEC — HIGH (ref 9.8–12.7)
PROTHROM AB SERPL-ACNC: 18.6 SEC — HIGH (ref 9.8–12.7)
RBC # BLD: 3.78 M/UL — LOW (ref 3.8–5.2)
RBC # BLD: 3.96 M/UL — SIGNIFICANT CHANGE UP (ref 3.8–5.2)
RBC # BLD: 4.02 M/UL — SIGNIFICANT CHANGE UP (ref 3.8–5.2)
RBC # BLD: 4.02 M/UL — SIGNIFICANT CHANGE UP (ref 3.8–5.2)
RBC # BLD: 4.11 M/UL — SIGNIFICANT CHANGE UP (ref 3.8–5.2)
RBC # FLD: 15.5 % — SIGNIFICANT CHANGE UP (ref 10.3–16.9)
RBC # FLD: 16.1 % — SIGNIFICANT CHANGE UP (ref 10.3–16.9)
RBC # FLD: 16.3 % — SIGNIFICANT CHANGE UP (ref 10.3–16.9)
RBC # FLD: 16.6 % — SIGNIFICANT CHANGE UP (ref 10.3–16.9)
RBC BLD AUTO: ABNORMAL
RETICS/RBC NFR: 1.3 % — SIGNIFICANT CHANGE UP (ref 0.5–2.5)
SAO2 % BLDV: 66 % — SIGNIFICANT CHANGE UP
SODIUM SERPL-SCNC: 135 MMOL/L — SIGNIFICANT CHANGE UP (ref 135–145)
SODIUM SERPL-SCNC: 135 MMOL/L — SIGNIFICANT CHANGE UP (ref 135–145)
SODIUM SERPL-SCNC: 137 MMOL/L — SIGNIFICANT CHANGE UP (ref 135–145)
SODIUM SERPL-SCNC: 138 MMOL/L — SIGNIFICANT CHANGE UP (ref 135–145)
SPECIMEN SOURCE: SIGNIFICANT CHANGE UP
SRA INTERP SER-IMP: SIGNIFICANT CHANGE UP
SURGICAL PATHOLOGY STUDY: SIGNIFICANT CHANGE UP
WBC # BLD: 11 K/UL — HIGH (ref 3.8–10.5)
WBC # BLD: 11.2 K/UL — HIGH (ref 3.8–10.5)
WBC # BLD: 11.3 K/UL — HIGH (ref 3.8–10.5)
WBC # BLD: 11.8 K/UL — HIGH (ref 3.8–10.5)
WBC # FLD AUTO: 11 K/UL — HIGH (ref 3.8–10.5)
WBC # FLD AUTO: 11.2 K/UL — HIGH (ref 3.8–10.5)
WBC # FLD AUTO: 11.3 K/UL — HIGH (ref 3.8–10.5)
WBC # FLD AUTO: 11.8 K/UL — HIGH (ref 3.8–10.5)

## 2018-07-31 PROCEDURE — 99292 CRITICAL CARE ADDL 30 MIN: CPT

## 2018-07-31 PROCEDURE — 76937 US GUIDE VASCULAR ACCESS: CPT | Mod: 26

## 2018-07-31 PROCEDURE — 71045 X-RAY EXAM CHEST 1 VIEW: CPT | Mod: 26

## 2018-07-31 PROCEDURE — 99291 CRITICAL CARE FIRST HOUR: CPT | Mod: 25

## 2018-07-31 PROCEDURE — 99292 CRITICAL CARE ADDL 30 MIN: CPT | Mod: 25

## 2018-07-31 PROCEDURE — 36620 INSERTION CATHETER ARTERY: CPT

## 2018-07-31 RX ORDER — ALBUMIN HUMAN 25 %
50 VIAL (ML) INTRAVENOUS ONCE
Qty: 0 | Refills: 0 | Status: COMPLETED | OUTPATIENT
Start: 2018-07-31 | End: 2018-07-31

## 2018-07-31 RX ORDER — POTASSIUM CHLORIDE 20 MEQ
10 PACKET (EA) ORAL ONCE
Qty: 0 | Refills: 0 | Status: COMPLETED | OUTPATIENT
Start: 2018-07-31 | End: 2018-07-31

## 2018-07-31 RX ORDER — FOLIC ACID 0.8 MG
1 TABLET ORAL DAILY
Qty: 0 | Refills: 0 | Status: DISCONTINUED | OUTPATIENT
Start: 2018-07-31 | End: 2018-08-01

## 2018-07-31 RX ORDER — FENTANYL CITRATE 50 UG/ML
25 INJECTION INTRAVENOUS ONCE
Qty: 0 | Refills: 0 | Status: DISCONTINUED | OUTPATIENT
Start: 2018-07-31 | End: 2018-07-31

## 2018-07-31 RX ORDER — ALBUMIN HUMAN 25 %
250 VIAL (ML) INTRAVENOUS ONCE
Qty: 0 | Refills: 0 | Status: COMPLETED | OUTPATIENT
Start: 2018-07-31 | End: 2018-07-31

## 2018-07-31 RX ORDER — DESMOPRESSIN ACETATE 0.1 MG/1
30 TABLET ORAL ONCE
Qty: 0 | Refills: 0 | Status: COMPLETED | OUTPATIENT
Start: 2018-07-31 | End: 2018-07-31

## 2018-07-31 RX ORDER — POTASSIUM CHLORIDE 20 MEQ
20 PACKET (EA) ORAL ONCE
Qty: 0 | Refills: 0 | Status: COMPLETED | OUTPATIENT
Start: 2018-07-31 | End: 2018-07-31

## 2018-07-31 RX ORDER — CALCIUM GLUCONATE 100 MG/ML
2 VIAL (ML) INTRAVENOUS ONCE
Qty: 0 | Refills: 0 | Status: COMPLETED | OUTPATIENT
Start: 2018-07-31 | End: 2018-07-31

## 2018-07-31 RX ORDER — ONDANSETRON 8 MG/1
8 TABLET, FILM COATED ORAL ONCE
Qty: 0 | Refills: 0 | Status: COMPLETED | OUTPATIENT
Start: 2018-07-31 | End: 2018-07-31

## 2018-07-31 RX ORDER — DEXMEDETOMIDINE HYDROCHLORIDE IN 0.9% SODIUM CHLORIDE 4 UG/ML
0.5 INJECTION INTRAVENOUS
Qty: 200 | Refills: 0 | Status: DISCONTINUED | OUTPATIENT
Start: 2018-07-31 | End: 2018-08-04

## 2018-07-31 RX ORDER — NITROGLYCERIN 6.5 MG
0.25 CAPSULE, EXTENDED RELEASE ORAL
Qty: 0 | Refills: 0 | Status: DISCONTINUED | OUTPATIENT
Start: 2018-07-31 | End: 2018-08-09

## 2018-07-31 RX ADMIN — Medication 200 MILLIGRAM(S): at 23:18

## 2018-07-31 RX ADMIN — Medication 100 MILLIGRAM(S): at 22:14

## 2018-07-31 RX ADMIN — PANTOPRAZOLE SODIUM 40 MILLIGRAM(S): 20 TABLET, DELAYED RELEASE ORAL at 11:19

## 2018-07-31 RX ADMIN — ONDANSETRON 8 MILLIGRAM(S): 8 TABLET, FILM COATED ORAL at 09:08

## 2018-07-31 RX ADMIN — Medication 0.25 INCH(S): at 10:35

## 2018-07-31 RX ADMIN — FENTANYL CITRATE 25 MICROGRAM(S): 50 INJECTION INTRAVENOUS at 15:10

## 2018-07-31 RX ADMIN — CHLORHEXIDINE GLUCONATE 5 MILLILITER(S): 213 SOLUTION TOPICAL at 02:28

## 2018-07-31 RX ADMIN — Medication 0.25 INCH(S): at 19:47

## 2018-07-31 RX ADMIN — Medication 125 MILLILITER(S): at 08:00

## 2018-07-31 RX ADMIN — DEXMEDETOMIDINE HYDROCHLORIDE IN 0.9% SODIUM CHLORIDE 6.36 MICROGRAM(S)/KG/HR: 4 INJECTION INTRAVENOUS at 19:21

## 2018-07-31 RX ADMIN — FENTANYL CITRATE 25 MICROGRAM(S): 50 INJECTION INTRAVENOUS at 11:19

## 2018-07-31 RX ADMIN — Medication 2: at 06:59

## 2018-07-31 RX ADMIN — Medication 0.25 INCH(S): at 17:21

## 2018-07-31 RX ADMIN — Medication 125 MILLILITER(S): at 03:42

## 2018-07-31 RX ADMIN — Medication 200 GRAM(S): at 10:46

## 2018-07-31 RX ADMIN — Medication 50 MILLILITER(S): at 19:50

## 2018-07-31 RX ADMIN — Medication 100 MILLIGRAM(S): at 06:23

## 2018-07-31 RX ADMIN — Medication 100 MILLIEQUIVALENT(S): at 11:47

## 2018-07-31 RX ADMIN — FENTANYL CITRATE 25 MICROGRAM(S): 50 INJECTION INTRAVENOUS at 03:58

## 2018-07-31 RX ADMIN — Medication 50 MILLILITER(S): at 19:30

## 2018-07-31 RX ADMIN — Medication 2: at 10:46

## 2018-07-31 RX ADMIN — CHLORHEXIDINE GLUCONATE 5 MILLILITER(S): 213 SOLUTION TOPICAL at 22:07

## 2018-07-31 RX ADMIN — DESMOPRESSIN ACETATE 230 MICROGRAM(S): 0.1 TABLET ORAL at 12:48

## 2018-07-31 RX ADMIN — Medication 100 MILLIGRAM(S): at 14:10

## 2018-07-31 RX ADMIN — Medication 1000 MILLIGRAM(S): at 09:25

## 2018-07-31 RX ADMIN — ATORVASTATIN CALCIUM 20 MILLIGRAM(S): 80 TABLET, FILM COATED ORAL at 22:06

## 2018-07-31 RX ADMIN — Medication 400 MILLIGRAM(S): at 08:58

## 2018-07-31 RX ADMIN — Medication 100 MILLIEQUIVALENT(S): at 06:13

## 2018-07-31 RX ADMIN — CHLORHEXIDINE GLUCONATE 1 APPLICATION(S): 213 SOLUTION TOPICAL at 11:17

## 2018-07-31 RX ADMIN — CHLORHEXIDINE GLUCONATE 5 MILLILITER(S): 213 SOLUTION TOPICAL at 17:22

## 2018-07-31 RX ADMIN — Medication 100 MILLIGRAM(S): at 22:06

## 2018-07-31 RX ADMIN — CHLORHEXIDINE GLUCONATE 5 MILLILITER(S): 213 SOLUTION TOPICAL at 06:14

## 2018-07-31 RX ADMIN — CHLORHEXIDINE GLUCONATE 5 MILLILITER(S): 213 SOLUTION TOPICAL at 10:27

## 2018-07-31 RX ADMIN — FENTANYL CITRATE 25 MICROGRAM(S): 50 INJECTION INTRAVENOUS at 11:48

## 2018-07-31 RX ADMIN — Medication 100 MILLIEQUIVALENT(S): at 03:39

## 2018-07-31 RX ADMIN — CHLORHEXIDINE GLUCONATE 5 MILLILITER(S): 213 SOLUTION TOPICAL at 14:10

## 2018-07-31 RX ADMIN — Medication 125 MILLILITER(S): at 08:58

## 2018-07-31 RX ADMIN — FENTANYL CITRATE 25 MICROGRAM(S): 50 INJECTION INTRAVENOUS at 14:51

## 2018-07-31 RX ADMIN — FENTANYL CITRATE 25 MICROGRAM(S): 50 INJECTION INTRAVENOUS at 04:15

## 2018-07-31 RX ADMIN — DEXMEDETOMIDINE HYDROCHLORIDE IN 0.9% SODIUM CHLORIDE 6.36 MICROGRAM(S)/KG/HR: 4 INJECTION INTRAVENOUS at 17:22

## 2018-07-31 NOTE — PROGRESS NOTE ADULT - ASSESSMENT
68 year old female with history of CAD, TIA in 1998, COPD, AAA s/p open AAA repair in 2004, EVAR/CIAA, with extension into L hypogastric & DIONNE in 3/29/17, Presented for a descending thoracoabdominal aortic aneurysm repair (aneurysm 5.7 with mural thrombus and widely patent Celiac and SMA),    S/p Thoracoabdominal aortic aneurysm repair with reimplantation of mesenteric vessels , post op labs are remarkable for elevated lactate, General surgery consulted for concern of ischemic colitis, which was ruled out with colonoscopy.  CTA done over the weekend, shows patent graft and mesenteric vessels, Patient lactic acidosis can be due to increase pressors requirement, patient is doing better now, Decreasing pressors requirement and downtrending lactate, Abdomen is still distended with LLQ tenderness    Plan:  - Cont fluid resuscitation and wean off pressors if tolerated  - Follow up lactate  - Frequent vascular and neurochecks  - Serial abdominal US  - Call Vascular surgery for any questions 68 year old female with history of CAD, TIA in 1998, COPD, AAA s/p open AAA repair in 2004, EVAR/CIAA, with extension into L hypogastric & DIONNE in 3/29/17, Presented for a descending thoracoabdominal aortic aneurysm repair (aneurysm 5.7 with mural thrombus and widely patent Celiac and SMA),    S/p Thoracoabdominal aortic aneurysm repair with reimplantation of mesenteric vessels , post op labs are remarkable for elevated lactate, General surgery consulted for concern of ischemic colitis, which was ruled out with colonoscopy.  CTA done over the weekend, shows patent graft and mesenteric vessels, Patient lactic acidosis can be due to increase pressors requirement, Lactic acid is down to normal however the patient is complaining from bilateral hand para    Plan:  - Cont fluid resuscitation and wean off pressors if tolerated  - Follow up lactate  - Frequent vascular and neurochecks  - Serial abdominal US  - Call Vascular surgery for any questions 68 year old female with history of CAD, TIA in 1998, COPD, AAA s/p open AAA repair in 2004, EVAR/CIAA, with extension into L hypogastric & DIONNE in 3/29/17, Presented for a descending thoracoabdominal aortic aneurysm repair (aneurysm 5.7 with mural thrombus and widely patent Celiac and SMA),    S/p Thoracoabdominal aortic aneurysm repair with reimplantation of mesenteric vessels , post op labs are remarkable for elevated lactate, General surgery consulted for concern of ischemic colitis, which was ruled out with colonoscopy.  CTA done over the weekend, shows patent graft and mesenteric vessels, Patient lactic acidosis can be due to increase pressors requirement, Lactic acid is down to normal however the patient is complaining from bilateral hand numbness, A-line was removed from right radial, now in left radial, both hands looks cyanosed, no motor deficit, foot appears cyanotic and cold, with palpable pulses in 4 extremities, which can be due to vasopressors effect. Patient is off pressors now, UOP improved, Lactic acid is down to normal, planned for possible extubation    Plan:  - Cont fluid resuscitation and minimize pressors  - Frequent vascular and neurochecks  - Serial abdominal exam  - Call Vascular surgery for any questions

## 2018-07-31 NOTE — PROGRESS NOTE ADULT - SUBJECTIVE AND OBJECTIVE BOX
CTICU  CRITICAL  CARE  attending     Hand off received 					   Pertinent clinical, laboratory, radiographic, hemodynamic, echocardiographic, respiratory data, microbiologic data and chart were reviewed and analyzed frequently throughout the course of the day and night  Patient seen and examined with CTS/ SH attending at bedside        This is a 68 year old female ith history of CAD, TIA in 1998, COPD, AAA s/p open AAA repair by Dr. Roberto in 2004, EVAR/AAA/CIAA with Dr. Burch on 3/29/17 who was referred to Dr. 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  She underwent CTA chest/abdomen/pelvis in 4/2018 which demonstrated descending thoracoabdominal aortic aneurysm wtih degeneration of the aorta at the mid-descending thoracic segment, measuring 5.7cm with significant mural thrombus (previously measuring 5.5cm) with widely patent celiac/SMA/renal arteries.  She completed outpatient pre-operative evaluation and was deemed a surgical candidate for thoracoabdominal aneurysm repair and admitted to [facility] on 7/25/18 under the care of Dr. Monson in anticipated of planned procedure on 7/26.  On admission, she states that she feels well overall without acute complaints at this time, though she continues to endorse LARSON, NYHA Class III (dyspneic after 1-2 flights of stairs) and intermittent chest discomfort, non-radiating, spontaneous in onset and resolution without exacerbating factors and relieved with rest.  Denies HA, AMS, active CP, palpitations, SOB, cough, hemoptysis, n/v/d, fever, chills, or recent hospitalizations.      FAMILY HISTORY:  No pertinent family history in first degree relatives  PAST MEDICAL & SURGICAL HISTORY:  History of seizures: 1980  COPD (chronic obstructive pulmonary disease)  HLD (hyperlipidemia)  HTN (hypertension)  AAA (abdominal aortic aneurysm)  CAD (coronary artery disease)  S/P AAA (abdominal aortic aneurysm) repair  History of abdominal aortic aneurysm (AAA): 2004  History of cholecystectomy    Patient is a 68y old  Female who presents with a chief complaint of Descending thoracic aortic aneurysm (25 Jul 2018 17:14)      14 system review was unremarkable  acute changes include acute respiratory failure  Vital signs, hemodynamic and respiratory parameters were reviewed from the bedside nursing flow sheet.  ICU Vital Signs Last 24 Hrs  T(C): 36.1 (31 Jul 2018 19:28), Max: 36.4 (31 Jul 2018 04:00)  T(F): 97 (31 Jul 2018 19:28), Max: 97.6 (31 Jul 2018 04:00)  HR: 76 (31 Jul 2018 22:00) (70 - 108)  BP: --  BP(mean): --  ABP: 138/70 (31 Jul 2018 22:00) (102/70 - 172/76)  ABP(mean): 96 (31 Jul 2018 22:00) (82 - 132)  RR: 14 (31 Jul 2018 21:00) (13 - 22)  SpO2: 100% (31 Jul 2018 22:00) (100% - 100%)    Adult Advanced Hemodynamics Last 24 Hrs  CVP(mm Hg): 17 (31 Jul 2018 22:00) (10 - 18)  CVP(cm H2O): --  CO: --  CI: --  PA: --  PA(mean): --  PCWP: --  SVR: --  SVRI: --  PVR: --  PVRI: --, ABG - ( 31 Jul 2018 17:19 )  pH, Arterial: 7.46  pH, Blood: x     /  pCO2: 32    /  pO2: 86    / HCO3: 22    / Base Excess: -0.8  /  SaO2: 96                Mode: AC/ CMV (Assist Control/ Continuous Mandatory Ventilation)  RR (machine): 14  TV (machine): 450  FiO2: 50  PEEP: 5  ITime: 1.4  MAP: 8  PIP: 20    Intake and output was reviewed and the fluid balance was calculated  Daily     Daily   I&O's Summary    30 Jul 2018 07:01  -  31 Jul 2018 07:00  --------------------------------------------------------  IN: 2312 mL / OUT: 2355 mL / NET: -43 mL    31 Jul 2018 07:01  -  31 Jul 2018 22:28  --------------------------------------------------------  IN: 2034.2 mL / OUT: 1470 mL / NET: 564.2 mL        All lines and drain sites were assessed  Glycemic trend was reviewed CAPILLARY BLOOD GLUCOSE      POCT Blood Glucose.: 129 mg/dL (31 Jul 2018 17:12)        NEURO: No acute change in mental status. PUPILS 2 mm ( REACTIVE).  MOVES all 4 extremities.  CVS: S1, S2, No S3.  RESPIRATORY: Auscultation of the chest reveals equal bibasilar  rales.  GI: Abdomen is soft. No tenderness. + Bowel sounds.  Extremities are warm and relatively well perfused. Diminished  Digital cyanosis.  VASCULAR: + Distal pulses.  Wounds appear clean and unremarkable  Antibiotics are cipro and falgyl.        labs  CBC Full  -  ( 31 Jul 2018 17:49 )  WBC Count : 11.0 K/uL  Hemoglobin : 11.9 g/dL  Hematocrit : 35.0 %  Platelet Count - Automated : 14 K/uL  Mean Cell Volume : 87.1 fL  Mean Cell Hemoglobin : 29.6 pg  Mean Cell Hemoglobin Concentration : 34.0 g/dL  Auto Neutrophil # : x  Auto Lymphocyte # : x  Auto Monocyte # : x  Auto Eosinophil # : x  Auto Basophil # : x  Auto Neutrophil % : 80.0 %  Auto Lymphocyte % : 6.0 %  Auto Monocyte % : x  Auto Eosinophil % : x  Auto Basophil % : x    07-31    137  |  94<L>  |  84<H>  ----------------------------<  133<H>  3.6   |  24  |  3.02<H>    Ca    8.8      31 Jul 2018 17:49  Phos  3.8     07-31  Mg     2.7     07-31    TPro  5.8<L>  /  Alb  3.8  /  TBili  5.2<H>  /  DBili  2.9<H>  /  AST  236<H>  /  ALT  111<H>  /  AlkPhos  77  07-31    PT/INR - ( 31 Jul 2018 17:49 )   PT: 15.8 sec;   INR: 1.41          PTT - ( 31 Jul 2018 17:49 )  PTT:28.2 sec  The current medications were reviewed   MEDICATIONS  (STANDING):  atorvastatin 20 milliGRAM(s) Oral at bedtime  chlorhexidine 0.12% Liquid 5 milliLiter(s) Swish and Spit every 4 hours  chlorhexidine 2% Cloths 1 Application(s) Topical daily  ciprofloxacin   IVPB 400 milliGRAM(s) IV Intermittent every 24 hours  dexmedetomidine Infusion 0.5 MICROgram(s)/kG/Hr (6.362 mL/Hr) IV Continuous <Continuous>  dextrose 5%. 1000 milliLiter(s) (50 mL/Hr) IV Continuous <Continuous>  dextrose 50% Injectable 50 milliLiter(s) IV Push every 15 minutes  dextrose 50% Injectable 25 milliLiter(s) IV Push every 15 minutes  dextrose 50% Injectable 50 milliLiter(s) IV Push every 15 minutes  dextrose 50% Injectable 25 milliLiter(s) IV Push every 15 minutes  folic acid Injectable 1 milliGRAM(s) IV Push daily  insulin lispro (HumaLOG) corrective regimen sliding scale   SubCutaneous Before meals and at bedtime  lidocaine   Patch 1 Patch Transdermal daily  metroNIDAZOLE  IVPB 500 milliGRAM(s) IV Intermittent every 8 hours  metroNIDAZOLE  IVPB      nitroglycerin    2% Ointment 0.25 Inch(s) Transdermal two times a day  pantoprazole  Injectable 40 milliGRAM(s) IV Push daily  sodium chloride 0.9%. 1000 milliLiter(s) (10 mL/Hr) IV Continuous <Continuous>  vasopressin Infusion 0.033 Unit(s)/Min (2 mL/Hr) IV Continuous <Continuous>    MEDICATIONS  (PRN):  dextrose 40% Gel 15 Gram(s) Oral once PRN Blood Glucose LESS THAN 70 milliGRAM(s)/deciliter  fentaNYL    Injectable 25 MICROGram(s) IV Push every 6 hours PRN Severe Pain (7 - 10)  glucagon  Injectable 1 milliGRAM(s) IntraMuscular once PRN Glucose LESS THAN 70 milligrams/deciliter       PROBLEM LIST/ ASSESSMENT:  HEALTH ISSUES - PROBLEM Dx:      Patient is a 68y old  Female who presents with Descending thoracic aortic aneurysm   S/P Replacement of thoraco abdominal aorta.  S/P reimplantation of celiac artery, SMA, JOSÉ MIGUEL, bilateral renal arteries.  Severe Thrombocytopenia  Hemodynamically stable.  Decreasing cyanosis of fingertips.  Renal  function at a plateau.   Good oxygenation.  Decreasing metabolic acidosis.  Overall satisfactory progress.        My plan includes :    DIC versus thrombotic thrombocytopenic purpura.  Hematologist consulted. Pulse dose of steroids given. Advised to give cross matched platelets.  Close hemodynamic, ventilatory and drain monitoring and management.  Continue IV antibiotics.  Monitor for arrhythmias and monitor parameters for organ perfusion  Monitor neurologic status  Head of the bed should remain elevated to 45 deg .   Chest PT and IS will be encouraged  Monitor adequacy of oxygenation and ventilation and attempt to wean oxygen  Nutritional goals will be met using po eventually , ensure adequate caloric intake and monitor  the same  Stress ulcer and VTE prophylaxis will be achieved    Glycemic control is satisfactory  Electrolytes have been repleted as necessary and wound care has been carried out. Pain control has been achieved.   Aggressive physical therapy and early mobility and ambulation goals will be met   The family was updated about the course and plan  CRITICAL CARE TIME SPENT in evaluation and management, reassessments, review and interpretation of labs and x-rays, ventilator and hemodynamic management, formulating a plan and coordinating care: ___30____ MIN.  Time does not include procedural time.  CTICU ATTENDING     					    Norberto Holbrook MD

## 2018-07-31 NOTE — CHART NOTE - NSCHARTNOTEFT_GEN_A_CORE
Admitting Diagnosis:   Patient is a 68y old  Female who presents with a chief complaint of Descending thoracic aortic aneurysm (2018 17:14)      PAST MEDICAL & SURGICAL HISTORY:  History of seizures:   COPD (chronic obstructive pulmonary disease)  HLD (hyperlipidemia)  HTN (hypertension)  AAA (abdominal aortic aneurysm)  CAD (coronary artery disease)  S/P AAA (abdominal aortic aneurysm) repair  History of abdominal aortic aneurysm (AAA):   History of cholecystectomy      Current Nutrition Order:  Jevity 1.2 via NGT at 10ml/hr x 24hr     GI Issues:   None noted per RN; Trophic TF tolerated     Pain:  Unable to assess 2/2 vent     Skin Integrity:  L groin ASW  L chest ASW    Labs:       138  |  94<L>  |  82<H>  ----------------------------<  146<H>  3.3<L>   |  24  |  3.08<H>    Ca    8.4      2018 02:26  Phos  3.5       Mg     2.8         TPro  5.5<L>  /  Alb  3.3  /  TBili  5.6<H>  /  DBili  x   /  AST  185<H>  /  ALT  76<H>  /  AlkPhos  78      CAPILLARY BLOOD GLUCOSE      POCT Blood Glucose.: 158 mg/dL (2018 06:52)  POCT Blood Glucose.: 146 mg/dL (2018 21:18)  POCT Blood Glucose.: 143 mg/dL (2018 17:02)  POCT Blood Glucose.: 136 mg/dL (2018 10:49)      Medications:  MEDICATIONS  (STANDING):  atorvastatin 20 milliGRAM(s) Oral at bedtime  calcium gluconate IVPB 2 Gram(s) IV Intermittent once  chlorhexidine 0.12% Liquid 5 milliLiter(s) Swish and Spit every 4 hours  chlorhexidine 2% Cloths 1 Application(s) Topical daily  ciprofloxacin   IVPB 400 milliGRAM(s) IV Intermittent every 24 hours  dextrose 5%. 1000 milliLiter(s) (50 mL/Hr) IV Continuous <Continuous>  dextrose 50% Injectable 50 milliLiter(s) IV Push every 15 minutes  dextrose 50% Injectable 25 milliLiter(s) IV Push every 15 minutes  dextrose 50% Injectable 50 milliLiter(s) IV Push every 15 minutes  dextrose 50% Injectable 25 milliLiter(s) IV Push every 15 minutes  insulin lispro (HumaLOG) corrective regimen sliding scale   SubCutaneous Before meals and at bedtime  lidocaine   Patch 1 Patch Transdermal daily  metroNIDAZOLE  IVPB 500 milliGRAM(s) IV Intermittent every 8 hours  metroNIDAZOLE  IVPB      nitroglycerin    2% Ointment 0.25 Inch(s) Transdermal two times a day  pantoprazole  Injectable 40 milliGRAM(s) IV Push daily  sodium chloride 0.9%. 1000 milliLiter(s) (10 mL/Hr) IV Continuous <Continuous>  vasopressin Infusion 0.033 Unit(s)/Min (2 mL/Hr) IV Continuous <Continuous>    MEDICATIONS  (PRN):  dextrose 40% Gel 15 Gram(s) Oral once PRN Blood Glucose LESS THAN 70 milliGRAM(s)/deciliter  fentaNYL    Injectable 25 MICROGram(s) IV Push every 6 hours PRN Severe Pain (7 - 10)  glucagon  Injectable 1 milliGRAM(s) IntraMuscular once PRN Glucose LESS THAN 70 milligrams/deciliter      Weight:  50.9kg ()    Daily Weight in k.3 (2018 11:49)    Weight Change:   wt change noted; could be due to fluids; please trend     Estimated energy needs:   IBW 52.3kg used for EER and adjusted for vent/post-op  25-30kcal/kg (1307-1569kcal), 1.4-1.6g/kg (73-83g), 30-35ml/kg (1569-1830ml)    Subjective:   69y/o F s/o open TAAA w/ replacement of descending aorta - w/separate reimplantation of mesorenals. Concern for possible ischemic colitis noted; flex sig performed and negative. Pt seen intubated. OGT d/c'd. NGT placed () and trophic TF initiated. Trophic feeds are tolerated per RN and PA. Per d/w PA, team concerned for GI function. Recommend changing formula to vital 1.5 and slowly advance to goal as tolerated. RN and PA provided with verbal recs and TF regimen entered for verification. RD to follow.    Previous Nutrition Diagnosis:  Inadequate energy intake RT NPO status AEB pt meeting 0% of EER    Active [   ]  Resolved [X]    If resolved, new PES:   Inadequate EN RT infusion rate AEB pt meeting 22% of lower kcal limit    Goal:  Pt to meet % of EER     Recommendations:  1. Change formula and advance TF as tolerated. Recommend Vital 1.5 via NGT with goal rate of 40ml/hr x 24hr + 1x prostat (960ml TV, 1540kcal, 79g, 733ml water, 96% of RDIs). Start at 20ml and advance as tolerated by 10ml q6h until goal. Additional fluids per MD. Monitor for s/s of intolerance and maintain aspiration precautions.   2. Monitor lytes and replete prn.   3. Add MVI.   4. Trend daily wts.     *d/w PA and RN*    Education:   N/A-intubated    Risk Level: High [X] Moderate [   ] Low [   ]

## 2018-07-31 NOTE — PROGRESS NOTE ADULT - SUBJECTIVE AND OBJECTIVE BOX
CTICU  CRITICAL  CARE  attending     Hand off received 					   Pertinent clinical, laboratory, radiographic, hemodynamic, echocardiographic, respiratory data, microbiologic data and chart were reviewed and analyzed frequently throughout the course of the day and night  Patient seen and examined with CTS/ SH attending at bedside  Pt is a 68y , Female, HEALTH ISSUES - PROBLEM Dx:      , FAMILY HISTORY:  No pertinent family history in first degree relatives  PAST MEDICAL & SURGICAL HISTORY:  History of seizures: 1980  COPD (chronic obstructive pulmonary disease)  HLD (hyperlipidemia)  HTN (hypertension)  AAA (abdominal aortic aneurysm)  CAD (coronary artery disease)  S/P AAA (abdominal aortic aneurysm) repair  History of abdominal aortic aneurysm (AAA): 2004  History of cholecystectomy    Patient is a 68y old  Female who presents with a chief complaint of Descending thoracic aortic aneurysm (25 Jul 2018 17:14)      14 system review was unremarkable  acute changes include acute respiratory failure  Vital signs, hemodynamic and respiratory parameters were reviewed from the bedside nursing flowsheet.  ICU Vital Signs Last 24 Hrs  T(C): 36.1 (31 Jul 2018 19:28), Max: 36.4 (31 Jul 2018 04:00)  T(F): 97 (31 Jul 2018 19:28), Max: 97.6 (31 Jul 2018 04:00)  HR: 72 (31 Jul 2018 21:00) (70 - 108)  BP: --  BP(mean): --  ABP: 130/64 (31 Jul 2018 21:00) (102/70 - 172/76)  ABP(mean): 90 (31 Jul 2018 21:00) (82 - 134)  RR: 14 (31 Jul 2018 21:00) (13 - 22)  SpO2: 100% (31 Jul 2018 21:00) (100% - 100%)    Adult Advanced Hemodynamics Last 24 Hrs  CVP(mm Hg): 16 (31 Jul 2018 21:00) (10 - 18)  CVP(cm H2O): --  CO: --  CI: --  PA: --  PA(mean): --  PCWP: --  SVR: --  SVRI: --  PVR: --  PVRI: --, ABG - ( 31 Jul 2018 17:19 )  pH, Arterial: 7.46  pH, Blood: x     /  pCO2: 32    /  pO2: 86    / HCO3: 22    / Base Excess: -0.8  /  SaO2: 96                Mode: AC/ CMV (Assist Control/ Continuous Mandatory Ventilation)  RR (machine): 14  TV (machine): 450  FiO2: 50  PEEP: 5  ITime: 1.4  MAP: 8  PIP: 20    Intake and output was reviewed and the fluid balance was calculated  Daily     Daily   I&O's Summary    30 Jul 2018 07:01  -  31 Jul 2018 07:00  --------------------------------------------------------  IN: 2312 mL / OUT: 2355 mL / NET: -43 mL    31 Jul 2018 07:01  -  31 Jul 2018 21:29  --------------------------------------------------------  IN: 2034.2 mL / OUT: 1470 mL / NET: 564.2 mL        All lines and drain sites were assessed  [facility]      POCT Blood Glucose.: 129 mg/dL (31 Jul 2018 17:12)    No acute change in mental status  Auscultation of the chest reveals equal bs  Abdomen is soft  Extremities are warm and well perfused  Wounds appear clean and unremarkable  Antibiotics are periop    labs  CBC Full  -  ( 31 Jul 2018 17:49 )  WBC Count : 11.0 K/uL  Hemoglobin : 11.9 g/dL  Hematocrit : 35.0 %  Platelet Count - Automated : 14 K/uL  Mean Cell Volume : 87.1 fL  Mean Cell Hemoglobin : 29.6 pg  Mean Cell Hemoglobin Concentration : 34.0 g/dL  Auto Neutrophil # : x  Auto Lymphocyte # : x  Auto Monocyte # : x  Auto Eosinophil # : x  Auto Basophil # : x  Auto Neutrophil % : 80.0 %  Auto Lymphocyte % : 6.0 %  Auto Monocyte % : x  Auto Eosinophil % : x  Auto Basophil % : x    07-31    137  |  94<L>  |  84<H>  ----------------------------<  133<H>  3.6   |  24  |  3.02<H>    Ca    8.8      31 Jul 2018 17:49  Phos  3.8     07-31  Mg     2.7     07-31    TPro  5.8<L>  /  Alb  3.8  /  TBili  5.2<H>  /  DBili  2.9<H>  /  AST  236<H>  /  ALT  111<H>  /  AlkPhos  77  07-31    PT/INR - ( 31 Jul 2018 17:49 )   PT: 15.8 sec;   INR: 1.41          PTT - ( 31 Jul 2018 17:49 )  PTT:28.2 sec  The current medications were reviewed   MEDICATIONS  (STANDING):  atorvastatin 20 milliGRAM(s) Oral at bedtime  chlorhexidine 0.12% Liquid 5 milliLiter(s) Swish and Spit every 4 hours  chlorhexidine 2% Cloths 1 Application(s) Topical daily  ciprofloxacin   IVPB 400 milliGRAM(s) IV Intermittent every 24 hours  dexmedetomidine Infusion 0.5 MICROgram(s)/kG/Hr (6.362 mL/Hr) IV Continuous <Continuous>  dextrose 5%. 1000 milliLiter(s) (50 mL/Hr) IV Continuous <Continuous>  dextrose 50% Injectable 50 milliLiter(s) IV Push every 15 minutes  dextrose 50% Injectable 25 milliLiter(s) IV Push every 15 minutes  dextrose 50% Injectable 50 milliLiter(s) IV Push every 15 minutes  dextrose 50% Injectable 25 milliLiter(s) IV Push every 15 minutes  folic acid Injectable 1 milliGRAM(s) IV Push daily  insulin lispro (HumaLOG) corrective regimen sliding scale   SubCutaneous Before meals and at bedtime  lidocaine   Patch 1 Patch Transdermal daily  methylPREDNISolone sodium succinate IVPB 250 milliGRAM(s) IV Intermittent once  metroNIDAZOLE  IVPB 500 milliGRAM(s) IV Intermittent every 8 hours  metroNIDAZOLE  IVPB      nitroglycerin    2% Ointment 0.25 Inch(s) Transdermal two times a day  pantoprazole  Injectable 40 milliGRAM(s) IV Push daily  sodium chloride 0.9%. 1000 milliLiter(s) (10 mL/Hr) IV Continuous <Continuous>  vasopressin Infusion 0.033 Unit(s)/Min (2 mL/Hr) IV Continuous <Continuous>    MEDICATIONS  (PRN):  dextrose 40% Gel 15 Gram(s) Oral once PRN Blood Glucose LESS THAN 70 milliGRAM(s)/deciliter  fentaNYL    Injectable 25 MICROGram(s) IV Push every 6 hours PRN Severe Pain (7 - 10)  glucagon  Injectable 1 milliGRAM(s) IntraMuscular once PRN Glucose LESS THAN 70 milligrams/deciliter       PROBLEM LIST/ ASSESSMENT:  HEALTH ISSUES - PROBLEM Dx:      ,   Patient is a 68y old  Female who presents with a chief complaint of Descending thoracic aortic aneurysm (25 Jul 2018 17:14)     s/p cardiac surgery      My plan includes :  close hemodynamic, ventilatory and drain monitoring and management per post op routine    Monitor for arrhythmias and monitor parameters for organ perfusion  monitor neurologic status  Head of the bed should remain elevated to 45 deg .   chest PT and IS will be encouraged  monitor adequacy of oxygenation and ventilation and attempt to wean oxygen  Nutritional goals will be met using po eventually , ensure adequate caloric intake and montior the same  Stress ulcer and VTE prophylaxis will be achieved    Glycemic control is satisfactory  Electrolytes have been repleted as necessary and wound care has been carried out. Pain control has been achieved.   agressive physical therapy and early mobility and ambulation goals will be met   The family was updated about the course and plan  CRITICAL CARE TIME SPENT in evaluation and management, reassessments, review and interpretation of labs and x-rays, ventilator and hemodynamic management, formulating a plan and coordinating care: ___90____ MIN.  Time does not include procedural time.  CTICU ATTENDING     					    Tuan Hutchins MD

## 2018-07-31 NOTE — PROGRESS NOTE ADULT - ASSESSMENT
68 year old female with history of CAD, TIA in 1998, COPD, AAA s/p open AAA repair in 2004, EVAR/CIAA, with extension into L hypogastric & DIONNE in 3/29/17, Presented for a descending thoracoabdominal aortic aneurysm repair (aneurysm 5.7 with mural thrombus and widely patent Celiac and SMA),  s/p Thoracoabdominal aortic aneurysm repair with reimplantation of mesenteric vessels 7/26. General surgery consulted for concern of ischemic colitis    -flex sigmoidoscopy was done, no evidence of Ischaemic colitis.   - CTA showed patent graft (final read pending)  -Lactate is normalized (1.9)  - UO is 70cc/hr. downtrending WBC.   - creatinine elevated to 3.     No surgical intervention at this time, as there's no sign of ischaemic colitis   Will continue to observe 68 year old female with history of CAD, TIA in 1998, COPD, AAA s/p open AAA repair in 2004, EVAR/CIAA, with extension into L hypogastric & DIONNE in 3/29/17, Presented for a descending thoracoabdominal aortic aneurysm repair (aneurysm 5.7 with mural thrombus and widely patent Celiac and SMA),  s/p Thoracoabdominal aortic aneurysm repair with reimplantation of mesenteric vessels 7/26. General surgery consulted for concern of ischemic colitis    -flex sigmoidoscopy was done, no evidence of Ischaemic colitis.   - CTA showed patent graft   -Lactate is normalized (1.9)  - UO is 70cc/hr. downtrending WBC.   - creatinine elevated to 3.     No surgical intervention at this time, as there's no sign of ischaemic colitis   Will continue to observe, team 1C will follow    call for any acute change    plan d/w

## 2018-07-31 NOTE — PROCEDURE NOTE - NSPROCDETAILS_GEN_ALL_CORE
connected to a pressurized flush line/hemostasis with direct pressure, dressing applied/ultrasound guidance/positive blood return obtained via catheter/sutured in place/Seldinger technique/all materials/supplies accounted for at end of procedure

## 2018-07-31 NOTE — PROGRESS NOTE ADULT - SUBJECTIVE AND OBJECTIVE BOX
Subjective:  Doing well, opens eyes and responds to questions, denies any pain, complaining from tingling sensation in both hands, have regular BM without blood      Vital Signs Last 24 Hrs  T(C): 36.4 (31 Jul 2018 04:00), Max: 36.4 (30 Jul 2018 13:00)  T(F): 97.6 (31 Jul 2018 04:00), Max: 97.6 (31 Jul 2018 04:00)  HR: 80 (31 Jul 2018 09:00) (68 - 88)  BP: --  BP(mean): --  RR: 20 (31 Jul 2018 09:00) (20 - 24)  SpO2: 100% (31 Jul 2018 09:00) (94% - 100%)    General: intubated and sedated, Following command and responding.  Pulmonary: intubated, no respiratory distress  Cardiovascular: tachy S1 S2  Abdominal: softly distended, with hypoactive bowel sounds, LLQ tenderness, Incision C/D/I  Extremities: warm, well perfused, no edema. Lower extremities: RLE 4/5 and LLE 3/5 strength   Pulses: bilateral 2+ DP pulses  UOP 30-75ml    LABS:                        12.0   11.2  )-----------( 27       ( 31 Jul 2018 02:27 )             33.9     07-31    138  |  94<L>  |  82<H>  ----------------------------<  146<H>  3.3<L>   |  24  |  3.08<H>    Ca    8.4      31 Jul 2018 02:26  Phos  3.5     07-31  Mg     2.8     07-31    TPro  5.5<L>  /  Alb  3.3  /  TBili  5.6<H>  /  DBili  x   /  AST  185<H>  /  ALT  76<H>  /  AlkPhos  78  07-31    PT/INR - ( 31 Jul 2018 02:27 )   PT: 18.6 sec;   INR: 1.66          PTT - ( 31 Jul 2018 02:27 )  PTT:29.9 sec  CAPILLARY BLOOD GLUCOSE      POCT Blood Glucose.: 158 mg/dL (31 Jul 2018 06:52)  POCT Blood Glucose.: 146 mg/dL (30 Jul 2018 21:18)  POCT Blood Glucose.: 143 mg/dL (30 Jul 2018 17:02)  POCT Blood Glucose.: 136 mg/dL (30 Jul 2018 10:49)      LIVER FUNCTIONS - ( 31 Jul 2018 02:26 )  Alb: 3.3 g/dL / Pro: 5.5 g/dL / ALK PHOS: 78 U/L / ALT: 76 U/L / AST: 185 U/L / GGT: x Subjective:  Doing well, opens eyes and responds to questions, denies any pain, complaining from tingling sensation and discoloration in both hands, have regular BM without blood      Vital Signs Last 24 Hrs  T(C): 36.4 (31 Jul 2018 04:00), Max: 36.4 (30 Jul 2018 13:00)  T(F): 97.6 (31 Jul 2018 04:00), Max: 97.6 (31 Jul 2018 04:00)  HR: 80 (31 Jul 2018 09:00) (68 - 88)  BP: --  BP(mean): --  RR: 20 (31 Jul 2018 09:00) (20 - 24)  SpO2: 100% (31 Jul 2018 09:00) (94% - 100%)    General: intubated and sedated, Following command and responding.  Pulmonary: intubated, no respiratory distress  Abdominal: softly distended, ND, Incision C/D/I  Extremities: Upper: Cold, cyanosed, no edema +2 Bilateral Radila. Lower extremities: Cold cyanosed +2 Bilateral DPs  UOP 30-75ml    LABS:                        12.0   11.2  )-----------( 27       ( 31 Jul 2018 02:27 )             33.9     07-31    138  |  94<L>  |  82<H>  ----------------------------<  146<H>  3.3<L>   |  24  |  3.08<H>    Ca    8.4      31 Jul 2018 02:26  Phos  3.5     07-31  Mg     2.8     07-31    TPro  5.5<L>  /  Alb  3.3  /  TBili  5.6<H>  /  DBili  x   /  AST  185<H>  /  ALT  76<H>  /  AlkPhos  78  07-31    PT/INR - ( 31 Jul 2018 02:27 )   PT: 18.6 sec;   INR: 1.66          PTT - ( 31 Jul 2018 02:27 )  PTT:29.9 sec  CAPILLARY BLOOD GLUCOSE      POCT Blood Glucose.: 158 mg/dL (31 Jul 2018 06:52)  POCT Blood Glucose.: 146 mg/dL (30 Jul 2018 21:18)  POCT Blood Glucose.: 143 mg/dL (30 Jul 2018 17:02)  POCT Blood Glucose.: 136 mg/dL (30 Jul 2018 10:49)      LIVER FUNCTIONS - ( 31 Jul 2018 02:26 )  Alb: 3.3 g/dL / Pro: 5.5 g/dL / ALK PHOS: 78 U/L / ALT: 76 U/L / AST: 185 U/L / GGT: x

## 2018-07-31 NOTE — PROGRESS NOTE ADULT - SUBJECTIVE AND OBJECTIVE BOX
STATUS POST:  Thoracoabdominal aortic aneurysm repair with reimplantation of mesenteric vessels 7/26, POD5.     SUBJECTIVE: Patient seen and examined bedside, responding well to verbal command. Denied any abdominal pain. Tolerating trickle feed. Had BM yesterday and was normal. No blood. Able to move her hands and feed. As per RN, vaso was off for 2 hours last night, however MAP started to fall below 90 and her fingers started to become cynotic, hence back on to 1cc/hr.     ciprofloxacin   IVPB 400 milliGRAM(s) IV Intermittent every 24 hours  metroNIDAZOLE  IVPB 500 milliGRAM(s) IV Intermittent every 8 hours  metroNIDAZOLE  IVPB          Vital Signs Last 24 Hrs  T(C): 36.4 (31 Jul 2018 04:00), Max: 36.4 (30 Jul 2018 13:00)  T(F): 97.6 (31 Jul 2018 04:00), Max: 97.6 (31 Jul 2018 04:00)  HR: 78 (31 Jul 2018 07:00) (68 - 88)  BP: --  BP(mean): --  RR: 20 (31 Jul 2018 07:00) (20 - 24)  SpO2: 100% (31 Jul 2018 07:00) (94% - 100%)  I&O's Detail    30 Jul 2018 07:01  -  31 Jul 2018 07:00  --------------------------------------------------------  IN:    Albumin 5%  - 250 mL: 750 mL    IV PiggyBack: 400 mL    Jevity: 100 mL    Platelets - Single Donor: 500 mL    sodium chloride 0.9%.: 240 mL    Solution: 300 mL    vasopressin Infusion: 22 mL  Total IN: 2312 mL    OUT:    Chest Tube: 790 mL    Chest Tube: 120 mL    Chest Tube: 10 mL    Drain: 240 mL    Indwelling Catheter - Urethral: 1195 mL  Total OUT: 2355 mL    Total NET: -43 mL      31 Jul 2018 07:01  -  31 Jul 2018 07:21  --------------------------------------------------------  IN:    Jevity: 10 mL    sodium chloride 0.9%.: 10 mL    vasopressin Infusion: 1 mL  Total IN: 21 mL    OUT:  Total OUT: 0 mL    Total NET: 21 mL          General: NAD, resting comfortably in bed, intubated, not sedated   C/V: NSR, on vasopressin, MAP >100  Pulm: Intubated on mech vent, chest tube intact  Abd: soft, distended, mild tender over the epigastric area, no guarding, no peritoneal sign  Extrem: extremities a little cold with right digits has mild cyanosis and delayed capillary refilling time >2seconds         LABS:                        12.0   11.2  )-----------( 27       ( 31 Jul 2018 02:27 )             33.9     07-31    138  |  94<L>  |  82<H>  ----------------------------<  146<H>  3.3<L>   |  24  |  3.08<H>    Ca    8.4      31 Jul 2018 02:26  Phos  3.5     07-31  Mg     2.8     07-31    TPro  5.5<L>  /  Alb  3.3  /  TBili  5.6<H>  /  DBili  x   /  AST  185<H>  /  ALT  76<H>  /  AlkPhos  78  07-31    PT/INR - ( 31 Jul 2018 02:27 )   PT: 18.6 sec;   INR: 1.66          PTT - ( 31 Jul 2018 02:27 )  PTT:29.9 sec      RADIOLOGY & ADDITIONAL STUDIES:

## 2018-07-31 NOTE — PROGRESS NOTE ADULT - SUBJECTIVE AND OBJECTIVE BOX
On small dose of vasopressin overnight for lower MAP  On TF at 10cc. Had normal BM overnight  No abdominal pain  Abd soft, mild dist, NT except at left flank    Lactate <2    Will continue to observe for now.  Would avoid high dose pressors.

## 2018-08-01 LAB
ALBUMIN SERPL ELPH-MCNC: 3.8 G/DL — SIGNIFICANT CHANGE UP (ref 3.3–5)
ALBUMIN SERPL ELPH-MCNC: 3.8 G/DL — SIGNIFICANT CHANGE UP (ref 3.3–5)
ALBUMIN SERPL ELPH-MCNC: 3.9 G/DL — SIGNIFICANT CHANGE UP (ref 3.3–5)
ALP SERPL-CCNC: 68 U/L — SIGNIFICANT CHANGE UP (ref 40–120)
ALP SERPL-CCNC: 72 U/L — SIGNIFICANT CHANGE UP (ref 40–120)
ALP SERPL-CCNC: 77 U/L — SIGNIFICANT CHANGE UP (ref 40–120)
ALT FLD-CCNC: 122 U/L — HIGH (ref 10–45)
ALT FLD-CCNC: 132 U/L — HIGH (ref 10–45)
ALT FLD-CCNC: 142 U/L — HIGH (ref 10–45)
ANION GAP SERPL CALC-SCNC: 19 MMOL/L — HIGH (ref 5–17)
ANION GAP SERPL CALC-SCNC: 20 MMOL/L — HIGH (ref 5–17)
APTT BLD: 27.1 SEC — LOW (ref 27.5–37.4)
APTT BLD: 27.8 SEC — SIGNIFICANT CHANGE UP (ref 27.5–37.4)
APTT BLD: 28.1 SEC — SIGNIFICANT CHANGE UP (ref 27.5–37.4)
APTT BLD: 28.4 SEC — SIGNIFICANT CHANGE UP (ref 27.5–37.4)
AST SERPL-CCNC: 230 U/L — HIGH (ref 10–40)
AST SERPL-CCNC: 233 U/L — HIGH (ref 10–40)
AST SERPL-CCNC: 242 U/L — HIGH (ref 10–40)
BASOPHILS NFR BLD AUTO: 0.2 % — SIGNIFICANT CHANGE UP (ref 0–2)
BASOPHILS NFR BLD AUTO: 0.2 % — SIGNIFICANT CHANGE UP (ref 0–2)
BASOPHILS NFR BLD AUTO: 0.3 % — SIGNIFICANT CHANGE UP (ref 0–2)
BILIRUB SERPL-MCNC: 4 MG/DL — HIGH (ref 0.2–1.2)
BILIRUB SERPL-MCNC: 4.5 MG/DL — HIGH (ref 0.2–1.2)
BILIRUB SERPL-MCNC: 4.7 MG/DL — HIGH (ref 0.2–1.2)
BLD GP AB SCN SERPL QL: NEGATIVE — SIGNIFICANT CHANGE UP
BUN SERPL-MCNC: 87 MG/DL — HIGH (ref 7–23)
BUN SERPL-MCNC: 92 MG/DL — HIGH (ref 7–23)
BUN SERPL-MCNC: 97 MG/DL — HIGH (ref 7–23)
BUN SERPL-MCNC: 97 MG/DL — HIGH (ref 7–23)
CALCIUM SERPL-MCNC: 8.3 MG/DL — LOW (ref 8.4–10.5)
CALCIUM SERPL-MCNC: 8.4 MG/DL — SIGNIFICANT CHANGE UP (ref 8.4–10.5)
CALCIUM SERPL-MCNC: 8.6 MG/DL — SIGNIFICANT CHANGE UP (ref 8.4–10.5)
CALCIUM SERPL-MCNC: 8.7 MG/DL — SIGNIFICANT CHANGE UP (ref 8.4–10.5)
CHLORIDE SERPL-SCNC: 93 MMOL/L — LOW (ref 96–108)
CHLORIDE SERPL-SCNC: 94 MMOL/L — LOW (ref 96–108)
CHLORIDE SERPL-SCNC: 96 MMOL/L — SIGNIFICANT CHANGE UP (ref 96–108)
CHLORIDE SERPL-SCNC: 98 MMOL/L — SIGNIFICANT CHANGE UP (ref 96–108)
CO2 SERPL-SCNC: 23 MMOL/L — SIGNIFICANT CHANGE UP (ref 22–31)
CREAT SERPL-MCNC: 3.07 MG/DL — HIGH (ref 0.5–1.3)
CREAT SERPL-MCNC: 3.09 MG/DL — HIGH (ref 0.5–1.3)
CREAT SERPL-MCNC: 3.13 MG/DL — HIGH (ref 0.5–1.3)
CREAT SERPL-MCNC: 3.14 MG/DL — HIGH (ref 0.5–1.3)
D DIMER BLD IA.RAPID-MCNC: HIGH NG/ML DDU
DAT POLY-SP REAG RBC QL: NEGATIVE — SIGNIFICANT CHANGE UP
EOSINOPHIL NFR BLD AUTO: 0 % — SIGNIFICANT CHANGE UP (ref 0–6)
EOSINOPHIL NFR BLD AUTO: 0 % — SIGNIFICANT CHANGE UP (ref 0–6)
EOSINOPHIL NFR BLD AUTO: 0.4 % — SIGNIFICANT CHANGE UP (ref 0–6)
FACT VIII ACT/NOR PPP: 170 % — HIGH (ref 51–138)
FIBRINOGEN PPP-MCNC: 252 MG/DL — LOW (ref 258–438)
GAS PNL BLDA: SIGNIFICANT CHANGE UP
GLUCOSE BLDC GLUCOMTR-MCNC: 155 MG/DL — HIGH (ref 70–99)
GLUCOSE BLDC GLUCOMTR-MCNC: 166 MG/DL — HIGH (ref 70–99)
GLUCOSE BLDC GLUCOMTR-MCNC: 168 MG/DL — HIGH (ref 70–99)
GLUCOSE BLDC GLUCOMTR-MCNC: 181 MG/DL — HIGH (ref 70–99)
GLUCOSE BLDC GLUCOMTR-MCNC: 186 MG/DL — HIGH (ref 70–99)
GLUCOSE SERPL-MCNC: 179 MG/DL — HIGH (ref 70–99)
GLUCOSE SERPL-MCNC: 186 MG/DL — HIGH (ref 70–99)
GLUCOSE SERPL-MCNC: 196 MG/DL — HIGH (ref 70–99)
GLUCOSE SERPL-MCNC: 204 MG/DL — HIGH (ref 70–99)
HAPTOGLOB SERPL-MCNC: <10 MG/DL — LOW (ref 34–200)
HCT VFR BLD CALC: 35.7 % — SIGNIFICANT CHANGE UP (ref 34.5–45)
HCT VFR BLD CALC: 36.6 % — SIGNIFICANT CHANGE UP (ref 34.5–45)
HCT VFR BLD CALC: 36.6 % — SIGNIFICANT CHANGE UP (ref 34.5–45)
HCT VFR BLD CALC: 37.5 % — SIGNIFICANT CHANGE UP (ref 34.5–45)
HGB BLD-MCNC: 11.8 G/DL — SIGNIFICANT CHANGE UP (ref 11.5–15.5)
HGB BLD-MCNC: 12 G/DL — SIGNIFICANT CHANGE UP (ref 11.5–15.5)
HGB BLD-MCNC: 12.3 G/DL — SIGNIFICANT CHANGE UP (ref 11.5–15.5)
HGB BLD-MCNC: 12.3 G/DL — SIGNIFICANT CHANGE UP (ref 11.5–15.5)
INR BLD: 1.36 — HIGH (ref 0.88–1.16)
INR BLD: 1.37 — HIGH (ref 0.88–1.16)
INR BLD: 1.46 — HIGH (ref 0.88–1.16)
LACTATE SERPL-SCNC: 1.6 MMOL/L — SIGNIFICANT CHANGE UP (ref 0.5–2)
LACTATE SERPL-SCNC: 1.6 MMOL/L — SIGNIFICANT CHANGE UP (ref 0.5–2)
LACTATE SERPL-SCNC: 1.7 MMOL/L — SIGNIFICANT CHANGE UP (ref 0.5–2)
LACTATE SERPL-SCNC: 1.7 MMOL/L — SIGNIFICANT CHANGE UP (ref 0.5–2)
LDH SERPL L TO P-CCNC: 1012 U/L — SIGNIFICANT CHANGE UP (ref 50–242)
LYMPHOCYTES # BLD AUTO: 4.6 % — LOW (ref 13–44)
LYMPHOCYTES # BLD AUTO: 5.6 % — LOW (ref 13–44)
LYMPHOCYTES # BLD AUTO: 5.7 % — LOW (ref 13–44)
MAGNESIUM SERPL-MCNC: 2.5 MG/DL — SIGNIFICANT CHANGE UP (ref 1.6–2.6)
MAGNESIUM SERPL-MCNC: 2.6 MG/DL — SIGNIFICANT CHANGE UP (ref 1.6–2.6)
MAGNESIUM SERPL-MCNC: 2.6 MG/DL — SIGNIFICANT CHANGE UP (ref 1.6–2.6)
MAGNESIUM SERPL-MCNC: 2.7 MG/DL — HIGH (ref 1.6–2.6)
MCHC RBC-ENTMCNC: 29.4 PG — SIGNIFICANT CHANGE UP (ref 27–34)
MCHC RBC-ENTMCNC: 29.4 PG — SIGNIFICANT CHANGE UP (ref 27–34)
MCHC RBC-ENTMCNC: 29.5 PG — SIGNIFICANT CHANGE UP (ref 27–34)
MCHC RBC-ENTMCNC: 30.1 PG — SIGNIFICANT CHANGE UP (ref 27–34)
MCHC RBC-ENTMCNC: 32.8 G/DL — SIGNIFICANT CHANGE UP (ref 32–36)
MCHC RBC-ENTMCNC: 32.8 G/DL — SIGNIFICANT CHANGE UP (ref 32–36)
MCHC RBC-ENTMCNC: 33.1 G/DL — SIGNIFICANT CHANGE UP (ref 32–36)
MCHC RBC-ENTMCNC: 33.6 G/DL — SIGNIFICANT CHANGE UP (ref 32–36)
MCV RBC AUTO: 89.3 FL — SIGNIFICANT CHANGE UP (ref 80–100)
MCV RBC AUTO: 89.5 FL — SIGNIFICANT CHANGE UP (ref 80–100)
MCV RBC AUTO: 89.7 FL — SIGNIFICANT CHANGE UP (ref 80–100)
MCV RBC AUTO: 89.7 FL — SIGNIFICANT CHANGE UP (ref 80–100)
MONOCYTES NFR BLD AUTO: 2.1 % — SIGNIFICANT CHANGE UP (ref 2–14)
MONOCYTES NFR BLD AUTO: 3.1 % — SIGNIFICANT CHANGE UP (ref 2–14)
MONOCYTES NFR BLD AUTO: 4.8 % — SIGNIFICANT CHANGE UP (ref 2–14)
NEUTROPHILS NFR BLD AUTO: 89.4 % — HIGH (ref 43–77)
NEUTROPHILS NFR BLD AUTO: 90.6 % — HIGH (ref 43–77)
NEUTROPHILS NFR BLD AUTO: 93 % — HIGH (ref 43–77)
PHOSPHATE SERPL-MCNC: 3.9 MG/DL — SIGNIFICANT CHANGE UP (ref 2.5–4.5)
PHOSPHATE SERPL-MCNC: 4.3 MG/DL — SIGNIFICANT CHANGE UP (ref 2.5–4.5)
PHOSPHATE SERPL-MCNC: 4.8 MG/DL — HIGH (ref 2.5–4.5)
PHOSPHATE SERPL-MCNC: 4.8 MG/DL — HIGH (ref 2.5–4.5)
PLATELET # BLD AUTO: 13 K/UL — CRITICAL LOW (ref 150–400)
PLATELET # BLD AUTO: 15 K/UL — CRITICAL LOW (ref 150–400)
PLATELET # BLD AUTO: 15 K/UL — CRITICAL LOW (ref 150–400)
PLATELET # BLD AUTO: 17 K/UL — CRITICAL LOW (ref 150–400)
POTASSIUM SERPL-MCNC: 3.6 MMOL/L — SIGNIFICANT CHANGE UP (ref 3.5–5.3)
POTASSIUM SERPL-MCNC: 4 MMOL/L — SIGNIFICANT CHANGE UP (ref 3.5–5.3)
POTASSIUM SERPL-MCNC: 4.2 MMOL/L — SIGNIFICANT CHANGE UP (ref 3.5–5.3)
POTASSIUM SERPL-MCNC: 4.3 MMOL/L — SIGNIFICANT CHANGE UP (ref 3.5–5.3)
POTASSIUM SERPL-SCNC: 3.6 MMOL/L — SIGNIFICANT CHANGE UP (ref 3.5–5.3)
POTASSIUM SERPL-SCNC: 4 MMOL/L — SIGNIFICANT CHANGE UP (ref 3.5–5.3)
POTASSIUM SERPL-SCNC: 4.2 MMOL/L — SIGNIFICANT CHANGE UP (ref 3.5–5.3)
POTASSIUM SERPL-SCNC: 4.3 MMOL/L — SIGNIFICANT CHANGE UP (ref 3.5–5.3)
PROT SERPL-MCNC: 5.7 G/DL — LOW (ref 6–8.3)
PROT SERPL-MCNC: 6.2 G/DL — SIGNIFICANT CHANGE UP (ref 6–8.3)
PROT SERPL-MCNC: 6.2 G/DL — SIGNIFICANT CHANGE UP (ref 6–8.3)
PROTHROM AB SERPL-ACNC: 15.2 SEC — HIGH (ref 9.8–12.7)
PROTHROM AB SERPL-ACNC: 15.3 SEC — HIGH (ref 9.8–12.7)
PROTHROM AB SERPL-ACNC: 16.3 SEC — HIGH (ref 9.8–12.7)
RBC # BLD: 4 M/UL — SIGNIFICANT CHANGE UP (ref 3.8–5.2)
RBC # BLD: 4.08 M/UL — SIGNIFICANT CHANGE UP (ref 3.8–5.2)
RBC # BLD: 4.08 M/UL — SIGNIFICANT CHANGE UP (ref 3.8–5.2)
RBC # BLD: 4.19 M/UL — SIGNIFICANT CHANGE UP (ref 3.8–5.2)
RBC # FLD: 16.7 % — SIGNIFICANT CHANGE UP (ref 10.3–16.9)
RBC # FLD: 16.8 % — SIGNIFICANT CHANGE UP (ref 10.3–16.9)
RBC # FLD: 16.8 % — SIGNIFICANT CHANGE UP (ref 10.3–16.9)
RBC # FLD: 16.9 % — SIGNIFICANT CHANGE UP (ref 10.3–16.9)
RH IG SCN BLD-IMP: NEGATIVE — SIGNIFICANT CHANGE UP
SODIUM SERPL-SCNC: 135 MMOL/L — SIGNIFICANT CHANGE UP (ref 135–145)
SODIUM SERPL-SCNC: 136 MMOL/L — SIGNIFICANT CHANGE UP (ref 135–145)
SODIUM SERPL-SCNC: 139 MMOL/L — SIGNIFICANT CHANGE UP (ref 135–145)
SODIUM SERPL-SCNC: 140 MMOL/L — SIGNIFICANT CHANGE UP (ref 135–145)
WBC # BLD: 10.4 K/UL — SIGNIFICANT CHANGE UP (ref 3.8–10.5)
WBC # BLD: 10.7 K/UL — HIGH (ref 3.8–10.5)
WBC # BLD: 11.8 K/UL — HIGH (ref 3.8–10.5)
WBC # BLD: 12.5 K/UL — HIGH (ref 3.8–10.5)
WBC # FLD AUTO: 10.4 K/UL — SIGNIFICANT CHANGE UP (ref 3.8–10.5)
WBC # FLD AUTO: 10.7 K/UL — HIGH (ref 3.8–10.5)
WBC # FLD AUTO: 11.8 K/UL — HIGH (ref 3.8–10.5)
WBC # FLD AUTO: 12.5 K/UL — HIGH (ref 3.8–10.5)

## 2018-08-01 PROCEDURE — 71045 X-RAY EXAM CHEST 1 VIEW: CPT | Mod: 26

## 2018-08-01 PROCEDURE — 99292 CRITICAL CARE ADDL 30 MIN: CPT

## 2018-08-01 PROCEDURE — 99291 CRITICAL CARE FIRST HOUR: CPT

## 2018-08-01 RX ORDER — POTASSIUM CHLORIDE 20 MEQ
10 PACKET (EA) ORAL ONCE
Qty: 0 | Refills: 0 | Status: COMPLETED | OUTPATIENT
Start: 2018-08-01 | End: 2018-08-01

## 2018-08-01 RX ORDER — ALBUMIN HUMAN 25 %
250 VIAL (ML) INTRAVENOUS ONCE
Qty: 0 | Refills: 0 | Status: COMPLETED | OUTPATIENT
Start: 2018-08-01 | End: 2018-08-01

## 2018-08-01 RX ORDER — FOLIC ACID 0.8 MG
1 TABLET ORAL DAILY
Qty: 0 | Refills: 0 | Status: DISCONTINUED | OUTPATIENT
Start: 2018-08-01 | End: 2018-08-22

## 2018-08-01 RX ADMIN — FENTANYL CITRATE 25 MICROGRAM(S): 50 INJECTION INTRAVENOUS at 04:18

## 2018-08-01 RX ADMIN — CHLORHEXIDINE GLUCONATE 5 MILLILITER(S): 213 SOLUTION TOPICAL at 03:30

## 2018-08-01 RX ADMIN — Medication 2: at 11:40

## 2018-08-01 RX ADMIN — Medication 200 MILLIGRAM(S): at 23:13

## 2018-08-01 RX ADMIN — Medication 0.25 INCH(S): at 06:54

## 2018-08-01 RX ADMIN — Medication 500 MILLILITER(S): at 16:19

## 2018-08-01 RX ADMIN — Medication 0.25 INCH(S): at 17:14

## 2018-08-01 RX ADMIN — ATORVASTATIN CALCIUM 20 MILLIGRAM(S): 80 TABLET, FILM COATED ORAL at 22:06

## 2018-08-01 RX ADMIN — Medication 100 MILLIGRAM(S): at 22:06

## 2018-08-01 RX ADMIN — CHLORHEXIDINE GLUCONATE 5 MILLILITER(S): 213 SOLUTION TOPICAL at 14:32

## 2018-08-01 RX ADMIN — Medication 0.25 INCH(S): at 05:00

## 2018-08-01 RX ADMIN — Medication 2: at 08:12

## 2018-08-01 RX ADMIN — CHLORHEXIDINE GLUCONATE 5 MILLILITER(S): 213 SOLUTION TOPICAL at 17:13

## 2018-08-01 RX ADMIN — Medication 50 MILLIEQUIVALENT(S): at 03:32

## 2018-08-01 RX ADMIN — FENTANYL CITRATE 25 MICROGRAM(S): 50 INJECTION INTRAVENOUS at 00:00

## 2018-08-01 RX ADMIN — Medication 100 MILLIGRAM(S): at 06:54

## 2018-08-01 RX ADMIN — LIDOCAINE 1 PATCH: 4 CREAM TOPICAL at 23:11

## 2018-08-01 RX ADMIN — CHLORHEXIDINE GLUCONATE 5 MILLILITER(S): 213 SOLUTION TOPICAL at 06:53

## 2018-08-01 RX ADMIN — Medication 2: at 23:11

## 2018-08-01 RX ADMIN — Medication 100 MILLIGRAM(S): at 14:15

## 2018-08-01 RX ADMIN — CHLORHEXIDINE GLUCONATE 5 MILLILITER(S): 213 SOLUTION TOPICAL at 09:55

## 2018-08-01 RX ADMIN — CHLORHEXIDINE GLUCONATE 1 APPLICATION(S): 213 SOLUTION TOPICAL at 11:41

## 2018-08-01 RX ADMIN — Medication 2: at 17:13

## 2018-08-01 RX ADMIN — CHLORHEXIDINE GLUCONATE 5 MILLILITER(S): 213 SOLUTION TOPICAL at 22:06

## 2018-08-01 RX ADMIN — PANTOPRAZOLE SODIUM 40 MILLIGRAM(S): 20 TABLET, DELAYED RELEASE ORAL at 11:40

## 2018-08-01 RX ADMIN — Medication 0.25 INCH(S): at 19:35

## 2018-08-01 RX ADMIN — LIDOCAINE 1 PATCH: 4 CREAM TOPICAL at 11:41

## 2018-08-01 RX ADMIN — Medication 1 MILLIGRAM(S): at 14:04

## 2018-08-01 RX ADMIN — Medication 1 MILLIGRAM(S): at 11:40

## 2018-08-01 NOTE — PROGRESS NOTE ADULT - SUBJECTIVE AND OBJECTIVE BOX
CTICU  CRITICAL  CARE  attending     Hand off received @ 7a					   Pertinent clinical, laboratory, radiographic, hemodynamic, echocardiographic, respiratory data, microbiologic data and chart were reviewed and analyzed frequently throughout the course of the day and night  Patient seen and examined with CTS/ SH attending at bedside  Pt is a 68y , Female,    Female, pod # 7 s/p Open TAAA with replacement of descending aorta; separate re-implantation for meso-renals; celiac/SMA/Bilateral renal bypass;  LD placed in the OR    post op c/b    lactic acidosis  concern for spinal cord ischemia  CT lumbar spina negative for hematoma at LD insertion site  CTA chest/abd/pelvis showing all grafts patent and no endoleak  thrombocytopenia    past 24 hrs    MAP maintained > 110  worsening thrombocytopenia not responding to platelet transfusion  Heme svc engaged; suggests a hemolytic process/DIC rather than TTP  has Acral cyanosis; non tender  Haptoglobin < 10; LDH . 1000  stable on full Vent support;  Lumbar drain and groin lines to stay 2/2 platelet counts        FAMILY HISTORY:  No pertinent family history in first degree relatives  PAST MEDICAL & SURGICAL HISTORY:  History of seizures: 1980  COPD (chronic obstructive pulmonary disease)  HLD (hyperlipidemia)  HTN (hypertension)  AAA (abdominal aortic aneurysm)  CAD (coronary artery disease)  S/P AAA (abdominal aortic aneurysm) repair  History of abdominal aortic aneurysm (AAA): 2004  History of cholecystectomy    Patient is a 68y old  Female who presents with a chief complaint of Descending thoracic aortic aneurysm (25 Jul 2018 17:14)      14 system review was unremarkable  acute changes include acute respiratory failure  Vital signs, hemodynamic and respiratory parameters were reviewed from the bedside nursing flowsheet.  ICU Vital Signs Last 24 Hrs  T(C): 36.7 (01 Aug 2018 13:42), Max: 36.7 (01 Aug 2018 13:42)  T(F): 98 (01 Aug 2018 13:42), Max: 98 (01 Aug 2018 13:42)  HR: 103 (01 Aug 2018 13:00) (70 - 108)  BP: --  BP(mean): --  ABP: 167/82 (01 Aug 2018 13:00) (126/62 - 170/92)  ABP(mean): 110 (01 Aug 2018 13:00) (86 - 134)  RR: 23 (01 Aug 2018 13:00) (13 - 25)  SpO2: 100% (01 Aug 2018 13:00) (100% - 100%)    Adult Advanced Hemodynamics Last 24 Hrs  CVP(mm Hg): 35 (01 Aug 2018 13:00) (11 - 35)  CVP(cm H2O): --  CO: --  CI: --  PA: --  PA(mean): --  PCWP: --  SVR: --  SVRI: --  PVR: --  PVRI: --, ABG - ( 01 Aug 2018 12:37 )  pH, Arterial: 7.44  pH, Blood: x     /  pCO2: 32    /  pO2: 87    / HCO3: 22    / Base Excess: -1.5  /  SaO2: 97                Mode: AC/ CMV (Assist Control/ Continuous Mandatory Ventilation)  RR (machine): 14  TV (machine): 450  FiO2: 50  PEEP: 5  ITime: 1.4  MAP: 7  PIP: 11    Intake and output was reviewed and the fluid balance was calculated  Daily     Daily   I&O's Summary    31 Jul 2018 07:01  -  01 Aug 2018 07:00  --------------------------------------------------------  IN: 2754.2 mL / OUT: 2400 mL / NET: 354.2 mL    01 Aug 2018 07:01  -  01 Aug 2018 13:53  --------------------------------------------------------  IN: 120 mL / OUT: 502 mL / NET: -382 mL        All lines and drain sites were assessed  [facility]      POCT Blood Glucose.: 166 mg/dL (01 Aug 2018 11:34)    No acute change in mental status  Auscultation of the chest reveals equal bs  Abdomen is soft  Extremities are warm and well perfused  Wounds appear clean and unremarkable  Antibiotics are periop    labs  CBC Full  -  ( 01 Aug 2018 12:38 )  WBC Count : 11.8 K/uL  Hemoglobin : 12.3 g/dL  Hematocrit : 36.6 %  Platelet Count - Automated : 17 K/uL  Mean Cell Volume : 89.7 fL  Mean Cell Hemoglobin : 30.1 pg  Mean Cell Hemoglobin Concentration : 33.6 g/dL  Auto Neutrophil # : x  Auto Lymphocyte # : x  Auto Monocyte # : x  Auto Eosinophil # : x  Auto Basophil # : x  Auto Neutrophil % : x  Auto Lymphocyte % : x  Auto Monocyte % : x  Auto Eosinophil % : x  Auto Basophil % : x    08-01    139  |  96  |  97<H>  ----------------------------<  186<H>  4.0   |  23  |  3.14<H>    Ca    8.3<L>      01 Aug 2018 12:38  Phos  4.8     08-01  Mg     2.7     08-01    TPro  6.2  /  Alb  3.9  /  TBili  4.0<H>  /  DBili  x   /  AST  230<H>  /  ALT  142<H>  /  AlkPhos  77  08-01    PT/INR - ( 01 Aug 2018 04:54 )   PT: 15.3 sec;   INR: 1.37          PTT - ( 01 Aug 2018 12:38 )  PTT:27.8 sec  The current medications were reviewed   MEDICATIONS  (STANDING):  atorvastatin 20 milliGRAM(s) Oral at bedtime  chlorhexidine 0.12% Liquid 5 milliLiter(s) Swish and Spit every 4 hours  chlorhexidine 2% Cloths 1 Application(s) Topical daily  ciprofloxacin   IVPB 400 milliGRAM(s) IV Intermittent every 24 hours  dexmedetomidine Infusion 0.5 MICROgram(s)/kG/Hr (6.362 mL/Hr) IV Continuous <Continuous>  dextrose 5%. 1000 milliLiter(s) (50 mL/Hr) IV Continuous <Continuous>  dextrose 50% Injectable 50 milliLiter(s) IV Push every 15 minutes  dextrose 50% Injectable 25 milliLiter(s) IV Push every 15 minutes  dextrose 50% Injectable 50 milliLiter(s) IV Push every 15 minutes  dextrose 50% Injectable 25 milliLiter(s) IV Push every 15 minutes  folic acid 1 milliGRAM(s) Oral daily  folic acid Injectable 1 milliGRAM(s) IV Push daily  insulin lispro (HumaLOG) corrective regimen sliding scale   SubCutaneous Before meals and at bedtime  lidocaine   Patch 1 Patch Transdermal daily  metroNIDAZOLE  IVPB 500 milliGRAM(s) IV Intermittent every 8 hours  metroNIDAZOLE  IVPB      nitroglycerin    2% Ointment 0.25 Inch(s) Transdermal two times a day  pantoprazole  Injectable 40 milliGRAM(s) IV Push daily  sodium chloride 0.9%. 1000 milliLiter(s) (10 mL/Hr) IV Continuous <Continuous>  vasopressin Infusion 0.033 Unit(s)/Min (2 mL/Hr) IV Continuous <Continuous>    MEDICATIONS  (PRN):  dextrose 40% Gel 15 Gram(s) Oral once PRN Blood Glucose LESS THAN 70 milliGRAM(s)/deciliter  glucagon  Injectable 1 milliGRAM(s) IntraMuscular once PRN Glucose LESS THAN 70 milligrams/deciliter       PROBLEM LIST/ ASSESSMENT:  HEALTH ISSUES - PROBLEM Dx:    Acute resp failure with hypoxemia  CARRIE  Thrombocytopenia  At risk for hemodynamic instability  s/p open TAAA        ,   Patient is a 68y old  Female who presents with a chief complaint of Descending thoracic aortic aneurysm (25 Jul 2018 17:14)     s/p  Open TAAA with replacement of descending aorta; separate re-implantation for meso-renals; celiac/SMA/Bilateral renal bypass;    acute changes include acute respiratory failure    My plan includes :  close hemodynamic, ventilatory and drain monitoring and management per post op routine  Will send SHANTELLE to r/o false negative PF4 results;  Maintain MAP > 110  Lines to stay in until platelet counts improve  Not a candidate for extubation given presence of lumbar drain and the need to remain supine; given Pt's poor DLCO prior to surgery  Monitor for arrhythmias and monitor parameters for organ perfusion  monitor neurologic status  Head of the bed should remain elevated to 45 deg .   chest PT and IS will be encouraged  monitor adequacy of oxygenation and ventilation and attempt to wean oxygen  Nutritional goals will be met using po eventually , ensure adequate caloric intake and montior the same  Stress ulcer and VTE prophylaxis will be achieved    Glycemic control is satisfactory  Electrolytes have been repleted as necessary and wound care has been carried out. Pain control has been achieved.   agressive physical therapy and early mobility and ambulation goals will be met   The family was updated about the course and plan  CRITICAL CARE TIME SPENT in evaluation and management, reassessments, review and interpretation of labs and x-rays, ventilator and hemodynamic management, formulating a plan and coordinating care: ___90____ MIN.  Time does not include procedural time.  CTICU ATTENDING     					    Roby Mueller MD

## 2018-08-01 NOTE — PROGRESS NOTE ADULT - ASSESSMENT
68 year old female with history of CAD, TIA in 1998, COPD, AAA s/p open AAA repair in 2004, EVAR/CIAA, with extension into L hypogastric & DIONNE in 3/29/17, Presented for a descending thoracoabdominal aortic aneurysm repair (aneurysm 5.7 with mural thrombus and widely patent Celiac and SMA),  s/p Thoracoabdominal aortic aneurysm repair with reimplantation of mesenteric vessels 7/26. General surgery consulted for concern of ischemic colitis.   No current evidence of ischaemic colitis, patient is off pressor. No leukocytosis. Having regular bowel movements.   Platelet 15 and creatinine 3.     No evidence of ischaemic colitis for now  No immediate surgical intervention needed  Will observe for now    Plan pending to be discussed with attending 68 year old female with history of CAD, TIA in 1998, COPD, AAA s/p open AAA repair in 2004, EVAR/CIAA, with extension into L hypogastric & DIONNE in 3/29/17, Presented for a descending thoracoabdominal aortic aneurysm repair (aneurysm 5.7 with mural thrombus and widely patent Celiac and SMA),  s/p Thoracoabdominal aortic aneurysm repair with reimplantation of mesenteric vessels 7/26. General surgery consulted for concern of ischemic colitis.   No current evidence of ischaemic colitis, patient is off pressor. No leukocytosis. Having regular bowel movements.   Platelet 15 and creatinine 3.     No evidence of ischaemic colitis for now  No immediate surgical intervention needed  Surgery will sign off as very unlikely to have ischaemic bowel  Please consult back if there is any acute changes     Plan d/w

## 2018-08-01 NOTE — PROGRESS NOTE ADULT - ASSESSMENT
68 year old female with history of CAD, TIA in 1998, COPD, AAA s/p open AAA repair in 2004, EVAR/CIAA, with extension into L hypogastric & DIONNE in 3/29/17, Presented for a descending thoracoabdominal aortic aneurysm repair (aneurysm 5.7 with mural thrombus and widely patent Celiac and SMA),    S/p Thoracoabdominal aortic aneurysm repair with reimplantation of mesenteric vessels , post op labs are remarkable for elevated lactate, General surgery consulted for concern of ischemic colitis, which was ruled out with colonoscopy.  CTA done over the weekend, shows patent graft and mesenteric vessels, Patient lactic acidosis can be due to increase pressors requirement, Lactic acid is down to normal.  Yesterday patient was complaining from bilateral hand numbness, A-line was removed from right radial, now in left radial, both hands looks cyanosed, no motor deficit, foot appears cyanotic and cold, with palpable pulses in 4 extremities, which can be due to vasopressors effect. Patient is off pressors now, UOP improved, Lactic acid is down to normal, planned for possible extubation    Today patient    Plan: 68 year old female with history of CAD, TIA in 1998, COPD, AAA s/p open AAA repair in 2004, EVAR/CIAA, with extension into L hypogastric & DIONNE in 3/29/17, Presented for a descending thoracoabdominal aortic aneurysm repair (aneurysm 5.7 with mural thrombus and widely patent Celiac and SMA),    S/p Thoracoabdominal aortic aneurysm repair with reimplantation of mesenteric vessels , post op labs are remarkable for elevated lactate, General surgery consulted for concern of ischemic colitis, which was ruled out with colonoscopy.  CTA done over the weekend, shows patent graft and mesenteric vessels, Patient lactic acidosis can be due to increase pressors requirement, Lactic acid is down to normal and limb perfusion has also improved.  Yesterday patient was complaining from bilateral hand numbness, A-line was removed from right radial, now in left radial, hands were cyanosed and patient was complaining from tingling sensation.  Today her sensation has improved, denies any tingling, Radial pulse +2 bilaterally, DP +2 bilateral, with motor weakness as mentioned in the exam, patient labs noted for Thrombocytopenia and CARRIE    Plan:  - No acute vascular surgery intervention  - Hematology follow up for Thrombocytopenia  - Rest of plan by CTICU team.  - Discussed with the chief

## 2018-08-01 NOTE — PROGRESS NOTE ADULT - ATTENDING COMMENTS
Looks well, much more awake.  Mild abdominal pain  Off pressors.  Abd soft, mild dist, non-tender  On tube feeds    Thrombocytopenia    Low suspicion for ischemic bowel at this point.  Management per CTICU

## 2018-08-01 NOTE — CONSULT NOTE ADULT - SUBJECTIVE AND OBJECTIVE BOX
GENERAL SURGERY CONSULT NOTE    HPI:  This is a 68 year old female ith history of CAD, TIA in 1998, COPD, AAA s/p open AAA repair by Dr. Roberto in 2004, EVAR/CIAA, with extension into L hypogastric & DIONNE with Dr. Burch on 3/29/17 who was referred to Dr. 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  She underwent CTA chest/abdomen/pelvis in 4/2018 which demonstrated descending thoracoabdominal aortic aneurysm with degeneration of the aorta at the mid-descending thoracic segment, measuring 5.7cm with significant mural thrombus (previously measuring 5.5cm) with widely patent celiac/SMA/renal arteries.  She completed outpatient pre-operative evaluation and was deemed a surgical candidate for thoracoabdominal aneurysm repair and admitted to [facility] on 7[facility]25/18 under the care of Dr. Monson in anticipated of planned procedure on 7/26.    Patient underwent Open thoraco-abdominal aortic aneurysm repair, with separate reimplantation of Celiac, SMA, & Renals. General surgery consulted for concern of ischemic colitis, patient reportedly had a bowel movement while on the OR table.       PAST MEDICAL & SURGICAL HISTORY:  History of seizures: 1980  COPD (chronic obstructive pulmonary disease)  HLD (hyperlipidemia)  HTN (hypertension)  AAA (abdominal aortic aneurysm)  CAD (coronary artery disease)  S/P AAA (abdominal aortic aneurysm) repair  History of abdominal aortic aneurysm (AAA): 2004  History of cholecystectomy      REVIEW OF SYSTEMS:   Pertinent positives/negatives noted in HPI.     HOME MEDICATIONS:    ALLERGIES:  Allergies    morphine (Hives; Urticaria)  penicillin (Hives)    Intolerances        SOCIAL HISTORY:    FAMILY HISTORY:  No pertinent family history in first degree relatives      Vital Signs Last 24 Hrs  T(C): 36.6 (26 Jul 2018 05:00), Max: 36.6 (25 Jul 2018 22:35)  T(F): 97.9 (26 Jul 2018 05:00), Max: 97.9 (25 Jul 2018 22:35)  HR: 80 (26 Jul 2018 21:00) (68 - 82)  BP: 131/74 (26 Jul 2018 07:00) (131/74 - 151/87)  BP(mean): 112 (26 Jul 2018 06:00) (89 - 113)  RR: 14 (26 Jul 2018 21:00) (12 - 18)  SpO2: 100% (26 Jul 2018 21:00) (93% - 100%)    PHYSICAL EXAM:  General: NAD  Neurology: Patient sedated for intubation  Respiratory: Ventilated, not overbreathing vent  Chest: Chest tube in place with serosanguinous output  Abdominal: Soft, Non-tender, non-distended. no grimacing   Rectal: Rectal Tube in place  : Solorio in place  Extremities: No edema  Skin: No Rashes, Hematoma, Ecchymosis    LABS:                        11.1   4.3   )-----------( 58       ( 26 Jul 2018 20:29 )             32.1     07-26    141  |  104  |  13  ----------------------------<  130<H>  3.6   |  23  |  0.85    Ca    8.0<L>      26 Jul 2018 20:29  Phos  4.5     07-26  Mg     1.1     07-26    TPro  3.5<L>  /  Alb  1.8<L>  /  TBili  2.9<H>  /  DBili  x   /  AST  108<H>  /  ALT  48<H>  /  AlkPhos  47  07-26    PT/INR - ( 26 Jul 2018 20:29 )   PT: 15.1 sec;   INR: 1.35          PTT - ( 26 Jul 2018 20:29 )  PTT:38.7 sec
Hematology Oncology Consult Note (Dr Tee)    The patient was seen and examined at bedside.    67 yo F with history of CAD, TIA in 1998, COPD, AAA s/p open AAA repair by Dr. Roberto in 2004, EVAR/AAA/CIAA with Dr. Burch on 3/29/17 who was referred to Dr. 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  She underwent CTA chest/abdomen/pelvis in 4/2018 which demonstrated descending thoracoabdominal aortic aneurysm with degeneration of the aorta at the mid-descending thoracic segment, measuring 5.7cm with significant mural thrombus (previously measuring 5.5cm) with widely patent celiac/SMA/renal arteries.  She completed outpatient pre-operative evaluation and was deemed a surgical candidate for thoracoabdominal aneurysm repair and admitted to [facility] on 7/25/18 under the care of Dr. Monson in anticipated of planned procedure on 7/26.    Pt underwent repair 7/26. Post-procedure labs showed plt count 58k, new coagulopathy, and worsening renal failure. She received many blood products intraop and heparin. She received pRBC, cry, ffp, plts intraop. Pt's plt count initial was responding to transfusion but then stopped responding yesterday in setting of worsenign renal failure. Her post-op complication was leg weakness 2/2 to spinal infarction requiring lumber drain placement by neurosurgery. She has had serosanguinous drainage of luids from ET suction and from her three thorax drainage tubes. No blood in ivan and no blood in stool. She has been receiving pRBC post-op despite having normal Hgb.     ROS is otherwise negative.    PAST MEDICAL & SURGICAL HISTORY:  History of seizures: 1980  COPD (chronic obstructive pulmonary disease)  HLD (hyperlipidemia)  HTN (hypertension)  AAA (abdominal aortic aneurysm)  CAD (coronary artery disease)  S/P AAA (abdominal aortic aneurysm) repair  History of abdominal aortic aneurysm (AAA): 2004  History of cholecystectomy      Allergies:  morphine (Hives; Urticaria)  penicillin (Hives)      Medications:  MEDICATIONS  (STANDING):  atorvastatin 20 milliGRAM(s) Oral at bedtime  chlorhexidine 0.12% Liquid 5 milliLiter(s) Swish and Spit every 4 hours  chlorhexidine 2% Cloths 1 Application(s) Topical daily  ciprofloxacin   IVPB 400 milliGRAM(s) IV Intermittent every 24 hours  dexmedetomidine Infusion 0.5 MICROgram(s)/kG/Hr IV Continuous <Continuous>  dextrose 5%. 1000 milliLiter(s) IV Continuous <Continuous>  dextrose 50% Injectable 50 milliLiter(s) IV Push every 15 minutes  dextrose 50% Injectable 25 milliLiter(s) IV Push every 15 minutes  dextrose 50% Injectable 50 milliLiter(s) IV Push every 15 minutes  dextrose 50% Injectable 25 milliLiter(s) IV Push every 15 minutes  folic acid 1 milliGRAM(s) Oral daily  folic acid Injectable 1 milliGRAM(s) IV Push daily  insulin lispro (HumaLOG) corrective regimen sliding scale   SubCutaneous Before meals and at bedtime  lidocaine   Patch 1 Patch Transdermal daily  metroNIDAZOLE  IVPB 500 milliGRAM(s) IV Intermittent every 8 hours  metroNIDAZOLE  IVPB      nitroglycerin    2% Ointment 0.25 Inch(s) Transdermal two times a day  pantoprazole  Injectable 40 milliGRAM(s) IV Push daily  sodium chloride 0.9%. 1000 milliLiter(s) IV Continuous <Continuous>  vasopressin Infusion 0.033 Unit(s)/Min IV Continuous <Continuous>      Social History: non smoker, no alcohol use    FAMILY HISTORY:  No pertinent family history in first degree relatives      PHYSICAL EXAM:    T(F): 97.5 (08-01-18 @ 17:04), Max: 98 (08-01-18 @ 13:42)  HR: 76 (08-01-18 @ 17:00) (70 - 108)  BP: --  RR: 24 (08-01-18 @ 17:00) (14 - 25)  SpO2: 100% (08-01-18 @ 17:00) (100% - 100%)    Gen: intubated, awake, alert, follows commands  HEENT: normocephalic/atraumatic, no conjunctival pallor, no scleral icterus, no oral thrush/mucosal bleeding/mucositis + NG tube  Neck: supple, no masses, no JVD  Back: + lumbar drain  Cardiovascular: RR, nl S1S2, no murmurs/rubs/gallops. 3 thoracic drains w/ serosanguinous fluid  Respiratory: clear air entry   Gastrointestinal: BS+, soft, NT/ND, no masses, no splenomegaly, no hepatomegaly, no evidence for ascites  Extremities: no clubbing/cyanosis, no edema, no calf tenderness, DP/PT 2+ b/l, no evidence of microthrombi  Neurological: no focal deficits  Skin: no rash on visible skin  Lymph Nodes:  no cervical/supraclavicular LAD, no axillary/groin LAD      Labs                        12.0   12.5  )-----------( 15       ( 01 Aug 2018 17:01 )             36.6         CBC Full  -  ( 01 Aug 2018 17:01 )  WBC Count : 12.5 K/uL  Hemoglobin : 12.0 g/dL  Hematocrit : 36.6 %  Platelet Count - Automated : 15 K/uL  Mean Cell Volume : 89.7 fL  Mean Cell Hemoglobin : 29.4 pg  Mean Cell Hemoglobin Concentration : 32.8 g/dL  Auto Neutrophil # : x  Auto Lymphocyte # : x  Auto Monocyte # : x  Auto Eosinophil # : x  Auto Basophil # : x  Auto Neutrophil % : x  Auto Lymphocyte % : x  Auto Monocyte % : x  Auto Eosinophil % : x  Auto Basophil % : x        08-01    140  |  98  |  97<H>  ----------------------------<  196<H>  4.2   |  23  |  3.09<H>    Ca    8.4      01 Aug 2018 17:01  Phos  4.8     08-01  Mg     2.6     08-01    TPro  6.2  /  Alb  3.9  /  TBili  4.0<H>  /  DBili  x   /  AST  230<H>  /  ALT  142<H>  /  AlkPhos  77  08-01        PT/INR - ( 01 Aug 2018 17:01 )   PT: 15.2 sec;   INR: 1.36          PTT - ( 01 Aug 2018 17:01 )  PTT:27.1 sec    ldh 900  hapto <10  d dimer 58635  retic 1.3    peripheral smear: 3-4 shistocytes per hpf, toxic granualtions of neutrophils, bands, myelocytes and meta myelocytes, large plts

## 2018-08-01 NOTE — CONSULT NOTE ADULT - ASSESSMENT
This is a 68 year old female ith history of CAD, TIA in 1998, COPD, AAA s/p open AAA repair by Dr. Roberto in 2004, EVAR/AAA/CIAA with Dr. Burch on 3/29/17 who was referred to Dr. Monson for 5.4cm descending thoracic aortic aneurysm s/p surgery complicated by new thrombocytopenia w/ suspicion for microangiopathic process    # Thrombocytopenia  etiology suspected 2/2 to DIC vs post-op state. Pt does have 3-4 shistocytes per HPF in setting of elevated LDH and low haptoglobin, but presence of coagulopathy and elevated d dimer supports DIC as the diagnosis of choice. Although she does have worsening renal failure and thrombocytopenia, the onset of thrombocytopenia immediately post-op sways us away from this as a dx. there is also lack of anemia and no elevation in retic count which also goes against TTP as the diagnosis  - cont daily LDH, haptoglobin and retic levels. [id number] level checked to r/o TTP. hold off on any plasmapharesis at the moment.  - pt had heprin exposure intraop. 4T score is low-int, no documented VTE. PF4 Ab neg. can be false negative. please check serotonin release assay- more specific and sensitive  - pt was initially responding to plt transfusions and now she is plt refractory, cross matched plt testing reveals presence of HLA antibodies and PL Ag- please only transfuse cross matched plts judiciously. can consider transfusion for plt coutn <10 k if no bleeding and <50k if bleeding. If you need to do a procedure and need plt count > 50k, can also transfuse cross matched plts.   - c/w folic acid 1 mg IV daily for ongoing hemolysis.       #Coagulopathy  - likely consistent with DIC, fibrinogen and Factor VIII may be falsely high as they can be acute phase reactants, but D dimer of [id number] in setting of shistocytes on smear and elevated LDH low haptoglobin support this diagnosis. would recommend no reversal of coaguloapthy unless pt is bleeding with INR >1.6. Avoid FFP and cryoprecipitate as pt is already high risk for thrombosis. treat underlying condition responsible for DIC    Case discussed with Dr Tee and Dr Levi. They are in agreement that we will hold off on plasma exchange at this time as clinical hx, exam findings and lab parameters are more suggestive of a DIC picture. Will f/u [id number] testing and SHANTELLE testing. Will cont to follow.
Patient with descending thoracoabdominal aortic aneurysm with significant mural thrombus (previously measuring 5.5cm). Now s/p Open thoraco-abdominal aortic aneurysm repair, with separate reimplantation of Celiac, SMA, & Renals. General Surgery consulted for suspicion of ischemic colitis    Plan per CTICU w/ Vascular   MAP goal 80  Patient's abdomen is soft, non distended  Baseline Lactate post-operatively 4.2   Not on Pressors  Rectal tube in place  Will perform Serial abdominal exams    will continue to monitor, if lactate rises or if patient requires addition of pressors will consider surgical evaluation for ischemic colitis  Will monitor rectal tube output as well for concern.    Plan as stands, is sigmoidoscopy tomorrow at bedside for further evaluation and inspection    Patient discussed with attending Dr. Flores

## 2018-08-01 NOTE — PROGRESS NOTE ADULT - SUBJECTIVE AND OBJECTIVE BOX
Subjective:  Doing well, afebrile, having regular BM,       Vital Signs Last 24 Hrs  T(C): 36.5 (01 Aug 2018 05:00), Max: 36.5 (01 Aug 2018 05:00)  T(F): 97.7 (01 Aug 2018 05:00), Max: 97.7 (01 Aug 2018 05:00)  HR: 88 (01 Aug 2018 08:19) (70 - 108)  BP: --  BP(mean): --  RR: 14 (01 Aug 2018 08:00) (13 - 20)  SpO2: 100% (01 Aug 2018 08:19) (100% - 100%)    Physical Exam:        LABS:                        12.3   10.4  )-----------( 15       ( 01 Aug 2018 04:54 )             37.5     08-01    136  |  94<L>  |  92<H>  ----------------------------<  179<H>  4.3   |  23  |  3.07<H>    Ca    8.6      01 Aug 2018 04:54  Phos  4.3     08-01  Mg     2.5     08-01    TPro  6.2  /  Alb  3.8  /  TBili  4.7<H>  /  DBili  x   /  AST  242<H>  /  ALT  132<H>  /  AlkPhos  72  08-01    PT/INR - ( 01 Aug 2018 04:54 )   PT: 15.3 sec;   INR: 1.37          PTT - ( 01 Aug 2018 04:54 )  PTT:28.1 sec  CAPILLARY BLOOD GLUCOSE      POCT Blood Glucose.: 181 mg/dL (01 Aug 2018 08:08)  POCT Blood Glucose.: 155 mg/dL (01 Aug 2018 04:56)  POCT Blood Glucose.: 77 mg/dL (31 Jul 2018 22:54)  POCT Blood Glucose.: 60 mg/dL (31 Jul 2018 22:53)  POCT Blood Glucose.: 129 mg/dL (31 Jul 2018 17:12)  POCT Blood Glucose.: 173 mg/dL (31 Jul 2018 10:39)      LIVER FUNCTIONS - ( 01 Aug 2018 04:54 )  Alb: 3.8 g/dL / Pro: 6.2 g/dL / ALK PHOS: 72 U/L / ALT: 132 U/L / AST: 242 U/L / GGT: x           ABO Interpretation: B (08-01 @ 04:27) Subjective:  Doing well, afebrile, having regular BM, sensation in both upper and lower extremity has improved      Vital Signs Last 24 Hrs  T(C): 36.5 (01 Aug 2018 05:00), Max: 36.5 (01 Aug 2018 05:00)  T(F): 97.7 (01 Aug 2018 05:00), Max: 97.7 (01 Aug 2018 05:00)  HR: 88 (01 Aug 2018 08:19) (70 - 108)  BP: --  BP(mean): --  RR: 14 (01 Aug 2018 08:00) (13 - 20)  SpO2: 100% (01 Aug 2018 08:19) (100% - 100%)    Physical Exam:  Gen: Awake, Alert, Responds to questions appropriately, ETT in place  Resp: Mechanical ventilated through ETT  Abdomen: Softly distended, ND,NT  Ext:  RUE: power 4/5, +2 radial pulses, sensation intact  LUE: power 5/5, +2 Radial pulses sensation intact  RLE: DP+2, power 4/5, sensation intact  LLE: DP+2, power 3/5, sensation itnact       LABS:                        12.3   10.4  )-----------( 15       ( 01 Aug 2018 04:54 )             37.5     08-01    136  |  94<L>  |  92<H>  ----------------------------<  179<H>  4.3   |  23  |  3.07<H>    Ca    8.6      01 Aug 2018 04:54  Phos  4.3     08-01  Mg     2.5     08-01    TPro  6.2  /  Alb  3.8  /  TBili  4.7<H>  /  DBili  x   /  AST  242<H>  /  ALT  132<H>  /  AlkPhos  72  08-01    PT/INR - ( 01 Aug 2018 04:54 )   PT: 15.3 sec;   INR: 1.37          PTT - ( 01 Aug 2018 04:54 )  PTT:28.1 sec  CAPILLARY BLOOD GLUCOSE      POCT Blood Glucose.: 181 mg/dL (01 Aug 2018 08:08)  POCT Blood Glucose.: 155 mg/dL (01 Aug 2018 04:56)  POCT Blood Glucose.: 77 mg/dL (31 Jul 2018 22:54)  POCT Blood Glucose.: 60 mg/dL (31 Jul 2018 22:53)  POCT Blood Glucose.: 129 mg/dL (31 Jul 2018 17:12)  POCT Blood Glucose.: 173 mg/dL (31 Jul 2018 10:39)      LIVER FUNCTIONS - ( 01 Aug 2018 04:54 )  Alb: 3.8 g/dL / Pro: 6.2 g/dL / ALK PHOS: 72 U/L / ALT: 132 U/L / AST: 242 U/L / GGT: x           ABO Interpretation: B (08-01 @ 04:27)

## 2018-08-01 NOTE — PROGRESS NOTE ADULT - SUBJECTIVE AND OBJECTIVE BOX
STATUS POST:  Thoracoabdominal aortic aneurysm repair with reimplantation of mesenteric vessels 7/26, POD6     SUBJECTIVE: Patient seen and examined bedside, patient able to communicate well, no abdominal pain, no nausea/vomiting, no bloody BM, off vasopressin since yesterday. Tolerating TF and making good UO    ciprofloxacin   IVPB 400 milliGRAM(s) IV Intermittent every 24 hours  metroNIDAZOLE  IVPB 500 milliGRAM(s) IV Intermittent every 8 hours  metroNIDAZOLE  IVPB      nitroglycerin    2% Ointment 0.25 Inch(s) Transdermal two times a day      Vital Signs Last 24 Hrs  T(C): 36.5 (01 Aug 2018 05:00), Max: 36.5 (01 Aug 2018 05:00)  T(F): 97.7 (01 Aug 2018 05:00), Max: 97.7 (01 Aug 2018 05:00)  HR: 90 (01 Aug 2018 06:24) (70 - 108)  BP: --  BP(mean): --  RR: 14 (01 Aug 2018 06:00) (13 - 20)  SpO2: 100% (01 Aug 2018 06:24) (100% - 100%)  I&O's Detail    30 Jul 2018 07:01  -  31 Jul 2018 07:00  --------------------------------------------------------  IN:    Albumin 5%  - 250 mL: 750 mL    IV PiggyBack: 400 mL    Jevity: 100 mL    Platelets - Single Donor: 500 mL    sodium chloride 0.9%.: 240 mL    Solution: 300 mL    vasopressin Infusion: 22 mL  Total IN: 2312 mL    OUT:    Chest Tube: 120 mL    Chest Tube: 790 mL    Chest Tube: 10 mL    Drain: 240 mL    Indwelling Catheter - Urethral: 1195 mL  Total OUT: 2355 mL    Total NET: -43 mL      31 Jul 2018 07:01  -  01 Aug 2018 06:44  --------------------------------------------------------  IN:    Albumin 25%: 100 mL    Albumin 5%  - 250 mL: 250 mL    dexmedetomidine Infusion: 29.2 mL    IV PiggyBack: 750 mL    Jevity: 170 mL    Packed Red Blood Cells: 300 mL    Platelets - Single Donor: 700 mL    sodium chloride 0.9%.: 230 mL    Solution: 200 mL    vasopressin Infusion: 5 mL  Total IN: 2734.2 mL    OUT:    Chest Tube: 50 mL    Chest Tube: 180 mL    Chest Tube: 650 mL    Drain: 200 mL    Indwelling Catheter - Urethral: 1180 mL  Total OUT: 2260 mL    Total NET: 474.2 mL          General: NAD, resting comfortably in bed, intubated and on precedex  C/V: NSR  Pulm: intubated on mechanical vent.  Abd: soft, mildly distended and mildly tender over the LUQ  Extrem: WWP,  SCDs in place        LABS:                        12.3   10.4  )-----------( 15       ( 01 Aug 2018 04:54 )             37.5     08-01    136  |  94<L>  |  92<H>  ----------------------------<  179<H>  4.3   |  23  |  3.07<H>    Ca    8.6      01 Aug 2018 04:54  Phos  4.3     08-01  Mg     2.5     08-01    TPro  6.2  /  Alb  3.8  /  TBili  4.7<H>  /  DBili  x   /  AST  242<H>  /  ALT  132<H>  /  AlkPhos  72  08-01    PT/INR - ( 01 Aug 2018 04:54 )   PT: 15.3 sec;   INR: 1.37          PTT - ( 01 Aug 2018 04:54 )  PTT:28.1 sec      RADIOLOGY & ADDITIONAL STUDIES:

## 2018-08-01 NOTE — PROGRESS NOTE ADULT - SUBJECTIVE AND OBJECTIVE BOX
CTICU  CRITICAL  CARE  attending     Hand off received 					   Pertinent clinical, laboratory, radiographic, hemodynamic, echocardiographic, respiratory data, microbiologic data and chart were reviewed and analyzed frequently throughout the course of the day and night  Patient seen and examined with CTS/ SH attending at bedside        This is a 68 year old female ith history of CAD, TIA in 1998, COPD, AAA s/p open AAA repair by Dr. Roberto in 2004, EVAR/AAA/CIAA with Dr. Burch on 3/29/17 who was referred to Dr. 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  She underwent CTA chest/abdomen/pelvis in 4/2018 which demonstrated descending thoracoabdominal aortic aneurysm wtih degeneration of the aorta at the mid-descending thoracic segment, measuring 5.7cm with significant mural thrombus (previously measuring 5.5cm) with widely patent celiac/SMA/renal arteries.  She completed outpatient pre-operative evaluation and was deemed a surgical candidate for thoracoabdominal aneurysm repair and admitted to [facility] on 7/25/18 under the care of Dr. Monson in anticipated of planned procedure on 7/26.  On admission, she states that she feels well overall without acute complaints at this time, though she continues to endorse LARSON, NYHA Class III (dyspneic after 1-2 flights of stairs) and intermittent chest discomfort, non-radiating, spontaneous in onset and resolution without exacerbating factors and relieved with rest.  Denies HA, AMS, active CP, palpitations, SOB, cough, hemoptysis, n/v/d, fever, chills, or recent hospitalizations.          FAMILY HISTORY:  No pertinent family history in first degree relatives  PAST MEDICAL & SURGICAL HISTORY:  History of seizures: 1980  COPD (chronic obstructive pulmonary disease)  HLD (hyperlipidemia)  HTN (hypertension)  AAA (abdominal aortic aneurysm)  CAD (coronary artery disease)  S/P AAA (abdominal aortic aneurysm) repair  History of abdominal aortic aneurysm (AAA): 2004  History of cholecystectomy    Patient is a 68y old  Female who presents with a chief complaint of Descending thoracic aortic aneurysm (25 Jul 2018 17:14)      14 system review was unremarkable  acute changes include acute respiratory failure  Vital signs, hemodynamic and respiratory parameters were reviewed from the bedside nursing flow sheet.  ICU Vital Signs Last 24 Hrs  T(C): 36.4 (01 Aug 2018 17:04), Max: 36.7 (01 Aug 2018 13:42)  T(F): 97.5 (01 Aug 2018 17:04), Max: 98 (01 Aug 2018 13:42)  HR: 64 (01 Aug 2018 23:00) (64 - 108)  BP: --  BP(mean): --  ABP: 142/70 (01 Aug 2018 23:00) (136/70 - 170/92)  ABP(mean): 98 (01 Aug 2018 23:00) (94 - 134)  RR: 13 (01 Aug 2018 23:00) (13 - 27)  SpO2: 100% (01 Aug 2018 23:00) (100% - 100%)    Adult Advanced Hemodynamics Last 24 Hrs  CVP(mm Hg): 15 (01 Aug 2018 23:00) (14 - 35)  CVP(cm H2O): --  CO: --  CI: --  PA: --  PA(mean): --  PCWP: --  SVR: --  SVRI: --  PVR: --  PVRI: --, ABG - ( 01 Aug 2018 17:00 )  pH, Arterial: 7.46  pH, Blood: x     /  pCO2: 32    /  pO2: 123   / HCO3: 22    / Base Excess: -1.1  /  SaO2: 98                Mode: AC/ CMV (Assist Control/ Continuous Mandatory Ventilation)  RR (machine): 14  TV (machine): 450  FiO2: 50  PEEP: 5  ITime: 1  MAP: 10  PIP: 19    Intake and output was reviewed and the fluid balance was calculated  Daily     Daily   I&O's Summary    31 Jul 2018 07:01  -  01 Aug 2018 07:00  --------------------------------------------------------  IN: 2754.2 mL / OUT: 2400 mL / NET: 354.2 mL    01 Aug 2018 07:01  -  01 Aug 2018 23:27  --------------------------------------------------------  IN: 1030 mL / OUT: 1255 mL / NET: -225 mL        All lines and drain sites were assessed  Glycemic trend was reviewed CAPILLARY BLOOD GLUCOSE      POCT Blood Glucose.: 168 mg/dL (01 Aug 2018 23:03)      NEURO: No acute change in mental status. PUPILS 2 mm ( REACTIVE).  MOVES all 4 extremities.  CVS: S1, S2, No S3.  RESPIRATORY: Auscultation of the chest reveals equal bibasilar  rales.  GI: Abdomen is soft. No tenderness. + Bowel sounds.  Extremities are warm and relatively well perfused. Diminished  Digital cyanosis.  VASCULAR: + Distal pulses.  Wounds appear clean and unremarkable  Antibiotics are cipro and falgyl.      labs  CBC Full  -  ( 01 Aug 2018 17:01 )  WBC Count : 12.5 K/uL  Hemoglobin : 12.0 g/dL  Hematocrit : 36.6 %  Platelet Count - Automated : 15 K/uL  Mean Cell Volume : 89.7 fL  Mean Cell Hemoglobin : 29.4 pg  Mean Cell Hemoglobin Concentration : 32.8 g/dL  Auto Neutrophil # : x  Auto Lymphocyte # : x  Auto Monocyte # : x  Auto Eosinophil # : x  Auto Basophil # : x  Auto Neutrophil % : 89.4 %  Auto Lymphocyte % : 5.6 %  Auto Monocyte % : 4.8 %  Auto Eosinophil % : 0.0 %  Auto Basophil % : 0.2 %    08-01    140  |  98  |  97<H>  ----------------------------<  196<H>  4.2   |  23  |  3.09<H>    Ca    8.4      01 Aug 2018 17:01  Phos  4.8     08-01  Mg     2.6     08-01    TPro  6.2  /  Alb  3.9  /  TBili  4.0<H>  /  DBili  x   /  AST  230<H>  /  ALT  142<H>  /  AlkPhos  77  08-01    PT/INR - ( 01 Aug 2018 17:01 )   PT: 15.2 sec;   INR: 1.36          PTT - ( 01 Aug 2018 17:01 )  PTT:27.1 sec  The current medications were reviewed   MEDICATIONS  (STANDING):  atorvastatin 20 milliGRAM(s) Oral at bedtime  chlorhexidine 0.12% Liquid 5 milliLiter(s) Swish and Spit every 4 hours  chlorhexidine 2% Cloths 1 Application(s) Topical daily  ciprofloxacin   IVPB 400 milliGRAM(s) IV Intermittent every 24 hours  dexmedetomidine Infusion 0.5 MICROgram(s)/kG/Hr (6.362 mL/Hr) IV Continuous <Continuous>  dextrose 5%. 1000 milliLiter(s) (50 mL/Hr) IV Continuous <Continuous>  dextrose 50% Injectable 50 milliLiter(s) IV Push every 15 minutes  dextrose 50% Injectable 25 milliLiter(s) IV Push every 15 minutes  dextrose 50% Injectable 50 milliLiter(s) IV Push every 15 minutes  dextrose 50% Injectable 25 milliLiter(s) IV Push every 15 minutes  folic acid 1 milliGRAM(s) Oral daily  insulin lispro (HumaLOG) corrective regimen sliding scale   SubCutaneous Before meals and at bedtime  lidocaine   Patch 1 Patch Transdermal daily  metroNIDAZOLE  IVPB 500 milliGRAM(s) IV Intermittent every 8 hours  metroNIDAZOLE  IVPB      nitroglycerin    2% Ointment 0.25 Inch(s) Transdermal two times a day  pantoprazole  Injectable 40 milliGRAM(s) IV Push daily  sodium chloride 0.9%. 1000 milliLiter(s) (10 mL/Hr) IV Continuous <Continuous>  vasopressin Infusion 0.033 Unit(s)/Min (2 mL/Hr) IV Continuous <Continuous>    MEDICATIONS  (PRN):  dextrose 40% Gel 15 Gram(s) Oral once PRN Blood Glucose LESS THAN 70 milliGRAM(s)/deciliter  glucagon  Injectable 1 milliGRAM(s) IntraMuscular once PRN Glucose LESS THAN 70 milligrams/deciliter       PROBLEM LIST/ ASSESSMENT:  HEALTH ISSUES - PROBLEM Dx:      Patient is a 68y old  Female who presents with Descending thoracic aortic aneurysm   S/P Replacement of thoraco abdominal aorta.  S/P reimplantation of celiac artery, SMA, JOSÉ MIGUEL, bilateral renal arteries.  Severe Thrombocytopenia  DIC  Hemodynamically stable.  Persistent cyanosis of fingertips and toes bilaterally.  Borderline urine out put.  Good oxygenation.  Decreasing metabolic acidosis.  Overall satisfactory progress.      My plan includes :  Close hemodynamic, ventilatory and drain monitoring and management per post op routine  D/C femoral arterial line and lumber drain in AM.  Continue vent support for now.  Monitor for arrhythmias and monitor parameters for organ perfusion  Monitor neurologic status  Head of the bed should remain elevated to 45 deg .   Chest PT and IS will be encouraged  Monitor adequacy of oxygenation and ventilation and attempt to wean oxygen  Nutritional goals will be met using po eventually , ensure adequate caloric intake and monitor  the same  Stress ulcer and VTE prophylaxis will be achieved    OPTIMIZE Glycemic control.  Electrolytes have been repleted as necessary and wound care has been carried out. Pain control has been achieved.   Aggressive physical therapy and early mobility and ambulation goals will be met   The family was updated about the course and plan  CRITICAL CARE TIME SPENT in evaluation and management, reassessments, review and interpretation of labs and x-rays, ventilator and hemodynamic management, formulating a plan and coordinating care: ___30____ MIN.  Time does not include procedural time.  CTICU ATTENDING     					    Norberto Holrbook MD

## 2018-08-01 NOTE — PROGRESS NOTE ADULT - SUBJECTIVE AND OBJECTIVE BOX
to follow consult note  Patient evaluated at bedside. Stable LLE  weakness.  Lumbar drain in place working well  Will keep LD in place for resolution of thrombocytopenia.  LD bag changed using sterile technique.

## 2018-08-01 NOTE — PROGRESS NOTE ADULT - SUBJECTIVE AND OBJECTIVE BOX
Heme/Onc Progress Note    Called by CT surgery team regarding worsening thrombocytopenia in setting of renal failure- concern for TTP. Pt's peripheral smear was reviewed today and it does show 3-4 shistocytes per hpf. Her lab abnormalities include elevated LDH and low haptoglobin which suggest a hemolytic process and presence of shistocytes suggest microangiopathic process. Elevated D dimer and coagulopathy point more towards a diagnosis of DIC. Her thrombocytopenia developed acutely after surgery.- time pattern is more consistent with a consumptive process rather than an acquired destructive process such as TTP josh given context of surgery. Heparin exposure intraop, PF4 was neg    For now please check   direct violeta  send specimen of blood to blood bank for cross matching of platalets. transfuse regular donor plts, check 1 hr post-transfusion cbc to document plt refractoriness. once phenotypically matched plts availalbe, transfuse those and assess response  - check SHANTELLE as PF4 Ab can have false negatives, 4T score is low-int  - start folic acid 1 mg daily  - hold of on plasma exchange as TTP seems less likely at this time    Case discussed with Dr Tee. Full consult note to follow in am

## 2018-08-02 LAB
ALBUMIN SERPL ELPH-MCNC: 3.6 G/DL — SIGNIFICANT CHANGE UP (ref 3.3–5)
ALBUMIN SERPL ELPH-MCNC: 3.7 G/DL — SIGNIFICANT CHANGE UP (ref 3.3–5)
ALBUMIN SERPL ELPH-MCNC: 4 G/DL — SIGNIFICANT CHANGE UP (ref 3.3–5)
ALP SERPL-CCNC: 68 U/L — SIGNIFICANT CHANGE UP (ref 40–120)
ALP SERPL-CCNC: 70 U/L — SIGNIFICANT CHANGE UP (ref 40–120)
ALP SERPL-CCNC: 73 U/L — SIGNIFICANT CHANGE UP (ref 40–120)
ALT FLD-CCNC: 121 U/L — HIGH (ref 10–45)
ALT FLD-CCNC: 125 U/L — HIGH (ref 10–45)
ALT FLD-CCNC: 86 U/L — HIGH (ref 10–45)
ANION GAP SERPL CALC-SCNC: 16 MMOL/L — SIGNIFICANT CHANGE UP (ref 5–17)
ANION GAP SERPL CALC-SCNC: 17 MMOL/L — SIGNIFICANT CHANGE UP (ref 5–17)
ANION GAP SERPL CALC-SCNC: 18 MMOL/L — HIGH (ref 5–17)
ANION GAP SERPL CALC-SCNC: 19 MMOL/L — HIGH (ref 5–17)
APTT BLD: 26.6 SEC — LOW (ref 27.5–37.4)
APTT BLD: 27.2 SEC — LOW (ref 27.5–37.4)
APTT BLD: 27.5 SEC — SIGNIFICANT CHANGE UP (ref 27.5–37.4)
APTT BLD: 28.5 SEC — SIGNIFICANT CHANGE UP (ref 27.5–37.4)
AST SERPL-CCNC: 135 U/L — HIGH (ref 10–40)
AST SERPL-CCNC: 161 U/L — HIGH (ref 10–40)
AST SERPL-CCNC: 90 U/L — HIGH (ref 10–40)
BILIRUB DIRECT SERPL-MCNC: 1.3 MG/DL — HIGH (ref 0–0.2)
BILIRUB INDIRECT FLD-MCNC: 1.7 MG/DL — HIGH (ref 0.2–1)
BILIRUB SERPL-MCNC: 2.7 MG/DL — HIGH (ref 0.2–1.2)
BILIRUB SERPL-MCNC: 2.8 MG/DL — HIGH (ref 0.2–1.2)
BILIRUB SERPL-MCNC: 3 MG/DL — HIGH (ref 0.2–1.2)
BUN SERPL-MCNC: 100 MG/DL — HIGH (ref 7–23)
BUN SERPL-MCNC: 105 MG/DL — HIGH (ref 7–23)
BUN SERPL-MCNC: 106 MG/DL — HIGH (ref 7–23)
BUN SERPL-MCNC: 108 MG/DL — HIGH (ref 7–23)
CALCIUM SERPL-MCNC: 8.3 MG/DL — LOW (ref 8.4–10.5)
CALCIUM SERPL-MCNC: 8.3 MG/DL — LOW (ref 8.4–10.5)
CALCIUM SERPL-MCNC: 8.4 MG/DL — SIGNIFICANT CHANGE UP (ref 8.4–10.5)
CALCIUM SERPL-MCNC: 8.8 MG/DL — SIGNIFICANT CHANGE UP (ref 8.4–10.5)
CHLORIDE SERPL-SCNC: 100 MMOL/L — SIGNIFICANT CHANGE UP (ref 96–108)
CHLORIDE SERPL-SCNC: 102 MMOL/L — SIGNIFICANT CHANGE UP (ref 96–108)
CHLORIDE SERPL-SCNC: 97 MMOL/L — SIGNIFICANT CHANGE UP (ref 96–108)
CHLORIDE SERPL-SCNC: 97 MMOL/L — SIGNIFICANT CHANGE UP (ref 96–108)
CO2 SERPL-SCNC: 23 MMOL/L — SIGNIFICANT CHANGE UP (ref 22–31)
CO2 SERPL-SCNC: 24 MMOL/L — SIGNIFICANT CHANGE UP (ref 22–31)
CO2 SERPL-SCNC: 24 MMOL/L — SIGNIFICANT CHANGE UP (ref 22–31)
CO2 SERPL-SCNC: 26 MMOL/L — SIGNIFICANT CHANGE UP (ref 22–31)
CREAT SERPL-MCNC: 2.67 MG/DL — HIGH (ref 0.5–1.3)
CREAT SERPL-MCNC: 2.96 MG/DL — HIGH (ref 0.5–1.3)
CREAT SERPL-MCNC: 3.13 MG/DL — HIGH (ref 0.5–1.3)
CREAT SERPL-MCNC: 3.23 MG/DL — HIGH (ref 0.5–1.3)
GAS PNL BLDA: SIGNIFICANT CHANGE UP
GLUCOSE BLDC GLUCOMTR-MCNC: 118 MG/DL — HIGH (ref 70–99)
GLUCOSE BLDC GLUCOMTR-MCNC: 123 MG/DL — HIGH (ref 70–99)
GLUCOSE BLDC GLUCOMTR-MCNC: 164 MG/DL — HIGH (ref 70–99)
GLUCOSE BLDC GLUCOMTR-MCNC: 187 MG/DL — HIGH (ref 70–99)
GLUCOSE SERPL-MCNC: 117 MG/DL — HIGH (ref 70–99)
GLUCOSE SERPL-MCNC: 201 MG/DL — HIGH (ref 70–99)
GLUCOSE SERPL-MCNC: 212 MG/DL — HIGH (ref 70–99)
GLUCOSE SERPL-MCNC: 216 MG/DL — HIGH (ref 70–99)
HAPTOGLOB SERPL-MCNC: <10 MG/DL — LOW (ref 34–200)
HAPTOGLOB SERPL-MCNC: <10 MG/DL — LOW (ref 34–200)
HCT VFR BLD CALC: 32.2 % — LOW (ref 34.5–45)
HCT VFR BLD CALC: 32.4 % — LOW (ref 34.5–45)
HCT VFR BLD CALC: 32.6 % — LOW (ref 34.5–45)
HCT VFR BLD CALC: 32.8 % — LOW (ref 34.5–45)
HCT VFR BLD CALC: 34.2 % — LOW (ref 34.5–45)
HCT VFR BLD CALC: 34.9 % — SIGNIFICANT CHANGE UP (ref 34.5–45)
HGB BLD-MCNC: 10.3 G/DL — LOW (ref 11.5–15.5)
HGB BLD-MCNC: 10.5 G/DL — LOW (ref 11.5–15.5)
HGB BLD-MCNC: 10.7 G/DL — LOW (ref 11.5–15.5)
HGB BLD-MCNC: 10.9 G/DL — LOW (ref 11.5–15.5)
HGB BLD-MCNC: 11.6 G/DL — SIGNIFICANT CHANGE UP (ref 11.5–15.5)
HGB BLD-MCNC: 11.7 G/DL — SIGNIFICANT CHANGE UP (ref 11.5–15.5)
INR BLD: 1.45 — HIGH (ref 0.88–1.16)
INR BLD: 1.47 — HIGH (ref 0.88–1.16)
INR BLD: 1.49 — HIGH (ref 0.88–1.16)
INR BLD: 1.51 — HIGH (ref 0.88–1.16)
LACTATE SERPL-SCNC: 1.4 MMOL/L — SIGNIFICANT CHANGE UP (ref 0.5–2)
LACTATE SERPL-SCNC: 1.9 MMOL/L — SIGNIFICANT CHANGE UP (ref 0.5–2)
LACTATE SERPL-SCNC: 2.1 MMOL/L — HIGH (ref 0.5–2)
LDH SERPL L TO P-CCNC: 687 U/L — HIGH (ref 50–242)
LDH SERPL L TO P-CCNC: 763 U/L — HIGH (ref 50–242)
MAGNESIUM SERPL-MCNC: 2.4 MG/DL — SIGNIFICANT CHANGE UP (ref 1.6–2.6)
MAGNESIUM SERPL-MCNC: 2.6 MG/DL — SIGNIFICANT CHANGE UP (ref 1.6–2.6)
MCHC RBC-ENTMCNC: 29.5 PG — SIGNIFICANT CHANGE UP (ref 27–34)
MCHC RBC-ENTMCNC: 29.5 PG — SIGNIFICANT CHANGE UP (ref 27–34)
MCHC RBC-ENTMCNC: 29.7 PG — SIGNIFICANT CHANGE UP (ref 27–34)
MCHC RBC-ENTMCNC: 30.1 PG — SIGNIFICANT CHANGE UP (ref 27–34)
MCHC RBC-ENTMCNC: 30.1 PG — SIGNIFICANT CHANGE UP (ref 27–34)
MCHC RBC-ENTMCNC: 30.3 PG — SIGNIFICANT CHANGE UP (ref 27–34)
MCHC RBC-ENTMCNC: 32 G/DL — SIGNIFICANT CHANGE UP (ref 32–36)
MCHC RBC-ENTMCNC: 32.2 G/DL — SIGNIFICANT CHANGE UP (ref 32–36)
MCHC RBC-ENTMCNC: 33 G/DL — SIGNIFICANT CHANGE UP (ref 32–36)
MCHC RBC-ENTMCNC: 33.2 G/DL — SIGNIFICANT CHANGE UP (ref 32–36)
MCHC RBC-ENTMCNC: 33.5 G/DL — SIGNIFICANT CHANGE UP (ref 32–36)
MCHC RBC-ENTMCNC: 33.9 G/DL — SIGNIFICANT CHANGE UP (ref 32–36)
MCV RBC AUTO: 88.6 FL — SIGNIFICANT CHANGE UP (ref 80–100)
MCV RBC AUTO: 88.6 FL — SIGNIFICANT CHANGE UP (ref 80–100)
MCV RBC AUTO: 91 FL — SIGNIFICANT CHANGE UP (ref 80–100)
MCV RBC AUTO: 91.1 FL — SIGNIFICANT CHANGE UP (ref 80–100)
MCV RBC AUTO: 91.6 FL — SIGNIFICANT CHANGE UP (ref 80–100)
MCV RBC AUTO: 92.3 FL — SIGNIFICANT CHANGE UP (ref 80–100)
PF4 HEPARIN CMPLX AB SER-ACNC: NEGATIVE — SIGNIFICANT CHANGE UP
PF4 HEPARIN CMPLX AB SERPL QL IA: 0.17 ABS — SIGNIFICANT CHANGE UP
PHOSPHATE SERPL-MCNC: 3.8 MG/DL — SIGNIFICANT CHANGE UP (ref 2.5–4.5)
PHOSPHATE SERPL-MCNC: 4.3 MG/DL — SIGNIFICANT CHANGE UP (ref 2.5–4.5)
PHOSPHATE SERPL-MCNC: 4.6 MG/DL — HIGH (ref 2.5–4.5)
PHOSPHATE SERPL-MCNC: 4.8 MG/DL — HIGH (ref 2.5–4.5)
PLATELET # BLD AUTO: 14 K/UL — CRITICAL LOW (ref 150–400)
PLATELET # BLD AUTO: 21 K/UL — LOW (ref 150–400)
PLATELET # BLD AUTO: 23 K/UL — LOW (ref 150–400)
PLATELET # BLD AUTO: 24 K/UL — LOW (ref 150–400)
PLATELET # BLD AUTO: 25 K/UL — LOW (ref 150–400)
PLATELET # BLD AUTO: 25 K/UL — LOW (ref 150–400)
POTASSIUM SERPL-MCNC: 3.2 MMOL/L — LOW (ref 3.5–5.3)
POTASSIUM SERPL-MCNC: 3.8 MMOL/L — SIGNIFICANT CHANGE UP (ref 3.5–5.3)
POTASSIUM SERPL-MCNC: 3.8 MMOL/L — SIGNIFICANT CHANGE UP (ref 3.5–5.3)
POTASSIUM SERPL-MCNC: 3.9 MMOL/L — SIGNIFICANT CHANGE UP (ref 3.5–5.3)
POTASSIUM SERPL-SCNC: 3.2 MMOL/L — LOW (ref 3.5–5.3)
POTASSIUM SERPL-SCNC: 3.8 MMOL/L — SIGNIFICANT CHANGE UP (ref 3.5–5.3)
POTASSIUM SERPL-SCNC: 3.8 MMOL/L — SIGNIFICANT CHANGE UP (ref 3.5–5.3)
POTASSIUM SERPL-SCNC: 3.9 MMOL/L — SIGNIFICANT CHANGE UP (ref 3.5–5.3)
PROT SERPL-MCNC: 5.8 G/DL — LOW (ref 6–8.3)
PROT SERPL-MCNC: 5.9 G/DL — LOW (ref 6–8.3)
PROT SERPL-MCNC: 5.9 G/DL — LOW (ref 6–8.3)
PROTHROM AB SERPL-ACNC: 16.2 SEC — HIGH (ref 9.8–12.7)
PROTHROM AB SERPL-ACNC: 16.5 SEC — HIGH (ref 9.8–12.7)
PROTHROM AB SERPL-ACNC: 16.7 SEC — HIGH (ref 9.8–12.7)
PROTHROM AB SERPL-ACNC: 16.9 SEC — HIGH (ref 9.8–12.7)
RBC # BLD: 3.49 M/UL — LOW (ref 3.8–5.2)
RBC # BLD: 3.56 M/UL — LOW (ref 3.8–5.2)
RBC # BLD: 3.56 M/UL — LOW (ref 3.8–5.2)
RBC # BLD: 3.6 M/UL — LOW (ref 3.8–5.2)
RBC # BLD: 3.86 M/UL — SIGNIFICANT CHANGE UP (ref 3.8–5.2)
RBC # BLD: 3.94 M/UL — SIGNIFICANT CHANGE UP (ref 3.8–5.2)
RBC # FLD: 16.6 % — SIGNIFICANT CHANGE UP (ref 10.3–16.9)
RBC # FLD: 16.7 % — SIGNIFICANT CHANGE UP (ref 10.3–16.9)
RBC # FLD: 16.8 % — SIGNIFICANT CHANGE UP (ref 10.3–16.9)
RBC # FLD: 16.8 % — SIGNIFICANT CHANGE UP (ref 10.3–16.9)
RETICS/RBC NFR: 1.4 % — SIGNIFICANT CHANGE UP (ref 0.5–2.5)
SODIUM SERPL-SCNC: 139 MMOL/L — SIGNIFICANT CHANGE UP (ref 135–145)
SODIUM SERPL-SCNC: 139 MMOL/L — SIGNIFICANT CHANGE UP (ref 135–145)
SODIUM SERPL-SCNC: 141 MMOL/L — SIGNIFICANT CHANGE UP (ref 135–145)
SODIUM SERPL-SCNC: 144 MMOL/L — SIGNIFICANT CHANGE UP (ref 135–145)
WBC # BLD: 12.4 K/UL — HIGH (ref 3.8–10.5)
WBC # BLD: 14 K/UL — HIGH (ref 3.8–10.5)
WBC # BLD: 15.1 K/UL — HIGH (ref 3.8–10.5)
WBC # BLD: 15.6 K/UL — HIGH (ref 3.8–10.5)
WBC # BLD: 16.3 K/UL — HIGH (ref 3.8–10.5)
WBC # BLD: 17.2 K/UL — HIGH (ref 3.8–10.5)
WBC # FLD AUTO: 12.4 K/UL — HIGH (ref 3.8–10.5)
WBC # FLD AUTO: 14 K/UL — HIGH (ref 3.8–10.5)
WBC # FLD AUTO: 15.1 K/UL — HIGH (ref 3.8–10.5)
WBC # FLD AUTO: 15.6 K/UL — HIGH (ref 3.8–10.5)
WBC # FLD AUTO: 16.3 K/UL — HIGH (ref 3.8–10.5)
WBC # FLD AUTO: 17.2 K/UL — HIGH (ref 3.8–10.5)

## 2018-08-02 PROCEDURE — 99292 CRITICAL CARE ADDL 30 MIN: CPT

## 2018-08-02 PROCEDURE — 71045 X-RAY EXAM CHEST 1 VIEW: CPT | Mod: 26,59

## 2018-08-02 PROCEDURE — 99291 CRITICAL CARE FIRST HOUR: CPT

## 2018-08-02 RX ORDER — ALBUMIN HUMAN 25 %
250 VIAL (ML) INTRAVENOUS ONCE
Qty: 0 | Refills: 0 | Status: COMPLETED | OUTPATIENT
Start: 2018-08-02 | End: 2018-08-02

## 2018-08-02 RX ORDER — ONDANSETRON 8 MG/1
4 TABLET, FILM COATED ORAL ONCE
Qty: 0 | Refills: 0 | Status: COMPLETED | OUTPATIENT
Start: 2018-08-02 | End: 2018-08-02

## 2018-08-02 RX ORDER — FENTANYL CITRATE 50 UG/ML
25 INJECTION INTRAVENOUS ONCE
Qty: 0 | Refills: 0 | Status: DISCONTINUED | OUTPATIENT
Start: 2018-08-02 | End: 2018-08-02

## 2018-08-02 RX ORDER — FENTANYL CITRATE 50 UG/ML
25 INJECTION INTRAVENOUS EVERY 4 HOURS
Qty: 0 | Refills: 0 | Status: DISCONTINUED | OUTPATIENT
Start: 2018-08-02 | End: 2018-08-03

## 2018-08-02 RX ORDER — ACETAMINOPHEN 500 MG
1000 TABLET ORAL ONCE
Qty: 0 | Refills: 0 | Status: COMPLETED | OUTPATIENT
Start: 2018-08-02 | End: 2018-08-02

## 2018-08-02 RX ORDER — FENTANYL CITRATE 50 UG/ML
12.5 INJECTION INTRAVENOUS ONCE
Qty: 0 | Refills: 0 | Status: DISCONTINUED | OUTPATIENT
Start: 2018-08-02 | End: 2018-08-02

## 2018-08-02 RX ADMIN — FENTANYL CITRATE 12.5 MICROGRAM(S): 50 INJECTION INTRAVENOUS at 15:54

## 2018-08-02 RX ADMIN — Medication 200 MILLIGRAM(S): at 22:45

## 2018-08-02 RX ADMIN — Medication 0.25 INCH(S): at 17:00

## 2018-08-02 RX ADMIN — CHLORHEXIDINE GLUCONATE 5 MILLILITER(S): 213 SOLUTION TOPICAL at 05:42

## 2018-08-02 RX ADMIN — CHLORHEXIDINE GLUCONATE 5 MILLILITER(S): 213 SOLUTION TOPICAL at 03:16

## 2018-08-02 RX ADMIN — Medication 1000 MILLIGRAM(S): at 11:14

## 2018-08-02 RX ADMIN — Medication 0.25 INCH(S): at 05:42

## 2018-08-02 RX ADMIN — FENTANYL CITRATE 25 MICROGRAM(S): 50 INJECTION INTRAVENOUS at 21:30

## 2018-08-02 RX ADMIN — Medication 0.25 INCH(S): at 05:21

## 2018-08-02 RX ADMIN — Medication 100 MILLIGRAM(S): at 14:15

## 2018-08-02 RX ADMIN — Medication 1 MILLIGRAM(S): at 12:46

## 2018-08-02 RX ADMIN — FENTANYL CITRATE 25 MICROGRAM(S): 50 INJECTION INTRAVENOUS at 05:00

## 2018-08-02 RX ADMIN — ONDANSETRON 4 MILLIGRAM(S): 8 TABLET, FILM COATED ORAL at 21:30

## 2018-08-02 RX ADMIN — CHLORHEXIDINE GLUCONATE 5 MILLILITER(S): 213 SOLUTION TOPICAL at 22:45

## 2018-08-02 RX ADMIN — ONDANSETRON 4 MILLIGRAM(S): 8 TABLET, FILM COATED ORAL at 15:54

## 2018-08-02 RX ADMIN — Medication 2: at 07:26

## 2018-08-02 RX ADMIN — FENTANYL CITRATE 25 MICROGRAM(S): 50 INJECTION INTRAVENOUS at 04:00

## 2018-08-02 RX ADMIN — Medication 0.25 INCH(S): at 19:04

## 2018-08-02 RX ADMIN — PANTOPRAZOLE SODIUM 40 MILLIGRAM(S): 20 TABLET, DELAYED RELEASE ORAL at 12:46

## 2018-08-02 RX ADMIN — Medication 125 MILLILITER(S): at 14:49

## 2018-08-02 RX ADMIN — CHLORHEXIDINE GLUCONATE 5 MILLILITER(S): 213 SOLUTION TOPICAL at 14:13

## 2018-08-02 RX ADMIN — Medication 125 MILLILITER(S): at 14:41

## 2018-08-02 RX ADMIN — FENTANYL CITRATE 25 MICROGRAM(S): 50 INJECTION INTRAVENOUS at 21:40

## 2018-08-02 RX ADMIN — CHLORHEXIDINE GLUCONATE 5 MILLILITER(S): 213 SOLUTION TOPICAL at 11:14

## 2018-08-02 RX ADMIN — FENTANYL CITRATE 12.5 MICROGRAM(S): 50 INJECTION INTRAVENOUS at 14:59

## 2018-08-02 RX ADMIN — Medication 100 MILLIGRAM(S): at 05:44

## 2018-08-02 RX ADMIN — Medication 100 MILLIGRAM(S): at 23:24

## 2018-08-02 RX ADMIN — Medication 2: at 11:14

## 2018-08-02 RX ADMIN — Medication 100 MILLIGRAM(S): at 22:45

## 2018-08-02 RX ADMIN — Medication 400 MILLIGRAM(S): at 10:33

## 2018-08-02 RX ADMIN — ATORVASTATIN CALCIUM 20 MILLIGRAM(S): 80 TABLET, FILM COATED ORAL at 22:45

## 2018-08-02 RX ADMIN — CHLORHEXIDINE GLUCONATE 1 APPLICATION(S): 213 SOLUTION TOPICAL at 12:43

## 2018-08-02 RX ADMIN — CHLORHEXIDINE GLUCONATE 5 MILLILITER(S): 213 SOLUTION TOPICAL at 18:29

## 2018-08-02 RX ADMIN — LIDOCAINE 1 PATCH: 4 CREAM TOPICAL at 12:46

## 2018-08-02 NOTE — BRIEF OPERATIVE NOTE - OPERATION/FINDINGS
This BON is for a procedure done on 7/25/2018. Patient positioned laterally, right side up.  lumbar drain placed under flouroscopic guidance. Clear spontaneous flow of CSF observed. Catheter secured with 3-0 nylon, 4x4 gauge, and tegaderm.  Catheter attached to CSF collection system.  Patient tolerated the procedure well.
EF50%  Aortic clamp time: 173 mins

## 2018-08-02 NOTE — PROVIDER CONTACT NOTE (CRITICAL VALUE NOTIFICATION) - SITUATION
Pt with low platelet count s/p thoroco-abdominal surgery
Pts platelet count is 14
Platelets from 68 down to 22

## 2018-08-02 NOTE — BRIEF OPERATIVE NOTE - POST-OP DX
Thoracoabdominal aortic aneurysm  07/26/2018    Active  Alie Fagan
Thoracoabdominal aortic aneurysm  07/26/2018    Active  Alie Fagan

## 2018-08-02 NOTE — PROVIDER CONTACT NOTE (CRITICAL VALUE NOTIFICATION) - RECOMMENDATIONS
Advised & MD will order Platelets
Follow up with selvin leblanc, continue to monitor
continue to closely monitor.

## 2018-08-02 NOTE — PROGRESS NOTE ADULT - ASSESSMENT
This is a 68 year old female ith history of CAD, TIA in 1998, COPD, AAA s/p open AAA repair by Dr. Roberto in 2004, EVAR/AAA/CIAA with Dr. Burch on 3/29/17 who was referred to Dr. 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 Pt does have 3-4 shistocytes per HPF in setting of elevated LDH and low haptoglobin, but presence of coagulopathy and elevated d dimer supports DIC as the diagnosis of choice. Although she does have worsening renal failure and thrombocytopenia, the onset of thrombocytopenia immediately post-op sways us away from this as a dx. there is also lack of anemia and no elevation in retic count which also goes against TTP as the diagnosis  - pt's LDH has improved and her renal function is improving as well, f/u [id number] level. suspicion for TTP at the current time is not high, hwoever, may consider steroids +/- plasma exchange if no improvement in plts w/ rising LDH, worsening anemia and increasing retic count  - pt had heprin exposure intraop. 4T score is low-int, no documented VTE. PF4 Ab neg. can be false negative. SHANTELLE neg, unlikely HIT  - pt was initially responding to plt transfusions and now she is plt refractory, cross matched plt testing reveals presence of HLA antibodies and PL Ag- s/p transfusion of 3 cross matched units of plts w/ no adequate incremental response  - will check LE dopplers and complete abd ultrasound for any VTE events. also eval for splenomegaly on Abd ultrasound  - will check Lupus profile to eval for antiphospholipid syndrome and catastrophic APS  - c/w folic acid 1 mg IV daily for ongoing hemolysis.       #Coagulopathy  - likely consistent with DIC, fibrinogen and Factor VIII may be falsely high as they can be acute phase reactants, but [place] dimer of [id number] in setting of shistocytes on smear and elevated LDH low haptoglobin support this diagnosis. would recommend no reversal of coaguloapthy unless pt is bleeding with INR >1.6. Avoid FFP and cryoprecipitate as pt is already high risk for thrombosis. treat underlying condition responsible for DIC. Can consider heparin gtt to treat any potential of thrombotic events with DIC- this may improve the plt count as well.     Case discussed with Dr Tee and Dr Levi today.

## 2018-08-02 NOTE — PROGRESS NOTE ADULT - ASSESSMENT
68 year old female with history of CAD, TIA in 1998, COPD, AAA s/p open AAA repair in 2004, EVAR/CIAA, with extension into L hypogastric & DIONNE in 3/29/17, Presented for a descending thoracoabdominal aortic aneurysm repair (aneurysm 5.7 with mural thrombus and widely patent Celiac and SMA),    S/p Thoracoabdominal aortic aneurysm repair with reimplantation of mesenteric vessels , post op labs are remarkable for elevated lactate, General surgery consulted for concern of ischemic colitis, which was ruled out with colonoscopy.  CTA done over the weekend, shows patent graft and mesenteric vessels, Patient lactic acidosis can be due to increase pressors requirement, Lactic acid is down to normal and limb perfusion has also improved.  Yesterday patient was complaining from bilateral hand numbness, A-line was removed from right radial, now in left radial, hands were cyanosed and patient was complaining from tingling sensation.  Today her sensation has improved, denies any tingling, Radial pulse +2 bilaterally, DP +2 bilateral, with motor weakness as mentioned in the exam, patient labs noted for Thrombocytopenia and CARRIE    Plan:  - No acute vascular surgery intervention  - Hematology follow up for Thrombocytopenia  - Rest of plan by CTICU team.  - Discussed with the chief 68 year old female with history of CAD, TIA in 1998, COPD, AAA s/p open AAA repair in 2004, EVAR/CIAA, with extension into L hypogastric & DIONNE in 3/29/17, Presented for a descending thoracoabdominal aortic aneurysm repair (aneurysm 5.7 with mural thrombus and widely patent Celiac and SMA),   Post op patient course is remarked for lactic acidosis, suspicious for mesenteric ischemia that was ruled by GI, patient Lactic acid went up again when she was started on pressors, now she is off pressors, her lactic acid is down to normal, also suffered from bilateral hand cyanosis and tingling that improved after stopping pressors. Today she is doing better, hands and feet tingling is improving, power in both upper and lower extremity is improving. her labs are remarkable for CARRIE, thrombocytopenia (Plt is 21 this morning), hem/onc on board, primary team planning to clamp the lumbar drain and see how she is doing    Plan:  - No acute vascular surgery intervention  - Hematology on board for Thrombocytopenia  - Rest of plan by CTICU team.  - Discussed with the chief

## 2018-08-02 NOTE — CHART NOTE - NSCHARTNOTEFT_GEN_A_CORE
Admitting Diagnosis:   Patient is a 68y old  Female who presents with a chief complaint of Descending thoracic aortic aneurysm (2018 17:14)      PAST MEDICAL & SURGICAL HISTORY:  History of seizures:   COPD (chronic obstructive pulmonary disease)  HLD (hyperlipidemia)  HTN (hypertension)  AAA (abdominal aortic aneurysm)  CAD (coronary artery disease)  S/P AAA (abdominal aortic aneurysm) repair  History of abdominal aortic aneurysm (AAA):   History of cholecystectomy      Current Nutrition Order:  Vital 1.5 via NGT at 40ml/hr x 24hr + 1x prostat; held upon assessment for possible weaning    GI Issues:   None noted per RN; TF were tolerated when running    Pain:  Pt denies     Skin Integrity:  L groin ASW  L chest ASW      Labs:       141  |  100  |  108<H>  ----------------------------<  201<H>  3.8   |  24  |  2.96<H>    Ca    8.3<L>      02 Aug 2018 11:05  Phos  4.3       Mg     2.6         TPro  5.8<L>  /  Alb  3.6  /  TBili  2.8<H>  /  DBili  x   /  AST  135<H>  /  ALT  121<H>  /  AlkPhos  73      CAPILLARY BLOOD GLUCOSE      POCT Blood Glucose.: 187 mg/dL (02 Aug 2018 10:58)  POCT Blood Glucose.: 164 mg/dL (02 Aug 2018 07:23)  POCT Blood Glucose.: 168 mg/dL (01 Aug 2018 23:03)  POCT Blood Glucose.: 186 mg/dL (01 Aug 2018 16:56)      Medications:  MEDICATIONS  (STANDING):  atorvastatin 20 milliGRAM(s) Oral at bedtime  chlorhexidine 0.12% Liquid 5 milliLiter(s) Swish and Spit every 4 hours  chlorhexidine 2% Cloths 1 Application(s) Topical daily  ciprofloxacin   IVPB 400 milliGRAM(s) IV Intermittent every 24 hours  dexmedetomidine Infusion 0.5 MICROgram(s)/kG/Hr (6.362 mL/Hr) IV Continuous <Continuous>  dextrose 5%. 1000 milliLiter(s) (50 mL/Hr) IV Continuous <Continuous>  dextrose 50% Injectable 50 milliLiter(s) IV Push every 15 minutes  dextrose 50% Injectable 25 milliLiter(s) IV Push every 15 minutes  dextrose 50% Injectable 50 milliLiter(s) IV Push every 15 minutes  dextrose 50% Injectable 25 milliLiter(s) IV Push every 15 minutes  folic acid 1 milliGRAM(s) Oral daily  insulin lispro (HumaLOG) corrective regimen sliding scale   SubCutaneous Before meals and at bedtime  lidocaine   Patch 1 Patch Transdermal daily  metroNIDAZOLE  IVPB 500 milliGRAM(s) IV Intermittent every 8 hours  metroNIDAZOLE  IVPB      nitroglycerin    2% Ointment 0.25 Inch(s) Transdermal two times a day  pantoprazole  Injectable 40 milliGRAM(s) IV Push daily  sodium chloride 0.9%. 1000 milliLiter(s) (10 mL/Hr) IV Continuous <Continuous>  vasopressin Infusion 0.033 Unit(s)/Min (2 mL/Hr) IV Continuous <Continuous>    MEDICATIONS  (PRN):  dextrose 40% Gel 15 Gram(s) Oral once PRN Blood Glucose LESS THAN 70 milliGRAM(s)/deciliter  glucagon  Injectable 1 milliGRAM(s) IntraMuscular once PRN Glucose LESS THAN 70 milligrams/deciliter      Weight:  50.9kg ()    Daily Weight in k.3 (2018 11:49)    Weight Change:   wt change noted; could be due to fluids; please trend daily wts    Estimated energy needs:   IBW 52.3kg used for EER and adjusted for vent/post-op  25-30kcal/kg (1307-1569kcal), 1.4-1.6g/kg (73-83g), 30-35ml/kg (1569-1830ml)    Subjective:   69y/o F s/o open TAAA w/ replacement of descending aorta - w/separate reimplantation of mesorenals. Concern for possible ischemic colitis noted; flex sig performed and negative. Pt seen intubated on Cpap. NGT in place with TF currently held for possible extubation later. Per RN TF tolerated prior to be held. No GI distress noted and pt denies current pain. If pt unable to be extubated, restart TF. If pt extubated, consult SLP for formal S&S prior to PO. Will follow.     Previous Nutrition Diagnosis:  Inadequate EN RT infusion rate AEB pt meeting 22% of lower kcal limit    Active [X as TF currently held]  Resolved [   ]    If resolved, new PES:     Goal:  Pt to have nutrition initiation within 24hrs per appropriate route     Recommendations:  1. If unable to extubate, restart Vital 1.5 via NGT with goal rate of 40ml/hr x 24hr + 1x prostat (960ml TV, 1540kcal, 79g, 733ml water, 96% of RDIs). Additional fluids per MD. Monitor for s/s of intolerance and maintain aspiration precautions.   2. If extubated, consult SLP for S&S prior to PO; Dash/TLC diet per appropriate consistency.   3. Monitor lytes and replete prn.   4. Add MVI.   5. Trend daily wts.     *d/w RN*    Education:   N/A-intubated    Risk Level: High [X] Moderate [   ] Low [   ]

## 2018-08-02 NOTE — BRIEF OPERATIVE NOTE - PROCEDURE
<<-----Click on this checkbox to enter Procedure Lumbar drain implantation  08/02/2018    Active  [id number]

## 2018-08-02 NOTE — PROGRESS NOTE ADULT - SUBJECTIVE AND OBJECTIVE BOX
CTICU  CRITICAL  CARE  attending     Hand off received 					   Pertinent clinical, laboratory, radiographic, hemodynamic, echocardiographic, respiratory data, microbiologic data and chart were reviewed and analyzed frequently throughout the course of the day and night  Patient seen and examined with CTS/ SH attending at bedside  Pt is a 68y , Female, HEALTH ISSUES - PROBLEM Dx:      , FAMILY HISTORY:  No pertinent family history in first degree relatives  PAST MEDICAL & SURGICAL HISTORY:  History of seizures: 1980  COPD (chronic obstructive pulmonary disease)  HLD (hyperlipidemia)  HTN (hypertension)  AAA (abdominal aortic aneurysm)  CAD (coronary artery disease)  S/P AAA (abdominal aortic aneurysm) repair  History of abdominal aortic aneurysm (AAA): 2004  History of cholecystectomy    Patient is a 68y old  Female who presents with a chief complaint of Descending thoracic aortic aneurysm (25 Jul 2018 17:14)      14 system review was unremarkable  acute changes include acute respiratory failure  Vital signs, hemodynamic and respiratory parameters were reviewed from the bedside nursing flowsheet.  ICU Vital Signs Last 24 Hrs  T(C): 35.9 (02 Aug 2018 10:00), Max: 36.3 (02 Aug 2018 05:01)  T(F): 96.6 (02 Aug 2018 10:00), Max: 97.4 (02 Aug 2018 05:01)  HR: 78 (02 Aug 2018 17:00) (54 - 88)  BP: --  BP(mean): --  ABP: 130/82 (02 Aug 2018 17:00) (130/82 - 170/86)  ABP(mean): 100 (02 Aug 2018 17:00) (94 - 122)  RR: 14 (02 Aug 2018 17:00) (12 - 27)  SpO2: 100% (02 Aug 2018 17:00) (50% - 100%)    Adult Advanced Hemodynamics Last 24 Hrs  CVP(mm Hg): 20 (02 Aug 2018 17:00) (12 - 21)  CVP(cm H2O): --  CO: --  CI: --  PA: --  PA(mean): --  PCWP: --  SVR: --  SVRI: --  PVR: --  PVRI: --, ABG - ( 02 Aug 2018 11:03 )  pH, Arterial: 7.47  pH, Blood: x     /  pCO2: 34    /  pO2: 113   / HCO3: 24    / Base Excess: 0.3   /  SaO2: 98                Mode: CPAP with PS  FiO2: 50  PEEP: 5  PS: 12  MAP: 9  PIP: 17    Intake and output was reviewed and the fluid balance was calculated  Daily     Daily   I&O's Summary    01 Aug 2018 07:01  -  02 Aug 2018 07:00  --------------------------------------------------------  IN: 1362.8 mL / OUT: 1894 mL / NET: -531.2 mL    02 Aug 2018 07:01  -  02 Aug 2018 17:33  --------------------------------------------------------  IN: 1553 mL / OUT: 926 mL / NET: 627 mL        All lines and drain sites were assessed  Glycemic trend was reviewedCAPILLARY BLOOD GLUCOSE      POCT Blood Glucose.: 123 mg/dL (02 Aug 2018 16:20)    No acute change in mental status  Auscultation of the chest reveals equal bs  Abdomen is soft  Extremities are warm and well perfused  Wounds appear clean and unremarkable  Antibiotics are periop    labs  CBC Full  -  ( 02 Aug 2018 15:14 )  WBC Count : 17.2 K/uL  Hemoglobin : 10.7 g/dL  Hematocrit : 32.4 %  Platelet Count - Automated : 24 K/uL  Mean Cell Volume : 91.0 fL  Mean Cell Hemoglobin : 30.1 pg  Mean Cell Hemoglobin Concentration : 33.0 g/dL  Auto Neutrophil # : x  Auto Lymphocyte # : x  Auto Monocyte # : x  Auto Eosinophil # : x  Auto Basophil # : x  Auto Neutrophil % : x  Auto Lymphocyte % : x  Auto Monocyte % : x  Auto Eosinophil % : x  Auto Basophil % : x    08-02    141  |  100  |  108<H>  ----------------------------<  201<H>  3.8   |  24  |  2.96<H>    Ca    8.3<L>      02 Aug 2018 11:05  Phos  4.3     08-02  Mg     2.6     08-02    TPro  5.8<L>  /  Alb  3.6  /  TBili  2.8<H>  /  DBili  x   /  AST  135<H>  /  ALT  121<H>  /  AlkPhos  73  08-02    PT/INR - ( 02 Aug 2018 11:05 )   PT: 16.5 sec;   INR: 1.47          PTT - ( 02 Aug 2018 11:05 )  PTT:27.5 sec  The current medications were reviewed   MEDICATIONS  (STANDING):  atorvastatin 20 milliGRAM(s) Oral at bedtime  chlorhexidine 0.12% Liquid 5 milliLiter(s) Swish and Spit every 4 hours  chlorhexidine 2% Cloths 1 Application(s) Topical daily  ciprofloxacin   IVPB 400 milliGRAM(s) IV Intermittent every 24 hours  dexmedetomidine Infusion 0.5 MICROgram(s)/kG/Hr (6.362 mL/Hr) IV Continuous <Continuous>  dextrose 5%. 1000 milliLiter(s) (50 mL/Hr) IV Continuous <Continuous>  dextrose 50% Injectable 50 milliLiter(s) IV Push every 15 minutes  dextrose 50% Injectable 25 milliLiter(s) IV Push every 15 minutes  dextrose 50% Injectable 50 milliLiter(s) IV Push every 15 minutes  dextrose 50% Injectable 25 milliLiter(s) IV Push every 15 minutes  folic acid 1 milliGRAM(s) Oral daily  insulin lispro (HumaLOG) corrective regimen sliding scale   SubCutaneous Before meals and at bedtime  lidocaine   Patch 1 Patch Transdermal daily  metroNIDAZOLE  IVPB 500 milliGRAM(s) IV Intermittent every 8 hours  metroNIDAZOLE  IVPB      nitroglycerin    2% Ointment 0.25 Inch(s) Transdermal two times a day  pantoprazole  Injectable 40 milliGRAM(s) IV Push daily  sodium chloride 0.9%. 1000 milliLiter(s) (10 mL/Hr) IV Continuous <Continuous>  vasopressin Infusion 0.033 Unit(s)/Min (2 mL/Hr) IV Continuous <Continuous>    MEDICATIONS  (PRN):  dextrose 40% Gel 15 Gram(s) Oral once PRN Blood Glucose LESS THAN 70 milliGRAM(s)/deciliter  glucagon  Injectable 1 milliGRAM(s) IntraMuscular once PRN Glucose LESS THAN 70 milligrams/deciliter       PROBLEM LIST/ ASSESSMENT:  HEALTH ISSUES - PROBLEM Dx:      ,   Patient is a 68y old  Female who presents with a chief complaint of Descending thoracic aortic aneurysm (25 Jul 2018 17:14)     s/p cardiac surgery      My plan includes :  close hemodynamic, ventilatory and drain monitoring and management per post op routine    Monitor for arrhythmias and monitor parameters for organ perfusion  monitor neurologic status  Head of the bed should remain elevated to 45 deg .   chest PT and IS will be encouraged  monitor adequacy of oxygenation and ventilation and attempt to wean oxygen  Nutritional goals will be met using po eventually , ensure adequate caloric intake and montior the same  Stress ulcer and VTE prophylaxis will be achieved    Glycemic control is satisfactory  Electrolytes have been repleted as necessary and wound care has been carried out. Pain control has been achieved.   agressive physical therapy and early mobility and ambulation goals will be met   The family was updated about the course and plan  CRITICAL CARE TIME SPENT in evaluation and management, reassessments, review and interpretation of labs and x-rays, ventilator and hemodynamic management, formulating a plan and coordinating care: ___90____ MIN.  Time does not include procedural time.  CTICU ATTENDING     					    Tuan Hutchins MD

## 2018-08-02 NOTE — PROGRESS NOTE ADULT - SUBJECTIVE AND OBJECTIVE BOX
Subjective:  Doing well, afebrile, having regular BM, sensation and strength in both upper and lower extremity is improving      Vital Signs Last 24 Hrs  T(C): 35.9 (02 Aug 2018 10:00), Max: 36.7 (01 Aug 2018 13:42)  T(F): 96.6 (02 Aug 2018 10:00), Max: 98 (01 Aug 2018 13:42)  HR: 68 (02 Aug 2018 11:00) (54 - 103)  BP: --  BP(mean): --  RR: 15 (02 Aug 2018 11:00) (13 - 27)  SpO2: 100% (02 Aug 2018 11:00) (50% - 100%)    Physical Exam:  Gen: Awake, Alert, Responds to questions appropriately, ETT in place  Resp: Mechanical ventilated through ETT  Abdomen: Softly distended, ND,NT  Ext:  RUE: power 5/5, +2 radial pulses, sensation intact  LUE: power 5/5, +2 Radial pulses sensation intact  RLE: DP+2, power 4/5, sensation intact  LLE: DP+2, power 4/5, sensation intact        LABS:                        11.7   14.0  )-----------( 21       ( 02 Aug 2018 04:43 )             34.9     08-02    139  |  97  |  106<H>  ----------------------------<  216<H>  3.8   |  24  |  3.13<H>    Ca    8.4      02 Aug 2018 04:43  Phos  4.6     08-02  Mg     2.6     08-02    TPro  5.8<L>  /  Alb  3.6  /  TBili  2.8<H>  /  DBili  x   /  AST  135<H>  /  ALT  121<H>  /  AlkPhos  73  08-02    PT/INR - ( 02 Aug 2018 04:43 )   PT: 16.9 sec;   INR: 1.51          PTT - ( 02 Aug 2018 04:43 )  PTT:27.2 sec  CAPILLARY BLOOD GLUCOSE      POCT Blood Glucose.: 187 mg/dL (02 Aug 2018 10:58)  POCT Blood Glucose.: 164 mg/dL (02 Aug 2018 07:23)  POCT Blood Glucose.: 168 mg/dL (01 Aug 2018 23:03)  POCT Blood Glucose.: 186 mg/dL (01 Aug 2018 16:56)  POCT Blood Glucose.: 166 mg/dL (01 Aug 2018 11:34)      LIVER FUNCTIONS - ( 02 Aug 2018 04:43 )  Alb: 3.6 g/dL / Pro: 5.8 g/dL / ALK PHOS: 73 U/L / ALT: 121 U/L / AST: 135 U/L / GGT: x                 Radiology and Additional Studies:

## 2018-08-02 NOTE — PROGRESS NOTE ADULT - SUBJECTIVE AND OBJECTIVE BOX
Hematology Oncology Consult Note (Dr Tee)    The patient was seen and examined at bedside.    67 yo F with history of CAD, TIA in 1998, COPD, AAA s/p open AAA repair by Dr. Roberto in 2004, EVAR/AAA/CIAA with Dr. Burch on 3/29/17 who was referred to Dr. 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  She underwent CTA chest/abdomen/pelvis in 4/2018 which demonstrated descending thoracoabdominal aortic aneurysm with degeneration of the aorta at the mid-descending thoracic segment, measuring 5.7cm with significant mural thrombus (previously measuring 5.5cm) with widely patent celiac/SMA/renal arteries.  She completed outpatient pre-operative evaluation and was deemed a surgical candidate for thoracoabdominal aneurysm repair and admitted to [facility] on 7/25/18 under the care of Dr. Monson in anticipated of planned procedure on 7/26.    Pt underwent repair 7/26. Post-procedure labs showed plt count 58k, new coagulopathy, and worsening renal failure. She received many blood products intraop and heparin. She received pRBC, cry, ffp, plts intraop. Pt's plt count initial was responding to transfusion but then stopped responding yesterday in setting of worsenign renal failure. Her post-op complication was leg weakness 2/2 to spinal infarction requiring lumber drain placement by neurosurgery. She has had serosanguinous drainage of luids from ET suction and from her three thorax drainage tubes. No blood in ivan and no blood in stool. She has been receiving pRBC post-op despite having normal Hgb.       Interval hx: pt has been transfused 3 units of cross matched plts w/ no evidence of appropriate response despite having HLA and ANAHI antibodies present per testing at [facility]. she is currently still intubated on cpap.    ROS is otherwise negative.    PHYSICAL EXAM:    ICU Vital Signs Last 24 Hrs  T(C): 36.8 (02 Aug 2018 20:52), Max: 36.8 (02 Aug 2018 20:52)  T(F): 98.2 (02 Aug 2018 20:52), Max: 98.2 (02 Aug 2018 20:52)  HR: 72 (02 Aug 2018 23:00) (54 - 88)  ABP: 136/60 (02 Aug 2018 23:00) (130/82 - 170/86)  ABP(mean): 90 (02 Aug 2018 23:00) (90 - 122)  RR: 14 (02 Aug 2018 23:00) (12 - 19)  SpO2: 100% (02 Aug 2018 23:00) (50% - 100%)    Gen: intubated, awake, alert, follows commands  HEENT: normocephalic/atraumatic, no conjunctival pallor, no scleral icterus, no oral thrush/mucosal bleeding/mucositis + NG tube  Neck: supple, no masses, no JVD  Back: + lumbar drain  Cardiovascular: RR, nl S1S2, no murmurs/rubs/gallops. 3 thoracic drains w/ serosanguinous fluid  Respiratory: clear air entry   Gastrointestinal: BS+, soft, NT/ND, no masses, no splenomegaly, no hepatomegaly, no evidence for ascites  Extremities: no edema, no calf tenderness, DP/PT 2+ b/l, no evidence of cyanosis  Neurological: no focal deficits  Skin: no rash on visible skin  Lymph Nodes:  no cervical/supraclavicular LAD, no axillary/groin LAD      Labs                                   10.5   15.1  )-----------( 23       ( 02 Aug 2018 20:03 )             32.6         CBC Full  -  ( 02 Aug 2018 20:03 )  WBC Count : 15.1 K/uL  Hemoglobin : 10.5 g/dL  Hematocrit : 32.6 %  Platelet Count - Automated : 23 K/uL  Mean Cell Volume : 91.6 fL  Mean Cell Hemoglobin : 29.5 pg  Mean Cell Hemoglobin Concentration : 32.2 g/dL  Auto Neutrophil # : x  Auto Lymphocyte # : x  Auto Monocyte # : x  Auto Eosinophil # : x  Auto Basophil # : x  Auto Neutrophil % : x  Auto Lymphocyte % : x  Auto Monocyte % : x  Auto Eosinophil % : x  Auto Basophil % : x        08-02    141  |  100  |  108<H>  ----------------------------<  201<H>  3.8   |  24  |  2.96<H>    Ca    8.3<L>      02 Aug 2018 11:05  Phos  4.3     08-02  Mg     2.6     08-02    TPro  5.8<L>  /  Alb  3.6  /  TBili  2.8<H>  /  DBili  x   /  AST  135<H>  /  ALT  121<H>  /  AlkPhos  73  08-02        PT/INR - ( 02 Aug 2018 11:05 )   PT: 16.5 sec;   INR: 1.47          PTT - ( 02 Aug 2018 11:05 )  PTT:27.5 sec      ldh 900  hapto <10  d dimer [id number]  retic 1.3    peripheral smear: 3-4 shistocytes per hpf, toxic granualtions of neutrophils, bands, myelocytes and meta myelocytes, large plts

## 2018-08-03 LAB
ALBUMIN SERPL ELPH-MCNC: 3.5 G/DL — SIGNIFICANT CHANGE UP (ref 3.3–5)
ALBUMIN SERPL ELPH-MCNC: 3.9 G/DL — SIGNIFICANT CHANGE UP (ref 3.3–5)
ALBUMIN SERPL ELPH-MCNC: 4 G/DL — SIGNIFICANT CHANGE UP (ref 3.3–5)
ALP SERPL-CCNC: 69 U/L — SIGNIFICANT CHANGE UP (ref 40–120)
ALP SERPL-CCNC: 71 U/L — SIGNIFICANT CHANGE UP (ref 40–120)
ALP SERPL-CCNC: 75 U/L — SIGNIFICANT CHANGE UP (ref 40–120)
ALT FLD-CCNC: 71 U/L — HIGH (ref 10–45)
ALT FLD-CCNC: 77 U/L — HIGH (ref 10–45)
ALT FLD-CCNC: 88 U/L — HIGH (ref 10–45)
ANION GAP SERPL CALC-SCNC: 16 MMOL/L — SIGNIFICANT CHANGE UP (ref 5–17)
ANION GAP SERPL CALC-SCNC: 16 MMOL/L — SIGNIFICANT CHANGE UP (ref 5–17)
ANION GAP SERPL CALC-SCNC: 17 MMOL/L — SIGNIFICANT CHANGE UP (ref 5–17)
ANISOCYTOSIS BLD QL: SLIGHT — SIGNIFICANT CHANGE UP
APTT BLD: 25.9 SEC — LOW (ref 27.5–37.4)
APTT BLD: 26.6 SEC — LOW (ref 27.5–37.4)
APTT BLD: 27.5 SEC — SIGNIFICANT CHANGE UP (ref 27.5–37.4)
AST SERPL-CCNC: 58 U/L — HIGH (ref 10–40)
AST SERPL-CCNC: 70 U/L — HIGH (ref 10–40)
AST SERPL-CCNC: 87 U/L — HIGH (ref 10–40)
BILIRUB SERPL-MCNC: 2.2 MG/DL — HIGH (ref 0.2–1.2)
BILIRUB SERPL-MCNC: 2.5 MG/DL — HIGH (ref 0.2–1.2)
BILIRUB SERPL-MCNC: 2.6 MG/DL — HIGH (ref 0.2–1.2)
BUN SERPL-MCNC: 98 MG/DL — HIGH (ref 7–23)
BUN SERPL-MCNC: 98 MG/DL — HIGH (ref 7–23)
BUN SERPL-MCNC: 99 MG/DL — HIGH (ref 7–23)
CALCIUM SERPL-MCNC: 8.8 MG/DL — SIGNIFICANT CHANGE UP (ref 8.4–10.5)
CALCIUM SERPL-MCNC: 9 MG/DL — SIGNIFICANT CHANGE UP (ref 8.4–10.5)
CALCIUM SERPL-MCNC: 9.6 MG/DL — SIGNIFICANT CHANGE UP (ref 8.4–10.5)
CHLORIDE SERPL-SCNC: 100 MMOL/L — SIGNIFICANT CHANGE UP (ref 96–108)
CHLORIDE SERPL-SCNC: 105 MMOL/L — SIGNIFICANT CHANGE UP (ref 96–108)
CHLORIDE SERPL-SCNC: 106 MMOL/L — SIGNIFICANT CHANGE UP (ref 96–108)
CO2 SERPL-SCNC: 25 MMOL/L — SIGNIFICANT CHANGE UP (ref 22–31)
CO2 SERPL-SCNC: 25 MMOL/L — SIGNIFICANT CHANGE UP (ref 22–31)
CO2 SERPL-SCNC: 26 MMOL/L — SIGNIFICANT CHANGE UP (ref 22–31)
CREAT SERPL-MCNC: 2.28 MG/DL — HIGH (ref 0.5–1.3)
CREAT SERPL-MCNC: 2.36 MG/DL — HIGH (ref 0.5–1.3)
CREAT SERPL-MCNC: 2.45 MG/DL — HIGH (ref 0.5–1.3)
CRP SERPL-MCNC: 3.11 MG/DL — HIGH (ref 0–0.4)
D DIMER BLD IA.RAPID-MCNC: HIGH NG/ML DDU
D DIMER BLD IA.RAPID-MCNC: HIGH NG/ML DDU
DRVVT SCREEN TO CONFIRM RATIO: SIGNIFICANT CHANGE UP
ERYTHROCYTE [SEDIMENTATION RATE] IN BLOOD: 6 MM/HR — SIGNIFICANT CHANGE UP
FACT V ACT/NOR PPP: 56 % — LOW (ref 70–143)
FACT VIII ACT/NOR PPP: 130 % — SIGNIFICANT CHANGE UP (ref 51–138)
FIBRINOGEN PPP-MCNC: 147 MG/DL — LOW (ref 258–438)
GAS PNL BLDA: SIGNIFICANT CHANGE UP
GIANT PLATELETS BLD QL SMEAR: PRESENT — SIGNIFICANT CHANGE UP
GLUCOSE BLDC GLUCOMTR-MCNC: 116 MG/DL — HIGH (ref 70–99)
GLUCOSE BLDC GLUCOMTR-MCNC: 181 MG/DL — HIGH (ref 70–99)
GLUCOSE BLDC GLUCOMTR-MCNC: 213 MG/DL — HIGH (ref 70–99)
GLUCOSE BLDC GLUCOMTR-MCNC: 230 MG/DL — HIGH (ref 70–99)
GLUCOSE SERPL-MCNC: 148 MG/DL — HIGH (ref 70–99)
GLUCOSE SERPL-MCNC: 178 MG/DL — HIGH (ref 70–99)
GLUCOSE SERPL-MCNC: 203 MG/DL — HIGH (ref 70–99)
HAPTOGLOB SERPL-MCNC: <10 MG/DL — LOW (ref 34–200)
HCT VFR BLD CALC: 32.7 % — LOW (ref 34.5–45)
HCT VFR BLD CALC: 33.2 % — LOW (ref 34.5–45)
HCT VFR BLD CALC: 34.3 % — LOW (ref 34.5–45)
HCT VFR BLD CALC: 35.1 % — SIGNIFICANT CHANGE UP (ref 34.5–45)
HGB BLD-MCNC: 10.6 G/DL — LOW (ref 11.5–15.5)
HGB BLD-MCNC: 10.8 G/DL — LOW (ref 11.5–15.5)
HGB BLD-MCNC: 11.2 G/DL — LOW (ref 11.5–15.5)
HGB BLD-MCNC: 11.2 G/DL — LOW (ref 11.5–15.5)
INR BLD: 1.41 — HIGH (ref 0.88–1.16)
INR BLD: 1.52 — HIGH (ref 0.88–1.16)
INR BLD: 1.52 — HIGH (ref 0.88–1.16)
LA NT DPL PPP QL: 30.3 SEC — SIGNIFICANT CHANGE UP
LACTATE SERPL-SCNC: 1.5 MMOL/L — SIGNIFICANT CHANGE UP (ref 0.5–2)
LACTATE SERPL-SCNC: 1.7 MMOL/L — SIGNIFICANT CHANGE UP (ref 0.5–2)
LACTATE SERPL-SCNC: 1.9 MMOL/L — SIGNIFICANT CHANGE UP (ref 0.5–2)
LDH SERPL L TO P-CCNC: 694 U/L — HIGH (ref 50–242)
LG PLATELETS BLD QL AUTO: PRESENT — SIGNIFICANT CHANGE UP
LYMPHOCYTES # BLD AUTO: 4 % — LOW (ref 13–44)
MACROCYTES BLD QL: SLIGHT — SIGNIFICANT CHANGE UP
MAGNESIUM SERPL-MCNC: 2.2 MG/DL — SIGNIFICANT CHANGE UP (ref 1.6–2.6)
MAGNESIUM SERPL-MCNC: 2.2 MG/DL — SIGNIFICANT CHANGE UP (ref 1.6–2.6)
MAGNESIUM SERPL-MCNC: 2.4 MG/DL — SIGNIFICANT CHANGE UP (ref 1.6–2.6)
MANUAL DIF COMMENT BLD-IMP: SIGNIFICANT CHANGE UP
MANUAL SMEAR VERIFICATION: SIGNIFICANT CHANGE UP
MCHC RBC-ENTMCNC: 29.6 PG — SIGNIFICANT CHANGE UP (ref 27–34)
MCHC RBC-ENTMCNC: 29.6 PG — SIGNIFICANT CHANGE UP (ref 27–34)
MCHC RBC-ENTMCNC: 29.9 PG — SIGNIFICANT CHANGE UP (ref 27–34)
MCHC RBC-ENTMCNC: 30.1 PG — SIGNIFICANT CHANGE UP (ref 27–34)
MCHC RBC-ENTMCNC: 31.9 G/DL — LOW (ref 32–36)
MCHC RBC-ENTMCNC: 32.4 G/DL — SIGNIFICANT CHANGE UP (ref 32–36)
MCHC RBC-ENTMCNC: 32.5 G/DL — SIGNIFICANT CHANGE UP (ref 32–36)
MCHC RBC-ENTMCNC: 32.7 G/DL — SIGNIFICANT CHANGE UP (ref 32–36)
MCV RBC AUTO: 90.7 FL — SIGNIFICANT CHANGE UP (ref 80–100)
MCV RBC AUTO: 92.1 FL — SIGNIFICANT CHANGE UP (ref 80–100)
MCV RBC AUTO: 92.5 FL — SIGNIFICANT CHANGE UP (ref 80–100)
MCV RBC AUTO: 92.9 FL — SIGNIFICANT CHANGE UP (ref 80–100)
MICROCYTES BLD QL: SLIGHT — SIGNIFICANT CHANGE UP
MONOCYTES NFR BLD AUTO: 3 % — SIGNIFICANT CHANGE UP (ref 2–14)
NEUTROPHILS NFR BLD AUTO: 87 % — HIGH (ref 43–77)
NEUTS BAND # BLD: 6 % — SIGNIFICANT CHANGE UP
OVALOCYTES BLD QL SMEAR: SLIGHT — SIGNIFICANT CHANGE UP
PHOSPHATE SERPL-MCNC: 4 MG/DL — SIGNIFICANT CHANGE UP (ref 2.5–4.5)
PHOSPHATE SERPL-MCNC: 4 MG/DL — SIGNIFICANT CHANGE UP (ref 2.5–4.5)
PHOSPHATE SERPL-MCNC: 4.1 MG/DL — SIGNIFICANT CHANGE UP (ref 2.5–4.5)
PLAT MORPH BLD: ABNORMAL
PLATELET # BLD AUTO: 27 K/UL — LOW (ref 150–400)
PLATELET # BLD AUTO: 28 K/UL — LOW (ref 150–400)
PLATELET # BLD AUTO: 35 K/UL — LOW (ref 150–400)
PLATELET # BLD AUTO: 37 K/UL — LOW (ref 150–400)
POLYCHROMASIA BLD QL SMEAR: SLIGHT — SIGNIFICANT CHANGE UP
POTASSIUM SERPL-MCNC: 3.7 MMOL/L — SIGNIFICANT CHANGE UP (ref 3.5–5.3)
POTASSIUM SERPL-MCNC: 3.7 MMOL/L — SIGNIFICANT CHANGE UP (ref 3.5–5.3)
POTASSIUM SERPL-MCNC: 4 MMOL/L — SIGNIFICANT CHANGE UP (ref 3.5–5.3)
POTASSIUM SERPL-SCNC: 3.7 MMOL/L — SIGNIFICANT CHANGE UP (ref 3.5–5.3)
POTASSIUM SERPL-SCNC: 3.7 MMOL/L — SIGNIFICANT CHANGE UP (ref 3.5–5.3)
POTASSIUM SERPL-SCNC: 4 MMOL/L — SIGNIFICANT CHANGE UP (ref 3.5–5.3)
PROT SERPL-MCNC: 6.1 G/DL — SIGNIFICANT CHANGE UP (ref 6–8.3)
PROT SERPL-MCNC: 6.3 G/DL — SIGNIFICANT CHANGE UP (ref 6–8.3)
PROT SERPL-MCNC: 6.5 G/DL — SIGNIFICANT CHANGE UP (ref 6–8.3)
PROTHROM AB SERPL-ACNC: 15.8 SEC — HIGH (ref 9.8–12.7)
PROTHROM AB SERPL-ACNC: 17 SEC — HIGH (ref 9.8–12.7)
PROTHROM AB SERPL-ACNC: 17 SEC — HIGH (ref 9.8–12.7)
RBC # BLD: 3.55 M/UL — LOW (ref 3.8–5.2)
RBC # BLD: 3.59 M/UL — LOW (ref 3.8–5.2)
RBC # BLD: 3.78 M/UL — LOW (ref 3.8–5.2)
RBC # BLD: 3.78 M/UL — LOW (ref 3.8–5.2)
RBC # FLD: 16.2 % — SIGNIFICANT CHANGE UP (ref 10.3–16.9)
RBC # FLD: 16.4 % — SIGNIFICANT CHANGE UP (ref 10.3–16.9)
RBC # FLD: 16.6 % — SIGNIFICANT CHANGE UP (ref 10.3–16.9)
RBC # FLD: 17 % — HIGH (ref 10.3–16.9)
RBC BLD AUTO: ABNORMAL
SMUDGE CELLS # BLD: SIGNIFICANT CHANGE UP
SODIUM SERPL-SCNC: 141 MMOL/L — SIGNIFICANT CHANGE UP (ref 135–145)
SODIUM SERPL-SCNC: 147 MMOL/L — HIGH (ref 135–145)
SODIUM SERPL-SCNC: 148 MMOL/L — HIGH (ref 135–145)
WBC # BLD: 12.9 K/UL — HIGH (ref 3.8–10.5)
WBC # BLD: 13.6 K/UL — HIGH (ref 3.8–10.5)
WBC # BLD: 16.2 K/UL — HIGH (ref 3.8–10.5)
WBC # BLD: 8.8 K/UL — SIGNIFICANT CHANGE UP (ref 3.8–10.5)
WBC # FLD AUTO: 12.9 K/UL — HIGH (ref 3.8–10.5)
WBC # FLD AUTO: 13.6 K/UL — HIGH (ref 3.8–10.5)
WBC # FLD AUTO: 16.2 K/UL — HIGH (ref 3.8–10.5)
WBC # FLD AUTO: 8.8 K/UL — SIGNIFICANT CHANGE UP (ref 3.8–10.5)

## 2018-08-03 PROCEDURE — 36620 INSERTION CATHETER ARTERY: CPT | Mod: 78

## 2018-08-03 PROCEDURE — 36556 INSERT NON-TUNNEL CV CATH: CPT | Mod: 78

## 2018-08-03 PROCEDURE — 99291 CRITICAL CARE FIRST HOUR: CPT

## 2018-08-03 PROCEDURE — 71045 X-RAY EXAM CHEST 1 VIEW: CPT | Mod: 26

## 2018-08-03 PROCEDURE — 99292 CRITICAL CARE ADDL 30 MIN: CPT

## 2018-08-03 RX ORDER — FENTANYL CITRATE 50 UG/ML
12.5 INJECTION INTRAVENOUS ONCE
Qty: 0 | Refills: 0 | Status: DISCONTINUED | OUTPATIENT
Start: 2018-08-03 | End: 2018-08-03

## 2018-08-03 RX ORDER — POTASSIUM CHLORIDE 20 MEQ
20 PACKET (EA) ORAL ONCE
Qty: 0 | Refills: 0 | Status: COMPLETED | OUTPATIENT
Start: 2018-08-03 | End: 2018-08-03

## 2018-08-03 RX ORDER — CALCIUM GLUCONATE 100 MG/ML
2 VIAL (ML) INTRAVENOUS ONCE
Qty: 0 | Refills: 0 | Status: COMPLETED | OUTPATIENT
Start: 2018-08-03 | End: 2018-08-03

## 2018-08-03 RX ORDER — FUROSEMIDE 40 MG
20 TABLET ORAL ONCE
Qty: 0 | Refills: 0 | Status: COMPLETED | OUTPATIENT
Start: 2018-08-03 | End: 2018-08-03

## 2018-08-03 RX ADMIN — LIDOCAINE 1 PATCH: 4 CREAM TOPICAL at 11:42

## 2018-08-03 RX ADMIN — Medication 2: at 22:06

## 2018-08-03 RX ADMIN — CHLORHEXIDINE GLUCONATE 1 APPLICATION(S): 213 SOLUTION TOPICAL at 11:44

## 2018-08-03 RX ADMIN — Medication 20 MILLIGRAM(S): at 10:06

## 2018-08-03 RX ADMIN — Medication 100 MILLIEQUIVALENT(S): at 01:00

## 2018-08-03 RX ADMIN — FENTANYL CITRATE 12.5 MICROGRAM(S): 50 INJECTION INTRAVENOUS at 11:15

## 2018-08-03 RX ADMIN — CHLORHEXIDINE GLUCONATE 5 MILLILITER(S): 213 SOLUTION TOPICAL at 18:22

## 2018-08-03 RX ADMIN — FENTANYL CITRATE 12.5 MICROGRAM(S): 50 INJECTION INTRAVENOUS at 13:12

## 2018-08-03 RX ADMIN — LIDOCAINE 1 PATCH: 4 CREAM TOPICAL at 23:00

## 2018-08-03 RX ADMIN — FENTANYL CITRATE 12.5 MICROGRAM(S): 50 INJECTION INTRAVENOUS at 20:00

## 2018-08-03 RX ADMIN — LIDOCAINE 1 PATCH: 4 CREAM TOPICAL at 00:20

## 2018-08-03 RX ADMIN — FENTANYL CITRATE 12.5 MICROGRAM(S): 50 INJECTION INTRAVENOUS at 12:16

## 2018-08-03 RX ADMIN — Medication 100 MILLIGRAM(S): at 06:00

## 2018-08-03 RX ADMIN — FENTANYL CITRATE 12.5 MICROGRAM(S): 50 INJECTION INTRAVENOUS at 12:58

## 2018-08-03 RX ADMIN — CHLORHEXIDINE GLUCONATE 5 MILLILITER(S): 213 SOLUTION TOPICAL at 14:21

## 2018-08-03 RX ADMIN — Medication 4: at 07:42

## 2018-08-03 RX ADMIN — Medication 0.25 INCH(S): at 17:18

## 2018-08-03 RX ADMIN — Medication 0.25 INCH(S): at 18:23

## 2018-08-03 RX ADMIN — Medication 0.25 INCH(S): at 05:28

## 2018-08-03 RX ADMIN — Medication 1 MILLIGRAM(S): at 11:41

## 2018-08-03 RX ADMIN — CHLORHEXIDINE GLUCONATE 5 MILLILITER(S): 213 SOLUTION TOPICAL at 05:26

## 2018-08-03 RX ADMIN — CHLORHEXIDINE GLUCONATE 5 MILLILITER(S): 213 SOLUTION TOPICAL at 22:07

## 2018-08-03 RX ADMIN — Medication 100 MILLIEQUIVALENT(S): at 23:19

## 2018-08-03 RX ADMIN — Medication 200 GRAM(S): at 02:31

## 2018-08-03 RX ADMIN — Medication 4: at 11:41

## 2018-08-03 RX ADMIN — FENTANYL CITRATE 25 MICROGRAM(S): 50 INJECTION INTRAVENOUS at 07:37

## 2018-08-03 RX ADMIN — ATORVASTATIN CALCIUM 20 MILLIGRAM(S): 80 TABLET, FILM COATED ORAL at 22:07

## 2018-08-03 RX ADMIN — FENTANYL CITRATE 25 MICROGRAM(S): 50 INJECTION INTRAVENOUS at 03:10

## 2018-08-03 RX ADMIN — FENTANYL CITRATE 12.5 MICROGRAM(S): 50 INJECTION INTRAVENOUS at 19:39

## 2018-08-03 RX ADMIN — CHLORHEXIDINE GLUCONATE 5 MILLILITER(S): 213 SOLUTION TOPICAL at 02:32

## 2018-08-03 RX ADMIN — CHLORHEXIDINE GLUCONATE 5 MILLILITER(S): 213 SOLUTION TOPICAL at 10:14

## 2018-08-03 RX ADMIN — FENTANYL CITRATE 25 MICROGRAM(S): 50 INJECTION INTRAVENOUS at 02:53

## 2018-08-03 RX ADMIN — PANTOPRAZOLE SODIUM 40 MILLIGRAM(S): 20 TABLET, DELAYED RELEASE ORAL at 11:41

## 2018-08-03 RX ADMIN — FENTANYL CITRATE 25 MICROGRAM(S): 50 INJECTION INTRAVENOUS at 07:43

## 2018-08-03 NOTE — PROGRESS NOTE ADULT - SUBJECTIVE AND OBJECTIVE BOX
Hematology Oncology Consult Note (Dr Tee)    The patient was seen and examined at bedside.    69 yo F with history of CAD, TIA in 1998, COPD, AAA s/p open AAA repair by Dr. Roberto in 2004, EVAR/AAA/CIAA with Dr. Burch on 3/29/17 who was referred to Dr. 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  She underwent CTA chest/abdomen/pelvis in 4/2018 which demonstrated descending thoracoabdominal aortic aneurysm with degeneration of the aorta at the mid-descending thoracic segment, measuring 5.7cm with significant mural thrombus (previously measuring 5.5cm) with widely patent celiac/SMA/renal arteries.  She completed outpatient pre-operative evaluation and was deemed a surgical candidate for thoracoabdominal aneurysm repair and admitted to [facility] on 7/25/18 under the care of Dr. Monson in anticipated of planned procedure on 7/26.    Pt underwent repair 7/26. Post-procedure labs showed plt count 58k, new coagulopathy, and worsening renal failure. She received many blood products intraop and heparin. She received pRBC, cry, ffp, plts intraop. Pt's plt count initial was responding to transfusion but then stopped responding yesterday in setting of worsenign renal failure. Her post-op complication was leg weakness 2/2 to spinal infarction requiring lumber drain placement by neurosurgery. She has had serosanguinous drainage of luids from ET suction and from her three thorax drainage tubes. No blood in ivan and no blood in stool. She has been receiving pRBC post-op despite having normal Hgb.       Interval hx: pt has now received multiple cross matched plt transfusions with little or no response, no evidence of bleeding, toes and soles of feet are dusky ultrasound of abd and lower ext pending. she has good pulses of her hands and feet. plan is to remove drains today- thorax and lumbar    ROS is otherwise negative.    PHYSICAL EXAM:    ICU Vital Signs Last 24 Hrs  T(C): 36.4 (03 Aug 2018 09:00), Max: 36.8 (02 Aug 2018 20:52)  T(F): 97.5 (03 Aug 2018 09:00), Max: 98.2 (02 Aug 2018 20:52)  HR: 86 (03 Aug 2018 14:00) (63 - 86)  BP: 164/88 (03 Aug 2018 13:00) (152/82 - 164/88)  BP(mean): 119 (03 Aug 2018 13:00) (119 - 125)  ABP: 182/84 (03 Aug 2018 14:00) (112/78 - 182/90)  ABP(mean): 122 (03 Aug 2018 14:00) (90 - 128)  RR: 19 (03 Aug 2018 14:00) (12 - 19)  SpO2: 100% (03 Aug 2018 14:00) (98% - 100%)      Gen: intubated, awake, alert, follows commands  HEENT: normocephalic/atraumatic, no conjunctival pallor, no scleral icterus, no oral thrush/mucosal bleeding/mucositis + NG tube  Neck: supple, no masses, no JVD  Back: + lumbar drain  Cardiovascular: RR, nl S1S2, no murmurs/rubs/gallops. 3 thoracic drains w/ serosanguinous fluid  Respiratory: clear air entry   Gastrointestinal: BS+, soft, NT/ND, no masses, no splenomegaly, no hepatomegaly, no evidence for ascites  Extremities: no edema, no calf tenderness, DP/PT 2+ b/l, evidence of acral cyanosis, brisk pulses  Neurological: no focal deficits  Skin: no rash on visible skin  Lymph Nodes:  no cervical/supraclavicular LAD, no axillary/groin LAD      Labs                          11.2   8.8   )-----------( 27       ( 03 Aug 2018 12:00 )             35.1         CBC Full  -  ( 03 Aug 2018 12:00 )  WBC Count : 8.8 K/uL  Hemoglobin : 11.2 g/dL  Hematocrit : 35.1 %  Platelet Count - Automated : 27 K/uL  Mean Cell Volume : 92.9 fL  Mean Cell Hemoglobin : 29.6 pg  Mean Cell Hemoglobin Concentration : 31.9 g/dL  Auto Neutrophil % : 87.0 %  Auto Lymphocyte % : 4.0 %  Auto Monocyte % : 3.0 %      08-03    141  |  100  |  99<H>  ----------------------------<  203<H>  4.0   |  25  |  2.45<H>    Ca    9.6      03 Aug 2018 03:12  Phos  4.0     08-03  Mg     2.4     08-03    TPro  6.1  /  Alb  4.0  /  TBili  2.6<H>  /  DBili  x   /  AST  87<H>  /  ALT  88<H>  /  AlkPhos  69  08-03        PT/INR - ( 03 Aug 2018 03:12 )   PT: 17.0 sec;   INR: 1.52          PTT - ( 03 Aug 2018 03:12 )  PTT:25.9 sec             d dimer [id number]  retic 1.3    peripheral smear: 3-4 shistocytes per hpf, toxic granualtions of neutrophils, bands, myelocytes and meta myelocytes, large plts

## 2018-08-03 NOTE — PROCEDURE NOTE - NSINFORMCONSENT_GEN_A_CORE
Benefits, risks, and possible complications of procedure explained to patient/caregiver who verbalized understanding and gave verbal consent.
Benefits, risks, and possible complications of procedure explained to patient/caregiver who verbalized understanding and gave verbal consent.
This was an emergent procedure.
Benefits, risks, and possible complications of procedure explained to patient/caregiver who verbalized understanding and gave verbal consent.

## 2018-08-03 NOTE — PROGRESS NOTE ADULT - SUBJECTIVE AND OBJECTIVE BOX
Doing well. Denies much abdominal pain other than incision  Soft, mild dist, NT    Low suspicion for bowel ischemia at this point. Will sign off.

## 2018-08-03 NOTE — PROGRESS NOTE ADULT - ASSESSMENT
68 year old female with history of CAD, TIA in 1998, COPD, AAA s/p open AAA repair in 2004, EVAR/CIAA, with extension into L hypogastric & DIONNE in 3/29/17, Presented for a descending thoracoabdominal aortic aneurysm repair (aneurysm 5.7 with mural thrombus and widely patent Celiac and SMA),   Post op patient course complicated by CARRIE, Thrombocytopenia (possible DIC) and lactic acidosis, suspicious for mesenteric ischemia that was ruled by GI, patient Lactic acid went up again when she was started on pressors, now she is off pressors, her lactic acid is down to normal, also suffered from bilateral hand cyanosis and tingling that improved after stopping pressors.   Motor function has improved her labs are remarkable. Today chest tubes were removed, lumbar drain was removed, received 2 units of Platelets for thrombocytopenia, Hem/Onc on board.    Plan:  - No acute surgical intervention by vascular surgery  - Possible extubation tomorrow  - rest of plan by CTICU  - Please call for any questions.

## 2018-08-03 NOTE — PROGRESS NOTE ADULT - ASSESSMENT
This is a 68 year old female ith history of CAD, TIA in 1998, COPD, AAA s/p open AAA repair by Dr. Roberto in 2004, EVAR/AAA/CIAA with Dr. Burch on 3/29/17 who was referred to Dr. 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 Pt does have 3-4 shistocytes per HPF in setting of elevated LDH and low haptoglobin, but presence of coagulopathy and elevated d dimer supports DIC as the diagnosis of choice. Although she does have worsening renal failure and thrombocytopenia, the onset of thrombocytopenia immediately post-op sways us away from this as a dx and towards DIC. there is also lack of anemia and no elevation in retic count which also goes against TTP as the diagnosis. low fibrinogen low factor V level and elevated d dimer support DIC as dx- likely a result of manipulation of vascular endothelium resulting in widespread coagulation cascade activation.   - pt's LDH has improved and her renal function is improving as well, f/u [id number] level. suspicion for TTP at the current time is not high, hwoever, may consider steroids +/- plasma exchange if no improvement in plts w/ rising LDH, worsening anemia and increasing retic count  - pt had heprin exposure intraop. 4T score is low-int, no documented VTE. PF4 Ab neg. can be false negative. SHANTELLE neg, unlikely HIT  - pt was initially responding to plt transfusions and now she is plt refractory, cross matched plt testing reveals presence of HLA antibodies and PL Ag- s/p transfusion of 5 cross matched units of plts w/ no adequate incremental response. AVOID further plt transfusion as their consumption is fueling the fire and likely contributing to microthrombi formation. once drains are removed, if ultrasounds show evidence of VTE will consider treatment with hep gtt  - will check LE dopplers and complete abd ultrasound for any VTE events. also eval for splenomegaly on Abd ultrasound  - will check Lupus profile to eval for antiphospholipid syndrome and catastrophic APS  - c/w folic acid 1 mg IV daily for ongoing hemolysis.       #Coagulopathy  - likely consistent with DIC, fibrinogen and Factor VIII may be falsely high as they can be acute phase reactants, but D dimer of [address] in setting of shistocytes on smear and elevated LDH low haptoglobin support this diagnosis. would recommend no reversal of coaguloapthy unless pt is bleeding with INR >1.6. Avoid FFP and cryoprecipitate as pt is already high risk for thrombosis. treat underlying condition responsible for DIC. Can consider heparin gtt to treat any potential of thrombotic events with DIC- this may improve the plt count as well. Factor V and fibrinogen are low consistent with DIC    Case discussed with Dr Tee and Dr Levi today.

## 2018-08-03 NOTE — PROGRESS NOTE ADULT - ASSESSMENT
Problems  1. S/p TAAA on 7/26  2. Concern for Spinal cord ischemia   3. Post op resp failure  4. CKD stage 3  5. Thrombocytopenia -- concern for DIC per Heme    Plan   Neuro -- pain control, sedation PRN  continue to follow neuro exam   CVS - permissive hypertensive for concern for spinal cord ischemia   Pulm - tolerating CPAP   GI - GI proph   - monitor UOP  Endo - glycemic control  Heme - s/p platelet transfusion.   DIC workup underway  ID - antibiotics de-escalated.       Critical post op.    Critical care time spent 80 min

## 2018-08-03 NOTE — PROCEDURE NOTE - NSINDICATIONS_GEN_A_CORE
monitoring purposes/blood sampling/critical patient
venous blood sampling/venous access
critical patient/arterial puncture to obtain ABG's/cannulation purposes

## 2018-08-03 NOTE — PROGRESS NOTE ADULT - SUBJECTIVE AND OBJECTIVE BOX
PMH :  THORACOABDOMINAL ANEURYS  THORACOABDOMINAL ANEURYSM  No pertinent family history in first degree relatives  Handoff  History of seizures  COPD (chronic obstructive pulmonary disease)  HLD (hyperlipidemia)  HTN (hypertension)  AAA (abdominal aortic aneurysm)  CAD (coronary artery disease)  Thoracoabdominal aortic aneurysm  Thoracoabdominal aortic aneurysm  Thoracic aortic aneurysm without rupture  Lumbar drain implantation  Thoracoabdominal surgical exposure  S/P AAA (abdominal aortic aneurysm) repair  History of abdominal aortic aneurysm (AAA)  History of cholecystectomy    ROS  All other systems reviewed and negative.    ICU Vital Signs Last 24 Hrs  T(C): 36.4 (08-03-18 @ 09:00), Max: 36.8 (08-02-18 @ 20:52)  T(F): 97.5 (08-03-18 @ 09:00), Max: 98.2 (08-02-18 @ 20:52)  HR: 88 (08-03-18 @ 15:00) (63 - 88)  BP: 164/88 (08-03-18 @ 13:00) (152/82 - 164/88)  BP(mean): 119 (08-03-18 @ 13:00) (119 - 125)  ABP: 166/88 (08-03-18 @ 15:00) (112/78 - 182/90)  ABP(mean): 124 (08-03-18 @ 15:00) (90 - 128)  RR: 17 (08-03-18 @ 15:00) (12 - 19)  SpO2: 100% (08-03-18 @ 15:00) (98% - 100%)    I&O's Summary    02 Aug 2018 07:01  -  03 Aug 2018 07:00  --------------------------------------------------------  IN: 2873.2 mL / OUT: 2371 mL / NET: 502.2 mL    03 Aug 2018 07:01  -  03 Aug 2018 16:09  --------------------------------------------------------  IN: 600 mL / OUT: 1020 mL / NET: -420 mL      Mode: CPAP with PS  FiO2: 50  PEEP: 5  PS: 12    ABG - ( 03 Aug 2018 15:08 )  pH: 7.44  /  pCO2: 35    /  pO2: 100   / HCO3: 24    / Base Excess: -0.2  /  SaO2: 98                                      11.2   8.8   )-----------( 27       ( 03 Aug 2018 12:00 )             35.1     03 Aug 2018 15:08    147    |  105    |  98     ----------------------------<  148    3.7     |  25     |  2.36     Ca    9.0        03 Aug 2018 15:08  Phos  4.1       03 Aug 2018 15:08  Mg     2.2       03 Aug 2018 15:08    TPro  6.5    /  Alb  3.9    /  TBili  2.5    /  DBili  x      /  AST  70     /  ALT  77     /  AlkPhos  75     03 Aug 2018 15:08    PT/INR - ( 03 Aug 2018 15:08 )   PT: 15.8 sec;   INR: 1.41          PTT - ( 03 Aug 2018 15:08 )  PTT:27.5 sec  MEDICATIONS  (STANDING):  atorvastatin 20 milliGRAM(s) Oral at bedtime  chlorhexidine 0.12% Liquid 5 milliLiter(s) Swish and Spit every 4 hours  chlorhexidine 2% Cloths 1 Application(s) Topical daily  dexmedetomidine Infusion 0.5 MICROgram(s)/kG/Hr IV Continuous <Continuous>  dextrose 5%. 1000 milliLiter(s) IV Continuous <Continuous>  dextrose 50% Injectable 50 milliLiter(s) IV Push every 15 minutes  dextrose 50% Injectable 25 milliLiter(s) IV Push every 15 minutes  dextrose 50% Injectable 50 milliLiter(s) IV Push every 15 minutes  dextrose 50% Injectable 25 milliLiter(s) IV Push every 15 minutes  folic acid 1 milliGRAM(s) Oral daily  insulin lispro (HumaLOG) corrective regimen sliding scale   SubCutaneous Before meals and at bedtime  lidocaine   Patch 1 Patch Transdermal daily  nitroglycerin    2% Ointment 0.25 Inch(s) Transdermal two times a day  pantoprazole  Injectable 40 milliGRAM(s) IV Push daily  sodium chloride 0.9%. 1000 milliLiter(s) IV Continuous <Continuous>    Home Medications:  Advair Diskus 100 mcg-50 mcg inhalation powder: 1 puff(s) inhaled 2 times a day (21 May 2018 07:27)  amLODIPine 5 mg oral tablet: 1 tab(s) orally once a day (21 May 2018 07:27)  aspirin 81 mg oral tablet: 1 tab(s) orally once a day (21 May 2018 07:27)  omeprazole 20 mg oral delayed release tablet: 1 tab(s) orally once a day (21 May 2018 07:27)  simvastatin 20 mg oral tablet: 1 tab(s) orally once a day (at bedtime) (21 May 2018 07:27)  Spiriva Respimat 1.25 mcg/inh inhalation aerosol: 2 puff(s) inhaled once a day (21 May 2018 07:27)  Ventolin HFA 90 mcg/inh inhalation aerosol: 2 puff(s) inhaled 4 times a day, As Needed (21 May 2018 07:27)    PHYSICAL EXAM:  Gen : no acute distress  Neck: No LAD, No JVD  Respiratory: decreased in the bases  Cardiovascular: S1 and S2, RRR, no M/G/R  Gastrointestinal: BS+, soft, NT/ND  Extremities: No peripheral edema  Vascular: 2+ peripheral pulses  Neurological: A/O x 3, no focal deficits  Incision: clean dry/ no sign of infection  Lines: no sign of infection 69yo with history of CAD, TIA,  open AAA s/p repair in 2004 and 3/2017  EVAR with coverage of left hypogastric artery and graft extension into the left EIA with Dr Solorzano for management of left iliac artery aneurysm.   Recent imaging showing descending aortic aneurysm with degenerated aorta with significant mural thrombus.        CT done in April 2018 : Diffusely aneurysmal descending thoracic aorta increased in size, with thick atheromatous plaques and mural thrombus.  Suprarenal abdominal aorta was also aneurysmal and increased in size. Patent Celiac and SMA and renals.  Infra-renal endoluminal stent with non-occlusive thrombus.     7/26  open thoraco-abdominal aorta replacement with separate re-implantation of meso-renals, bypass to celiac/SMA and bilateral renals.  Placement of Lumbar Drain    Post op issues with   1. Spinal cord ischemia   2. Severe thrombocytopenia and concern for DIC  3. Acrocyanosis     Over the course of the day, tolerated removal of lumbar drain , removal of chest tubes and line insertion during platelet transfusion      PMH :  COPD (chronic obstructive pulmonary disease)  HLD (hyperlipidemia)  HTN (hypertension)  AAA (abdominal aortic aneurysm)  CAD (coronary artery disease)  Thoracic aortic aneurysm without rupture  Lumbar drain implantation  Thoracoabdominal surgical exposure  S/P AAA (abdominal aortic aneurysm) repair      ICU Vital Signs Last 24 Hrs  T(C): 36.4 (08-03-18 @ 09:00), Max: 36.8 (08-02-18 @ 20:52)  T(F): 97.5 (08-03-18 @ 09:00), Max: 98.2 (08-02-18 @ 20:52)  HR: 88 (08-03-18 @ 15:00) (63 - 88)  BP: 164/88 (08-03-18 @ 13:00) (152/82 - 164/88)  BP(mean): 119 (08-03-18 @ 13:00) (119 - 125)  ABP: 166/88 (08-03-18 @ 15:00) (112/78 - 182/90)  ABP(mean): 124 (08-03-18 @ 15:00) (90 - 128)  RR: 17 (08-03-18 @ 15:00) (12 - 19)  SpO2: 100% (08-03-18 @ 15:00) (98% - 100%)    I&O's Summary    02 Aug 2018 07:01  -  03 Aug 2018 07:00  --------------------------------------------------------  IN: 2873.2 mL / OUT: 2371 mL / NET: 502.2 mL    03 Aug 2018 07:01  -  03 Aug 2018 16:09  --------------------------------------------------------  IN: 600 mL / OUT: 1020 mL / NET: -420 mL      Mode: CPAP with PS  FiO2: 50  PEEP: 5  PS: 12    ABG - ( 03 Aug 2018 15:08 )  pH: 7.44  /  pCO2: 35    /  pO2: 100   / HCO3: 24    / Base Excess: -0.2  /  SaO2: 98                              11.2   8.8   )-----------( 27       ( 03 Aug 2018 12:00 )             35.1     03 Aug 2018 15:08    147    |  105    |  98     ----------------------------<  148    3.7     |  25     |  2.36     Ca    9.0        03 Aug 2018 15:08  Phos  4.1       03 Aug 2018 15:08  Mg     2.2       03 Aug 2018 15:08    TPro  6.5    /  Alb  3.9    /  TBili  2.5    /  DBili  x      /  AST  70     /  ALT  77     /  AlkPhos  75     03 Aug 2018 15:08    PT/INR - ( 03 Aug 2018 15:08 )   PT: 15.8 sec;   INR: 1.41     PTT - ( 03 Aug 2018 15:08 )  PTT:27.5 sec    MEDICATIONS  (STANDING):  atorvastatin 20 milliGRAM(s) Oral at bedtime  chlorhexidine 0.12% Liquid 5 milliLiter(s) Swish and Spit every 4 hours  dexmedetomidine Infusion 0.5 MICROgram(s)/kG/Hr IV Continuous <Continuous>  folic acid 1 milliGRAM(s) Oral daily  insulin lispro (HumaLOG) corrective regimen sliding scale   SubCutaneous Before meals and at bedtime  lidocaine   Patch 1 Patch Transdermal daily  nitroglycerin    2% Ointment 0.25 Inch(s) Transdermal two times a day  pantoprazole  Injectable 40 milliGRAM(s) IV Push daily      Home Medications:  Advair Diskus 100 mcg-50 mcg inhalation powder: 1 puff(s) inhaled 2 times a day (21 May 2018 07:27)  amLODIPine 5 mg oral tablet: 1 tab(s) orally once a day (21 May 2018 07:27)  aspirin 81 mg oral tablet: 1 tab(s) orally once a day (21 May 2018 07:27)  omeprazole 20 mg oral delayed release tablet: 1 tab(s) orally once a day (21 May 2018 07:27)  simvastatin 20 mg oral tablet: 1 tab(s) orally once a day (at bedtime) (21 May 2018 07:27)  Spiriva Respimat 1.25 mcg/inh inhalation aerosol: 2 puff(s) inhaled once a day (21 May 2018 07:27)  Ventolin HFA 90 mcg/inh inhalation aerosol: 2 puff(s) inhaled 4 times a day, As Needed (21 May 2018 07:27)    PHYSICAL EXAM:  Gen : no acute distress  Respiratory: decreased in the bases  Cardiovascular: S1 and S2, RRR, no M/G/R  Gastrointestinal: BS+, soft, NT/ND  Extremities: No peripheral edema  Vascular: 2+ peripheral pulses  Neurological: A/O x 3  RLE 4+/5  LLE 4/ 5  Incision: clean dry/ no sign of infection + ecchymosis   Lines: no sign of infection

## 2018-08-03 NOTE — PROGRESS NOTE ADULT - SUBJECTIVE AND OBJECTIVE BOX
Subjective:  Doing well, afebrile, denies abdominal pain, having regular BM.    24 hours event:  Received 2 units of Platelets   chest tubes removed  lumbar drain removed.    Vital Signs Last 24 Hrs  T(C): 36.4 (03 Aug 2018 09:00), Max: 36.8 (02 Aug 2018 20:52)  T(F): 97.5 (03 Aug 2018 09:00), Max: 98.2 (02 Aug 2018 20:52)  HR: 86 (03 Aug 2018 14:00) (63 - 86)  BP: 164/88 (03 Aug 2018 13:00) (152/82 - 164/88)  BP(mean): 119 (03 Aug 2018 13:00) (119 - 125)  RR: 19 (03 Aug 2018 14:00) (12 - 19)  SpO2: 100% (03 Aug 2018 14:00) (98% - 100%)    Physical Exam:  Gen: Awake, alert, intubated, responds to questions and follow command  Resp: Mechanical breathing through ETT  Abdomen:  Softly distended, NT        LABS:                        11.2   8.8   )-----------( 27       ( 03 Aug 2018 12:00 )             35.1     08-03    141  |  100  |  99<H>  ----------------------------<  203<H>  4.0   |  25  |  2.45<H>    Ca    9.6      03 Aug 2018 03:12  Phos  4.0     08-03  Mg     2.4     08-03    TPro  6.1  /  Alb  4.0  /  TBili  2.6<H>  /  DBili  x   /  AST  87<H>  /  ALT  88<H>  /  AlkPhos  69  08-03    PT/INR - ( 03 Aug 2018 03:12 )   PT: 17.0 sec;   INR: 1.52          PTT - ( 03 Aug 2018 03:12 )  PTT:25.9 sec  CAPILLARY BLOOD GLUCOSE      POCT Blood Glucose.: 213 mg/dL (03 Aug 2018 11:31)  POCT Blood Glucose.: 230 mg/dL (03 Aug 2018 07:22)  POCT Blood Glucose.: 118 mg/dL (02 Aug 2018 23:08)  POCT Blood Glucose.: 123 mg/dL (02 Aug 2018 16:20)      LIVER FUNCTIONS - ( 03 Aug 2018 03:12 )  Alb: 4.0 g/dL / Pro: 6.1 g/dL / ALK PHOS: 69 U/L / ALT: 88 U/L / AST: 87 U/L / GGT: x

## 2018-08-04 LAB
ALBUMIN SERPL ELPH-MCNC: 3.3 G/DL — SIGNIFICANT CHANGE UP (ref 3.3–5)
ALBUMIN SERPL ELPH-MCNC: 3.6 G/DL — SIGNIFICANT CHANGE UP (ref 3.3–5)
ALP SERPL-CCNC: 77 U/L — SIGNIFICANT CHANGE UP (ref 40–120)
ALP SERPL-CCNC: 87 U/L — SIGNIFICANT CHANGE UP (ref 40–120)
ALT FLD-CCNC: 65 U/L — HIGH (ref 10–45)
ALT FLD-CCNC: 66 U/L — HIGH (ref 10–45)
ANION GAP SERPL CALC-SCNC: 13 MMOL/L — SIGNIFICANT CHANGE UP (ref 5–17)
ANION GAP SERPL CALC-SCNC: 14 MMOL/L — SIGNIFICANT CHANGE UP (ref 5–17)
ANION GAP SERPL CALC-SCNC: 16 MMOL/L — SIGNIFICANT CHANGE UP (ref 5–17)
APTT BLD: 26 SEC — LOW (ref 27.5–37.4)
APTT BLD: 26.4 SEC — LOW (ref 27.5–37.4)
APTT BLD: 26.7 SEC — LOW (ref 27.5–37.4)
APTT BLD: 26.8 SEC — LOW (ref 27.5–37.4)
AST SERPL-CCNC: 50 U/L — HIGH (ref 10–40)
AST SERPL-CCNC: 52 U/L — HIGH (ref 10–40)
BILIRUB DIRECT SERPL-MCNC: 0.8 MG/DL — HIGH (ref 0–0.2)
BILIRUB INDIRECT FLD-MCNC: 1.4 MG/DL — HIGH (ref 0.2–1)
BILIRUB SERPL-MCNC: 1.9 MG/DL — HIGH (ref 0.2–1.2)
BILIRUB SERPL-MCNC: 2.2 MG/DL — HIGH (ref 0.2–1.2)
BUN SERPL-MCNC: 85 MG/DL — HIGH (ref 7–23)
BUN SERPL-MCNC: 91 MG/DL — HIGH (ref 7–23)
BUN SERPL-MCNC: 98 MG/DL — HIGH (ref 7–23)
CALCIUM SERPL-MCNC: 8.5 MG/DL — SIGNIFICANT CHANGE UP (ref 8.4–10.5)
CALCIUM SERPL-MCNC: 8.6 MG/DL — SIGNIFICANT CHANGE UP (ref 8.4–10.5)
CALCIUM SERPL-MCNC: 8.7 MG/DL — SIGNIFICANT CHANGE UP (ref 8.4–10.5)
CHLORIDE SERPL-SCNC: 107 MMOL/L — SIGNIFICANT CHANGE UP (ref 96–108)
CO2 SERPL-SCNC: 25 MMOL/L — SIGNIFICANT CHANGE UP (ref 22–31)
CO2 SERPL-SCNC: 25 MMOL/L — SIGNIFICANT CHANGE UP (ref 22–31)
CO2 SERPL-SCNC: 26 MMOL/L — SIGNIFICANT CHANGE UP (ref 22–31)
CREAT SERPL-MCNC: 1.72 MG/DL — HIGH (ref 0.5–1.3)
CREAT SERPL-MCNC: 1.89 MG/DL — HIGH (ref 0.5–1.3)
CREAT SERPL-MCNC: 2.15 MG/DL — HIGH (ref 0.5–1.3)
GAS PNL BLDA: SIGNIFICANT CHANGE UP
GLUCOSE BLDC GLUCOMTR-MCNC: 136 MG/DL — HIGH (ref 70–99)
GLUCOSE BLDC GLUCOMTR-MCNC: 149 MG/DL — HIGH (ref 70–99)
GLUCOSE BLDC GLUCOMTR-MCNC: 156 MG/DL — HIGH (ref 70–99)
GLUCOSE BLDC GLUCOMTR-MCNC: 157 MG/DL — HIGH (ref 70–99)
GLUCOSE SERPL-MCNC: 182 MG/DL — HIGH (ref 70–99)
GLUCOSE SERPL-MCNC: 205 MG/DL — HIGH (ref 70–99)
GLUCOSE SERPL-MCNC: 209 MG/DL — HIGH (ref 70–99)
HCT VFR BLD CALC: 32.4 % — LOW (ref 34.5–45)
HCT VFR BLD CALC: 35.3 % — SIGNIFICANT CHANGE UP (ref 34.5–45)
HCT VFR BLD CALC: 35.9 % — SIGNIFICANT CHANGE UP (ref 34.5–45)
HCT VFR BLD CALC: 36 % — SIGNIFICANT CHANGE UP (ref 34.5–45)
HGB BLD-MCNC: 10.6 G/DL — LOW (ref 11.5–15.5)
HGB BLD-MCNC: 11.3 G/DL — LOW (ref 11.5–15.5)
HGB BLD-MCNC: 11.3 G/DL — LOW (ref 11.5–15.5)
HGB BLD-MCNC: 11.6 G/DL — SIGNIFICANT CHANGE UP (ref 11.5–15.5)
INR BLD: 1.26 — HIGH (ref 0.88–1.16)
INR BLD: 1.3 — HIGH (ref 0.88–1.16)
INR BLD: 1.33 — HIGH (ref 0.88–1.16)
INR BLD: 1.51 — HIGH (ref 0.88–1.16)
LACTATE SERPL-SCNC: 2 MMOL/L — SIGNIFICANT CHANGE UP (ref 0.5–2)
LACTATE SERPL-SCNC: 2.4 MMOL/L — HIGH (ref 0.5–2)
MAGNESIUM SERPL-MCNC: 2 MG/DL — SIGNIFICANT CHANGE UP (ref 1.6–2.6)
MAGNESIUM SERPL-MCNC: 2.2 MG/DL — SIGNIFICANT CHANGE UP (ref 1.6–2.6)
MCHC RBC-ENTMCNC: 29.7 PG — SIGNIFICANT CHANGE UP (ref 27–34)
MCHC RBC-ENTMCNC: 29.7 PG — SIGNIFICANT CHANGE UP (ref 27–34)
MCHC RBC-ENTMCNC: 30 PG — SIGNIFICANT CHANGE UP (ref 27–34)
MCHC RBC-ENTMCNC: 30 PG — SIGNIFICANT CHANGE UP (ref 27–34)
MCHC RBC-ENTMCNC: 31.5 G/DL — LOW (ref 32–36)
MCHC RBC-ENTMCNC: 32 G/DL — SIGNIFICANT CHANGE UP (ref 32–36)
MCHC RBC-ENTMCNC: 32.2 G/DL — SIGNIFICANT CHANGE UP (ref 32–36)
MCHC RBC-ENTMCNC: 32.7 G/DL — SIGNIFICANT CHANGE UP (ref 32–36)
MCV RBC AUTO: 91.8 FL — SIGNIFICANT CHANGE UP (ref 80–100)
MCV RBC AUTO: 92.9 FL — SIGNIFICANT CHANGE UP (ref 80–100)
MCV RBC AUTO: 93 FL — SIGNIFICANT CHANGE UP (ref 80–100)
MCV RBC AUTO: 94.2 FL — SIGNIFICANT CHANGE UP (ref 80–100)
PHOSPHATE SERPL-MCNC: 2.7 MG/DL — SIGNIFICANT CHANGE UP (ref 2.5–4.5)
PHOSPHATE SERPL-MCNC: 3.5 MG/DL — SIGNIFICANT CHANGE UP (ref 2.5–4.5)
PLATELET # BLD AUTO: 39 K/UL — LOW (ref 150–400)
PLATELET # BLD AUTO: 41 K/UL — LOW (ref 150–400)
PLATELET # BLD AUTO: 41 K/UL — LOW (ref 150–400)
PLATELET # BLD AUTO: 48 K/UL — LOW (ref 150–400)
POTASSIUM SERPL-MCNC: 3.7 MMOL/L — SIGNIFICANT CHANGE UP (ref 3.5–5.3)
POTASSIUM SERPL-MCNC: 3.8 MMOL/L — SIGNIFICANT CHANGE UP (ref 3.5–5.3)
POTASSIUM SERPL-MCNC: 3.9 MMOL/L — SIGNIFICANT CHANGE UP (ref 3.5–5.3)
POTASSIUM SERPL-SCNC: 3.7 MMOL/L — SIGNIFICANT CHANGE UP (ref 3.5–5.3)
POTASSIUM SERPL-SCNC: 3.8 MMOL/L — SIGNIFICANT CHANGE UP (ref 3.5–5.3)
POTASSIUM SERPL-SCNC: 3.9 MMOL/L — SIGNIFICANT CHANGE UP (ref 3.5–5.3)
PROT SERPL-MCNC: 6 G/DL — SIGNIFICANT CHANGE UP (ref 6–8.3)
PROT SERPL-MCNC: 6.3 G/DL — SIGNIFICANT CHANGE UP (ref 6–8.3)
PROTHROM AB SERPL-ACNC: 14.1 SEC — HIGH (ref 9.8–12.7)
PROTHROM AB SERPL-ACNC: 14.5 SEC — HIGH (ref 9.8–12.7)
PROTHROM AB SERPL-ACNC: 14.8 SEC — HIGH (ref 9.8–12.7)
PROTHROM AB SERPL-ACNC: 16.9 SEC — HIGH (ref 9.8–12.7)
RBC # BLD: 3.53 M/UL — LOW (ref 3.8–5.2)
RBC # BLD: 3.8 M/UL — SIGNIFICANT CHANGE UP (ref 3.8–5.2)
RBC # BLD: 3.81 M/UL — SIGNIFICANT CHANGE UP (ref 3.8–5.2)
RBC # BLD: 3.87 M/UL — SIGNIFICANT CHANGE UP (ref 3.8–5.2)
RBC # FLD: 16.2 % — SIGNIFICANT CHANGE UP (ref 10.3–16.9)
RBC # FLD: 16.8 % — SIGNIFICANT CHANGE UP (ref 10.3–16.9)
SODIUM SERPL-SCNC: 146 MMOL/L — HIGH (ref 135–145)
SODIUM SERPL-SCNC: 146 MMOL/L — HIGH (ref 135–145)
SODIUM SERPL-SCNC: 148 MMOL/L — HIGH (ref 135–145)
WBC # BLD: 13 K/UL — HIGH (ref 3.8–10.5)
WBC # BLD: 18.1 K/UL — HIGH (ref 3.8–10.5)
WBC # BLD: 18.6 K/UL — HIGH (ref 3.8–10.5)
WBC # BLD: 19.5 K/UL — HIGH (ref 3.8–10.5)
WBC # FLD AUTO: 13 K/UL — HIGH (ref 3.8–10.5)
WBC # FLD AUTO: 18.1 K/UL — HIGH (ref 3.8–10.5)
WBC # FLD AUTO: 18.6 K/UL — HIGH (ref 3.8–10.5)
WBC # FLD AUTO: 19.5 K/UL — HIGH (ref 3.8–10.5)

## 2018-08-04 PROCEDURE — 76700 US EXAM ABDOM COMPLETE: CPT | Mod: 26

## 2018-08-04 PROCEDURE — 93970 EXTREMITY STUDY: CPT | Mod: 26

## 2018-08-04 PROCEDURE — 93010 ELECTROCARDIOGRAM REPORT: CPT

## 2018-08-04 PROCEDURE — 71045 X-RAY EXAM CHEST 1 VIEW: CPT | Mod: 26,59

## 2018-08-04 PROCEDURE — 99291 CRITICAL CARE FIRST HOUR: CPT

## 2018-08-04 RX ORDER — FENTANYL CITRATE 50 UG/ML
12.5 INJECTION INTRAVENOUS ONCE
Qty: 0 | Refills: 0 | Status: DISCONTINUED | OUTPATIENT
Start: 2018-08-04 | End: 2018-08-04

## 2018-08-04 RX ORDER — FENTANYL CITRATE 50 UG/ML
12.5 INJECTION INTRAVENOUS
Qty: 0 | Refills: 0 | Status: DISCONTINUED | OUTPATIENT
Start: 2018-08-04 | End: 2018-08-05

## 2018-08-04 RX ORDER — FUROSEMIDE 40 MG
20 TABLET ORAL ONCE
Qty: 0 | Refills: 0 | Status: COMPLETED | OUTPATIENT
Start: 2018-08-04 | End: 2018-08-04

## 2018-08-04 RX ORDER — POTASSIUM CHLORIDE 20 MEQ
20 PACKET (EA) ORAL ONCE
Qty: 0 | Refills: 0 | Status: COMPLETED | OUTPATIENT
Start: 2018-08-04 | End: 2018-08-04

## 2018-08-04 RX ORDER — MAGNESIUM SULFATE 500 MG/ML
2 VIAL (ML) INJECTION ONCE
Qty: 0 | Refills: 0 | Status: COMPLETED | OUTPATIENT
Start: 2018-08-04 | End: 2018-08-04

## 2018-08-04 RX ORDER — ALBUMIN HUMAN 25 %
250 VIAL (ML) INTRAVENOUS
Qty: 0 | Refills: 0 | Status: DISCONTINUED | OUTPATIENT
Start: 2018-08-04 | End: 2018-08-04

## 2018-08-04 RX ORDER — SODIUM CHLORIDE 9 MG/ML
1000 INJECTION, SOLUTION INTRAVENOUS
Qty: 0 | Refills: 0 | Status: DISCONTINUED | OUTPATIENT
Start: 2018-08-04 | End: 2018-08-05

## 2018-08-04 RX ORDER — FUROSEMIDE 40 MG
40 TABLET ORAL ONCE
Qty: 0 | Refills: 0 | Status: DISCONTINUED | OUTPATIENT
Start: 2018-08-04 | End: 2018-08-04

## 2018-08-04 RX ORDER — FUROSEMIDE 40 MG
40 TABLET ORAL ONCE
Qty: 0 | Refills: 0 | Status: COMPLETED | OUTPATIENT
Start: 2018-08-04 | End: 2018-08-04

## 2018-08-04 RX ORDER — POTASSIUM CHLORIDE 20 MEQ
10 PACKET (EA) ORAL ONCE
Qty: 0 | Refills: 0 | Status: COMPLETED | OUTPATIENT
Start: 2018-08-04 | End: 2018-08-04

## 2018-08-04 RX ADMIN — Medication 50 GRAM(S): at 04:56

## 2018-08-04 RX ADMIN — Medication 0.25 INCH(S): at 18:04

## 2018-08-04 RX ADMIN — FENTANYL CITRATE 12.5 MICROGRAM(S): 50 INJECTION INTRAVENOUS at 10:24

## 2018-08-04 RX ADMIN — CHLORHEXIDINE GLUCONATE 5 MILLILITER(S): 213 SOLUTION TOPICAL at 05:05

## 2018-08-04 RX ADMIN — LIDOCAINE 1 PATCH: 4 CREAM TOPICAL at 10:46

## 2018-08-04 RX ADMIN — Medication 0.25 INCH(S): at 05:06

## 2018-08-04 RX ADMIN — FENTANYL CITRATE 12.5 MICROGRAM(S): 50 INJECTION INTRAVENOUS at 21:10

## 2018-08-04 RX ADMIN — LIDOCAINE 1 PATCH: 4 CREAM TOPICAL at 21:20

## 2018-08-04 RX ADMIN — FENTANYL CITRATE 12.5 MICROGRAM(S): 50 INJECTION INTRAVENOUS at 06:36

## 2018-08-04 RX ADMIN — CHLORHEXIDINE GLUCONATE 5 MILLILITER(S): 213 SOLUTION TOPICAL at 18:03

## 2018-08-04 RX ADMIN — ATORVASTATIN CALCIUM 20 MILLIGRAM(S): 80 TABLET, FILM COATED ORAL at 21:19

## 2018-08-04 RX ADMIN — CHLORHEXIDINE GLUCONATE 5 MILLILITER(S): 213 SOLUTION TOPICAL at 10:27

## 2018-08-04 RX ADMIN — PANTOPRAZOLE SODIUM 40 MILLIGRAM(S): 20 TABLET, DELAYED RELEASE ORAL at 10:46

## 2018-08-04 RX ADMIN — CHLORHEXIDINE GLUCONATE 5 MILLILITER(S): 213 SOLUTION TOPICAL at 21:19

## 2018-08-04 RX ADMIN — FENTANYL CITRATE 12.5 MICROGRAM(S): 50 INJECTION INTRAVENOUS at 14:45

## 2018-08-04 RX ADMIN — Medication 40 MILLIGRAM(S): at 20:02

## 2018-08-04 RX ADMIN — CHLORHEXIDINE GLUCONATE 5 MILLILITER(S): 213 SOLUTION TOPICAL at 03:28

## 2018-08-04 RX ADMIN — CHLORHEXIDINE GLUCONATE 5 MILLILITER(S): 213 SOLUTION TOPICAL at 15:16

## 2018-08-04 RX ADMIN — Medication 0.25 INCH(S): at 18:25

## 2018-08-04 RX ADMIN — Medication 50 MILLIEQUIVALENT(S): at 15:42

## 2018-08-04 RX ADMIN — FENTANYL CITRATE 12.5 MICROGRAM(S): 50 INJECTION INTRAVENOUS at 14:37

## 2018-08-04 RX ADMIN — CHLORHEXIDINE GLUCONATE 1 APPLICATION(S): 213 SOLUTION TOPICAL at 15:16

## 2018-08-04 RX ADMIN — Medication 2: at 22:21

## 2018-08-04 RX ADMIN — SODIUM CHLORIDE 30 MILLILITER(S): 9 INJECTION, SOLUTION INTRAVENOUS at 07:24

## 2018-08-04 RX ADMIN — Medication 1 MILLIGRAM(S): at 10:47

## 2018-08-04 RX ADMIN — FENTANYL CITRATE 12.5 MICROGRAM(S): 50 INJECTION INTRAVENOUS at 06:06

## 2018-08-04 RX ADMIN — Medication 20 MILLIGRAM(S): at 10:30

## 2018-08-04 RX ADMIN — Medication 100 MILLIEQUIVALENT(S): at 04:55

## 2018-08-04 RX ADMIN — FENTANYL CITRATE 12.5 MICROGRAM(S): 50 INJECTION INTRAVENOUS at 20:50

## 2018-08-04 NOTE — PROGRESS NOTE ADULT - ASSESSMENT
Problems  1. S/p TAAA on 7/26  2. Concern for Spinal cord ischemia   3. Post op resp failure  4. CKD stage 3  5. Thrombocytopenia -- concern for DIC per Heme    Plan   Neuro -- pain control, sedation PRN  continue to follow neuro exam   CVS - permissive hypertensive for concern for spinal cord ischemia   Pulm - tolerating CPAP   Possible extubation tmr   GI - GI proph   - monitor UOP, continue diuresis   Endo - glycemic control  Heme - s/p platelet transfusion.   DIC workup underway    Critical post op.    Critical care time spent 50min

## 2018-08-04 NOTE — PROGRESS NOTE ADULT - SUBJECTIVE AND OBJECTIVE BOX
69yo with history of CAD, TIA,  open AAA s/p repair in 2004 and 3/2017  EVAR with coverage of left hypogastric artery and graft extension into the left EIA with Dr Solorzano for management of left iliac artery aneurysm.   Recent imaging showing descending aortic aneurysm with degenerated aorta with significant mural thrombus.        CT done in April 2018 : Diffusely aneurysmal descending thoracic aorta increased in size, with thick atheromatous plaques and mural thrombus.  Suprarenal abdominal aorta was also aneurysmal and increased in size. Patent Celiac and SMA and renals.  Infra-renal endoluminal stent with non-occlusive thrombus.     7/26  open thoraco-abdominal aorta replacement with separate re-implantation of meso-renals, bypass to celiac/SMA and bilateral renals.  Placement of Lumbar Drain    Post op issues with   1. Spinal cord ischemia   2. Severe thrombocytopenia and concern for DIC  3. Acrocyanosis   4. Resp failure     24 hours : stable on vent.    stable hemodynamics, stable to improving strenght in b/l LE  bilateral foot ischemia stable, demarcating.       PMH :  COPD (chronic obstructive pulmonary disease)  HLD (hyperlipidemia)  HTN (hypertension)  AAA (abdominal aortic aneurysm)  CAD (coronary artery disease)  Thoracic aortic aneurysm without rupture  Lumbar drain implantation  Thoracoabdominal surgical exposure  S/P AAA (abdominal aortic aneurysm) repair      ICU Vital Signs Last 24 Hrs  T(C): 35.7 (08-04-18 @ 14:00), Max: 36.6 (08-03-18 @ 20:39)  T(F): 96.3 (08-04-18 @ 14:00), Max: 97.9 (08-03-18 @ 20:39)  HR: 84 (08-04-18 @ 15:00) (52 - 98)  ABP: 174/68 (08-04-18 @ 15:00) (132/56 - 186/86)  ABP(mean): 106 (08-04-18 @ 15:00) (80 - 128)  RR: 13 (08-04-18 @ 15:00) (11 - 33)  SpO2: 100% (08-04-18 @ 15:00) (98% - 100%)    I&O's Summary    03 Aug 2018 07:01  -  04 Aug 2018 07:00  --------------------------------------------------------  IN: 1885 mL / OUT: 2355 mL / NET: -470 mL    04 Aug 2018 07:01  -  04 Aug 2018 16:45  --------------------------------------------------------  IN: 964 mL / OUT: 775 mL / NET: 189 mL      Mode: CPAP with PS  FiO2: 50  PEEP: 5  PS: 12  MAP: 7  PIP: 11    ABG - ( 04 Aug 2018 12:54 )  pH: 7.44  /  pCO2: 40    /  pO2: 160   / HCO3: 26    / Base Excess: 1.8   /  SaO2: 99                              11.3   19.5  )-----------( 41       ( 04 Aug 2018 12:51 )             35.9     04 Aug 2018 12:51    146    |  107    |  91     ----------------------------<  205    3.7     |  25     |  1.89     Ca    8.5        04 Aug 2018 12:51  Phos  3.5       04 Aug 2018 03:29  Mg     2.0       04 Aug 2018 03:29    TPro  6.3    /  Alb  3.6    /  TBili  2.2    /  DBili  0.8    /  AST  52     /  ALT  65     /  AlkPhos  87     04 Aug 2018 12:51    PT/INR - ( 04 Aug 2018 12:51 )   PT: 14.8 sec;   INR: 1.33      PTT - ( 04 Aug 2018 12:51 )  PTT:26.8 sec    MEDICATIONS  (STANDING):  atorvastatin 20 milliGRAM(s) Oral at bedtime  folic acid 1 milliGRAM(s) Oral daily  insulin lispro (HumaLOG) corrective regimen sliding scale   SubCutaneous Before meals and at bedtime  lidocaine   Patch 1 Patch Transdermal daily  nitroglycerin    2% Ointment 0.25 Inch(s) Transdermal two times a day  pantoprazole  Injectable 40 milliGRAM(s) IV Push daily    Home Medications:  Advair Diskus 100 mcg-50 mcg inhalation powder: 1 puff(s) inhaled 2 times a day (21 May 2018 07:27)  amLODIPine 5 mg oral tablet: 1 tab(s) orally once a day (21 May 2018 07:27)  aspirin 81 mg oral tablet: 1 tab(s) orally once a day (21 May 2018 07:27)  omeprazole 20 mg oral delayed release tablet: 1 tab(s) orally once a day (21 May 2018 07:27)  simvastatin 20 mg oral tablet: 1 tab(s) orally once a day (at bedtime) (21 May 2018 07:27)  Spiriva Respimat 1.25 mcg/inh inhalation aerosol: 2 puff(s) inhaled once a day (21 May 2018 07:27)  Ventolin HFA 90 mcg/inh inhalation aerosol: 2 puff(s) inhaled 4 times a day, As Needed (21 May 2018 07:27)    PHYSICAL EXAM:  Gen : no acute distress  Respiratory: decreased in the bases  Cardiovascular: S1 and S2, RRR, no M/G/R  Gastrointestinal: BS+, soft, NT/ND  Extremities: No peripheral edema  Vascular: 2+ peripheral pulses  pupura and cynosis to b/l feet   Neurological: A/O x 3, no focal deficits  Incision: clean dry/ no sign of infection  serosang drainage from chest tube removal sites   Lines: no sign of infection

## 2018-08-04 NOTE — PROGRESS NOTE ADULT - ASSESSMENT
This is a 68 year old female ith history of CAD, TIA in 1998, COPD, AAA s/p open AAA repair by Dr. Roberto in 2004, EVAR/AAA/CIAA with Dr. Burch on 3/29/17 who was referred to Dr. 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 Pt does have 3-4 shistocytes per HPF in setting of elevated LDH and low haptoglobin, but presence of coagulopathy and elevated d dimer supports DIC as the diagnosis of choice. Although she does have worsening renal failure and thrombocytopenia, the onset of thrombocytopenia immediately post-op sways us away from this as a dx and towards DIC. there is also lack of anemia and no elevation in retic count which also goes against TTP as the diagnosis. low fibrinogen low factor V level and elevated d dimer support DIC as dx- likely a result of manipulation of vascular endothelium resulting in widespread coagulation cascade activation.   - pt's LDH has improved and her renal function is improving as well, f/u [id number] level. suspicion for TTP at the current time is not high, hwoever, may consider steroids +/- plasma exchange if no improvement in plts w/ rising LDH, worsening anemia and increasing retic count  - pt had heprin exposure intraop. 4T score is low-int, no documented VTE. PF4 Ab neg. can be false negative. SHANTELLE neg, unlikely HIT  - pt was initially responding to plt transfusions and now she is plt refractory, cross matched plt testing reveals presence of HLA antibodies and PL Ag- s/p transfusion of 5 cross matched units of plts w/ no adequate incremental response. AVOID further plt transfusion as their consumption is fueling the fire and likely contributing to microthrombi formation. once drains are removed, if ultrasounds show evidence of VTE will consider treatment with hep gtt  - will check LE dopplers and complete abd ultrasound performed- f/u results  - will check Lupus profile to eval for antiphospholipid syndrome and catastrophic APS  - c/w folic acid 1 mg IV daily for ongoing hemolysis.       #Coagulopathy  - likely consistent with DIC, fibrinogen and Factor VIII may be falsely high as they can be acute phase reactants, but D dimer of [address] in setting of shistocytes on smear and elevated LDH low haptoglobin support this diagnosis. would recommend no reversal of coaguloapthy unless pt is bleeding with INR >1.6. Avoid FFP and cryoprecipitate as pt is already high risk for thrombosis. treat underlying condition responsible for DIC. Can consider heparin gtt to treat any potential of thrombotic events with DIC- this may improve the plt count as well. Factor V and fibrinogen are low consistent with DIC    Case discussed with Dr Tee

## 2018-08-04 NOTE — PROGRESS NOTE ADULT - SUBJECTIVE AND OBJECTIVE BOX
Hematology Oncology Consult Note (Dr Tee)    The patient was seen and examined at bedside.    67 yo F with history of CAD, TIA in 1998, COPD, AAA s/p open AAA repair by Dr. Roberto in 2004, EVAR/AAA/CIAA with Dr. Burch on 3/29/17 who was referred to Dr. 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  She underwent CTA chest/abdomen/pelvis in 4/2018 which demonstrated descending thoracoabdominal aortic aneurysm with degeneration of the aorta at the mid-descending thoracic segment, measuring 5.7cm with significant mural thrombus (previously measuring 5.5cm) with widely patent celiac/SMA/renal arteries.  She completed outpatient pre-operative evaluation and was deemed a surgical candidate for thoracoabdominal aneurysm repair and admitted to [facility] on 7/25/18 under the care of Dr. Monson in anticipated of planned procedure on 7/26.    Pt underwent repair 7/26. Post-procedure labs showed plt count 58k, new coagulopathy, and worsening renal failure. She received many blood products intraop and heparin. She received pRBC, cry, ffp, plts intraop. Pt's plt count initial was responding to transfusion but then stopped responding yesterday in setting of worsenign renal failure. Her post-op complication was leg weakness 2/2 to spinal infarction requiring lumber drain placement by neurosurgery. She has had serosanguinous drainage of luids from ET suction and from her three thorax drainage tubes. No blood in ivan and no blood in stool. She has been receiving pRBC post-op despite having normal Hgb.       Interval hx: still intubated, two thorax drains removed and lumbar drain removed. fem a line also removed. had some oozing after procedures. H&H stable. received another unit of plts today    ROS is otherwise negative.    PHYSICAL EXAM:    ICU Vital Signs Last 24 Hrs  T(C): 35.7 (04 Aug 2018 09:00), Max: 36.6 (03 Aug 2018 16:40)  T(F): 96.3 (04 Aug 2018 09:00), Max: 97.9 (03 Aug 2018 20:39)  HR: 98 (04 Aug 2018 12:45) (52 - 98)  BP: --  BP(mean): --  ABP: 180/76 (04 Aug 2018 12:00) (132/56 - 180/76)  ABP(mean): 118 (04 Aug 2018 12:00) (80 - 124)  RR: 15 (04 Aug 2018 12:00) (11 - 27)  SpO2: 100% (04 Aug 2018 12:45) (100% - 100%)        Gen: intubated, awake, alert, follows commands  HEENT: normocephalic/atraumatic, no conjunctival pallor, no scleral icterus, no oral thrush/mucosal bleeding/mucositis + NG tube  Neck: supple, no masses, no JVD  Cardiovascular: RR, nl S1S2, no murmurs/rubs/gallops.   Respiratory: clear air entry   Gastrointestinal: BS+, soft, NT/ND, no masses, no splenomegaly, no hepatomegaly, no evidence for ascites  Extremities: no edema, no calf tenderness, DP/PT 2+ b/l, evidence of acral cyanosis, brisk pulses  Neurological: no focal deficits  Skin: no rash on visible skin  Lymph Nodes:  no cervical/supraclavicular LAD, no axillary/groin LAD      Labs                          11.3   19.5  )-----------( 41       ( 04 Aug 2018 12:51 )             35.9         CBC Full  -  ( 04 Aug 2018 12:51 )  WBC Count : 19.5 K/uL  Hemoglobin : 11.3 g/dL  Hematocrit : 35.9 %  Platelet Count - Automated : 41 K/uL  Mean Cell Volume : 94.2 fL  Mean Cell Hemoglobin : 29.7 pg  Mean Cell Hemoglobin Concentration : 31.5 g/dL  Auto Neutrophil # : x  Auto Lymphocyte # : x  Auto Monocyte # : x  Auto Eosinophil # : x  Auto Basophil # : x  Auto Neutrophil % : x  Auto Lymphocyte % : x  Auto Monocyte % : x  Auto Eosinophil % : x  Auto Basophil % : x        08-04    146<H>  |  107  |  91<H>  ----------------------------<  205<H>  3.7   |  25  |  1.89<H>    Ca    8.5      04 Aug 2018 12:51  Phos  3.5     08-04  Mg     2.0     08-04    TPro  6.3  /  Alb  3.6  /  TBili  2.2<H>  /  DBili  0.8<H>  /  AST  52<H>  /  ALT  65<H>  /  AlkPhos  87  08-04        PT/INR - ( 04 Aug 2018 12:51 )   PT: 14.8 sec;   INR: 1.33          PTT - ( 04 Aug 2018 12:51 )  PTT:26.8 sec               d dimer [id number]  retic 1.3    peripheral smear: 3-4 shistocytes per hpf, toxic granualtions of neutrophils, bands, myelocytes and meta myelocytes, large plts

## 2018-08-05 LAB
ALBUMIN SERPL ELPH-MCNC: 3 G/DL — LOW (ref 3.3–5)
ALBUMIN SERPL ELPH-MCNC: 3.4 G/DL — SIGNIFICANT CHANGE UP (ref 3.3–5)
ALP SERPL-CCNC: 80 U/L — SIGNIFICANT CHANGE UP (ref 40–120)
ALP SERPL-CCNC: 80 U/L — SIGNIFICANT CHANGE UP (ref 40–120)
ALT FLD-CCNC: 54 U/L — HIGH (ref 10–45)
ALT FLD-CCNC: 60 U/L — HIGH (ref 10–45)
ANION GAP SERPL CALC-SCNC: 11 MMOL/L — SIGNIFICANT CHANGE UP (ref 5–17)
ANION GAP SERPL CALC-SCNC: 12 MMOL/L — SIGNIFICANT CHANGE UP (ref 5–17)
ANION GAP SERPL CALC-SCNC: 13 MMOL/L — SIGNIFICANT CHANGE UP (ref 5–17)
ANION GAP SERPL CALC-SCNC: 13 MMOL/L — SIGNIFICANT CHANGE UP (ref 5–17)
APTT BLD: 26.5 SEC — LOW (ref 27.5–37.4)
APTT BLD: 26.7 SEC — LOW (ref 27.5–37.4)
APTT BLD: 27.7 SEC — SIGNIFICANT CHANGE UP (ref 27.5–37.4)
AST SERPL-CCNC: 38 U/L — SIGNIFICANT CHANGE UP (ref 10–40)
AST SERPL-CCNC: 44 U/L — HIGH (ref 10–40)
BASE EXCESS BLDA CALC-SCNC: 4.2 MMOL/L — HIGH (ref -2–3)
BILIRUB SERPL-MCNC: 1.8 MG/DL — HIGH (ref 0.2–1.2)
BILIRUB SERPL-MCNC: 2 MG/DL — HIGH (ref 0.2–1.2)
BUN SERPL-MCNC: 77 MG/DL — HIGH (ref 7–23)
BUN SERPL-MCNC: 79 MG/DL — HIGH (ref 7–23)
BUN SERPL-MCNC: 82 MG/DL — HIGH (ref 7–23)
BUN SERPL-MCNC: 83 MG/DL — HIGH (ref 7–23)
CALCIUM SERPL-MCNC: 8.2 MG/DL — LOW (ref 8.4–10.5)
CALCIUM SERPL-MCNC: 8.3 MG/DL — LOW (ref 8.4–10.5)
CALCIUM SERPL-MCNC: 8.5 MG/DL — SIGNIFICANT CHANGE UP (ref 8.4–10.5)
CALCIUM SERPL-MCNC: 8.6 MG/DL — SIGNIFICANT CHANGE UP (ref 8.4–10.5)
CHLORIDE SERPL-SCNC: 103 MMOL/L — SIGNIFICANT CHANGE UP (ref 96–108)
CHLORIDE SERPL-SCNC: 105 MMOL/L — SIGNIFICANT CHANGE UP (ref 96–108)
CHLORIDE SERPL-SCNC: 108 MMOL/L — SIGNIFICANT CHANGE UP (ref 96–108)
CHLORIDE SERPL-SCNC: 109 MMOL/L — HIGH (ref 96–108)
CO2 SERPL-SCNC: 27 MMOL/L — SIGNIFICANT CHANGE UP (ref 22–31)
CO2 SERPL-SCNC: 28 MMOL/L — SIGNIFICANT CHANGE UP (ref 22–31)
CREAT SERPL-MCNC: 1.51 MG/DL — HIGH (ref 0.5–1.3)
CREAT SERPL-MCNC: 1.6 MG/DL — HIGH (ref 0.5–1.3)
CREAT SERPL-MCNC: 1.68 MG/DL — HIGH (ref 0.5–1.3)
CREAT SERPL-MCNC: 1.7 MG/DL — HIGH (ref 0.5–1.3)
D DIMER BLD IA.RAPID-MCNC: HIGH NG/ML DDU
FIBRINOGEN PPP-MCNC: 181 MG/DL — LOW (ref 258–438)
GAS PNL BLDA: SIGNIFICANT CHANGE UP
GLUCOSE BLDC GLUCOMTR-MCNC: 101 MG/DL — HIGH (ref 70–99)
GLUCOSE BLDC GLUCOMTR-MCNC: 147 MG/DL — HIGH (ref 70–99)
GLUCOSE BLDC GLUCOMTR-MCNC: 194 MG/DL — HIGH (ref 70–99)
GLUCOSE BLDC GLUCOMTR-MCNC: 200 MG/DL — HIGH (ref 70–99)
GLUCOSE SERPL-MCNC: 144 MG/DL — HIGH (ref 70–99)
GLUCOSE SERPL-MCNC: 152 MG/DL — HIGH (ref 70–99)
GLUCOSE SERPL-MCNC: 172 MG/DL — HIGH (ref 70–99)
GLUCOSE SERPL-MCNC: 180 MG/DL — HIGH (ref 70–99)
HAPTOGLOB SERPL-MCNC: <10 MG/DL — LOW (ref 34–200)
HCO3 BLDA-SCNC: 28 MMOL/L — SIGNIFICANT CHANGE UP (ref 21–28)
HCT VFR BLD CALC: 33.7 % — LOW (ref 34.5–45)
HCT VFR BLD CALC: 34.4 % — LOW (ref 34.5–45)
HCT VFR BLD CALC: 34.9 % — SIGNIFICANT CHANGE UP (ref 34.5–45)
HCT VFR BLD CALC: 36.3 % — SIGNIFICANT CHANGE UP (ref 34.5–45)
HGB BLD-MCNC: 10.9 G/DL — LOW (ref 11.5–15.5)
HGB BLD-MCNC: 11 G/DL — LOW (ref 11.5–15.5)
HGB BLD-MCNC: 11.2 G/DL — LOW (ref 11.5–15.5)
HGB BLD-MCNC: 11.2 G/DL — LOW (ref 11.5–15.5)
INR BLD: 1.26 — HIGH (ref 0.88–1.16)
INR BLD: 1.27 — HIGH (ref 0.88–1.16)
INR BLD: 1.29 — HIGH (ref 0.88–1.16)
LACTATE SERPL-SCNC: 1.1 MMOL/L — SIGNIFICANT CHANGE UP (ref 0.5–2)
LACTATE SERPL-SCNC: 1.6 MMOL/L — SIGNIFICANT CHANGE UP (ref 0.5–2)
LACTATE SERPL-SCNC: 2 MMOL/L — SIGNIFICANT CHANGE UP (ref 0.5–2)
LDH SERPL L TO P-CCNC: 550 U/L — HIGH (ref 50–242)
MAGNESIUM SERPL-MCNC: 2 MG/DL — SIGNIFICANT CHANGE UP (ref 1.6–2.6)
MAGNESIUM SERPL-MCNC: 2.1 MG/DL — SIGNIFICANT CHANGE UP (ref 1.6–2.6)
MAGNESIUM SERPL-MCNC: 2.1 MG/DL — SIGNIFICANT CHANGE UP (ref 1.6–2.6)
MAGNESIUM SERPL-MCNC: 2.7 MG/DL — HIGH (ref 1.6–2.6)
MCHC RBC-ENTMCNC: 29.3 PG — SIGNIFICANT CHANGE UP (ref 27–34)
MCHC RBC-ENTMCNC: 29.9 PG — SIGNIFICANT CHANGE UP (ref 27–34)
MCHC RBC-ENTMCNC: 30 PG — SIGNIFICANT CHANGE UP (ref 27–34)
MCHC RBC-ENTMCNC: 30.3 PG — SIGNIFICANT CHANGE UP (ref 27–34)
MCHC RBC-ENTMCNC: 30.9 G/DL — LOW (ref 32–36)
MCHC RBC-ENTMCNC: 32 G/DL — SIGNIFICANT CHANGE UP (ref 32–36)
MCHC RBC-ENTMCNC: 32.1 G/DL — SIGNIFICANT CHANGE UP (ref 32–36)
MCHC RBC-ENTMCNC: 32.3 G/DL — SIGNIFICANT CHANGE UP (ref 32–36)
MCV RBC AUTO: 93.5 FL — SIGNIFICANT CHANGE UP (ref 80–100)
MCV RBC AUTO: 93.6 FL — SIGNIFICANT CHANGE UP (ref 80–100)
MCV RBC AUTO: 93.6 FL — SIGNIFICANT CHANGE UP (ref 80–100)
MCV RBC AUTO: 95 FL — SIGNIFICANT CHANGE UP (ref 80–100)
PCO2 BLDA: 39 MMHG — SIGNIFICANT CHANGE UP (ref 32–45)
PH BLDA: 7.48 — HIGH (ref 7.35–7.45)
PHOSPHATE SERPL-MCNC: 2.9 MG/DL — SIGNIFICANT CHANGE UP (ref 2.5–4.5)
PHOSPHATE SERPL-MCNC: 3 MG/DL — SIGNIFICANT CHANGE UP (ref 2.5–4.5)
PHOSPHATE SERPL-MCNC: 3.6 MG/DL — SIGNIFICANT CHANGE UP (ref 2.5–4.5)
PLATELET # BLD AUTO: 61 K/UL — LOW (ref 150–400)
PLATELET # BLD AUTO: 61 K/UL — LOW (ref 150–400)
PLATELET # BLD AUTO: 72 K/UL — LOW (ref 150–400)
PLATELET # BLD AUTO: 78 K/UL — LOW (ref 150–400)
PO2 BLDA: 267 MMHG — HIGH (ref 83–108)
POTASSIUM SERPL-MCNC: 3.7 MMOL/L — SIGNIFICANT CHANGE UP (ref 3.5–5.3)
POTASSIUM SERPL-MCNC: 4 MMOL/L — SIGNIFICANT CHANGE UP (ref 3.5–5.3)
POTASSIUM SERPL-MCNC: 4.1 MMOL/L — SIGNIFICANT CHANGE UP (ref 3.5–5.3)
POTASSIUM SERPL-MCNC: 4.2 MMOL/L — SIGNIFICANT CHANGE UP (ref 3.5–5.3)
POTASSIUM SERPL-SCNC: 3.7 MMOL/L — SIGNIFICANT CHANGE UP (ref 3.5–5.3)
POTASSIUM SERPL-SCNC: 4 MMOL/L — SIGNIFICANT CHANGE UP (ref 3.5–5.3)
POTASSIUM SERPL-SCNC: 4.1 MMOL/L — SIGNIFICANT CHANGE UP (ref 3.5–5.3)
POTASSIUM SERPL-SCNC: 4.2 MMOL/L — SIGNIFICANT CHANGE UP (ref 3.5–5.3)
PROT SERPL-MCNC: 5.7 G/DL — LOW (ref 6–8.3)
PROT SERPL-MCNC: 6.2 G/DL — SIGNIFICANT CHANGE UP (ref 6–8.3)
PROTHROM AB SERPL-ACNC: 14.1 SEC — HIGH (ref 9.8–12.7)
PROTHROM AB SERPL-ACNC: 14.2 SEC — HIGH (ref 9.8–12.7)
PROTHROM AB SERPL-ACNC: 14.4 SEC — HIGH (ref 9.8–12.7)
RBC # BLD: 3.6 M/UL — LOW (ref 3.8–5.2)
RBC # BLD: 3.68 M/UL — LOW (ref 3.8–5.2)
RBC # BLD: 3.73 M/UL — LOW (ref 3.8–5.2)
RBC # BLD: 3.82 M/UL — SIGNIFICANT CHANGE UP (ref 3.8–5.2)
RBC # FLD: 16.6 % — SIGNIFICANT CHANGE UP (ref 10.3–16.9)
RBC # FLD: 16.7 % — SIGNIFICANT CHANGE UP (ref 10.3–16.9)
RBC # FLD: 16.8 % — SIGNIFICANT CHANGE UP (ref 10.3–16.9)
RBC # FLD: 16.9 % — SIGNIFICANT CHANGE UP (ref 10.3–16.9)
SAO2 % BLDA: 99 % — SIGNIFICANT CHANGE UP (ref 95–100)
SODIUM SERPL-SCNC: 143 MMOL/L — SIGNIFICANT CHANGE UP (ref 135–145)
SODIUM SERPL-SCNC: 143 MMOL/L — SIGNIFICANT CHANGE UP (ref 135–145)
SODIUM SERPL-SCNC: 148 MMOL/L — HIGH (ref 135–145)
SODIUM SERPL-SCNC: 149 MMOL/L — HIGH (ref 135–145)
WBC # BLD: 18.8 K/UL — HIGH (ref 3.8–10.5)
WBC # BLD: 21.2 K/UL — HIGH (ref 3.8–10.5)
WBC # BLD: 21.5 K/UL — HIGH (ref 3.8–10.5)
WBC # BLD: 22.8 K/UL — HIGH (ref 3.8–10.5)
WBC # FLD AUTO: 18.8 K/UL — HIGH (ref 3.8–10.5)
WBC # FLD AUTO: 21.2 K/UL — HIGH (ref 3.8–10.5)
WBC # FLD AUTO: 21.5 K/UL — HIGH (ref 3.8–10.5)
WBC # FLD AUTO: 22.8 K/UL — HIGH (ref 3.8–10.5)

## 2018-08-05 PROCEDURE — 71045 X-RAY EXAM CHEST 1 VIEW: CPT | Mod: 26

## 2018-08-05 PROCEDURE — 99291 CRITICAL CARE FIRST HOUR: CPT

## 2018-08-05 RX ORDER — ONDANSETRON 8 MG/1
4 TABLET, FILM COATED ORAL ONCE
Qty: 0 | Refills: 0 | Status: COMPLETED | OUTPATIENT
Start: 2018-08-05 | End: 2018-08-05

## 2018-08-05 RX ORDER — FENTANYL CITRATE 50 UG/ML
25 INJECTION INTRAVENOUS ONCE
Qty: 0 | Refills: 0 | Status: DISCONTINUED | OUTPATIENT
Start: 2018-08-05 | End: 2018-08-05

## 2018-08-05 RX ORDER — FUROSEMIDE 40 MG
20 TABLET ORAL ONCE
Qty: 0 | Refills: 0 | Status: COMPLETED | OUTPATIENT
Start: 2018-08-05 | End: 2018-08-05

## 2018-08-05 RX ORDER — ONDANSETRON 8 MG/1
0.4 TABLET, FILM COATED ORAL ONCE
Qty: 0 | Refills: 0 | Status: DISCONTINUED | OUTPATIENT
Start: 2018-08-05 | End: 2018-08-05

## 2018-08-05 RX ORDER — MAGNESIUM SULFATE 500 MG/ML
2 VIAL (ML) INJECTION ONCE
Qty: 0 | Refills: 0 | Status: COMPLETED | OUTPATIENT
Start: 2018-08-05 | End: 2018-08-05

## 2018-08-05 RX ORDER — ZALEPLON 10 MG
5 CAPSULE ORAL AT BEDTIME
Qty: 0 | Refills: 0 | Status: DISCONTINUED | OUTPATIENT
Start: 2018-08-05 | End: 2018-08-06

## 2018-08-05 RX ORDER — MAGNESIUM SULFATE 500 MG/ML
2 VIAL (ML) INJECTION ONCE
Qty: 0 | Refills: 0 | Status: DISCONTINUED | OUTPATIENT
Start: 2018-08-05 | End: 2018-08-05

## 2018-08-05 RX ORDER — FUROSEMIDE 40 MG
40 TABLET ORAL ONCE
Qty: 0 | Refills: 0 | Status: COMPLETED | OUTPATIENT
Start: 2018-08-05 | End: 2018-08-05

## 2018-08-05 RX ORDER — HYDROMORPHONE HYDROCHLORIDE 2 MG/ML
0.25 INJECTION INTRAMUSCULAR; INTRAVENOUS; SUBCUTANEOUS ONCE
Qty: 0 | Refills: 0 | Status: DISCONTINUED | OUTPATIENT
Start: 2018-08-05 | End: 2018-08-05

## 2018-08-05 RX ORDER — CHLORHEXIDINE GLUCONATE 213 G/1000ML
1 SOLUTION TOPICAL DAILY
Qty: 0 | Refills: 0 | Status: DISCONTINUED | OUTPATIENT
Start: 2018-08-05 | End: 2018-08-22

## 2018-08-05 RX ORDER — ZOLPIDEM TARTRATE 10 MG/1
5 TABLET ORAL ONCE
Qty: 0 | Refills: 0 | Status: DISCONTINUED | OUTPATIENT
Start: 2018-08-05 | End: 2018-08-05

## 2018-08-05 RX ORDER — ACETAMINOPHEN 500 MG
1000 TABLET ORAL ONCE
Qty: 0 | Refills: 0 | Status: COMPLETED | OUTPATIENT
Start: 2018-08-05 | End: 2018-08-05

## 2018-08-05 RX ORDER — POTASSIUM CHLORIDE 20 MEQ
20 PACKET (EA) ORAL ONCE
Qty: 0 | Refills: 0 | Status: COMPLETED | OUTPATIENT
Start: 2018-08-05 | End: 2018-08-05

## 2018-08-05 RX ADMIN — Medication 0.25 INCH(S): at 18:26

## 2018-08-05 RX ADMIN — Medication 0.25 INCH(S): at 18:23

## 2018-08-05 RX ADMIN — Medication 20 MILLIGRAM(S): at 19:52

## 2018-08-05 RX ADMIN — PANTOPRAZOLE SODIUM 40 MILLIGRAM(S): 20 TABLET, DELAYED RELEASE ORAL at 12:05

## 2018-08-05 RX ADMIN — ATORVASTATIN CALCIUM 20 MILLIGRAM(S): 80 TABLET, FILM COATED ORAL at 22:02

## 2018-08-05 RX ADMIN — FENTANYL CITRATE 25 MICROGRAM(S): 50 INJECTION INTRAVENOUS at 10:30

## 2018-08-05 RX ADMIN — Medication 0.25 INCH(S): at 06:00

## 2018-08-05 RX ADMIN — FENTANYL CITRATE 12.5 MICROGRAM(S): 50 INJECTION INTRAVENOUS at 03:15

## 2018-08-05 RX ADMIN — Medication 2: at 12:01

## 2018-08-05 RX ADMIN — ONDANSETRON 4 MILLIGRAM(S): 8 TABLET, FILM COATED ORAL at 12:17

## 2018-08-05 RX ADMIN — Medication 0.25 INCH(S): at 07:33

## 2018-08-05 RX ADMIN — LIDOCAINE 1 PATCH: 4 CREAM TOPICAL at 12:04

## 2018-08-05 RX ADMIN — Medication 400 MILLIGRAM(S): at 22:02

## 2018-08-05 RX ADMIN — CHLORHEXIDINE GLUCONATE 1 APPLICATION(S): 213 SOLUTION TOPICAL at 12:04

## 2018-08-05 RX ADMIN — Medication 5 MILLIGRAM(S): at 22:21

## 2018-08-05 RX ADMIN — Medication 100 MILLIEQUIVALENT(S): at 02:57

## 2018-08-05 RX ADMIN — Medication 2: at 08:26

## 2018-08-05 RX ADMIN — FENTANYL CITRATE 12.5 MICROGRAM(S): 50 INJECTION INTRAVENOUS at 03:04

## 2018-08-05 RX ADMIN — FENTANYL CITRATE 25 MICROGRAM(S): 50 INJECTION INTRAVENOUS at 16:15

## 2018-08-05 RX ADMIN — Medication 40 MILLIGRAM(S): at 08:06

## 2018-08-05 RX ADMIN — FENTANYL CITRATE 25 MICROGRAM(S): 50 INJECTION INTRAVENOUS at 15:16

## 2018-08-05 RX ADMIN — CHLORHEXIDINE GLUCONATE 5 MILLILITER(S): 213 SOLUTION TOPICAL at 11:05

## 2018-08-05 RX ADMIN — CHLORHEXIDINE GLUCONATE 5 MILLILITER(S): 213 SOLUTION TOPICAL at 07:33

## 2018-08-05 RX ADMIN — FENTANYL CITRATE 25 MICROGRAM(S): 50 INJECTION INTRAVENOUS at 10:05

## 2018-08-05 RX ADMIN — Medication 50 GRAM(S): at 01:06

## 2018-08-05 NOTE — PROGRESS NOTE ADULT - SUBJECTIVE AND OBJECTIVE BOX
67 yo Female with PAST MEDICAL & SURGICAL HISTORY: History of seizures: 1980, COPD (chronic obstructive pulmonary disease), HLD, HTN, CAD, s/p AAA repair (EVAR) 2004, [facility]Y.     7/26/18 - s/p Open TAAA w replacement of descending aorta, re-implantation of mesorenals,     Vital Signs Last 24 Hrs  T(C): 36.4 (05 Aug 2018 14:00), Max: 36.6 (05 Aug 2018 01:00)  T(F): 97.5 (05 Aug 2018 14:00), Max: 97.9 (05 Aug 2018 04:00)  HR: 104 (05 Aug 2018 19:00) (94 - 112)  BP: --  BP(mean): >80  RR: 17 (05 Aug 2018 19:00) (13 - 21)  SpO2: 100% (05 Aug 2018 19:00) (97% - 100%)    Extubated, verbal  follows commands  tolerates NGT feeds  min redness on incision with eschar above redness  b/l pedal pitting edema  lower ext are weaker than upper  right arm PICC    WBC 22.8  Lactate, Blood: 1.6    a/p:    s/p open TAAA with mesorenal reimplantation  HTN  Fluid overload  Acute Post op Pain    extubated earlier today to HF NC, 50%  cont with hemodynamic control. Currently off pressers  ngt feeds, speech eval in am  Tylenol PRN pain  plts are improving  OOB to chair daily  trend WBC and abdominal incision exam  Cont with diuresis  glycemic control  DVT proph    40 minutes of critical care time spent providing medical care for patient's acute illness/conditions that impairs at least one vital organ system and/or poses a high risk of imminent or life threatening deterioration in the patient's condition. It includes time spent evaluating and treating the patient's acute illness as well as time spent reviewing labs, radiology, discussing goals of care with patient and/or patient's family, and discussing the case with a multidisciplinary team in an effort to prevent further life threatening deterioration or end organ damage. This time is independent of any procedures performed.

## 2018-08-05 NOTE — PROGRESS NOTE ADULT - SUBJECTIVE AND OBJECTIVE BOX
69yo with history of CAD, TIA,  open AAA s/p repair in 2004 and 3/2017  EVAR with coverage of left hypogastric artery and graft extension into the left EIA with Dr Solorzano for management of left iliac artery aneurysm.   Recent imaging showing descending aortic aneurysm with degenerated aorta with significant mural thrombus.        CT done in April 2018 : Diffusely aneurysmal descending thoracic aorta increased in size, with thick atheromatous plaques and mural thrombus.  Suprarenal abdominal aorta was also aneurysmal and increased in size. Patent Celiac and SMA and renals.  Infra-renal endoluminal stent with non-occlusive thrombus.     7/26  open thoraco-abdominal aorta replacement with separate re-implantation of meso-renals, bypass to celiac/SMA and bilateral renals.  Placement of Lumbar Drain    Post op issues with   1. Spinal cord ischemia   2. Severe thrombocytopenia and concern for DIC  3. Acrocyanosis       24 hours : vent weaned and extubated  Tolerated early mobility -- Able to stand 2 person assist       PMH :  THORACOABDOMINAL ANEURYS  COPD (chronic obstructive pulmonary disease)  HLD (hyperlipidemia)  HTN (hypertension)  AAA (abdominal aortic aneurysm)  CAD (coronary artery disease)  Thoracoabdominal aortic aneurysm  Thoracic aortic aneurysm without rupture  Lumbar drain implantation  S/P AAA (abdominal aortic aneurysm) repair  History of abdominal aortic aneurysm (AAA)  History of cholecystectomy    ICU Vital Signs Last 24 Hrs  T(C): 36.4 (08-05-18 @ 14:00), Max: 36.6 (08-05-18 @ 01:00)  T(F): 97.5 (08-05-18 @ 14:00), Max: 97.9 (08-05-18 @ 04:00)  HR: 106 (08-05-18 @ 15:00) (88 - 112)  ABP: 152/72 (08-05-18 @ 15:00) (110/54 - 176/78)  ABP(mean): 96 (08-05-18 @ 15:00) (72 - 118)  RR: 21 (08-05-18 @ 15:00) (13 - 26)  SpO2: 100% (08-05-18 @ 15:00) (97% - 100%)    I&O's Summary    04 Aug 2018 07:01  -  05 Aug 2018 07:00  --------------------------------------------------------  IN: 2284 mL / OUT: 2590 mL / NET: -306 mL    05 Aug 2018 07:01  -  05 Aug 2018 15:41  --------------------------------------------------------  IN: 160 mL / OUT: 740 mL / NET: -580 mL      Mode: standby    ABG - ( 05 Aug 2018 13:02 )  pH: 7.49  /  pCO2: 36    /  pO2: 109   / HCO3: 27    / Base Excess: 4.0   /  SaO2: 98                              11.2   22.8  )-----------( 61       ( 05 Aug 2018 13:02 )             36.3     05 Aug 2018 13:03    148    |  108    |  79     ----------------------------<  172    4.1     |  27     |  1.60     Ca    8.5        05 Aug 2018 13:03  Phos  3.0       05 Aug 2018 03:35  Mg     2.7       05 Aug 2018 03:35    TPro  5.7    /  Alb  3.0    /  TBili  1.8    /  DBili  x      /  AST  38     /  ALT  54     /  AlkPhos  80     05 Aug 2018 03:35    PT/INR - ( 05 Aug 2018 13:02 )   PT: 14.4 sec;   INR: 1.29     PTT - ( 05 Aug 2018 13:02 )  PTT:26.5 sec    MEDICATIONS  (STANDING):  atorvastatin 20 milliGRAM(s) Oral at bedtime  folic acid 1 milliGRAM(s) Oral daily  insulin lispro (HumaLOG) corrective regimen sliding scale   SubCutaneous Before meals and at bedtime  lidocaine   Patch 1 Patch Transdermal daily  nitroglycerin    2% Ointment 0.25 Inch(s) Transdermal two times a day  pantoprazole  Injectable 40 milliGRAM(s) IV Push daily    Home Medications:  Advair Diskus 100 mcg-50 mcg inhalation powder: 1 puff(s) inhaled 2 times a day (21 May 2018 07:27)  amLODIPine 5 mg oral tablet: 1 tab(s) orally once a day (21 May 2018 07:27)  aspirin 81 mg oral tablet: 1 tab(s) orally once a day (21 May 2018 07:27)  omeprazole 20 mg oral delayed release tablet: 1 tab(s) orally once a day (21 May 2018 07:27)  simvastatin 20 mg oral tablet: 1 tab(s) orally once a day (at bedtime) (21 May 2018 07:27)  Spiriva Respimat 1.25 mcg/inh inhalation aerosol: 2 puff(s) inhaled once a day (21 May 2018 07:27)  Ventolin HFA 90 mcg/inh inhalation aerosol: 2 puff(s) inhaled 4 times a day, As Needed (21 May 2018 07:27)    PHYSICAL EXAM:  Gen : no acute distress  Respiratory: decreased in the bases  Cardiovascular: S1 and S2, RRR, no M/G/R  Gastrointestinal: BS+, soft, NT/ND  Extremities: No peripheral edema  Vascular: 2+ peripheral pulses  Incision: clean dry/ no sign of infection  Lines: no sign of infection

## 2018-08-05 NOTE — PROGRESS NOTE ADULT - ASSESSMENT
Problems  1. S/p TAAA on 7/26  2. Concern for Spinal cord ischemia   3. Post op resp failure wean extubated  4. CKD stage 3  5. Thrombocytopenia - - improving    Plan   Neuro -- pain control  continue to follow neuro exam   CVS - permissive hypertensive for concern for spinal cord ischemia   Pulm - extubated to HF.   Will continue aggressive pulm toileting  likely will need BIPAP at night   GI - GI proph   - monitor UOP, continue diuresis   Endo - glycemic control  Heme - platelet count improving  DIC workup underway    Critical post op.    Critical care time spent 80min

## 2018-08-06 LAB
ALBUMIN SERPL ELPH-MCNC: 3.2 G/DL — LOW (ref 3.3–5)
ALBUMIN SERPL ELPH-MCNC: 3.3 G/DL — SIGNIFICANT CHANGE UP (ref 3.3–5)
ALBUMIN SERPL ELPH-MCNC: 3.8 G/DL — SIGNIFICANT CHANGE UP (ref 3.3–5)
ALP SERPL-CCNC: 47 U/L — SIGNIFICANT CHANGE UP (ref 40–120)
ALP SERPL-CCNC: 60 U/L — SIGNIFICANT CHANGE UP (ref 40–120)
ALP SERPL-CCNC: 66 U/L — SIGNIFICANT CHANGE UP (ref 40–120)
ALT FLD-CCNC: 38 U/L — SIGNIFICANT CHANGE UP (ref 10–45)
ALT FLD-CCNC: 47 U/L — HIGH (ref 10–45)
ALT FLD-CCNC: 48 U/L — HIGH (ref 10–45)
ANION GAP SERPL CALC-SCNC: 11 MMOL/L — SIGNIFICANT CHANGE UP (ref 5–17)
ANION GAP SERPL CALC-SCNC: 11 MMOL/L — SIGNIFICANT CHANGE UP (ref 5–17)
ANION GAP SERPL CALC-SCNC: 12 MMOL/L — SIGNIFICANT CHANGE UP (ref 5–17)
APTT BLD: 26.5 SEC — LOW (ref 27.5–37.4)
APTT BLD: 27 SEC — LOW (ref 27.5–37.4)
APTT BLD: 38.2 SEC — HIGH (ref 27.5–37.4)
AST SERPL-CCNC: 34 U/L — SIGNIFICANT CHANGE UP (ref 10–40)
AST SERPL-CCNC: 37 U/L — SIGNIFICANT CHANGE UP (ref 10–40)
AST SERPL-CCNC: 39 U/L — SIGNIFICANT CHANGE UP (ref 10–40)
B2 GLYCOPROT1 AB SER QL: NEGATIVE — SIGNIFICANT CHANGE UP
BASE EXCESS BLDA CALC-SCNC: 4.6 MMOL/L — HIGH (ref -2–3)
BILIRUB SERPL-MCNC: 1.9 MG/DL — HIGH (ref 0.2–1.2)
BILIRUB SERPL-MCNC: 1.9 MG/DL — HIGH (ref 0.2–1.2)
BILIRUB SERPL-MCNC: 2 MG/DL — HIGH (ref 0.2–1.2)
BUN SERPL-MCNC: 59 MG/DL — HIGH (ref 7–23)
BUN SERPL-MCNC: 66 MG/DL — HIGH (ref 7–23)
BUN SERPL-MCNC: 73 MG/DL — HIGH (ref 7–23)
CALCIUM SERPL-MCNC: 8.8 MG/DL — SIGNIFICANT CHANGE UP (ref 8.4–10.5)
CALCIUM SERPL-MCNC: 8.9 MG/DL — SIGNIFICANT CHANGE UP (ref 8.4–10.5)
CALCIUM SERPL-MCNC: 8.9 MG/DL — SIGNIFICANT CHANGE UP (ref 8.4–10.5)
CARDIOLIPIN AB SER-ACNC: NEGATIVE — SIGNIFICANT CHANGE UP
CHLORIDE SERPL-SCNC: 107 MMOL/L — SIGNIFICANT CHANGE UP (ref 96–108)
CHLORIDE SERPL-SCNC: 108 MMOL/L — SIGNIFICANT CHANGE UP (ref 96–108)
CHLORIDE SERPL-SCNC: 109 MMOL/L — HIGH (ref 96–108)
CO2 SERPL-SCNC: 29 MMOL/L — SIGNIFICANT CHANGE UP (ref 22–31)
CO2 SERPL-SCNC: 29 MMOL/L — SIGNIFICANT CHANGE UP (ref 22–31)
CO2 SERPL-SCNC: 30 MMOL/L — SIGNIFICANT CHANGE UP (ref 22–31)
CREAT SERPL-MCNC: 1.31 MG/DL — HIGH (ref 0.5–1.3)
CREAT SERPL-MCNC: 1.44 MG/DL — HIGH (ref 0.5–1.3)
CREAT SERPL-MCNC: 1.53 MG/DL — HIGH (ref 0.5–1.3)
D DIMER BLD IA.RAPID-MCNC: HIGH NG/ML DDU
FIBRINOGEN PPP-MCNC: 322 MG/DL — SIGNIFICANT CHANGE UP (ref 258–438)
GAS PNL BLDA: SIGNIFICANT CHANGE UP
GLUCOSE BLDC GLUCOMTR-MCNC: 145 MG/DL — HIGH (ref 70–99)
GLUCOSE BLDC GLUCOMTR-MCNC: 153 MG/DL — HIGH (ref 70–99)
GLUCOSE BLDC GLUCOMTR-MCNC: 170 MG/DL — HIGH (ref 70–99)
GLUCOSE BLDC GLUCOMTR-MCNC: 98 MG/DL — SIGNIFICANT CHANGE UP (ref 70–99)
GLUCOSE SERPL-MCNC: 104 MG/DL — HIGH (ref 70–99)
GLUCOSE SERPL-MCNC: 148 MG/DL — HIGH (ref 70–99)
GLUCOSE SERPL-MCNC: 163 MG/DL — HIGH (ref 70–99)
HAPTOGLOB SERPL-MCNC: 38 MG/DL — SIGNIFICANT CHANGE UP (ref 34–200)
HCO3 BLDA-SCNC: 28 MMOL/L — SIGNIFICANT CHANGE UP (ref 21–28)
HCT VFR BLD CALC: 28.3 % — LOW (ref 34.5–45)
HCT VFR BLD CALC: 28.3 % — LOW (ref 34.5–45)
HCT VFR BLD CALC: 32.9 % — LOW (ref 34.5–45)
HCT VFR BLD CALC: 35.6 % — SIGNIFICANT CHANGE UP (ref 34.5–45)
HEMOSIDERIN UR-MCNC: SIGNIFICANT CHANGE UP
HGB BLD-MCNC: 10.6 G/DL — LOW (ref 11.5–15.5)
HGB BLD-MCNC: 11 G/DL — LOW (ref 11.5–15.5)
HGB BLD-MCNC: 8.9 G/DL — LOW (ref 11.5–15.5)
HGB BLD-MCNC: 9 G/DL — LOW (ref 11.5–15.5)
INR BLD: 1.21 — HIGH (ref 0.88–1.16)
INR BLD: 1.26 — HIGH (ref 0.88–1.16)
INR BLD: 1.31 — HIGH (ref 0.88–1.16)
LACTATE SERPL-SCNC: 0.8 MMOL/L — SIGNIFICANT CHANGE UP (ref 0.5–2)
LACTATE SERPL-SCNC: 1 MMOL/L — SIGNIFICANT CHANGE UP (ref 0.5–2)
LACTATE SERPL-SCNC: 1.2 MMOL/L — SIGNIFICANT CHANGE UP (ref 0.5–2)
LDH SERPL L TO P-CCNC: 525 U/L — HIGH (ref 50–242)
LG PLATELETS BLD QL AUTO: PRESENT — SIGNIFICANT CHANGE UP
MAGNESIUM SERPL-MCNC: 2 MG/DL — SIGNIFICANT CHANGE UP (ref 1.6–2.6)
MAGNESIUM SERPL-MCNC: 2.2 MG/DL — SIGNIFICANT CHANGE UP (ref 1.6–2.6)
MAGNESIUM SERPL-MCNC: 2.8 MG/DL — HIGH (ref 1.6–2.6)
MANUAL SMEAR VERIFICATION: SIGNIFICANT CHANGE UP
MCHC RBC-ENTMCNC: 29.7 PG — SIGNIFICANT CHANGE UP (ref 27–34)
MCHC RBC-ENTMCNC: 30.1 PG — SIGNIFICANT CHANGE UP (ref 27–34)
MCHC RBC-ENTMCNC: 30.4 PG — SIGNIFICANT CHANGE UP (ref 27–34)
MCHC RBC-ENTMCNC: 30.4 PG — SIGNIFICANT CHANGE UP (ref 27–34)
MCHC RBC-ENTMCNC: 30.9 G/DL — LOW (ref 32–36)
MCHC RBC-ENTMCNC: 31.4 G/DL — LOW (ref 32–36)
MCHC RBC-ENTMCNC: 31.8 G/DL — LOW (ref 32–36)
MCHC RBC-ENTMCNC: 32.2 G/DL — SIGNIFICANT CHANGE UP (ref 32–36)
MCV RBC AUTO: 94.3 FL — SIGNIFICANT CHANGE UP (ref 80–100)
MCV RBC AUTO: 95.6 FL — SIGNIFICANT CHANGE UP (ref 80–100)
MCV RBC AUTO: 95.6 FL — SIGNIFICANT CHANGE UP (ref 80–100)
MCV RBC AUTO: 96.2 FL — SIGNIFICANT CHANGE UP (ref 80–100)
PCO2 BLDA: 35 MMHG — SIGNIFICANT CHANGE UP (ref 32–45)
PH BLDA: 7.52 — HIGH (ref 7.35–7.45)
PHOSPHATE SERPL-MCNC: 3.7 MG/DL — SIGNIFICANT CHANGE UP (ref 2.5–4.5)
PHOSPHATE SERPL-MCNC: 3.8 MG/DL — SIGNIFICANT CHANGE UP (ref 2.5–4.5)
PHOSPHATE SERPL-MCNC: 4 MG/DL — SIGNIFICANT CHANGE UP (ref 2.5–4.5)
PLAT MORPH BLD: ABNORMAL
PLATELET # BLD AUTO: 77 K/UL — LOW (ref 150–400)
PLATELET # BLD AUTO: 89 K/UL — LOW (ref 150–400)
PLATELET # BLD AUTO: 91 K/UL — LOW (ref 150–400)
PLATELET # BLD AUTO: 91 K/UL — LOW (ref 150–400)
PO2 BLDA: 123 MMHG — HIGH (ref 83–108)
POTASSIUM SERPL-MCNC: 3.9 MMOL/L — SIGNIFICANT CHANGE UP (ref 3.5–5.3)
POTASSIUM SERPL-MCNC: 4.1 MMOL/L — SIGNIFICANT CHANGE UP (ref 3.5–5.3)
POTASSIUM SERPL-MCNC: 4.3 MMOL/L — SIGNIFICANT CHANGE UP (ref 3.5–5.3)
POTASSIUM SERPL-SCNC: 3.9 MMOL/L — SIGNIFICANT CHANGE UP (ref 3.5–5.3)
POTASSIUM SERPL-SCNC: 4.1 MMOL/L — SIGNIFICANT CHANGE UP (ref 3.5–5.3)
POTASSIUM SERPL-SCNC: 4.3 MMOL/L — SIGNIFICANT CHANGE UP (ref 3.5–5.3)
PROT SERPL-MCNC: 5.6 G/DL — LOW (ref 6–8.3)
PROT SERPL-MCNC: 5.9 G/DL — LOW (ref 6–8.3)
PROT SERPL-MCNC: 6.2 G/DL — SIGNIFICANT CHANGE UP (ref 6–8.3)
PROTHROM AB SERPL-ACNC: 13.5 SEC — HIGH (ref 9.8–12.7)
PROTHROM AB SERPL-ACNC: 14 SEC — HIGH (ref 9.8–12.7)
PROTHROM AB SERPL-ACNC: 14.6 SEC — HIGH (ref 9.8–12.7)
RBC # BLD: 2.96 M/UL — LOW (ref 3.8–5.2)
RBC # BLD: 2.96 M/UL — LOW (ref 3.8–5.2)
RBC # BLD: 3.49 M/UL — LOW (ref 3.8–5.2)
RBC # BLD: 3.7 M/UL — LOW (ref 3.8–5.2)
RBC # FLD: 16.6 % — SIGNIFICANT CHANGE UP (ref 10.3–16.9)
RBC # FLD: 16.7 % — SIGNIFICANT CHANGE UP (ref 10.3–16.9)
RBC # FLD: 16.9 % — SIGNIFICANT CHANGE UP (ref 10.3–16.9)
RBC # FLD: 17 % — HIGH (ref 10.3–16.9)
RBC BLD AUTO: SIGNIFICANT CHANGE UP
SAO2 % BLDA: 98 % — SIGNIFICANT CHANGE UP (ref 95–100)
SODIUM SERPL-SCNC: 148 MMOL/L — HIGH (ref 135–145)
SODIUM SERPL-SCNC: 148 MMOL/L — HIGH (ref 135–145)
SODIUM SERPL-SCNC: 150 MMOL/L — HIGH (ref 135–145)
WBC # BLD: 16 K/UL — HIGH (ref 3.8–10.5)
WBC # BLD: 16.2 K/UL — HIGH (ref 3.8–10.5)
WBC # BLD: 19 K/UL — HIGH (ref 3.8–10.5)
WBC # BLD: 21.9 K/UL — HIGH (ref 3.8–10.5)
WBC # FLD AUTO: 16 K/UL — HIGH (ref 3.8–10.5)
WBC # FLD AUTO: 16.2 K/UL — HIGH (ref 3.8–10.5)
WBC # FLD AUTO: 19 K/UL — HIGH (ref 3.8–10.5)
WBC # FLD AUTO: 21.9 K/UL — HIGH (ref 3.8–10.5)

## 2018-08-06 PROCEDURE — 99292 CRITICAL CARE ADDL 30 MIN: CPT | Mod: 25

## 2018-08-06 PROCEDURE — 99291 CRITICAL CARE FIRST HOUR: CPT | Mod: 25

## 2018-08-06 PROCEDURE — 71045 X-RAY EXAM CHEST 1 VIEW: CPT | Mod: 26,76

## 2018-08-06 PROCEDURE — 31645 BRNCHSC W/THER ASPIR 1ST: CPT

## 2018-08-06 PROCEDURE — 93010 ELECTROCARDIOGRAM REPORT: CPT

## 2018-08-06 PROCEDURE — 76604 US EXAM CHEST: CPT | Mod: 26

## 2018-08-06 PROCEDURE — 99292 CRITICAL CARE ADDL 30 MIN: CPT

## 2018-08-06 RX ORDER — FUROSEMIDE 40 MG
20 TABLET ORAL ONCE
Qty: 0 | Refills: 0 | Status: COMPLETED | OUTPATIENT
Start: 2018-08-06 | End: 2018-08-06

## 2018-08-06 RX ORDER — ACETAMINOPHEN 500 MG
1000 TABLET ORAL ONCE
Qty: 0 | Refills: 0 | Status: COMPLETED | OUTPATIENT
Start: 2018-08-06 | End: 2018-08-06

## 2018-08-06 RX ORDER — POTASSIUM CHLORIDE 20 MEQ
20 PACKET (EA) ORAL ONCE
Qty: 0 | Refills: 0 | Status: COMPLETED | OUTPATIENT
Start: 2018-08-06 | End: 2018-08-06

## 2018-08-06 RX ORDER — ALBUMIN HUMAN 25 %
50 VIAL (ML) INTRAVENOUS
Qty: 0 | Refills: 0 | Status: COMPLETED | OUTPATIENT
Start: 2018-08-06 | End: 2018-08-06

## 2018-08-06 RX ORDER — METOPROLOL TARTRATE 50 MG
12.5 TABLET ORAL EVERY 12 HOURS
Qty: 0 | Refills: 0 | Status: DISCONTINUED | OUTPATIENT
Start: 2018-08-06 | End: 2018-08-07

## 2018-08-06 RX ORDER — POTASSIUM CHLORIDE 20 MEQ
10 PACKET (EA) ORAL ONCE
Qty: 0 | Refills: 0 | Status: COMPLETED | OUTPATIENT
Start: 2018-08-06 | End: 2018-08-07

## 2018-08-06 RX ORDER — MAGNESIUM SULFATE 500 MG/ML
2 VIAL (ML) INJECTION ONCE
Qty: 0 | Refills: 0 | Status: COMPLETED | OUTPATIENT
Start: 2018-08-06 | End: 2018-08-06

## 2018-08-06 RX ADMIN — Medication 50 MILLILITER(S): at 18:10

## 2018-08-06 RX ADMIN — CHLORHEXIDINE GLUCONATE 1 APPLICATION(S): 213 SOLUTION TOPICAL at 11:46

## 2018-08-06 RX ADMIN — Medication 2: at 16:35

## 2018-08-06 RX ADMIN — Medication 50 MILLILITER(S): at 16:13

## 2018-08-06 RX ADMIN — Medication 100 MILLIEQUIVALENT(S): at 23:38

## 2018-08-06 RX ADMIN — Medication 400 MILLIGRAM(S): at 04:57

## 2018-08-06 RX ADMIN — LIDOCAINE 1 PATCH: 4 CREAM TOPICAL at 23:04

## 2018-08-06 RX ADMIN — Medication 1000 MILLIGRAM(S): at 22:25

## 2018-08-06 RX ADMIN — ATORVASTATIN CALCIUM 20 MILLIGRAM(S): 80 TABLET, FILM COATED ORAL at 21:49

## 2018-08-06 RX ADMIN — CHLORHEXIDINE GLUCONATE 5 MILLILITER(S): 213 SOLUTION TOPICAL at 14:30

## 2018-08-06 RX ADMIN — Medication 50 MILLILITER(S): at 17:05

## 2018-08-06 RX ADMIN — Medication 50 GRAM(S): at 08:02

## 2018-08-06 RX ADMIN — CHLORHEXIDINE GLUCONATE 5 MILLILITER(S): 213 SOLUTION TOPICAL at 18:13

## 2018-08-06 RX ADMIN — Medication 0.25 INCH(S): at 08:02

## 2018-08-06 RX ADMIN — Medication 400 MILLIGRAM(S): at 21:45

## 2018-08-06 RX ADMIN — Medication 1000 MILLIGRAM(S): at 12:48

## 2018-08-06 RX ADMIN — LIDOCAINE 1 PATCH: 4 CREAM TOPICAL at 11:40

## 2018-08-06 RX ADMIN — Medication 400 MILLIGRAM(S): at 11:46

## 2018-08-06 RX ADMIN — PANTOPRAZOLE SODIUM 40 MILLIGRAM(S): 20 TABLET, DELAYED RELEASE ORAL at 11:37

## 2018-08-06 RX ADMIN — Medication 0.25 INCH(S): at 19:30

## 2018-08-06 RX ADMIN — CHLORHEXIDINE GLUCONATE 5 MILLILITER(S): 213 SOLUTION TOPICAL at 11:31

## 2018-08-06 RX ADMIN — Medication 4 MILLIGRAM(S): at 11:30

## 2018-08-06 RX ADMIN — Medication 0.25 INCH(S): at 18:12

## 2018-08-06 RX ADMIN — Medication 1 MILLIGRAM(S): at 14:30

## 2018-08-06 RX ADMIN — Medication 1000 MILLIGRAM(S): at 00:51

## 2018-08-06 RX ADMIN — Medication 1000 MILLIGRAM(S): at 04:59

## 2018-08-06 RX ADMIN — Medication 0.25 INCH(S): at 07:14

## 2018-08-06 RX ADMIN — LIDOCAINE 1 PATCH: 4 CREAM TOPICAL at 00:51

## 2018-08-06 RX ADMIN — Medication 20 MILLIGRAM(S): at 19:55

## 2018-08-06 NOTE — PROGRESS NOTE ADULT - SUBJECTIVE AND OBJECTIVE BOX
CTICU  CRITICAL  CARE  PROCEDURE  NOTE      				          Name of procedure – Flexible fiberoptic bronchoscopy    Indication –   Respiratory failure    Flexible fiberoptic bronchoscopy was performed under conscious sedation using 2 mg versed iv    The scope was advanced orotracheally after anaesthetizing using viscous lidocaine   VC were sharp and moving well…    The naima was sharp  Right LL and RML air way were patent , scant secretions  LLL and JENNIFER air way were patent , scant secretions    CXR confirmed no pneumothorax post bronch  There were no complications  The patient tolerated the procedure well

## 2018-08-06 NOTE — PROGRESS NOTE ADULT - ASSESSMENT
This is a 68 year old female ith history of CAD, TIA in 1998, COPD, AAA s/p open AAA repair by Dr. Roberto in 2004, EVAR/AAA/CIAA with Dr. Burch on 3/29/17 who was referred to Dr. 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   plt count improving  and improving fibrinogen level suggestive of resolving DIC. Ddimer still rising- unclear why  - renal failure also improving, hemolysis labs improving LDH decreasing and hapto rising  cont to treat underlying cause of DIC, avoid plt transfusions. ultrasound abd and LE show no thrombus  no evidence of cAPS as antibodies are negative.  will cont to follow        #Coagulopathy  - likely consistent with DIC, fibrinogen and Factor VIII may be falsely high as they can be acute phase reactants, but [place] dimer of [id number] in setting of shistocytes on smear and elevated LDH low haptoglobin support this diagnosis. would recommend no reversal of coaguloapthy unless pt is bleeding with INR >1.6. Avoid FFP and cryoprecipitate as pt is already high risk for thrombosis. treat underlying condition responsible for DIC. Can consider heparin gtt to treat any potential of thrombotic events with DIC- this may improve the plt count as well. Factor V and fibrinogen are low consistent with DIC    Case discussed with Dr Tee

## 2018-08-06 NOTE — CHART NOTE - NSCHARTNOTEFT_GEN_A_CORE
Admitting Diagnosis:   Patient is a 68y old  Female who presents with a chief complaint of Descending thoracic aortic aneurysm (2018 17:14)      PAST MEDICAL & SURGICAL HISTORY:  History of seizures:   COPD (chronic obstructive pulmonary disease)  HLD (hyperlipidemia)  HTN (hypertension)  AAA (abdominal aortic aneurysm)  CAD (coronary artery disease)  S/P AAA (abdominal aortic aneurysm) repair  History of abdominal aortic aneurysm (AAA):   History of cholecystectomy      Current Nutrition Order:  Vital 1.5 via NGT at 40ml/hr x 24hr + 1x prostat; off as pt for bronch today    GI Issues:   Denies N/V/D    Pain:  Pt denies     Skin Integrity:  L groin ASW  L chest ASW      Labs:       148<H>  |  108  |  73<H>  ----------------------------<  148<H>  4.1   |  29  |  1.53<H>    Ca    8.9      06 Aug 2018 03:41  Phos  3.7     -  Mg     2.0     -06    TPro  5.6<L>  /  Alb  3.3  /  TBili  1.9<H>  /  DBili  x   /  AST  37  /  ALT  47<H>  /  AlkPhos  60  -    CAPILLARY BLOOD GLUCOSE      POCT Blood Glucose.: 153 mg/dL (06 Aug 2018 07:55)  POCT Blood Glucose.: 101 mg/dL (05 Aug 2018 21:53)  POCT Blood Glucose.: 147 mg/dL (05 Aug 2018 17:28)  POCT Blood Glucose.: 194 mg/dL (05 Aug 2018 11:20)      Medications:  MEDICATIONS  (STANDING):  atorvastatin 20 milliGRAM(s) Oral at bedtime  chlorhexidine 0.12% Liquid 5 milliLiter(s) Swish and Spit every 4 hours  chlorhexidine 2% Cloths 1 Application(s) Topical daily  chlorhexidine 2% Cloths 1 Application(s) Topical daily  dextrose 50% Injectable 50 milliLiter(s) IV Push every 15 minutes  dextrose 50% Injectable 25 milliLiter(s) IV Push every 15 minutes  dextrose 50% Injectable 50 milliLiter(s) IV Push every 15 minutes  dextrose 50% Injectable 25 milliLiter(s) IV Push every 15 minutes  folic acid 1 milliGRAM(s) Oral daily  insulin lispro (HumaLOG) corrective regimen sliding scale   SubCutaneous Before meals and at bedtime  lidocaine   Patch 1 Patch Transdermal daily  nitroglycerin    2% Ointment 0.25 Inch(s) Transdermal two times a day  pantoprazole  Injectable 40 milliGRAM(s) IV Push daily  sodium chloride 0.9%. 1000 milliLiter(s) (10 mL/Hr) IV Continuous <Continuous>  testosterone patch 4 mG/24 Hr(s) 4 milliGRAM(s) Transdermal once    MEDICATIONS  (PRN):  dextrose 40% Gel 15 Gram(s) Oral once PRN Blood Glucose LESS THAN 70 milliGRAM(s)/deciliter  glucagon  Injectable 1 milliGRAM(s) IntraMuscular once PRN Glucose LESS THAN 70 milligrams/deciliter      Weight:  50.9kg ()    Daily Weight in k.3 (2018 11:49)    Weight Change:   wt change noted; could be due to fluids; please trend daily wts    Estimated energy needs:   IBW 52.3kg used for EER and adjusted for post-op/age  25-30kcal/kg (1307-1569kcal), 1.2-1.4g/kg (63-73g), 30-35ml/kg (1569-1830ml)    Subjective:   69y/o F s/o open TAAA w/ replacement of descending aorta - w/separate reimplantation of mesorenals. Concern for possible ischemic colitis noted; flex sig performed and negative. Pt now extubated. SLP consult for S&S today but deferred 2/2 pt NPO for bronch. Per d/w PA, pt likely not for PO today; noted pt with hoarse, low voice and thick secretions. NGT remains in place with TF off. D/w PA to restart feeds after bronch. Pt seen resting in bed on HFNC. She reports she is hungry/thirsty and wants to eat something. Denies GI distress and current pain. Discussed need for SLP eval prior to PO in order to assess for appropriateness of PO/safety. Also discussed EN meeting EER at goal rate. Will continue to follow.      Previous Nutrition Diagnosis:  Inadequate EN RT infusion rate AEB pt meeting 22% of lower kcal limit    Active [   ]  Resolved [X]    If resolved, new PES:   Inadequate EN RT TF held AEB pt meeting 0% of EER     Goal:  Pt to restart nutrition within 24hrs     Recommendations:  1. When medically appropriate, restart Vital 1.5 via NGT with goal rate of 40ml/hr x 24hr + 1x prostat (960ml TV, 1540kcal, 79g, 733ml water, 96% of RDIs). Additional fluids per MD. Monitor for s/s of intolerance and maintain aspiration precautions.   2. When appropriate, reconsult SLP for S&S prior to PO; Dash/TLC diet per appropriate consistency.   3. Monitor lytes and replete prn.   4. Add MVI.   5. Trend daily wts.     *d/w PA*    Education:   EN meeting EER at goal; SLP eval prior to PO    Risk Level: High [X] Moderate [   ] Low [   ]

## 2018-08-06 NOTE — CHART NOTE - NSCHARTNOTEFT_GEN_A_CORE
CT SURGERY     Exam:  US Chest    Procedure Date: 7/26/18    History: 68y Female whose CXR today shows a possible b/l sided pleural effusions.  Pt is s/p Thoracoabdminal aortic repair     Findings:                    Evaluation of the b/l  side of the thoracic cavity demonstrates small right pleural effusion and no Left pleural effusion                     Impression:  mall right pleural effusion and no Left pleural effusion

## 2018-08-06 NOTE — PROGRESS NOTE ADULT - ASSESSMENT
68 year old female with history of CAD, TIA in 1998, COPD, AAA s/p open AAA repair in 2004, EVAR/CIAA, with extension into L hypogastric & DIONNE in 3/29/17, Presented for a descending thoracoabdominal aortic aneurysm repair (aneurysm 5.7 with mural thrombus and widely patent Celiac and SMA),   Post op patient course complicated by CARRIE, Thrombocytopenia (possible DIC),Acrocyanosis, Spinal cord ischemia and respiratory failure S/p extubation still need high flow  Plan:  - Pulmonary toileting  - Permissive hypertension for spinal cord ischemia  - Hematology Follow up for thrombocytopenia, Plt count is improving.  - Rest of care by CTICU  - Discussed with the chief resident

## 2018-08-06 NOTE — PROGRESS NOTE ADULT - SUBJECTIVE AND OBJECTIVE BOX
Subjective:  Doing well, afebrile, extubated over the weekend, no active complaints      Vital Signs Last 24 Hrs  T(C): 36.3 (06 Aug 2018 10:16), Max: 36.9 (06 Aug 2018 04:00)  T(F): 97.4 (06 Aug 2018 10:16), Max: 98.4 (06 Aug 2018 04:00)  HR: 122 (06 Aug 2018 11:00) (90 - 122)  BP: --  BP(mean): --  RR: 32 (06 Aug 2018 11:00) (16 - 32)  SpO2: 100% (06 Aug 2018 11:00) (100% - 100%)    Physical Exam:        LABS:                        11.0   21.9  )-----------( 91       ( 06 Aug 2018 11:23 )             35.6     08-06    150<H>  |  109<H>  |  66<H>  ----------------------------<  163<H>  4.3   |  29  |  1.44<H>    Ca    8.8      06 Aug 2018 11:23  Phos  4.0     08-06  Mg     2.8     08-06    TPro  6.2  /  Alb  3.2<L>  /  TBili  1.9<H>  /  DBili  x   /  AST  39  /  ALT  48<H>  /  AlkPhos  66  08-06    PT/INR - ( 06 Aug 2018 11:23 )   PT: 13.5 sec;   INR: 1.21          PTT - ( 06 Aug 2018 11:23 )  PTT:26.5 sec  CAPILLARY BLOOD GLUCOSE      POCT Blood Glucose.: 145 mg/dL (06 Aug 2018 11:13)  POCT Blood Glucose.: 153 mg/dL (06 Aug 2018 07:55)  POCT Blood Glucose.: 101 mg/dL (05 Aug 2018 21:53)  POCT Blood Glucose.: 147 mg/dL (05 Aug 2018 17:28)      LIVER FUNCTIONS - ( 06 Aug 2018 11:23 )  Alb: 3.2 g/dL / Pro: 6.2 g/dL / ALK PHOS: 66 U/L / ALT: 48 U/L / AST: 39 U/L / GGT: x Subjective:  Doing well, afebrile, extubated over the weekend, no active complaints      Vital Signs Last 24 Hrs  T(C): 36.3 (06 Aug 2018 10:16), Max: 36.9 (06 Aug 2018 04:00)  T(F): 97.4 (06 Aug 2018 10:16), Max: 98.4 (06 Aug 2018 04:00)  HR: 122 (06 Aug 2018 11:00) (90 - 122)  BP: --  BP(mean): --  RR: 32 (06 Aug 2018 11:00) (16 - 32)  SpO2: 100% (06 Aug 2018 11:00) (100% - 100%)    Physical Exam:  Gen : no acute distress  Respiratory: non labored breathing  Gastrointestinal: BS+, soft, NT/ND  Vascular: 2+ peripheral pulses  Incision: clean dry/ no sign of infection      LABS:                        11.0   21.9  )-----------( 91       ( 06 Aug 2018 11:23 )             35.6     08-06    150<H>  |  109<H>  |  66<H>  ----------------------------<  163<H>  4.3   |  29  |  1.44<H>    Ca    8.8      06 Aug 2018 11:23  Phos  4.0     08-06  Mg     2.8     08-06    TPro  6.2  /  Alb  3.2<L>  /  TBili  1.9<H>  /  DBili  x   /  AST  39  /  ALT  48<H>  /  AlkPhos  66  08-06    PT/INR - ( 06 Aug 2018 11:23 )   PT: 13.5 sec;   INR: 1.21          PTT - ( 06 Aug 2018 11:23 )  PTT:26.5 sec  CAPILLARY BLOOD GLUCOSE      POCT Blood Glucose.: 145 mg/dL (06 Aug 2018 11:13)  POCT Blood Glucose.: 153 mg/dL (06 Aug 2018 07:55)  POCT Blood Glucose.: 101 mg/dL (05 Aug 2018 21:53)  POCT Blood Glucose.: 147 mg/dL (05 Aug 2018 17:28)      LIVER FUNCTIONS - ( 06 Aug 2018 11:23 )  Alb: 3.2 g/dL / Pro: 6.2 g/dL / ALK PHOS: 66 U/L / ALT: 48 U/L / AST: 39 U/L / GGT: x

## 2018-08-06 NOTE — PROGRESS NOTE ADULT - SUBJECTIVE AND OBJECTIVE BOX
Hematology Oncology Consult Note (Dr Tee)    The patient was seen and examined at bedside.    67 yo F with history of CAD, TIA in 1998, COPD, AAA s/p open AAA repair by Dr. Roberto in 2004, EVAR/AAA/CIAA with Dr. Burch on 3/29/17 who was referred to Dr. 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  She underwent CTA chest/abdomen/pelvis in 4/2018 which demonstrated descending thoracoabdominal aortic aneurysm with degeneration of the aorta at the mid-descending thoracic segment, measuring 5.7cm with significant mural thrombus (previously measuring 5.5cm) with widely patent celiac/SMA/renal arteries.  She completed outpatient pre-operative evaluation and was deemed a surgical candidate for thoracoabdominal aneurysm repair and admitted to [facility] on 7/25/18 under the care of Dr. Monson in anticipated of planned procedure on 7/26.    Pt underwent repair 7/26. Post-procedure labs showed plt count 58k, new coagulopathy, and worsening renal failure. She received many blood products intraop and heparin. She received pRBC, cry, ffp, plts intraop. Pt's plt count initial was responding to transfusion but then stopped responding yesterday in setting of worsenign renal failure. Her post-op complication was leg weakness 2/2 to spinal infarction requiring lumber drain placement by neurosurgery. She has had serosanguinous drainage of luids from ET suction and from her three thorax drainage tubes. No blood in ivan and no blood in stool. She has been receiving pRBC post-op despite having normal Hgb.       Interval hx: extubated, sitting in chair. no bleeding. still has acral cyanosis. plts improving.     ROS is otherwise negative.    PHYSICAL EXAM:  ICU Vital Signs Last 24 Hrs  T(C): 36.2 (06 Aug 2018 15:10), Max: 36.9 (06 Aug 2018 04:00)  T(F): 97.1 (06 Aug 2018 15:10), Max: 98.4 (06 Aug 2018 04:00)  HR: 104 (06 Aug 2018 16:00) (90 - 122)  BP: --  BP(mean): --  ABP: 156/66 (06 Aug 2018 16:00) (110/50 - 160/64)  ABP(mean): 94 (06 Aug 2018 16:00) (70 - 96)  RR: 28 (06 Aug 2018 16:00) (16 - 32)  SpO2: 100% (06 Aug 2018 16:00) (100% - 100%)        Gen: intubated, awake, alert, follows commands  HEENT: normocephalic/atraumatic, no conjunctival pallor, no scleral icterus, no oral thrush/mucosal bleeding/mucositis + NG tube  Neck: supple, no masses, no JVD  Cardiovascular: RR, nl S1S2, no murmurs/rubs/gallops.   Respiratory: clear air entry   Gastrointestinal: BS+, soft, NT/ND, no masses, no splenomegaly, no hepatomegaly, no evidence for ascites  Extremities: no edema, no calf tenderness, DP/PT 2+ b/l, evidence of acral cyanosis, brisk pulses  Neurological: no focal deficits  Skin: no rash on visible skin  Lymph Nodes:  no cervical/supraclavicular LAD, no axillary/groin LAD      Labs                          11.0   21.9  )-----------( 91       ( 06 Aug 2018 11:23 )             35.6         CBC Full  -  ( 06 Aug 2018 11:23 )  WBC Count : 21.9 K/uL  Hemoglobin : 11.0 g/dL  Hematocrit : 35.6 %  Platelet Count - Automated : 91 K/uL  Mean Cell Volume : 96.2 fL  Mean Cell Hemoglobin : 29.7 pg  Mean Cell Hemoglobin Concentration : 30.9 g/dL  Auto Neutrophil # : x  Auto Lymphocyte # : x  Auto Monocyte # : x  Auto Eosinophil # : x  Auto Basophil # : x  Auto Neutrophil % : x  Auto Lymphocyte % : x  Auto Monocyte % : x  Auto Eosinophil % : x  Auto Basophil % : x        08-06    150<H>  |  109<H>  |  66<H>  ----------------------------<  163<H>  4.3   |  29  |  1.44<H>    Ca    8.8      06 Aug 2018 11:23  Phos  4.0     08-06  Mg     2.8     08-06    TPro  6.2  /  Alb  3.2<L>  /  TBili  1.9<H>  /  DBili  x   /  AST  39  /  ALT  48<H>  /  AlkPhos  66  08-06        PT/INR - ( 06 Aug 2018 11:23 )   PT: 13.5 sec;   INR: 1.21          PTT - ( 06 Aug 2018 11:23 )  PTT:26.5 sec               d dimer 3800  retic 1.3    peripheral smear: 3-4 shistocytes per hpf, toxic granualtions of neutrophils, bands, myelocytes and meta myelocytes, large plts

## 2018-08-06 NOTE — SWALLOW BEDSIDE ASSESSMENT ADULT - COMMENTS
Received OOB to chair, awake, alert, pleasant & cooperative. Language skills appear intact for at least basic conversation. Voice is clear but with decreased loudness.

## 2018-08-06 NOTE — SWALLOW BEDSIDE ASSESSMENT ADULT - PHARYNGEAL PHASE
Hyolaryngeal excursion palpated during swallow trigger. No overt signs of aspiration noted./Within functional limits

## 2018-08-06 NOTE — PROGRESS NOTE ADULT - SUBJECTIVE AND OBJECTIVE BOX
CTICU  CRITICAL  CARE  attending     Hand off received 					   Pertinent clinical, laboratory, radiographic, hemodynamic, echocardiographic, respiratory data, microbiologic data and chart were reviewed and analyzed frequently throughout the course of the day and night  Patient seen and examined with CTS/ SH attending at bedside  Pt is a 68y , Female, HEALTH ISSUES - PROBLEM Dx:      , FAMILY HISTORY:  No pertinent family history in first degree relatives  PAST MEDICAL & SURGICAL HISTORY:  History of seizures: 1980  COPD (chronic obstructive pulmonary disease)  HLD (hyperlipidemia)  HTN (hypertension)  AAA (abdominal aortic aneurysm)  CAD (coronary artery disease)  S/P AAA (abdominal aortic aneurysm) repair  History of abdominal aortic aneurysm (AAA): 2004  History of cholecystectomy    Patient is a 68y old  Female who presents with a chief complaint of Descending thoracic aortic aneurysm (25 Jul 2018 17:14)      14 system review was unremarkable  acute changes include acute respiratory failure  Vital signs, hemodynamic and respiratory parameters were reviewed from the bedside nursing flowsheet.  ICU Vital Signs Last 24 Hrs  T(C): 36.2 (06 Aug 2018 21:13), Max: 36.9 (06 Aug 2018 04:00)  T(F): 97.2 (06 Aug 2018 21:13), Max: 98.4 (06 Aug 2018 04:00)  HR: 82 (06 Aug 2018 22:00) (81 - 122)  BP: --  BP(mean): --  ABP: 156/54 (06 Aug 2018 22:00) (110/50 - 166/62)  ABP(mean): 86 (06 Aug 2018 22:00) (70 - 96)  RR: 24 (06 Aug 2018 22:00) (18 - 32)  SpO2: 100% (06 Aug 2018 22:00) (100% - 100%)    Adult Advanced Hemodynamics Last 24 Hrs  CVP(mm Hg): --  CVP(cm H2O): --  CO: --  CI: --  PA: --  PA(mean): --  PCWP: --  SVR: --  SVRI: --  PVR: --  PVRI: --, ABG - ( 06 Aug 2018 22:25 )  pH, Arterial: 7.48  pH, Blood: x     /  pCO2: 43    /  pO2: 122   / HCO3: 32    / Base Excess: 7.4   /  SaO2: 98                  Intake and output was reviewed and the fluid balance was calculated  Daily     Daily   I&O's Summary    05 Aug 2018 07:01  -  06 Aug 2018 07:00  --------------------------------------------------------  IN: 700 mL / OUT: 2210 mL / NET: -1510 mL    06 Aug 2018 07:01  -  06 Aug 2018 22:59  --------------------------------------------------------  IN: 655 mL / OUT: 1705 mL / NET: -1050 mL        All lines and drain sites were assessed  [facility]      POCT Blood Glucose.: 98 mg/dL (06 Aug 2018 21:55)    No acute change in mental status  Auscultation of the chest reveals equal bs  Abdomen is soft  Extremities are warm and well perfused  Wounds appear clean and unremarkable  Antibiotics are periop    labs  CBC Full  -  ( 06 Aug 2018 22:27 )  WBC Count : 16.2 K/uL  Hemoglobin : 9.0 g/dL  Hematocrit : 28.3 %  Platelet Count - Automated : 89 K/uL  Mean Cell Volume : 95.6 fL  Mean Cell Hemoglobin : 30.4 pg  Mean Cell Hemoglobin Concentration : 31.8 g/dL  Auto Neutrophil # : x  Auto Lymphocyte # : x  Auto Monocyte # : x  Auto Eosinophil # : x  Auto Basophil # : x  Auto Neutrophil % : x  Auto Lymphocyte % : x  Auto Monocyte % : x  Auto Eosinophil % : x  Auto Basophil % : x    08-06    148<H>  |  107  |  59<H>  ----------------------------<  104<H>  3.9   |  30  |  1.31<H>    Ca    8.9      06 Aug 2018 22:26  Phos  3.8     08-06  Mg     2.2     08-06    TPro  5.9<L>  /  Alb  3.8  /  TBili  2.0<H>  /  DBili  x   /  AST  34  /  ALT  38  /  AlkPhos  47  08-06    PT/INR - ( 06 Aug 2018 11:23 )   PT: 13.5 sec;   INR: 1.21          PTT - ( 06 Aug 2018 11:23 )  PTT:26.5 sec  The current medications were reviewed   MEDICATIONS  (STANDING):  atorvastatin 20 milliGRAM(s) Oral at bedtime  chlorhexidine 2% Cloths 1 Application(s) Topical daily  chlorhexidine 2% Cloths 1 Application(s) Topical daily  dextrose 50% Injectable 50 milliLiter(s) IV Push every 15 minutes  dextrose 50% Injectable 25 milliLiter(s) IV Push every 15 minutes  dextrose 50% Injectable 50 milliLiter(s) IV Push every 15 minutes  dextrose 50% Injectable 25 milliLiter(s) IV Push every 15 minutes  folic acid 1 milliGRAM(s) Oral daily  insulin lispro (HumaLOG) corrective regimen sliding scale   SubCutaneous Before meals and at bedtime  lidocaine   Patch 1 Patch Transdermal daily  metoprolol tartrate 12.5 milliGRAM(s) Oral every 12 hours  nitroglycerin    2% Ointment 0.25 Inch(s) Transdermal two times a day  pantoprazole  Injectable 40 milliGRAM(s) IV Push daily  potassium chloride  20 mEq/100 mL IVPB 20 milliEquivalent(s) IV Intermittent once  sodium chloride 0.9%. 1000 milliLiter(s) (10 mL/Hr) IV Continuous <Continuous>    MEDICATIONS  (PRN):  dextrose 40% Gel 15 Gram(s) Oral once PRN Blood Glucose LESS THAN 70 milliGRAM(s)/deciliter  glucagon  Injectable 1 milliGRAM(s) IntraMuscular once PRN Glucose LESS THAN 70 milligrams/deciliter       PROBLEM LIST/ ASSESSMENT:  HEALTH ISSUES - PROBLEM Dx:      ,   Patient is a 68y old  Female who presents with a chief complaint of Descending thoracic aortic aneurysm (25 Jul 2018 17:14)     s/p cardiac surgery      My plan includes :  close hemodynamic, ventilatory and drain monitoring and management per post op routine    Monitor for arrhythmias and monitor parameters for organ perfusion  monitor neurologic status  Head of the bed should remain elevated to 45 deg .   chest PT and IS will be encouraged  monitor adequacy of oxygenation and ventilation and attempt to wean oxygen  Nutritional goals will be met using po eventually , ensure adequate caloric intake and montior the same  Stress ulcer and VTE prophylaxis will be achieved    Glycemic control is satisfactory  Electrolytes have been repleted as necessary and wound care has been carried out. Pain control has been achieved.   agressive physical therapy and early mobility and ambulation goals will be met   The family was updated about the course and plan  CRITICAL CARE TIME SPENT in evaluation and management, reassessments, review and interpretation of labs and x-rays, ventilator and hemodynamic management, formulating a plan and coordinating care: ___90____ MIN.  Time does not include procedural time.  CTICU ATTENDING     					    Tuan Hutchins MD

## 2018-08-06 NOTE — PROGRESS NOTE ADULT - SUBJECTIVE AND OBJECTIVE BOX
CTICU  CRITICAL  CARE  ATTENDING:   				   Pertinent clinical, laboratory, radiographic, hemodynamic, echocardiographic, respiratory data, microbiologic data and chart were reviewed and analyzed frequently throughout the course of the day and night    Patient seen and examined with CTS/ SH attending at bedside    67yo female with open TAAA with replacement of descending aorta with bypass of celiac, SMA adn b/l renal arteries; post op complicated by thrombocytopenia and DIC vs post op vs sepsis; platelets are improving. Remains extubated and had a bronch today. Afebrile and off abx.      HEALTH ISSUES - PROBLEM Dx:         FAMILY HISTORY:  No pertinent family history in first degree relatives  PAST MEDICAL & SURGICAL HISTORY:  History of seizures: 1980  COPD (chronic obstructive pulmonary disease)  HLD (hyperlipidemia)  HTN (hypertension)  AAA (abdominal aortic aneurysm)  CAD (coronary artery disease)  S/P AAA (abdominal aortic aneurysm) repair  History of abdominal aortic aneurysm (AAA): 2004  History of cholecystectomy      14 system review was unremarkable  acute changes include acute respiratory failure    Vital signs, hemodynamic and respiratory parameters were reviewed from the bedside nursing flowsheet.  ICU Vital Signs Last 24 Hrs  T(C): 36.2 (06 Aug 2018 21:13), Max: 36.9 (06 Aug 2018 04:00)  T(F): 97.2 (06 Aug 2018 21:13), Max: 98.4 (06 Aug 2018 04:00)  HR: 90 (06 Aug 2018 23:00) (81 - 122)  BP: --  BP(mean): --  ABP: 144/52 (06 Aug 2018 23:00) (110/50 - 166/62)  ABP(mean): 82 (06 Aug 2018 23:00) (70 - 96)  RR: 23 (06 Aug 2018 23:00) (18 - 32)  SpO2: 100% (06 Aug 2018 23:00) (100% - 100%)    Adult Advanced Hemodynamics Last 24 Hrs  CVP(mm Hg): --  CVP(cm H2O): --  CO: --  CI: --  PA: --  PA(mean): --  PCWP: --  SVR: --  SVRI: --  PVR: --  PVRI: --, ABG - ( 06 Aug 2018 22:25 )  pH, Arterial: 7.48  pH, Blood: x     /  pCO2: 43    /  pO2: 122   / HCO3: 32    / Base Excess: 7.4   /  SaO2: 98                  Intake and output was reviewed and the fluid balance was calculated  Daily     Daily   I&O's Summary    05 Aug 2018 07:01  -  06 Aug 2018 07:00  --------------------------------------------------------  IN: 700 mL / OUT: 2210 mL / NET: -1510 mL    06 Aug 2018 07:01  -  06 Aug 2018 23:36  --------------------------------------------------------  IN: 765 mL / OUT: 1825 mL / NET: -1060 mL        All lines and drain sites were assessed  Glycemic trend was reviewed[facility] BLOOD GLUCOSE      POCT Blood Glucose.: 98 mg/dL (06 Aug 2018 21:55)        Exam:   Gen: NAD   Neuro: Mental status at baseline  Lungs: Auscultation of the chest reveals equal bs  Abd: soft, nt/nd  Ext: warm and well perfused  Wound: appears clean and unremarkable  Antibiotics are periop      labs  CBC Full  -  ( 06 Aug 2018 22:55 )  WBC Count : 16.0 K/uL  Hemoglobin : 8.9 g/dL  Hematocrit : 28.3 %  Platelet Count - Automated : 91 K/uL  Mean Cell Volume : 95.6 fL  Mean Cell Hemoglobin : 30.1 pg  Mean Cell Hemoglobin Concentration : 31.4 g/dL  Auto Neutrophil # : x  Auto Lymphocyte # : x  Auto Monocyte # : x  Auto Eosinophil # : x  Auto Basophil # : x  Auto Neutrophil % : x  Auto Lymphocyte % : x  Auto Monocyte % : x  Auto Eosinophil % : x  Auto Basophil % : x    08-06    148<H>  |  107  |  59<H>  ----------------------------<  104<H>  3.9   |  30  |  1.31<H>    Ca    8.9      06 Aug 2018 22:26  Phos  3.8     08-06  Mg     2.2     08-06    TPro  5.9<L>  /  Alb  3.8  /  TBili  2.0<H>  /  DBili  x   /  AST  34  /  ALT  38  /  AlkPhos  47  08-06    PT/INR - ( 06 Aug 2018 22:26 )   PT: 14.0 sec;   INR: 1.26          PTT - ( 06 Aug 2018 22:26 )  PTT:27.0 sec    The current medications were reviewed   MEDICATIONS  (STANDING):  atorvastatin 20 milliGRAM(s) Oral at bedtime  chlorhexidine 2% Cloths 1 Application(s) Topical daily  chlorhexidine 2% Cloths 1 Application(s) Topical daily  dextrose 50% Injectable 50 milliLiter(s) IV Push every 15 minutes  dextrose 50% Injectable 25 milliLiter(s) IV Push every 15 minutes  dextrose 50% Injectable 50 milliLiter(s) IV Push every 15 minutes  dextrose 50% Injectable 25 milliLiter(s) IV Push every 15 minutes  folic acid 1 milliGRAM(s) Oral daily  insulin lispro (HumaLOG) corrective regimen sliding scale   SubCutaneous Before meals and at bedtime  lidocaine   Patch 1 Patch Transdermal daily  metoprolol tartrate 12.5 milliGRAM(s) Oral every 12 hours  nitroglycerin    2% Ointment 0.25 Inch(s) Transdermal two times a day  pantoprazole  Injectable 40 milliGRAM(s) IV Push daily  potassium chloride  20 mEq/100 mL IVPB 20 milliEquivalent(s) IV Intermittent once  sodium chloride 0.9%. 1000 milliLiter(s) (10 mL/Hr) IV Continuous <Continuous>    MEDICATIONS  (PRN):  dextrose 40% Gel 15 Gram(s) Oral once PRN Blood Glucose LESS THAN 70 milliGRAM(s)/deciliter  glucagon  Injectable 1 milliGRAM(s) IntraMuscular once PRN Glucose LESS THAN 70 milligrams/deciliter       PROBLEM LIST/ ASSESSMENT:  HEALTH ISSUES - PROBLEM Dx:  aortic aneurysm  CAD  EVAR         67yo female with open TAAA with replacement of descending aorta with bypass of celiac, SMA adn b/l renal arteries; post op complicated by thrombocytopenia and DIC vs post op vs sepsis; platelets are improving. Remains extubated and had a bronch today. Afebrile and off abx.       Pt is s/p   acute changes include acute respiratory failure--currently on high flow    My plan includes :  -bp and HR elevated--started on lopressor 12.5mg bid;   -Cr improving.   -h/h dropped a bit from am labs repeat was stable. no active bleeding noted and bp is stable. will hold off on any blood products at this time.   -Close hemodynamic, ventilatory and drain monitoring and management per post op routine  -Monitor for arrhythmias and monitor parameters for organ perfusion  -Monitor neurologic status  -Head of the bed should remain elevated to 45 deg .   -Chest PT and IS will be encouraged  -Monitor adequacy of oxygenation and ventilation and attempt to wean oxygen  -Nutritional goals will be met using po eventually , ensure adequate caloric intake and montior the same  -Stress ulcer and VTE prophylaxis will be achieved    -Glycemic control is satisfactory  -Electrolytes have been repleated as necessary and wound care has been carried out. Pain control has been achieved.   -Agressive physical therapy and early mobility and ambulation goals will be met   The family was updated about the course and plan    CRITICAL CARE TIME SPENT in evaluation and management, reassessments, review and interpretation of labs and x-rays, ventilator and hemodynamic management, formulating a plan and coordinating care: ___30____ MIN.  Time does not include procedural time.      CTICU ATTENDING:      		  Martín Vanegas MD

## 2018-08-07 LAB
ALBUMIN SERPL ELPH-MCNC: 3.4 G/DL — SIGNIFICANT CHANGE UP (ref 3.3–5)
ALBUMIN SERPL ELPH-MCNC: 3.6 G/DL — SIGNIFICANT CHANGE UP (ref 3.3–5)
ALBUMIN SERPL ELPH-MCNC: 3.9 G/DL — SIGNIFICANT CHANGE UP (ref 3.3–5)
ALP SERPL-CCNC: 47 U/L — SIGNIFICANT CHANGE UP (ref 40–120)
ALP SERPL-CCNC: 49 U/L — SIGNIFICANT CHANGE UP (ref 40–120)
ALP SERPL-CCNC: 49 U/L — SIGNIFICANT CHANGE UP (ref 40–120)
ALT FLD-CCNC: 31 U/L — SIGNIFICANT CHANGE UP (ref 10–45)
ALT FLD-CCNC: 33 U/L — SIGNIFICANT CHANGE UP (ref 10–45)
ALT FLD-CCNC: 36 U/L — SIGNIFICANT CHANGE UP (ref 10–45)
ANION GAP SERPL CALC-SCNC: 12 MMOL/L — SIGNIFICANT CHANGE UP (ref 5–17)
ANION GAP SERPL CALC-SCNC: 12 MMOL/L — SIGNIFICANT CHANGE UP (ref 5–17)
ANION GAP SERPL CALC-SCNC: 14 MMOL/L — SIGNIFICANT CHANGE UP (ref 5–17)
APTT BLD: 25.7 SEC — LOW (ref 27.5–37.4)
APTT BLD: 26.2 SEC — LOW (ref 27.5–37.4)
APTT BLD: 26.2 SEC — LOW (ref 27.5–37.4)
AST SERPL-CCNC: 31 U/L — SIGNIFICANT CHANGE UP (ref 10–40)
AST SERPL-CCNC: 34 U/L — SIGNIFICANT CHANGE UP (ref 10–40)
AST SERPL-CCNC: 35 U/L — SIGNIFICANT CHANGE UP (ref 10–40)
BASE EXCESS BLDV CALC-SCNC: 6.1 MMOL/L — SIGNIFICANT CHANGE UP
BILIRUB DIRECT SERPL-MCNC: 0.6 MG/DL — HIGH (ref 0–0.2)
BILIRUB INDIRECT FLD-MCNC: 1.4 MG/DL — HIGH (ref 0.2–1)
BILIRUB SERPL-MCNC: 1.6 MG/DL — HIGH (ref 0.2–1.2)
BILIRUB SERPL-MCNC: 2 MG/DL — HIGH (ref 0.2–1.2)
BILIRUB SERPL-MCNC: 2 MG/DL — HIGH (ref 0.2–1.2)
BUN SERPL-MCNC: 43 MG/DL — HIGH (ref 7–23)
BUN SERPL-MCNC: 47 MG/DL — HIGH (ref 7–23)
BUN SERPL-MCNC: 51 MG/DL — HIGH (ref 7–23)
CALCIUM SERPL-MCNC: 8.6 MG/DL — SIGNIFICANT CHANGE UP (ref 8.4–10.5)
CALCIUM SERPL-MCNC: 8.9 MG/DL — SIGNIFICANT CHANGE UP (ref 8.4–10.5)
CALCIUM SERPL-MCNC: 9 MG/DL — SIGNIFICANT CHANGE UP (ref 8.4–10.5)
CHLORIDE SERPL-SCNC: 101 MMOL/L — SIGNIFICANT CHANGE UP (ref 96–108)
CHLORIDE SERPL-SCNC: 103 MMOL/L — SIGNIFICANT CHANGE UP (ref 96–108)
CHLORIDE SERPL-SCNC: 106 MMOL/L — SIGNIFICANT CHANGE UP (ref 96–108)
CO2 SERPL-SCNC: 27 MMOL/L — SIGNIFICANT CHANGE UP (ref 22–31)
CO2 SERPL-SCNC: 28 MMOL/L — SIGNIFICANT CHANGE UP (ref 22–31)
CO2 SERPL-SCNC: 30 MMOL/L — SIGNIFICANT CHANGE UP (ref 22–31)
CREAT SERPL-MCNC: 1.05 MG/DL — SIGNIFICANT CHANGE UP (ref 0.5–1.3)
CREAT SERPL-MCNC: 1.11 MG/DL — SIGNIFICANT CHANGE UP (ref 0.5–1.3)
CREAT SERPL-MCNC: 1.22 MG/DL — SIGNIFICANT CHANGE UP (ref 0.5–1.3)
CULTURE RESULTS: SIGNIFICANT CHANGE UP
D DIMER BLD IA.RAPID-MCNC: HIGH NG/ML DDU
FIBRINOGEN PPP-MCNC: 307 MG/DL — SIGNIFICANT CHANGE UP (ref 258–438)
GAS PNL BLDA: SIGNIFICANT CHANGE UP
GAS PNL BLDV: SIGNIFICANT CHANGE UP
GLUCOSE BLDC GLUCOMTR-MCNC: 100 MG/DL — HIGH (ref 70–99)
GLUCOSE BLDC GLUCOMTR-MCNC: 104 MG/DL — HIGH (ref 70–99)
GLUCOSE BLDC GLUCOMTR-MCNC: 105 MG/DL — HIGH (ref 70–99)
GLUCOSE BLDC GLUCOMTR-MCNC: 75 MG/DL — SIGNIFICANT CHANGE UP (ref 70–99)
GLUCOSE BLDC GLUCOMTR-MCNC: 83 MG/DL — SIGNIFICANT CHANGE UP (ref 70–99)
GLUCOSE SERPL-MCNC: 111 MG/DL — HIGH (ref 70–99)
GLUCOSE SERPL-MCNC: 133 MG/DL — HIGH (ref 70–99)
GLUCOSE SERPL-MCNC: 97 MG/DL — SIGNIFICANT CHANGE UP (ref 70–99)
GRAM STN FLD: SIGNIFICANT CHANGE UP
HAPTOGLOB SERPL-MCNC: 46 MG/DL — SIGNIFICANT CHANGE UP (ref 34–200)
HCO3 BLDV-SCNC: 31 MMOL/L — HIGH (ref 20–27)
HCT VFR BLD CALC: 27 % — LOW (ref 34.5–45)
HCT VFR BLD CALC: 28.4 % — LOW (ref 34.5–45)
HCT VFR BLD CALC: 29.2 % — LOW (ref 34.5–45)
HGB BLD-MCNC: 8.7 G/DL — LOW (ref 11.5–15.5)
HGB BLD-MCNC: 9 G/DL — LOW (ref 11.5–15.5)
HGB BLD-MCNC: 9.3 G/DL — LOW (ref 11.5–15.5)
INR BLD: 1.25 — HIGH (ref 0.88–1.16)
INR BLD: 1.25 — HIGH (ref 0.88–1.16)
INR BLD: 1.26 — HIGH (ref 0.88–1.16)
LACTATE SERPL-SCNC: 0.7 MMOL/L — SIGNIFICANT CHANGE UP (ref 0.5–2)
LACTATE SERPL-SCNC: 0.8 MMOL/L — SIGNIFICANT CHANGE UP (ref 0.5–2)
LACTATE SERPL-SCNC: 1.1 MMOL/L — SIGNIFICANT CHANGE UP (ref 0.5–2)
LDH SERPL L TO P-CCNC: 493 U/L — HIGH (ref 50–242)
MAGNESIUM SERPL-MCNC: 1.7 MG/DL — SIGNIFICANT CHANGE UP (ref 1.6–2.6)
MAGNESIUM SERPL-MCNC: 2.2 MG/DL — SIGNIFICANT CHANGE UP (ref 1.6–2.6)
MCHC RBC-ENTMCNC: 30.5 PG — SIGNIFICANT CHANGE UP (ref 27–34)
MCHC RBC-ENTMCNC: 30.7 PG — SIGNIFICANT CHANGE UP (ref 27–34)
MCHC RBC-ENTMCNC: 30.9 PG — SIGNIFICANT CHANGE UP (ref 27–34)
MCHC RBC-ENTMCNC: 31.7 G/DL — LOW (ref 32–36)
MCHC RBC-ENTMCNC: 31.8 G/DL — LOW (ref 32–36)
MCHC RBC-ENTMCNC: 32.2 G/DL — SIGNIFICANT CHANGE UP (ref 32–36)
MCV RBC AUTO: 95.7 FL — SIGNIFICANT CHANGE UP (ref 80–100)
MCV RBC AUTO: 96.3 FL — SIGNIFICANT CHANGE UP (ref 80–100)
MCV RBC AUTO: 96.4 FL — SIGNIFICANT CHANGE UP (ref 80–100)
PCO2 BLDV: 48 MMHG — SIGNIFICANT CHANGE UP (ref 41–51)
PH BLDV: 7.43 — SIGNIFICANT CHANGE UP (ref 7.32–7.43)
PHOSPHATE SERPL-MCNC: 2.7 MG/DL — SIGNIFICANT CHANGE UP (ref 2.5–4.5)
PHOSPHATE SERPL-MCNC: 3.7 MG/DL — SIGNIFICANT CHANGE UP (ref 2.5–4.5)
PLATELET # BLD AUTO: 107 K/UL — LOW (ref 150–400)
PLATELET # BLD AUTO: 109 K/UL — LOW (ref 150–400)
PLATELET # BLD AUTO: 98 K/UL — LOW (ref 150–400)
PO2 BLDV: 33 MMHG — SIGNIFICANT CHANGE UP
POTASSIUM SERPL-MCNC: 3.9 MMOL/L — SIGNIFICANT CHANGE UP (ref 3.5–5.3)
POTASSIUM SERPL-MCNC: 4 MMOL/L — SIGNIFICANT CHANGE UP (ref 3.5–5.3)
POTASSIUM SERPL-MCNC: 4.2 MMOL/L — SIGNIFICANT CHANGE UP (ref 3.5–5.3)
POTASSIUM SERPL-SCNC: 3.9 MMOL/L — SIGNIFICANT CHANGE UP (ref 3.5–5.3)
POTASSIUM SERPL-SCNC: 4 MMOL/L — SIGNIFICANT CHANGE UP (ref 3.5–5.3)
POTASSIUM SERPL-SCNC: 4.2 MMOL/L — SIGNIFICANT CHANGE UP (ref 3.5–5.3)
PROT SERPL-MCNC: 5.5 G/DL — LOW (ref 6–8.3)
PROT SERPL-MCNC: 6 G/DL — SIGNIFICANT CHANGE UP (ref 6–8.3)
PROT SERPL-MCNC: 6 G/DL — SIGNIFICANT CHANGE UP (ref 6–8.3)
PROTHROM AB SERPL-ACNC: 13.9 SEC — HIGH (ref 9.8–12.7)
PROTHROM AB SERPL-ACNC: 13.9 SEC — HIGH (ref 9.8–12.7)
PROTHROM AB SERPL-ACNC: 14.1 SEC — HIGH (ref 9.8–12.7)
RBC # BLD: 2.82 M/UL — LOW (ref 3.8–5.2)
RBC # BLD: 2.95 M/UL — LOW (ref 3.8–5.2)
RBC # BLD: 3.03 M/UL — LOW (ref 3.8–5.2)
RBC # FLD: 16.4 % — SIGNIFICANT CHANGE UP (ref 10.3–16.9)
RBC # FLD: 16.5 % — SIGNIFICANT CHANGE UP (ref 10.3–16.9)
RBC # FLD: 16.7 % — SIGNIFICANT CHANGE UP (ref 10.3–16.9)
SAO2 % BLDV: 64 % — SIGNIFICANT CHANGE UP
SODIUM SERPL-SCNC: 141 MMOL/L — SIGNIFICANT CHANGE UP (ref 135–145)
SODIUM SERPL-SCNC: 144 MMOL/L — SIGNIFICANT CHANGE UP (ref 135–145)
SODIUM SERPL-SCNC: 148 MMOL/L — HIGH (ref 135–145)
SPECIMEN SOURCE: SIGNIFICANT CHANGE UP
WBC # BLD: 15 K/UL — HIGH (ref 3.8–10.5)
WBC # BLD: 15.1 K/UL — HIGH (ref 3.8–10.5)
WBC # BLD: 16.3 K/UL — HIGH (ref 3.8–10.5)
WBC # FLD AUTO: 15 K/UL — HIGH (ref 3.8–10.5)
WBC # FLD AUTO: 15.1 K/UL — HIGH (ref 3.8–10.5)
WBC # FLD AUTO: 16.3 K/UL — HIGH (ref 3.8–10.5)

## 2018-08-07 PROCEDURE — 99292 CRITICAL CARE ADDL 30 MIN: CPT

## 2018-08-07 PROCEDURE — 71045 X-RAY EXAM CHEST 1 VIEW: CPT | Mod: 26

## 2018-08-07 PROCEDURE — 99291 CRITICAL CARE FIRST HOUR: CPT

## 2018-08-07 RX ORDER — MIDODRINE HYDROCHLORIDE 2.5 MG/1
10 TABLET ORAL EVERY 8 HOURS
Qty: 0 | Refills: 0 | Status: DISCONTINUED | OUTPATIENT
Start: 2018-08-07 | End: 2018-08-08

## 2018-08-07 RX ORDER — ACETAMINOPHEN 500 MG
1000 TABLET ORAL ONCE
Qty: 0 | Refills: 0 | Status: COMPLETED | OUTPATIENT
Start: 2018-08-07 | End: 2018-08-07

## 2018-08-07 RX ORDER — HEPARIN SODIUM 5000 [USP'U]/ML
5000 INJECTION INTRAVENOUS; SUBCUTANEOUS EVERY 12 HOURS
Qty: 0 | Refills: 0 | Status: DISCONTINUED | OUTPATIENT
Start: 2018-08-07 | End: 2018-08-22

## 2018-08-07 RX ORDER — FENTANYL CITRATE 50 UG/ML
12.5 INJECTION INTRAVENOUS ONCE
Qty: 0 | Refills: 0 | Status: DISCONTINUED | OUTPATIENT
Start: 2018-08-07 | End: 2018-08-07

## 2018-08-07 RX ORDER — PANTOPRAZOLE SODIUM 20 MG/1
40 TABLET, DELAYED RELEASE ORAL
Qty: 0 | Refills: 0 | Status: DISCONTINUED | OUTPATIENT
Start: 2018-08-07 | End: 2018-08-22

## 2018-08-07 RX ORDER — POTASSIUM CHLORIDE 20 MEQ
10 PACKET (EA) ORAL ONCE
Qty: 0 | Refills: 0 | Status: COMPLETED | OUTPATIENT
Start: 2018-08-07 | End: 2018-08-07

## 2018-08-07 RX ORDER — SENNA PLUS 8.6 MG/1
2 TABLET ORAL AT BEDTIME
Qty: 0 | Refills: 0 | Status: DISCONTINUED | OUTPATIENT
Start: 2018-08-07 | End: 2018-08-22

## 2018-08-07 RX ORDER — DOCUSATE SODIUM 100 MG
100 CAPSULE ORAL THREE TIMES A DAY
Qty: 0 | Refills: 0 | Status: DISCONTINUED | OUTPATIENT
Start: 2018-08-07 | End: 2018-08-22

## 2018-08-07 RX ORDER — ACETAMINOPHEN 500 MG
650 TABLET ORAL EVERY 6 HOURS
Qty: 0 | Refills: 0 | Status: DISCONTINUED | OUTPATIENT
Start: 2018-08-07 | End: 2018-08-22

## 2018-08-07 RX ORDER — ALBUMIN HUMAN 25 %
50 VIAL (ML) INTRAVENOUS
Qty: 0 | Refills: 0 | Status: COMPLETED | OUTPATIENT
Start: 2018-08-07 | End: 2018-08-07

## 2018-08-07 RX ADMIN — Medication 0.25 INCH(S): at 06:52

## 2018-08-07 RX ADMIN — Medication 0.25 INCH(S): at 18:32

## 2018-08-07 RX ADMIN — MIDODRINE HYDROCHLORIDE 10 MILLIGRAM(S): 2.5 TABLET ORAL at 12:26

## 2018-08-07 RX ADMIN — PANTOPRAZOLE SODIUM 40 MILLIGRAM(S): 20 TABLET, DELAYED RELEASE ORAL at 12:26

## 2018-08-07 RX ADMIN — Medication 12.5 MILLIGRAM(S): at 06:45

## 2018-08-07 RX ADMIN — Medication 50 MILLILITER(S): at 09:57

## 2018-08-07 RX ADMIN — Medication 4 MILLIGRAM(S): at 11:42

## 2018-08-07 RX ADMIN — Medication 1 MILLIGRAM(S): at 12:26

## 2018-08-07 RX ADMIN — CHLORHEXIDINE GLUCONATE 1 APPLICATION(S): 213 SOLUTION TOPICAL at 12:27

## 2018-08-07 RX ADMIN — Medication 1000 MILLIGRAM(S): at 04:15

## 2018-08-07 RX ADMIN — Medication 0.25 INCH(S): at 18:30

## 2018-08-07 RX ADMIN — FENTANYL CITRATE 25 MICROGRAM(S): 50 INJECTION INTRAVENOUS at 23:45

## 2018-08-07 RX ADMIN — ATORVASTATIN CALCIUM 20 MILLIGRAM(S): 80 TABLET, FILM COATED ORAL at 22:10

## 2018-08-07 RX ADMIN — FENTANYL CITRATE 12.5 MICROGRAM(S): 50 INJECTION INTRAVENOUS at 07:19

## 2018-08-07 RX ADMIN — Medication 50 MILLILITER(S): at 11:48

## 2018-08-07 RX ADMIN — LIDOCAINE 1 PATCH: 4 CREAM TOPICAL at 12:26

## 2018-08-07 RX ADMIN — Medication 400 MILLIGRAM(S): at 04:14

## 2018-08-07 RX ADMIN — Medication 100 MILLIEQUIVALENT(S): at 10:32

## 2018-08-07 RX ADMIN — Medication 50 MILLILITER(S): at 11:01

## 2018-08-07 RX ADMIN — Medication 100 MILLIEQUIVALENT(S): at 23:35

## 2018-08-07 RX ADMIN — MIDODRINE HYDROCHLORIDE 10 MILLIGRAM(S): 2.5 TABLET ORAL at 22:10

## 2018-08-07 RX ADMIN — LIDOCAINE 1 PATCH: 4 CREAM TOPICAL at 23:32

## 2018-08-07 RX ADMIN — FENTANYL CITRATE 12.5 MICROGRAM(S): 50 INJECTION INTRAVENOUS at 06:45

## 2018-08-07 RX ADMIN — Medication 0.25 INCH(S): at 06:46

## 2018-08-07 RX ADMIN — CHLORHEXIDINE GLUCONATE 1 APPLICATION(S): 213 SOLUTION TOPICAL at 12:32

## 2018-08-07 NOTE — PHYSICAL THERAPY INITIAL EVALUATION ADULT - DID THE PATIENT HAVE SURGERY?
open TAAA with replacement of descending aorta with bypass of celiac, SMA and b/l renal arteries/yes

## 2018-08-07 NOTE — PHYSICAL THERAPY INITIAL EVALUATION ADULT - ADDITIONAL COMMENTS
Patient reports that she lives alone in a 1-level apartment with 2 flights to enter. Reports that she was fully independent prior to surgery, able to ambulate community distances as needed without device.

## 2018-08-07 NOTE — PHYSICAL THERAPY INITIAL EVALUATION ADULT - PERTINENT HX OF CURRENT PROBLEM, REHAB EVAL
69yo female with open TAAA with replacement of descending aorta with bypass of celiac, SMA and b/l renal arteries; post op complicated by thrombocytopenia and DIC vs post op vs sepsis

## 2018-08-07 NOTE — PHYSICAL THERAPY INITIAL EVALUATION ADULT - GENERAL OBSERVATIONS, REHAB EVAL
Patient received seated in bedside chair with + IV, PICC, hi flow 02, tele, gauze wrapping to bilaterally distal feet/toes, z-flow boots.

## 2018-08-07 NOTE — PHYSICAL THERAPY INITIAL EVALUATION ADULT - MANUAL MUSCLE TESTING RESULTS, REHAB EVAL
Able to move (B) UE and (B) LE against gravity, however (L) LE strength limited, 3-/5 knee extension. Patient reports that this is chronic.

## 2018-08-07 NOTE — PHYSICAL THERAPY INITIAL EVALUATION ADULT - MODALITIES TREATMENT COMMENTS
Patient able to stand 4x, each with 2 person max assist. Each trial of standing appx 10-15 seconds. Bilateral knee buckling noted (L>R).

## 2018-08-07 NOTE — PHYSICAL THERAPY INITIAL EVALUATION ADULT - CRITERIA FOR SKILLED THERAPEUTIC INTERVENTIONS
rehab potential/therapy frequency/anticipated discharge recommendation/impairments found/anticipated equipment needs at discharge

## 2018-08-07 NOTE — PROGRESS NOTE ADULT - SUBJECTIVE AND OBJECTIVE BOX
CTICU  CRITICAL  CARE  attending     Hand off received 					   Pertinent clinical, laboratory, radiographic, hemodynamic, echocardiographic, respiratory data, microbiologic data and chart were reviewed and analyzed frequently throughout the course of the day and night  Patient seen and examined with CTS/ SH attending at bedside  Pt is a 68y , Female, HEALTH ISSUES - PROBLEM Dx:      , FAMILY HISTORY:  No pertinent family history in first degree relatives  PAST MEDICAL & SURGICAL HISTORY:  History of seizures: 1980  COPD (chronic obstructive pulmonary disease)  HLD (hyperlipidemia)  HTN (hypertension)  AAA (abdominal aortic aneurysm)  CAD (coronary artery disease)  S/P AAA (abdominal aortic aneurysm) repair  History of abdominal aortic aneurysm (AAA): 2004  History of cholecystectomy    Patient is a 68y old  Female who presents with a chief complaint of Descending thoracic aortic aneurysm (25 Jul 2018 17:14)      14 system review was unremarkable  acute changes include acute respiratory failure  Vital signs, hemodynamic and respiratory parameters were reviewed from the bedside nursing flowsheet.  ICU Vital Signs Last 24 Hrs  T(C): 36.6 (07 Aug 2018 17:38), Max: 36.6 (07 Aug 2018 01:00)  T(F): 97.9 (07 Aug 2018 17:38), Max: 97.9 (07 Aug 2018 17:38)  HR: 80 (07 Aug 2018 20:00) (74 - 99)  BP: 157/69 (07 Aug 2018 07:00) (157/69 - 157/69)  BP(mean): --  ABP: 156/48 (07 Aug 2018 20:00) (124/44 - 168/58)  ABP(mean): 80 (07 Aug 2018 20:00) (70 - 96)  RR: 26 (07 Aug 2018 20:00) (17 - 27)  SpO2: 100% (07 Aug 2018 20:00) (94% - 100%)    Adult Advanced Hemodynamics Last 24 Hrs  CVP(mm Hg): --  CVP(cm H2O): --  CO: --  CI: --  PA: --  PA(mean): --  PCWP: --  SVR: --  SVRI: --  PVR: --  PVRI: --, ABG - ( 07 Aug 2018 10:52 )  pH, Arterial: 7.48  pH, Blood: x     /  pCO2: 36    /  pO2: 149   / HCO3: 26    / Base Excess: 3.0   /  SaO2: 99                  Intake and output was reviewed and the fluid balance was calculated  Daily     Daily   I&O's Summary    06 Aug 2018 07:01  -  07 Aug 2018 07:00  --------------------------------------------------------  IN: 1045 mL / OUT: 2625 mL / NET: -1580 mL    07 Aug 2018 07:01  -  07 Aug 2018 21:05  --------------------------------------------------------  IN: 870 mL / OUT: 770 mL / NET: 100 mL        All lines and drain sites were assessed  Glycemic trend was reviewedCAPILLARY BLOOD GLUCOSE      POCT Blood Glucose.: 83 mg/dL (07 Aug 2018 17:42)    No acute change in mental status  Auscultation of the chest reveals equal bs  Abdomen is soft  Extremities are warm and well perfused  Wounds appear clean and unremarkable  Antibiotics are periop    labs  CBC Full  -  ( 07 Aug 2018 10:53 )  WBC Count : 16.3 K/uL  Hemoglobin : 9.3 g/dL  Hematocrit : 29.2 %  Platelet Count - Automated : 109 K/uL  Mean Cell Volume : 96.4 fL  Mean Cell Hemoglobin : 30.7 pg  Mean Cell Hemoglobin Concentration : 31.8 g/dL  Auto Neutrophil # : x  Auto Lymphocyte # : x  Auto Monocyte # : x  Auto Eosinophil # : x  Auto Basophil # : x  Auto Neutrophil % : x  Auto Lymphocyte % : x  Auto Monocyte % : x  Auto Eosinophil % : x  Auto Basophil % : x    08-07    144  |  103  |  47<H>  ----------------------------<  133<H>  4.2   |  27  |  1.11    Ca    8.9      07 Aug 2018 10:53  Phos  3.7     08-07  Mg     2.2     08-07    TPro  6.0  /  Alb  3.9  /  TBili  2.0<H>  /  DBili  0.6<H>  /  AST  34  /  ALT  36  /  AlkPhos  49  08-07    PT/INR - ( 07 Aug 2018 10:53 )   PT: 13.9 sec;   INR: 1.25          PTT - ( 07 Aug 2018 10:53 )  PTT:25.7 sec  The current medications were reviewed   MEDICATIONS  (STANDING):  atorvastatin 20 milliGRAM(s) Oral at bedtime  chlorhexidine 2% Cloths 1 Application(s) Topical daily  chlorhexidine 2% Cloths 1 Application(s) Topical daily  dextrose 50% Injectable 50 milliLiter(s) IV Push every 15 minutes  dextrose 50% Injectable 25 milliLiter(s) IV Push every 15 minutes  dextrose 50% Injectable 50 milliLiter(s) IV Push every 15 minutes  dextrose 50% Injectable 25 milliLiter(s) IV Push every 15 minutes  docusate sodium 100 milliGRAM(s) Oral three times a day  folic acid 1 milliGRAM(s) Oral daily  insulin lispro (HumaLOG) corrective regimen sliding scale   SubCutaneous Before meals and at bedtime  lidocaine   Patch 1 Patch Transdermal daily  midodrine 10 milliGRAM(s) Oral every 8 hours  nitroglycerin    2% Ointment 0.25 Inch(s) Transdermal two times a day  pantoprazole    Tablet 40 milliGRAM(s) Oral before breakfast  senna 2 Tablet(s) Oral at bedtime  sodium chloride 0.9%. 1000 milliLiter(s) (10 mL/Hr) IV Continuous <Continuous>    MEDICATIONS  (PRN):  acetaminophen   Tablet. 650 milliGRAM(s) Oral every 6 hours PRN Mild Pain (1 - 3)  dextrose 40% Gel 15 Gram(s) Oral once PRN Blood Glucose LESS THAN 70 milliGRAM(s)/deciliter  glucagon  Injectable 1 milliGRAM(s) IntraMuscular once PRN Glucose LESS THAN 70 milligrams/deciliter       PROBLEM LIST/ ASSESSMENT:  HEALTH ISSUES - PROBLEM Dx:      ,   Patient is a 68y old  Female who presents with a chief complaint of Descending thoracic aortic aneurysm (25 Jul 2018 17:14)     s/p cardiac surgery      My plan includes :  close hemodynamic, ventilatory and drain monitoring and management per post op routine    Monitor for arrhythmias and monitor parameters for organ perfusion  monitor neurologic status  Head of the bed should remain elevated to 45 deg .   chest PT and IS will be encouraged  monitor adequacy of oxygenation and ventilation and attempt to wean oxygen  Nutritional goals will be met using po eventually , ensure adequate caloric intake and montior the same  Stress ulcer and VTE prophylaxis will be achieved    Glycemic control is satisfactory  Electrolytes have been repleted as necessary and wound care has been carried out. Pain control has been achieved.   agressive physical therapy and early mobility and ambulation goals will be met   The family was updated about the course and plan  CRITICAL CARE TIME SPENT in evaluation and management, reassessments, review and interpretation of labs and x-rays, ventilator and hemodynamic management, formulating a plan and coordinating care: ___90____ MIN.  Time does not include procedural time.  CTICU ATTENDING     					    Tuan Hutchins MD

## 2018-08-07 NOTE — PROGRESS NOTE ADULT - SUBJECTIVE AND OBJECTIVE BOX
Subjective:  Doing well, afebrile, tolerating water intake, passing stool and flatus, complains in tingling sensation in left lower extremity, abdominal pain on the left side near the surgical incision, denies chest pain, SOB, lower extremity pain.    Vital Signs Last 24 Hrs  T(C): 36.3 (07 Aug 2018 05:00), Max: 36.6 (07 Aug 2018 01:00)  T(F): 97.3 (07 Aug 2018 05:00), Max: 97.8 (07 Aug 2018 01:00)  HR: 90 (07 Aug 2018 07:00) (81 - 122)  BP: 157/69 (07 Aug 2018 07:00) (157/69 - 157/69)  BP(mean): --  RR: 24 (07 Aug 2018 07:00) (17 - 32)  SpO2: 99% (07 Aug 2018 07:00) (94% - 100%)    Physical Exam:  Gen: Awake, alert,Satting well on HFNC  Resp: Non labored breathing  Abdomen:  Softly distended, tender around the surgical incision, incision is covered by dressing with some ecchymosis around.  Ext:  RUE:   +2 radial pulse, hand  4/5  LUE:  +2 Radial pulse, A-line in place, Hand  5/5  LLE:  warm  warm +2 DP and PT  Big toe with blue discoloration at distal end  RLE:  warm  warm +2 DP and PT  Big toe with blue discoloration at distal end      LABS:                        9.0    15.0  )-----------( 98       ( 07 Aug 2018 03:16 )             28.4     08-07    148<H>  |  106  |  51<H>  ----------------------------<  97  4.0   |  30  |  1.22    Ca    9.0      07 Aug 2018 03:16  Phos  3.7     08-07  Mg     2.2     08-07    TPro  6.0  /  Alb  3.6  /  TBili  2.0<H>  /  DBili  x   /  AST  35  /  ALT  33  /  AlkPhos  49  08-07    PT/INR - ( 07 Aug 2018 03:16 )   PT: 14.1 sec;   INR: 1.26          PTT - ( 07 Aug 2018 03:16 )  PTT:26.2 sec  CAPILLARY BLOOD GLUCOSE      POCT Blood Glucose.: 105 mg/dL (07 Aug 2018 07:15)  POCT Blood Glucose.: 98 mg/dL (06 Aug 2018 21:55)  POCT Blood Glucose.: 170 mg/dL (06 Aug 2018 16:26)  POCT Blood Glucose.: 145 mg/dL (06 Aug 2018 11:13)  POCT Blood Glucose.: 153 mg/dL (06 Aug 2018 07:55)      LIVER FUNCTIONS - ( 07 Aug 2018 03:16 )  Alb: 3.6 g/dL / Pro: 6.0 g/dL / ALK PHOS: 49 U/L / ALT: 33 U/L / AST: 35 U/L / GGT: x

## 2018-08-08 LAB
ALBUMIN SERPL ELPH-MCNC: 3.1 G/DL — LOW (ref 3.3–5)
ALBUMIN SERPL ELPH-MCNC: 3.4 G/DL — SIGNIFICANT CHANGE UP (ref 3.3–5)
ALP SERPL-CCNC: 45 U/L — SIGNIFICANT CHANGE UP (ref 40–120)
ALP SERPL-CCNC: 65 U/L — SIGNIFICANT CHANGE UP (ref 40–120)
ALT FLD-CCNC: 30 U/L — SIGNIFICANT CHANGE UP (ref 10–45)
ALT FLD-CCNC: 34 U/L — SIGNIFICANT CHANGE UP (ref 10–45)
ANION GAP SERPL CALC-SCNC: 11 MMOL/L — SIGNIFICANT CHANGE UP (ref 5–17)
ANION GAP SERPL CALC-SCNC: 13 MMOL/L — SIGNIFICANT CHANGE UP (ref 5–17)
ANION GAP SERPL CALC-SCNC: 13 MMOL/L — SIGNIFICANT CHANGE UP (ref 5–17)
APTT BLD: 25.9 SEC — LOW (ref 27.5–37.4)
APTT BLD: 27.1 SEC — LOW (ref 27.5–37.4)
APTT BLD: 27.9 SEC — SIGNIFICANT CHANGE UP (ref 27.5–37.4)
AST SERPL-CCNC: 30 U/L — SIGNIFICANT CHANGE UP (ref 10–40)
AST SERPL-CCNC: 32 U/L — SIGNIFICANT CHANGE UP (ref 10–40)
BILIRUB SERPL-MCNC: 1.4 MG/DL — HIGH (ref 0.2–1.2)
BILIRUB SERPL-MCNC: 1.7 MG/DL — HIGH (ref 0.2–1.2)
BUN SERPL-MCNC: 39 MG/DL — HIGH (ref 7–23)
BUN SERPL-MCNC: 40 MG/DL — HIGH (ref 7–23)
BUN SERPL-MCNC: 40 MG/DL — HIGH (ref 7–23)
CALCIUM SERPL-MCNC: 8.4 MG/DL — SIGNIFICANT CHANGE UP (ref 8.4–10.5)
CALCIUM SERPL-MCNC: 8.6 MG/DL — SIGNIFICANT CHANGE UP (ref 8.4–10.5)
CALCIUM SERPL-MCNC: 8.8 MG/DL — SIGNIFICANT CHANGE UP (ref 8.4–10.5)
CHLORIDE SERPL-SCNC: 100 MMOL/L — SIGNIFICANT CHANGE UP (ref 96–108)
CHLORIDE SERPL-SCNC: 96 MMOL/L — SIGNIFICANT CHANGE UP (ref 96–108)
CHLORIDE SERPL-SCNC: 96 MMOL/L — SIGNIFICANT CHANGE UP (ref 96–108)
CO2 SERPL-SCNC: 25 MMOL/L — SIGNIFICANT CHANGE UP (ref 22–31)
CO2 SERPL-SCNC: 25 MMOL/L — SIGNIFICANT CHANGE UP (ref 22–31)
CO2 SERPL-SCNC: 28 MMOL/L — SIGNIFICANT CHANGE UP (ref 22–31)
CREAT SERPL-MCNC: 0.98 MG/DL — SIGNIFICANT CHANGE UP (ref 0.5–1.3)
CREAT SERPL-MCNC: 0.99 MG/DL — SIGNIFICANT CHANGE UP (ref 0.5–1.3)
CREAT SERPL-MCNC: 1.06 MG/DL — SIGNIFICANT CHANGE UP (ref 0.5–1.3)
GAS PNL BLDA: SIGNIFICANT CHANGE UP
GLUCOSE BLDC GLUCOMTR-MCNC: 129 MG/DL — HIGH (ref 70–99)
GLUCOSE BLDC GLUCOMTR-MCNC: 168 MG/DL — HIGH (ref 70–99)
GLUCOSE BLDC GLUCOMTR-MCNC: 193 MG/DL — HIGH (ref 70–99)
GLUCOSE SERPL-MCNC: 100 MG/DL — HIGH (ref 70–99)
GLUCOSE SERPL-MCNC: 177 MG/DL — HIGH (ref 70–99)
GLUCOSE SERPL-MCNC: 198 MG/DL — HIGH (ref 70–99)
HCT VFR BLD CALC: 27.7 % — LOW (ref 34.5–45)
HCT VFR BLD CALC: 31.5 % — LOW (ref 34.5–45)
HCT VFR BLD CALC: 33.1 % — LOW (ref 34.5–45)
HGB BLD-MCNC: 10.4 G/DL — LOW (ref 11.5–15.5)
HGB BLD-MCNC: 10.9 G/DL — LOW (ref 11.5–15.5)
HGB BLD-MCNC: 8.7 G/DL — LOW (ref 11.5–15.5)
INR BLD: 1.12 — SIGNIFICANT CHANGE UP (ref 0.88–1.16)
INR BLD: 1.16 — SIGNIFICANT CHANGE UP (ref 0.88–1.16)
INR BLD: 1.25 — HIGH (ref 0.88–1.16)
LACTATE SERPL-SCNC: 0.9 MMOL/L — SIGNIFICANT CHANGE UP (ref 0.5–2)
LACTATE SERPL-SCNC: 1.8 MMOL/L — SIGNIFICANT CHANGE UP (ref 0.5–2)
MAGNESIUM SERPL-MCNC: 1.5 MG/DL — LOW (ref 1.6–2.6)
MAGNESIUM SERPL-MCNC: 1.6 MG/DL — SIGNIFICANT CHANGE UP (ref 1.6–2.6)
MAGNESIUM SERPL-MCNC: 1.7 MG/DL — SIGNIFICANT CHANGE UP (ref 1.6–2.6)
MCHC RBC-ENTMCNC: 30 PG — SIGNIFICANT CHANGE UP (ref 27–34)
MCHC RBC-ENTMCNC: 30 PG — SIGNIFICANT CHANGE UP (ref 27–34)
MCHC RBC-ENTMCNC: 30.1 PG — SIGNIFICANT CHANGE UP (ref 27–34)
MCHC RBC-ENTMCNC: 31.4 G/DL — LOW (ref 32–36)
MCHC RBC-ENTMCNC: 32.9 G/DL — SIGNIFICANT CHANGE UP (ref 32–36)
MCHC RBC-ENTMCNC: 33 G/DL — SIGNIFICANT CHANGE UP (ref 32–36)
MCV RBC AUTO: 91.2 FL — SIGNIFICANT CHANGE UP (ref 80–100)
MCV RBC AUTO: 91.3 FL — SIGNIFICANT CHANGE UP (ref 80–100)
MCV RBC AUTO: 95.5 FL — SIGNIFICANT CHANGE UP (ref 80–100)
PHOSPHATE SERPL-MCNC: 2.4 MG/DL — LOW (ref 2.5–4.5)
PHOSPHATE SERPL-MCNC: 2.6 MG/DL — SIGNIFICANT CHANGE UP (ref 2.5–4.5)
PHOSPHATE SERPL-MCNC: 3 MG/DL — SIGNIFICANT CHANGE UP (ref 2.5–4.5)
PLATELET # BLD AUTO: 128 K/UL — LOW (ref 150–400)
PLATELET # BLD AUTO: 132 K/UL — LOW (ref 150–400)
PLATELET # BLD AUTO: 140 K/UL — LOW (ref 150–400)
POTASSIUM SERPL-MCNC: 4.1 MMOL/L — SIGNIFICANT CHANGE UP (ref 3.5–5.3)
POTASSIUM SERPL-MCNC: 4.4 MMOL/L — SIGNIFICANT CHANGE UP (ref 3.5–5.3)
POTASSIUM SERPL-MCNC: 4.5 MMOL/L — SIGNIFICANT CHANGE UP (ref 3.5–5.3)
POTASSIUM SERPL-SCNC: 4.1 MMOL/L — SIGNIFICANT CHANGE UP (ref 3.5–5.3)
POTASSIUM SERPL-SCNC: 4.4 MMOL/L — SIGNIFICANT CHANGE UP (ref 3.5–5.3)
POTASSIUM SERPL-SCNC: 4.5 MMOL/L — SIGNIFICANT CHANGE UP (ref 3.5–5.3)
PROT SERPL-MCNC: 5.5 G/DL — LOW (ref 6–8.3)
PROT SERPL-MCNC: 5.8 G/DL — LOW (ref 6–8.3)
PROTHROM AB SERPL-ACNC: 12.5 SEC — SIGNIFICANT CHANGE UP (ref 9.8–12.7)
PROTHROM AB SERPL-ACNC: 12.9 SEC — HIGH (ref 9.8–12.7)
PROTHROM AB SERPL-ACNC: 13.9 SEC — HIGH (ref 9.8–12.7)
RBC # BLD: 2.9 M/UL — LOW (ref 3.8–5.2)
RBC # BLD: 3.45 M/UL — LOW (ref 3.8–5.2)
RBC # BLD: 3.63 M/UL — LOW (ref 3.8–5.2)
RBC # FLD: 16.4 % — SIGNIFICANT CHANGE UP (ref 10.3–16.9)
RBC # FLD: 17.8 % — HIGH (ref 10.3–16.9)
RBC # FLD: 17.9 % — HIGH (ref 10.3–16.9)
SODIUM SERPL-SCNC: 134 MMOL/L — LOW (ref 135–145)
SODIUM SERPL-SCNC: 134 MMOL/L — LOW (ref 135–145)
SODIUM SERPL-SCNC: 139 MMOL/L — SIGNIFICANT CHANGE UP (ref 135–145)
WBC # BLD: 14.7 K/UL — HIGH (ref 3.8–10.5)
WBC # BLD: 15.5 K/UL — HIGH (ref 3.8–10.5)
WBC # BLD: 15.7 K/UL — HIGH (ref 3.8–10.5)
WBC # FLD AUTO: 14.7 K/UL — HIGH (ref 3.8–10.5)
WBC # FLD AUTO: 15.5 K/UL — HIGH (ref 3.8–10.5)
WBC # FLD AUTO: 15.7 K/UL — HIGH (ref 3.8–10.5)

## 2018-08-08 PROCEDURE — 71045 X-RAY EXAM CHEST 1 VIEW: CPT | Mod: 26

## 2018-08-08 PROCEDURE — 99291 CRITICAL CARE FIRST HOUR: CPT

## 2018-08-08 RX ORDER — FENTANYL CITRATE 50 UG/ML
25 INJECTION INTRAVENOUS EVERY 6 HOURS
Qty: 0 | Refills: 0 | Status: DISCONTINUED | OUTPATIENT
Start: 2018-08-08 | End: 2018-08-08

## 2018-08-08 RX ORDER — MAGNESIUM SULFATE 500 MG/ML
2 VIAL (ML) INJECTION
Qty: 0 | Refills: 0 | Status: COMPLETED | OUTPATIENT
Start: 2018-08-08 | End: 2018-08-09

## 2018-08-08 RX ORDER — ACETAMINOPHEN WITH CODEINE 300MG-30MG
12.5 TABLET ORAL EVERY 6 HOURS
Qty: 0 | Refills: 0 | Status: DISCONTINUED | OUTPATIENT
Start: 2018-08-08 | End: 2018-08-08

## 2018-08-08 RX ORDER — POTASSIUM CHLORIDE 20 MEQ
20 PACKET (EA) ORAL ONCE
Qty: 0 | Refills: 0 | Status: COMPLETED | OUTPATIENT
Start: 2018-08-08 | End: 2018-08-08

## 2018-08-08 RX ORDER — MIDODRINE HYDROCHLORIDE 2.5 MG/1
5 TABLET ORAL EVERY 8 HOURS
Qty: 0 | Refills: 0 | Status: DISCONTINUED | OUTPATIENT
Start: 2018-08-09 | End: 2018-08-09

## 2018-08-08 RX ORDER — ACETAMINOPHEN WITH CODEINE 300MG-30MG
2 TABLET ORAL EVERY 4 HOURS
Qty: 0 | Refills: 0 | Status: DISCONTINUED | OUTPATIENT
Start: 2018-08-08 | End: 2018-08-15

## 2018-08-08 RX ORDER — FUROSEMIDE 40 MG
20 TABLET ORAL ONCE
Qty: 0 | Refills: 0 | Status: COMPLETED | OUTPATIENT
Start: 2018-08-08 | End: 2018-08-08

## 2018-08-08 RX ORDER — FENTANYL CITRATE 50 UG/ML
25 INJECTION INTRAVENOUS ONCE
Qty: 0 | Refills: 0 | Status: DISCONTINUED | OUTPATIENT
Start: 2018-08-08 | End: 2018-08-07

## 2018-08-08 RX ORDER — PHENYLEPHRINE HYDROCHLORIDE 10 MG/ML
0.1 INJECTION INTRAVENOUS
Qty: 40 | Refills: 0 | Status: DISCONTINUED | OUTPATIENT
Start: 2018-08-08 | End: 2018-08-08

## 2018-08-08 RX ADMIN — Medication 0.25 INCH(S): at 06:18

## 2018-08-08 RX ADMIN — Medication 2: at 11:19

## 2018-08-08 RX ADMIN — FENTANYL CITRATE 25 MICROGRAM(S): 50 INJECTION INTRAVENOUS at 00:00

## 2018-08-08 RX ADMIN — FENTANYL CITRATE 25 MICROGRAM(S): 50 INJECTION INTRAVENOUS at 10:15

## 2018-08-08 RX ADMIN — Medication 50 GRAM(S): at 23:07

## 2018-08-08 RX ADMIN — MIDODRINE HYDROCHLORIDE 10 MILLIGRAM(S): 2.5 TABLET ORAL at 06:17

## 2018-08-08 RX ADMIN — LIDOCAINE 1 PATCH: 4 CREAM TOPICAL at 12:18

## 2018-08-08 RX ADMIN — PHENYLEPHRINE HYDROCHLORIDE 1.91 MICROGRAM(S)/KG/MIN: 10 INJECTION INTRAVENOUS at 03:31

## 2018-08-08 RX ADMIN — Medication 2 TABLET(S): at 12:40

## 2018-08-08 RX ADMIN — HEPARIN SODIUM 5000 UNIT(S): 5000 INJECTION INTRAVENOUS; SUBCUTANEOUS at 18:08

## 2018-08-08 RX ADMIN — Medication 2 TABLET(S): at 13:19

## 2018-08-08 RX ADMIN — ATORVASTATIN CALCIUM 20 MILLIGRAM(S): 80 TABLET, FILM COATED ORAL at 21:01

## 2018-08-08 RX ADMIN — Medication 0.25 INCH(S): at 18:10

## 2018-08-08 RX ADMIN — Medication 2: at 21:43

## 2018-08-08 RX ADMIN — Medication 20 MILLIGRAM(S): at 17:06

## 2018-08-08 RX ADMIN — PANTOPRAZOLE SODIUM 40 MILLIGRAM(S): 20 TABLET, DELAYED RELEASE ORAL at 06:18

## 2018-08-08 RX ADMIN — MIDODRINE HYDROCHLORIDE 10 MILLIGRAM(S): 2.5 TABLET ORAL at 15:10

## 2018-08-08 RX ADMIN — FENTANYL CITRATE 25 MICROGRAM(S): 50 INJECTION INTRAVENOUS at 09:54

## 2018-08-08 RX ADMIN — Medication 100 MILLIEQUIVALENT(S): at 17:06

## 2018-08-08 RX ADMIN — Medication 2 TABLET(S): at 18:04

## 2018-08-08 RX ADMIN — Medication 1 MILLIGRAM(S): at 13:18

## 2018-08-08 RX ADMIN — HEPARIN SODIUM 5000 UNIT(S): 5000 INJECTION INTRAVENOUS; SUBCUTANEOUS at 06:17

## 2018-08-08 RX ADMIN — CHLORHEXIDINE GLUCONATE 1 APPLICATION(S): 213 SOLUTION TOPICAL at 12:05

## 2018-08-08 RX ADMIN — Medication 2 TABLET(S): at 18:59

## 2018-08-08 NOTE — CHART NOTE - NSCHARTNOTEFT_GEN_A_CORE
Admitting Diagnosis:   Patient is a 68y old  Female who presents with a chief complaint of Descending thoracic aortic aneurysm (2018 17:14)      PAST MEDICAL & SURGICAL HISTORY:  History of seizures:   COPD (chronic obstructive pulmonary disease)  HLD (hyperlipidemia)  HTN (hypertension)  AAA (abdominal aortic aneurysm)  CAD (coronary artery disease)  S/P AAA (abdominal aortic aneurysm) repair  History of abdominal aortic aneurysm (AAA):   History of cholecystectomy      Current Nutrition Order:  Dysphagia 2 mechanical soft diet/thin liquids, CST CHO    PO Intake: Good (%) [   ]  Fair (50-75%) [X] Poor (<25%) [   ]    GI Issues:   Denies N/V/D/C    Pain:  Controlled     Skin Integrity:  L groin ASW  L thoracotomy     Labs:       139  |  100  |  40<H>  ----------------------------<  100<H>  4.1   |  28  |  1.06    Ca    8.8      08 Aug 2018 03:34  Phos  3.0     -  Mg     1.7     -    TPro  5.5<L>  /  Alb  3.4  /  TBili  1.7<H>  /  DBili  x   /  AST  30  /  ALT  30  /  AlkPhos  45      CAPILLARY BLOOD GLUCOSE      POCT Blood Glucose.: 193 mg/dL (08 Aug 2018 10:37)  POCT Blood Glucose.: 104 mg/dL (07 Aug 2018 22:25)  POCT Blood Glucose.: 83 mg/dL (07 Aug 2018 17:42)  POCT Blood Glucose.: 75 mg/dL (07 Aug 2018 17:39)      Medications:  MEDICATIONS  (STANDING):  atorvastatin 20 milliGRAM(s) Oral at bedtime  chlorhexidine 2% Cloths 1 Application(s) Topical daily  chlorhexidine 2% Cloths 1 Application(s) Topical daily  dextrose 50% Injectable 50 milliLiter(s) IV Push every 15 minutes  dextrose 50% Injectable 25 milliLiter(s) IV Push every 15 minutes  dextrose 50% Injectable 50 milliLiter(s) IV Push every 15 minutes  dextrose 50% Injectable 25 milliLiter(s) IV Push every 15 minutes  docusate sodium 100 milliGRAM(s) Oral three times a day  folic acid 1 milliGRAM(s) Oral daily  heparin  Injectable 5000 Unit(s) SubCutaneous every 12 hours  insulin lispro (HumaLOG) corrective regimen sliding scale   SubCutaneous Before meals and at bedtime  lidocaine   Patch 1 Patch Transdermal daily  midodrine 10 milliGRAM(s) Oral every 8 hours  nitroglycerin    2% Ointment 0.25 Inch(s) Transdermal two times a day  pantoprazole    Tablet 40 milliGRAM(s) Oral before breakfast  phenylephrine    Infusion 0.1 MICROgram(s)/kG/Min (1.909 mL/Hr) IV Continuous <Continuous>  senna 2 Tablet(s) Oral at bedtime  sodium chloride 0.9%. 1000 milliLiter(s) (10 mL/Hr) IV Continuous <Continuous>    MEDICATIONS  (PRN):  acetaminophen   Tablet. 650 milliGRAM(s) Oral every 6 hours PRN Mild Pain (1 - 3)  acetaminophen with codeine Elixir 12.5 milliLiter(s) Oral every 6 hours PRN Moderate Pain (4 - 6)  dextrose 40% Gel 15 Gram(s) Oral once PRN Blood Glucose LESS THAN 70 milliGRAM(s)/deciliter  fentaNYL    Injectable 25 MICROGram(s) IV Push every 6 hours PRN Severe Pain (7 - 10)  glucagon  Injectable 1 milliGRAM(s) IntraMuscular once PRN Glucose LESS THAN 70 milligrams/deciliter      Weight:  50.9kg ()    Daily Weight in k.3 (2018 11:49)    Weight Change:   wt change noted; could be due to fluids; please trend daily wts    Estimated energy needs:   IBW 52.3kg used for EER and adjusted for post-op/age  25-30kcal/kg (1307-1569kcal), 1.2-1.4g/kg (63-73g), 30-35ml/kg (1569-1830ml)    Subjective:   67y/o F s/o open TAAA w/ replacement of descending aorta - w/separate reimplantation of mesorenals. Concern for possible ischemic colitis noted; flex sig performed and negative. SLP cleared pt for mechanical soft, thin liquid diet and PO initiated. Per d/w RN, pt with fair intake, but requires total feeding assistance. Encouraged adequate intake and assistance prn. Kcal count ordered; per d/w PA no nutrition support to be ordered and kcal count d/c'd. Discussed initiating ensure enlive TID 2/2 decreased intake and increased kcal/pro needs; order assigned to PA for verification and notified. Pt denies GI distress and current pain. Will follow.       Previous Nutrition Diagnosis:  Inadequate EN RT TF held AEB pt meeting 0% of EER     Active [   ]  Resolved [X]    If resolved, new PES:   Increased nutrient needs RT increased demand AEB post-op     Goal:  Pt to meet >/=75% of EER     Recommendations:  1. Change diet to mechanical soft, Dash/TLC + ensure enlive TID.   2. Provide assistance with meals and encourage intake and ONS.   3. Monitor lytes and replete prn.   4. Add MVI.   5. Trend daily wts.     *d/w PA/RN; order entered for verification*    Education:   Adequate intake; increased needs; ONS    Risk Level: High [X] Moderate [   ] Low [   ]

## 2018-08-08 NOTE — PROGRESS NOTE ADULT - SUBJECTIVE AND OBJECTIVE BOX
67yo Female with PAST MEDICAL & SURGICAL HISTORY:  Seizures in 1980, COPD (chronic obstructive pulmonary disease), HLD, HTN, CAD, s/p AAA repair (EVAR) 2004, [facility].     7/26/18 - s/p Open TAAA w replacement of descending aorta, re-implantation of mesorenals    extubated, on regular NC at 6 LPM    Vital Signs Last 24 Hrs  T(C): 36.3 (08 Aug 2018 17:04), Max: 36.4 (08 Aug 2018 01:00)  T(F): 97.3 (08 Aug 2018 17:04), Max: 97.6 (08 Aug 2018 01:00)  HR: 86 (08 Aug 2018 20:00) (64 - 104)  BP: --  BP(mean): --  RR: 31 (08 Aug 2018 20:00) (20 - 252)  SpO2: 100% (08 Aug 2018 20:00) (96% - 100%)    alert, awake, verbal  follows commands  strength is better in LE than upper yet has improved since few days ago    Lactate, Blood: 0.9 mmoL/L (08-08-18 @ 03:33)  Lactate, Blood: 0.7 mmoL/L (08-07-18 @ 22:21)    a/p:    cont with PO diet and Daily PT  negative fluid balance daily. lasix 20 mg was given earlier.  adjust midodrine to BP effect    40 minutes of critical care time spent providing medical care for patient's acute illness/conditions that impairs at least one vital organ system and/or poses a high risk of imminent or life threatening deterioration in the patient's condition. It includes time spent evaluating and treating the patient's acute illness as well as time spent reviewing labs, radiology, discussing goals of care with patient and/or patient's family, and discussing the case with a multidisciplinary team in an effort to prevent further life threatening deterioration or end organ damage. This time is independent of any procedures performed.

## 2018-08-08 NOTE — PROGRESS NOTE ADULT - SUBJECTIVE AND OBJECTIVE BOX
69yo with history of CAD, TIA,  open AAA s/p repair in 2004 and 3/2017  EVAR with coverage of left hypogastric artery and graft extension into the left EIA with Dr Solorzano for management of left iliac artery aneurysm.   Recent imaging showing descending aortic aneurysm with degenerated aorta with significant mural thrombus.        CT done in April 2018 : Diffusely aneurysmal descending thoracic aorta increased in size, with thick atheromatous plaques and mural thrombus.  Suprarenal abdominal aorta was also aneurysmal and increased in size. Patent Celiac and SMA and renals.  Infra-renal endoluminal stent with non-occlusive thrombus.     7/26  open thoraco-abdominal aorta replacement with separate re-implantation of meso-renals, bypass to celiac/SMA and bilateral renals.  Placement of Lumbar Drain    Post op issues with   1. Spinal cord ischemia with residual left LLE weakness  2. ischemic feet  3. Respriratory failure s/p extubation 3 days ago       PMH :  THORACOABDOMINAL ANEURYS  COPD (chronic obstructive pulmonary disease)  HLD (hyperlipidemia)  HTN (hypertension)  AAA (abdominal aortic aneurysm)  CAD (coronary artery disease)  Thoracoabdominal aortic aneurysm  Thoracic aortic aneurysm without rupture  Lumbar drain implantation  Thoracoabdominal surgical exposure  S/P AAA (abdominal aortic aneurysm) repair  History of abdominal aortic aneurysm (AAA)  History of cholecystectomy    ROS no issues   All other systems reviewed and negative.    ICU Vital Signs Last 24 Hrs  T(C): 36.1 (08-08-18 @ 13:23), Max: 36.6 (08-07-18 @ 17:38)  T(F): 97 (08-08-18 @ 13:23), Max: 97.9 (08-07-18 @ 17:38)  HR: 86 (08-08-18 @ 14:00) (64 - 98)  ABP: 152/58 (08-08-18 @ 14:00) (124/40 - 188/58)  ABP(mean): 86 (08-08-18 @ 14:00) (68 - 98)  RR: 24 (08-08-18 @ 14:00) (20 - 252)  SpO2: 100% (08-08-18 @ 14:00) (96% - 100%)    I&O's Summary    07 Aug 2018 07:01  -  08 Aug 2018 07:00  --------------------------------------------------------  IN: 1590 mL / OUT: 1535 mL / NET: 55 mL    08 Aug 2018 07:01  -  08 Aug 2018 14:43  --------------------------------------------------------  IN: 400 mL / OUT: 305 mL / NET: 95 mL        ABG - ( 08 Aug 2018 10:59 )  pH: 7.47  /  pCO2: 35    /  pO2: 112   / HCO3: 25    / Base Excess: 1.2   /  SaO2: 98                              8.7    14.7  )-----------( 128      ( 08 Aug 2018 03:34 )             27.7     08 Aug 2018 03:34    139    |  100    |  40     ----------------------------<  100    4.1     |  28     |  1.06     Ca    8.8        08 Aug 2018 03:34  Phos  3.0       08 Aug 2018 03:34  Mg     1.7       08 Aug 2018 03:34    TPro  5.5    /  Alb  3.4    /  TBili  1.7    /  DBili  x      /  AST  30     /  ALT  30     /  AlkPhos  45     08 Aug 2018 03:34    PT/INR - ( 08 Aug 2018 03:34 )   PT: 13.9 sec;   INR: 1.25     PTT - ( 08 Aug 2018 03:34 )  PTT:27.9 sec    MEDICATIONS  (STANDING):  atorvastatin 20 milliGRAM(s) Oral at bedtime  docusate sodium 100 milliGRAM(s) Oral three times a day  folic acid 1 milliGRAM(s) Oral daily  heparin  Injectable 5000 Unit(s) SubCutaneous every 12 hours  insulin lispro (HumaLOG) corrective regimen sliding scale   SubCutaneous Before meals and at bedtime  lidocaine   Patch 1 Patch Transdermal daily  midodrine 10 milliGRAM(s) Oral every 8 hours  nitroglycerin    2% Ointment 0.25 Inch(s) Transdermal two times a day  pantoprazole    Tablet 40 milliGRAM(s) Oral before breakfast  phenylephrine    Infusion 0.1 MICROgram(s)/kG/Min IV Continuous <Continuous>  senna 2 Tablet(s) Oral at bedtime    Home Medications:  Advair Diskus 100 mcg-50 mcg inhalation powder: 1 puff(s) inhaled 2 times a day (21 May 2018 07:27)  amLODIPine 5 mg oral tablet: 1 tab(s) orally once a day (21 May 2018 07:27)  aspirin 81 mg oral tablet: 1 tab(s) orally once a day (21 May 2018 07:27)  omeprazole 20 mg oral delayed release tablet: 1 tab(s) orally once a day (21 May 2018 07:27)  simvastatin 20 mg oral tablet: 1 tab(s) orally once a day (at bedtime) (21 May 2018 07:27)  Spiriva Respimat 1.25 mcg/inh inhalation aerosol: 2 puff(s) inhaled once a day (21 May 2018 07:27)  Ventolin HFA 90 mcg/inh inhalation aerosol: 2 puff(s) inhaled 4 times a day, As Needed (21 May 2018 07:27)    PHYSICAL EXAM:  Gen : no acute distress  Respiratory: decreased in the bases, coarse breath sounds  Cardiovascular: S1 and S2, RRR, no M/G/R  Gastrointestinal: BS+, soft, NT/ND  Extremities: No peripheral edema, cyanosis of feet improving  Vascular: 2+ peripheral pulses  Neurological: A/O x 3, LLE strenght 4/5, RLL 4+/5   Incision: clean dry/ no sign of infection  Lines: no sign of infection

## 2018-08-08 NOTE — PROGRESS NOTE ADULT - ASSESSMENT
Problems  1. S/p TAAA on 7/26  2. Concern for Spinal cord ischemia with residual LLE weakness  3. Respiratory insufficiency   4 Thrombocytopenia - - improving    Plan   Neuro -- pain control  continue to follow neuro exam   CVS - permissive hypertensive for concern for spinal cord ischemia   Pulm -stable resp status.   Will continue aggressive pulm toileting  GI - tolerating PO diet   - monitor UOP, continue diuresis   Endo - glycemic control  Heme - platelet count improving    Critical post op.    Critical care time spent 50min

## 2018-08-09 LAB
ALBUMIN SERPL ELPH-MCNC: 3.2 G/DL — LOW (ref 3.3–5)
ALBUMIN SERPL ELPH-MCNC: 3.3 G/DL — SIGNIFICANT CHANGE UP (ref 3.3–5)
ALP SERPL-CCNC: 54 U/L — SIGNIFICANT CHANGE UP (ref 40–120)
ALP SERPL-CCNC: 57 U/L — SIGNIFICANT CHANGE UP (ref 40–120)
ALT FLD-CCNC: 32 U/L — SIGNIFICANT CHANGE UP (ref 10–45)
ALT FLD-CCNC: 33 U/L — SIGNIFICANT CHANGE UP (ref 10–45)
ANION GAP SERPL CALC-SCNC: 12 MMOL/L — SIGNIFICANT CHANGE UP (ref 5–17)
ANION GAP SERPL CALC-SCNC: 13 MMOL/L — SIGNIFICANT CHANGE UP (ref 5–17)
APTT BLD: 26 SEC — LOW (ref 27.5–37.4)
APTT BLD: 26.5 SEC — LOW (ref 27.5–37.4)
AST SERPL-CCNC: 33 U/L — SIGNIFICANT CHANGE UP (ref 10–40)
AST SERPL-CCNC: 34 U/L — SIGNIFICANT CHANGE UP (ref 10–40)
BILIRUB SERPL-MCNC: 1.3 MG/DL — HIGH (ref 0.2–1.2)
BILIRUB SERPL-MCNC: 1.7 MG/DL — HIGH (ref 0.2–1.2)
BUN SERPL-MCNC: 30 MG/DL — HIGH (ref 7–23)
BUN SERPL-MCNC: 36 MG/DL — HIGH (ref 7–23)
CALCIUM SERPL-MCNC: 8.6 MG/DL — SIGNIFICANT CHANGE UP (ref 8.4–10.5)
CALCIUM SERPL-MCNC: 8.6 MG/DL — SIGNIFICANT CHANGE UP (ref 8.4–10.5)
CHLORIDE SERPL-SCNC: 95 MMOL/L — LOW (ref 96–108)
CHLORIDE SERPL-SCNC: 96 MMOL/L — SIGNIFICANT CHANGE UP (ref 96–108)
CO2 SERPL-SCNC: 26 MMOL/L — SIGNIFICANT CHANGE UP (ref 22–31)
CO2 SERPL-SCNC: 27 MMOL/L — SIGNIFICANT CHANGE UP (ref 22–31)
CREAT SERPL-MCNC: 0.91 MG/DL — SIGNIFICANT CHANGE UP (ref 0.5–1.3)
CREAT SERPL-MCNC: 0.93 MG/DL — SIGNIFICANT CHANGE UP (ref 0.5–1.3)
GAS PNL BLDA: SIGNIFICANT CHANGE UP
GLUCOSE BLDC GLUCOMTR-MCNC: 115 MG/DL — HIGH (ref 70–99)
GLUCOSE BLDC GLUCOMTR-MCNC: 161 MG/DL — HIGH (ref 70–99)
GLUCOSE BLDC GLUCOMTR-MCNC: 233 MG/DL — HIGH (ref 70–99)
GLUCOSE BLDC GLUCOMTR-MCNC: 79 MG/DL — SIGNIFICANT CHANGE UP (ref 70–99)
GLUCOSE SERPL-MCNC: 121 MG/DL — HIGH (ref 70–99)
GLUCOSE SERPL-MCNC: 131 MG/DL — HIGH (ref 70–99)
HCT VFR BLD CALC: 31.3 % — LOW (ref 34.5–45)
HCT VFR BLD CALC: 31.5 % — LOW (ref 34.5–45)
HGB BLD-MCNC: 10.4 G/DL — LOW (ref 11.5–15.5)
HGB BLD-MCNC: 10.5 G/DL — LOW (ref 11.5–15.5)
INR BLD: 1.06 — SIGNIFICANT CHANGE UP (ref 0.88–1.16)
INR BLD: 1.08 — SIGNIFICANT CHANGE UP (ref 0.88–1.16)
LACTATE SERPL-SCNC: 0.9 MMOL/L — SIGNIFICANT CHANGE UP (ref 0.5–2)
MAGNESIUM SERPL-MCNC: 2.1 MG/DL — SIGNIFICANT CHANGE UP (ref 1.6–2.6)
MAGNESIUM SERPL-MCNC: 2.9 MG/DL — HIGH (ref 1.6–2.6)
MCHC RBC-ENTMCNC: 30 PG — SIGNIFICANT CHANGE UP (ref 27–34)
MCHC RBC-ENTMCNC: 30.5 PG — SIGNIFICANT CHANGE UP (ref 27–34)
MCHC RBC-ENTMCNC: 33 G/DL — SIGNIFICANT CHANGE UP (ref 32–36)
MCHC RBC-ENTMCNC: 33.5 G/DL — SIGNIFICANT CHANGE UP (ref 32–36)
MCV RBC AUTO: 90.8 FL — SIGNIFICANT CHANGE UP (ref 80–100)
MCV RBC AUTO: 91 FL — SIGNIFICANT CHANGE UP (ref 80–100)
PHOSPHATE SERPL-MCNC: 3.5 MG/DL — SIGNIFICANT CHANGE UP (ref 2.5–4.5)
PHOSPHATE SERPL-MCNC: 4.1 MG/DL — SIGNIFICANT CHANGE UP (ref 2.5–4.5)
PLATELET # BLD AUTO: 147 K/UL — LOW (ref 150–400)
PLATELET # BLD AUTO: 207 K/UL — SIGNIFICANT CHANGE UP (ref 150–400)
POTASSIUM SERPL-MCNC: 4 MMOL/L — SIGNIFICANT CHANGE UP (ref 3.5–5.3)
POTASSIUM SERPL-MCNC: 4.4 MMOL/L — SIGNIFICANT CHANGE UP (ref 3.5–5.3)
POTASSIUM SERPL-SCNC: 4 MMOL/L — SIGNIFICANT CHANGE UP (ref 3.5–5.3)
POTASSIUM SERPL-SCNC: 4.4 MMOL/L — SIGNIFICANT CHANGE UP (ref 3.5–5.3)
PROT SERPL-MCNC: 5.7 G/DL — LOW (ref 6–8.3)
PROT SERPL-MCNC: 5.8 G/DL — LOW (ref 6–8.3)
PROTHROM AB SERPL-ACNC: 11.8 SEC — SIGNIFICANT CHANGE UP (ref 9.8–12.7)
PROTHROM AB SERPL-ACNC: 12 SEC — SIGNIFICANT CHANGE UP (ref 9.8–12.7)
RBC # BLD: 3.44 M/UL — LOW (ref 3.8–5.2)
RBC # BLD: 3.47 M/UL — LOW (ref 3.8–5.2)
RBC # FLD: 17 % — HIGH (ref 10.3–16.9)
RBC # FLD: 17.6 % — HIGH (ref 10.3–16.9)
SODIUM SERPL-SCNC: 134 MMOL/L — LOW (ref 135–145)
SODIUM SERPL-SCNC: 135 MMOL/L — SIGNIFICANT CHANGE UP (ref 135–145)
WBC # BLD: 11.7 K/UL — HIGH (ref 3.8–10.5)
WBC # BLD: 12.6 K/UL — HIGH (ref 3.8–10.5)
WBC # FLD AUTO: 11.7 K/UL — HIGH (ref 3.8–10.5)
WBC # FLD AUTO: 12.6 K/UL — HIGH (ref 3.8–10.5)

## 2018-08-09 PROCEDURE — 99292 CRITICAL CARE ADDL 30 MIN: CPT

## 2018-08-09 PROCEDURE — 71045 X-RAY EXAM CHEST 1 VIEW: CPT | Mod: 26

## 2018-08-09 PROCEDURE — 99291 CRITICAL CARE FIRST HOUR: CPT

## 2018-08-09 RX ORDER — METOPROLOL TARTRATE 50 MG
12.5 TABLET ORAL EVERY 12 HOURS
Qty: 0 | Refills: 0 | Status: DISCONTINUED | OUTPATIENT
Start: 2018-08-09 | End: 2018-08-09

## 2018-08-09 RX ORDER — FUROSEMIDE 40 MG
20 TABLET ORAL ONCE
Qty: 0 | Refills: 0 | Status: COMPLETED | OUTPATIENT
Start: 2018-08-09 | End: 2018-08-09

## 2018-08-09 RX ADMIN — CHLORHEXIDINE GLUCONATE 1 APPLICATION(S): 213 SOLUTION TOPICAL at 12:03

## 2018-08-09 RX ADMIN — Medication 85 MILLIMOLE(S): at 01:36

## 2018-08-09 RX ADMIN — Medication 1 MILLIGRAM(S): at 12:03

## 2018-08-09 RX ADMIN — Medication 4: at 12:28

## 2018-08-09 RX ADMIN — Medication 0.25 INCH(S): at 17:18

## 2018-08-09 RX ADMIN — ATORVASTATIN CALCIUM 20 MILLIGRAM(S): 80 TABLET, FILM COATED ORAL at 21:14

## 2018-08-09 RX ADMIN — Medication 2 TABLET(S): at 06:29

## 2018-08-09 RX ADMIN — Medication 2 TABLET(S): at 13:40

## 2018-08-09 RX ADMIN — HEPARIN SODIUM 5000 UNIT(S): 5000 INJECTION INTRAVENOUS; SUBCUTANEOUS at 17:51

## 2018-08-09 RX ADMIN — Medication 12.5 MILLIGRAM(S): at 07:53

## 2018-08-09 RX ADMIN — Medication 2 TABLET(S): at 07:00

## 2018-08-09 RX ADMIN — LIDOCAINE 1 PATCH: 4 CREAM TOPICAL at 12:03

## 2018-08-09 RX ADMIN — Medication 50 GRAM(S): at 00:19

## 2018-08-09 RX ADMIN — Medication 2 TABLET(S): at 19:22

## 2018-08-09 RX ADMIN — Medication 0.25 INCH(S): at 07:00

## 2018-08-09 RX ADMIN — PANTOPRAZOLE SODIUM 40 MILLIGRAM(S): 20 TABLET, DELAYED RELEASE ORAL at 06:29

## 2018-08-09 RX ADMIN — HEPARIN SODIUM 5000 UNIT(S): 5000 INJECTION INTRAVENOUS; SUBCUTANEOUS at 06:29

## 2018-08-09 RX ADMIN — Medication 2: at 21:22

## 2018-08-09 RX ADMIN — Medication 20 MILLIGRAM(S): at 01:33

## 2018-08-09 RX ADMIN — Medication 2 TABLET(S): at 13:10

## 2018-08-09 RX ADMIN — LIDOCAINE 1 PATCH: 4 CREAM TOPICAL at 00:19

## 2018-08-09 RX ADMIN — Medication 0.25 INCH(S): at 06:29

## 2018-08-09 NOTE — PROGRESS NOTE ADULT - SUBJECTIVE AND OBJECTIVE BOX
Hematology Oncology Consult Note (Dr Tee)    The patient was seen and examined at bedside.    69 yo F with history of CAD, TIA in 1998, COPD, AAA s/p open AAA repair by Dr. Roberto in 2004, EVAR/AAA/CIAA with Dr. Burch on 3/29/17 who was referred to Dr. 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  She underwent CTA chest/abdomen/pelvis in 4/2018 which demonstrated descending thoracoabdominal aortic aneurysm with degeneration of the aorta at the mid-descending thoracic segment, measuring 5.7cm with significant mural thrombus (previously measuring 5.5cm) with widely patent celiac/SMA/renal arteries.  She completed outpatient pre-operative evaluation and was deemed a surgical candidate for thoracoabdominal aneurysm repair and admitted to [facility] on 7/25/18 under the care of Dr. Monson in anticipated of planned procedure on 7/26.    Pt underwent repair 7/26. Post-procedure labs showed plt count 58k, new coagulopathy, and worsening renal failure. She received many blood products intraop and heparin. She received pRBC, cry, ffp, plts intraop. Pt's plt count initial was responding to transfusion but then stopped responding yesterday in setting of worsenign renal failure. Her post-op complication was leg weakness 2/2 to spinal infarction requiring lumber drain placement by neurosurgery. She has had serosanguinous drainage of luids from ET suction and from her three thorax drainage tubes. No blood in ivan and no blood in stool. She has been receiving pRBC post-op despite having normal Hgb.       Interval hx: s/p Day 3 of extubation, doing well, eating and drinking. b/l toe are still cyanotic. report intermittent tingling and numbness of toes, not cold. no pain in both toes. plt count improving to 147k. no bleeding issues    ROS is otherwise negative.    ICU Vital Signs Last 24 Hrs  T(C): 36.2 (09 Aug 2018 10:31), Max: 36.6 (09 Aug 2018 00:30)  T(F): 97.2 (09 Aug 2018 10:31), Max: 97.9 (09 Aug 2018 00:30)  HR: 84 (09 Aug 2018 10:00) (74 - 116)  ABP: 162/62 (09 Aug 2018 10:00) (124/60 - 174/64)  ABP(mean): 94 (09 Aug 2018 10:00) (74 - 106)  RR: 20 (09 Aug 2018 10:00) (18 - 38)  SpO2: 100% (09 Aug 2018 10:00) (97% - 100%)          Gen: awake, alert, follows commands  HEENT: normocephalic/atraumatic, no conjunctival pallor, no scleral icterus, no oral thrush/mucosal bleeding/mucositis + NG tube  Neck: supple, no masses, no JVD  Cardiovascular: RR, nl S1S2, no murmurs/rubs/gallops.   Respiratory: clear air entry   Gastrointestinal: BS+, soft, NT/ND, no masses, no splenomegaly, no hepatomegaly, no evidence for ascites  Extremities: no edema, no calf tenderness, DP/PT 2+ b/l, evidence of acral cyanosis of b/l toes- stable, brisk pulses  Neurological: no focal deficits  Skin: no rash on visible skin  Lymph Nodes:  no cervical/supraclavicular LAD, no axillary/groin LAD      Labs                          10.4   12.6  )-----------( 147      ( 09 Aug 2018 03:08 )             31.5         CBC Full  -  ( 09 Aug 2018 03:08 )  WBC Count : 12.6 K/uL  Hemoglobin : 10.4 g/dL  Hematocrit : 31.5 %  Platelet Count - Automated : 147 K/uL  Mean Cell Volume : 90.8 fL  Mean Cell Hemoglobin : 30.0 pg  Mean Cell Hemoglobin Concentration : 33.0 g/dL  Auto Neutrophil # : x  Auto Lymphocyte # : x  Auto Monocyte # : x  Auto Eosinophil # : x  Auto Basophil # : x  Auto Neutrophil % : x  Auto Lymphocyte % : x  Auto Monocyte % : x  Auto Eosinophil % : x  Auto Basophil % : x        08-09    134<L>  |  95<L>  |  36<H>  ----------------------------<  121<H>  4.0   |  27  |  0.91    Ca    8.6      09 Aug 2018 03:08  Phos  3.5     08-09  Mg     2.9     08-09    TPro  5.7<L>  /  Alb  3.3  /  TBili  1.7<H>  /  DBili  x   /  AST  34  /  ALT  32  /  AlkPhos  54  08-09        PT/INR - ( 09 Aug 2018 03:08 )   PT: 11.8 sec;   INR: 1.06          PTT - ( 09 Aug 2018 03:08 )  PTT:26.5 sec

## 2018-08-09 NOTE — PROGRESS NOTE ADULT - ASSESSMENT
This is a 68 year old female ith history of CAD, TIA in 1998, COPD, AAA s/p open AAA repair by Dr. Roberto in 2004, EVAR/AAA/CIAA with Dr. Burch on 3/29/17 who was referred to Dr. Monson for 5.4cm descending thoracic aortic aneurysm s/p surgery complicated by new thrombocytopenia w/ suspicion for microangiopathic process likely DIC    # Thrombocytopenia  etiology suspected 2/2 to DIC vs post-op state vs sepsis. now improving, plt count 147k and no bleeding issues. cont to monitor. will sign off at this time. please reconsult as needed.    Case discussed with Dr Tee

## 2018-08-09 NOTE — PROGRESS NOTE ADULT - SUBJECTIVE AND OBJECTIVE BOX
PMH :  THORACOABDOMINAL ANEURYS  THORACOABDOMINAL ANEURYSM  No pertinent family history in first degree relatives  Handoff  History of seizures  COPD (chronic obstructive pulmonary disease)  HLD (hyperlipidemia)  HTN (hypertension)  AAA (abdominal aortic aneurysm)  CAD (coronary artery disease)  Thoracoabdominal aortic aneurysm  Thoracoabdominal aortic aneurysm  Thoracic aortic aneurysm without rupture  Lumbar drain implantation  Thoracoabdominal surgical exposure  S/P AAA (abdominal aortic aneurysm) repair  History of abdominal aortic aneurysm (AAA)  History of cholecystectomy    ROS  All other systems reviewed and negative.    ICU Vital Signs Last 24 Hrs  T(C): 36.8 (08-09-18 @ 18:50), Max: 36.8 (08-09-18 @ 18:50)  T(F): 98.3 (08-09-18 @ 18:50), Max: 98.3 (08-09-18 @ 18:50)  HR: 96 (08-09-18 @ 19:00) (78 - 116)  BP: 145/71 (08-09-18 @ 19:00) (136/63 - 146/66)  BP(mean): 91 (08-09-18 @ 19:00) (90 - 107)  ABP: 135/54 (08-09-18 @ 16:00) (120/59 - 174/64)  ABP(mean): 76 (08-09-18 @ 16:00) (68 - 106)  RR: 23 (08-09-18 @ 19:00) (18 - 31)  SpO2: 100% (08-09-18 @ 19:00) (97% - 100%)    I&O's Summary    08 Aug 2018 07:01  -  09 Aug 2018 07:00  --------------------------------------------------------  IN: 1160 mL / OUT: 2380 mL / NET: -1220 mL    09 Aug 2018 07:01  -  09 Aug 2018 19:58  --------------------------------------------------------  IN: 865 mL / OUT: 815 mL / NET: 50 mL        ABG - ( 09 Aug 2018 02:58 )  pH: 7.48  /  pCO2: 38    /  pO2: 83    / HCO3: 27    / Base Excess: 3.7   /  SaO2: 97                                      10.5   11.7  )-----------( 207      ( 09 Aug 2018 15:51 )             31.3     09 Aug 2018 15:51    135    |  96     |  30     ----------------------------<  131    4.4     |  26     |  0.93     Ca    8.6        09 Aug 2018 15:51  Phos  4.1       09 Aug 2018 15:51  Mg     2.1       09 Aug 2018 15:51    TPro  5.8    /  Alb  3.2    /  TBili  1.3    /  DBili  x      /  AST  33     /  ALT  33     /  AlkPhos  57     09 Aug 2018 15:51    PT/INR - ( 09 Aug 2018 15:51 )   PT: 12.0 sec;   INR: 1.08          PTT - ( 09 Aug 2018 15:51 )  PTT:26.0 sec  MEDICATIONS  (STANDING):  atorvastatin 20 milliGRAM(s) Oral at bedtime  chlorhexidine 2% Cloths 1 Application(s) Topical daily  chlorhexidine 2% Cloths 1 Application(s) Topical daily  dextrose 50% Injectable 50 milliLiter(s) IV Push every 15 minutes  dextrose 50% Injectable 25 milliLiter(s) IV Push every 15 minutes  dextrose 50% Injectable 50 milliLiter(s) IV Push every 15 minutes  dextrose 50% Injectable 25 milliLiter(s) IV Push every 15 minutes  docusate sodium 100 milliGRAM(s) Oral three times a day  folic acid 1 milliGRAM(s) Oral daily  heparin  Injectable 5000 Unit(s) SubCutaneous every 12 hours  insulin lispro (HumaLOG) corrective regimen sliding scale   SubCutaneous Before meals and at bedtime  lidocaine   Patch 1 Patch Transdermal daily  pantoprazole    Tablet 40 milliGRAM(s) Oral before breakfast  senna 2 Tablet(s) Oral at bedtime  sodium chloride 0.9%. 1000 milliLiter(s) IV Continuous <Continuous>    Home Medications:  Advair Diskus 100 mcg-50 mcg inhalation powder: 1 puff(s) inhaled 2 times a day (21 May 2018 07:27)  amLODIPine 5 mg oral tablet: 1 tab(s) orally once a day (21 May 2018 07:27)  aspirin 81 mg oral tablet: 1 tab(s) orally once a day (21 May 2018 07:27)  omeprazole 20 mg oral delayed release tablet: 1 tab(s) orally once a day (21 May 2018 07:27)  simvastatin 20 mg oral tablet: 1 tab(s) orally once a day (at bedtime) (21 May 2018 07:27)  Spiriva Respimat 1.25 mcg/inh inhalation aerosol: 2 puff(s) inhaled once a day (21 May 2018 07:27)  Ventolin HFA 90 mcg/inh inhalation aerosol: 2 puff(s) inhaled 4 times a day, As Needed (21 May 2018 07:27)    PHYSICAL EXAM:  Gen : no acute distress  Neck: No LAD, No JVD  Respiratory: decreased in the bases  Cardiovascular: S1 and S2, RRR, no M/G/R  Gastrointestinal: BS+, soft, NT/ND  Extremities: No peripheral edema  Vascular: 2+ peripheral pulses  Neurological: A/O x 3, no focal deficits  Incision: clean dry/ no sign of infection  Lines: no sign of infection 69yo with history of CAD, TIA,  open AAA s/p repair in 2004 and 3/2017  EVAR with coverage of left hypogastric artery and graft extension into the left EIA with Dr Solorzano for management of left iliac artery aneurysm.   Recent imaging showing descending aortic aneurysm with degenerated aorta with significant mural thrombus.        CT done in April 2018 : Diffusely aneurysmal descending thoracic aorta increased in size, with thick atheromatous plaques and mural thrombus.  Suprarenal abdominal aorta was also aneurysmal and increased in size. Patent Celiac and SMA and renals.  Infra-renal endoluminal stent with non-occlusive thrombus.     7/26  open thoraco-abdominal aorta replacement with separate re-implantation of meso-renals, bypass to celiac/SMA and bilateral renals.  Placement of Lumbar Drain    Post op issues with   1. Spinal cord ischemia with residual bilateral LLE weakness (left > right)  2. Ischemic feet    PMH :  THORACOABDOMINAL ANEURYS  COPD (chronic obstructive pulmonary disease)  HLD (hyperlipidemia)  HTN (hypertension)  AAA (abdominal aortic aneurysm)  CAD (coronary artery disease)  Lumbar drain implantation  Thoracoabdominal surgical exposure  S/P AAA (abdominal aortic aneurysm) repair  History of abdominal aortic aneurysm (AAA)  History of cholecystectomy    ROS no issue  All other systems reviewed and negative.    ICU Vital Signs Last 24 Hrs  T(C): 36.8 (08-09-18 @ 18:50), Max: 36.8 (08-09-18 @ 18:50)  T(F): 98.3 (08-09-18 @ 18:50), Max: 98.3 (08-09-18 @ 18:50)  HR: 96 (08-09-18 @ 19:00) (78 - 116)  BP: 145/71 (08-09-18 @ 19:00) (136/63 - 146/66)  BP(mean): 91 (08-09-18 @ 19:00) (90 - 107)  ABP: 135/54 (08-09-18 @ 16:00) (120/59 - 174/64)  ABP(mean): 76 (08-09-18 @ 16:00) (68 - 106)  RR: 23 (08-09-18 @ 19:00) (18 - 31)  SpO2: 100% (08-09-18 @ 19:00) (97% - 100%)    I&O's Summary    08 Aug 2018 07:01  -  09 Aug 2018 07:00  --------------------------------------------------------  IN: 1160 mL / OUT: 2380 mL / NET: -1220 mL    09 Aug 2018 07:01  -  09 Aug 2018 19:58  --------------------------------------------------------  IN: 865 mL / OUT: 815 mL / NET: 50 mL        ABG - ( 09 Aug 2018 02:58 )  pH: 7.48  /  pCO2: 38    /  pO2: 83    / HCO3: 27    / Base Excess: 3.7   /  SaO2: 97                              10.5   11.7  )-----------( 207      ( 09 Aug 2018 15:51 )             31.3     09 Aug 2018 15:51    135    |  96     |  30     ----------------------------<  131    4.4     |  26     |  0.93     Ca    8.6        09 Aug 2018 15:51  Phos  4.1       09 Aug 2018 15:51  Mg     2.1       09 Aug 2018 15:51    TPro  5.8    /  Alb  3.2    /  TBili  1.3    /  DBili  x      /  AST  33     /  ALT  33     /  AlkPhos  57     09 Aug 2018 15:51    PT/INR - ( 09 Aug 2018 15:51 )   PT: 12.0 sec;   INR: 1.08     PTT - ( 09 Aug 2018 15:51 )  PTT:26.0 sec    MEDICATIONS  (STANDING):  atorvastatin 20 milliGRAM(s) Oral at bedtime  docusate sodium 100 milliGRAM(s) Oral three times a day  folic acid 1 milliGRAM(s) Oral daily  heparin  Injectable 5000 Unit(s) SubCutaneous every 12 hours  insulin lispro (HumaLOG) corrective regimen sliding scale   SubCutaneous Before meals and at bedtime  lidocaine   Patch 1 Patch Transdermal daily  pantoprazole    Tablet 40 milliGRAM(s) Oral before breakfast  senna 2 Tablet(s) Oral at bedtime    Home Medications:  Advair Diskus 100 mcg-50 mcg inhalation powder: 1 puff(s) inhaled 2 times a day (21 May 2018 07:27)  amLODIPine 5 mg oral tablet: 1 tab(s) orally once a day (21 May 2018 07:27)  aspirin 81 mg oral tablet: 1 tab(s) orally once a day (21 May 2018 07:27)  omeprazole 20 mg oral delayed release tablet: 1 tab(s) orally once a day (21 May 2018 07:27)  simvastatin 20 mg oral tablet: 1 tab(s) orally once a day (at bedtime) (21 May 2018 07:27)  Spiriva Respimat 1.25 mcg/inh inhalation aerosol: 2 puff(s) inhaled once a day (21 May 2018 07:27)  Ventolin HFA 90 mcg/inh inhalation aerosol: 2 puff(s) inhaled 4 times a day, As Needed (21 May 2018 07:27)    PHYSICAL EXAM:  Gen : no acute distress  Respiratory: decreased in the bases  Cardiovascular: S1 and S2, Tachy  Gastrointestinal: BS+, soft, NT/ND  Extremities: No peripheral edema  Vascular: 2+ peripheral pulses  Neurological: bilateral lower extremity weakness  Incision: clean dry/ no sign of infection  Lines: no sign of infection

## 2018-08-09 NOTE — PROGRESS NOTE ADULT - ASSESSMENT
Problems  1. S/p TAAA on 7/26  2. Concern for Spinal cord ischemia with residual LLE weakness  3. Respiratory insufficiency   4 Thrombocytopenia - - improving    Plan   Neuro -- pain control  continue to follow neuro exam   CVS - permissive hypertensive for concern for spinal cord ischemia   Pulm -stable resp status.   Possible drainage of left effusion   Will continue aggressive pulm toileting  GI - tolerating PO diet   - monitor UOP, continue diuresis   Endo - glycemic control  Heme - platelet count improving    Critical post op.    Critical care time spent 30min

## 2018-08-09 NOTE — PROGRESS NOTE ADULT - SUBJECTIVE AND OBJECTIVE BOX
CTICU  CRITICAL  CARE  attending     Hand off received 					   Pertinent clinical, laboratory, radiographic, hemodynamic, echocardiographic, respiratory data, microbiologic data and chart were reviewed and analyzed frequently throughout the course of the day and night  Patient seen and examined with CTS/ SH attending at bedside  Pt is a 68y , Female, HEALTH ISSUES - PROBLEM Dx:      , FAMILY HISTORY:  No pertinent family history in first degree relatives  PAST MEDICAL & SURGICAL HISTORY:  History of seizures: 1980  COPD (chronic obstructive pulmonary disease)  HLD (hyperlipidemia)  HTN (hypertension)  AAA (abdominal aortic aneurysm)  CAD (coronary artery disease)  S/P AAA (abdominal aortic aneurysm) repair  History of abdominal aortic aneurysm (AAA): 2004  History of cholecystectomy    Patient is a 68y old  Female who presents with a chief complaint of Descending thoracic aortic aneurysm (25 Jul 2018 17:14)      14 system review was unremarkable  acute changes include acute respiratory failure  Vital signs, hemodynamic and respiratory parameters were reviewed from the bedside nursing flowsheet.  ICU Vital Signs Last 24 Hrs  T(C): 36.8 (09 Aug 2018 18:50), Max: 36.8 (09 Aug 2018 18:50)  T(F): 98.3 (09 Aug 2018 18:50), Max: 98.3 (09 Aug 2018 18:50)  HR: 94 (09 Aug 2018 21:00) (78 - 116)  BP: 152/66 (09 Aug 2018 21:00) (136/63 - 162/72)  BP(mean): 89 (09 Aug 2018 21:00) (89 - 107)  ABP: 135/54 (09 Aug 2018 16:00) (120/59 - 174/64)  ABP(mean): 76 (09 Aug 2018 16:00) (68 - 106)  RR: 17 (09 Aug 2018 21:00) (17 - 28)  SpO2: 95% (09 Aug 2018 21:00) (95% - 100%)    Adult Advanced Hemodynamics Last 24 Hrs  CVP(mm Hg): --  CVP(cm H2O): --  CO: --  CI: --  PA: --  PA(mean): --  PCWP: --  SVR: --  SVRI: --  PVR: --  PVRI: --, ABG - ( 09 Aug 2018 02:58 )  pH, Arterial: 7.48  pH, Blood: x     /  pCO2: 38    /  pO2: 83    / HCO3: 27    / Base Excess: 3.7   /  SaO2: 97                  Intake and output was reviewed and the fluid balance was calculated  Daily     Daily   I&O's Summary    08 Aug 2018 07:01  -  09 Aug 2018 07:00  --------------------------------------------------------  IN: 1160 mL / OUT: 2380 mL / NET: -1220 mL    09 Aug 2018 07:01  -  09 Aug 2018 22:01  --------------------------------------------------------  IN: 865 mL / OUT: 950 mL / NET: -85 mL        All lines and drain sites were assessed  Glycemic trend was reviewedCAPILLARY BLOOD GLUCOSE      POCT Blood Glucose.: 161 mg/dL (09 Aug 2018 21:17)    No acute change in mental status  Auscultation of the chest reveals equal bs  Abdomen is soft  Extremities are warm and well perfused  Wounds appear clean and unremarkable  Antibiotics are periop    labs  CBC Full  -  ( 09 Aug 2018 15:51 )  WBC Count : 11.7 K/uL  Hemoglobin : 10.5 g/dL  Hematocrit : 31.3 %  Platelet Count - Automated : 207 K/uL  Mean Cell Volume : 91.0 fL  Mean Cell Hemoglobin : 30.5 pg  Mean Cell Hemoglobin Concentration : 33.5 g/dL  Auto Neutrophil # : x  Auto Lymphocyte # : x  Auto Monocyte # : x  Auto Eosinophil # : x  Auto Basophil # : x  Auto Neutrophil % : x  Auto Lymphocyte % : x  Auto Monocyte % : x  Auto Eosinophil % : x  Auto Basophil % : x    08-09    135  |  96  |  30<H>  ----------------------------<  131<H>  4.4   |  26  |  0.93    Ca    8.6      09 Aug 2018 15:51  Phos  4.1     08-09  Mg     2.1     08-09    TPro  5.8<L>  /  Alb  3.2<L>  /  TBili  1.3<H>  /  DBili  x   /  AST  33  /  ALT  33  /  AlkPhos  57  08-09    PT/INR - ( 09 Aug 2018 15:51 )   PT: 12.0 sec;   INR: 1.08          PTT - ( 09 Aug 2018 15:51 )  PTT:26.0 sec  The current medications were reviewed   MEDICATIONS  (STANDING):  atorvastatin 20 milliGRAM(s) Oral at bedtime  chlorhexidine 2% Cloths 1 Application(s) Topical daily  chlorhexidine 2% Cloths 1 Application(s) Topical daily  dextrose 50% Injectable 50 milliLiter(s) IV Push every 15 minutes  dextrose 50% Injectable 25 milliLiter(s) IV Push every 15 minutes  dextrose 50% Injectable 50 milliLiter(s) IV Push every 15 minutes  dextrose 50% Injectable 25 milliLiter(s) IV Push every 15 minutes  docusate sodium 100 milliGRAM(s) Oral three times a day  folic acid 1 milliGRAM(s) Oral daily  heparin  Injectable 5000 Unit(s) SubCutaneous every 12 hours  insulin lispro (HumaLOG) corrective regimen sliding scale   SubCutaneous Before meals and at bedtime  lidocaine   Patch 1 Patch Transdermal daily  pantoprazole    Tablet 40 milliGRAM(s) Oral before breakfast  senna 2 Tablet(s) Oral at bedtime  sodium chloride 0.9%. 1000 milliLiter(s) (10 mL/Hr) IV Continuous <Continuous>    MEDICATIONS  (PRN):  acetaminophen   Tablet. 650 milliGRAM(s) Oral every 6 hours PRN Mild Pain (1 - 3)  acetaminophen 300 mG/codeine 30 mG 2 Tablet(s) Oral every 4 hours PRN Moderate Pain (4 - 6)  dextrose 40% Gel 15 Gram(s) Oral once PRN Blood Glucose LESS THAN 70 milliGRAM(s)/deciliter  glucagon  Injectable 1 milliGRAM(s) IntraMuscular once PRN Glucose LESS THAN 70 milligrams/deciliter       PROBLEM LIST/ ASSESSMENT:  HEALTH ISSUES - PROBLEM Dx:      ,   Patient is a 68y old  Female who presents with a chief complaint of Descending thoracic aortic aneurysm (25 Jul 2018 17:14)     s/p cardiac surgery      My plan includes :  close hemodynamic, ventilatory and drain monitoring and management per post op routine    Monitor for arrhythmias and monitor parameters for organ perfusion  monitor neurologic status  Head of the bed should remain elevated to 45 deg .   chest PT and IS will be encouraged  monitor adequacy of oxygenation and ventilation and attempt to wean oxygen  Nutritional goals will be met using po eventually , ensure adequate caloric intake and montior the same  Stress ulcer and VTE prophylaxis will be achieved    Glycemic control is satisfactory  Electrolytes have been repleted as necessary and wound care has been carried out. Pain control has been achieved.   agressive physical therapy and early mobility and ambulation goals will be met   The family was updated about the course and plan  CRITICAL CARE TIME SPENT in evaluation and management, reassessments, review and interpretation of labs and x-rays, ventilator and hemodynamic management, formulating a plan and coordinating care: ___90____ MIN.  Time does not include procedural time.  CTICU ATTENDING     					    Tuan Hutchins MD

## 2018-08-10 LAB
ALBUMIN FLD-MCNC: 2.4 G/DL — SIGNIFICANT CHANGE UP
ALBUMIN SERPL ELPH-MCNC: 3.1 G/DL — LOW (ref 3.3–5)
ALP SERPL-CCNC: 57 U/L — SIGNIFICANT CHANGE UP (ref 40–120)
ALT FLD-CCNC: 30 U/L — SIGNIFICANT CHANGE UP (ref 10–45)
ANION GAP SERPL CALC-SCNC: 12 MMOL/L — SIGNIFICANT CHANGE UP (ref 5–17)
APTT BLD: 26.4 SEC — LOW (ref 27.5–37.4)
AST SERPL-CCNC: 31 U/L — SIGNIFICANT CHANGE UP (ref 10–40)
BILIRUB SERPL-MCNC: 1.5 MG/DL — HIGH (ref 0.2–1.2)
BUN SERPL-MCNC: 30 MG/DL — HIGH (ref 7–23)
CALCIUM SERPL-MCNC: 8.5 MG/DL — SIGNIFICANT CHANGE UP (ref 8.4–10.5)
CHLORIDE SERPL-SCNC: 94 MMOL/L — LOW (ref 96–108)
CO2 SERPL-SCNC: 28 MMOL/L — SIGNIFICANT CHANGE UP (ref 22–31)
CREAT SERPL-MCNC: 0.96 MG/DL — SIGNIFICANT CHANGE UP (ref 0.5–1.3)
GLUCOSE BLDC GLUCOMTR-MCNC: 114 MG/DL — HIGH (ref 70–99)
GLUCOSE BLDC GLUCOMTR-MCNC: 138 MG/DL — HIGH (ref 70–99)
GLUCOSE BLDC GLUCOMTR-MCNC: 151 MG/DL — HIGH (ref 70–99)
GLUCOSE BLDC GLUCOMTR-MCNC: 154 MG/DL — HIGH (ref 70–99)
GLUCOSE FLD-MCNC: 162 MG/DL — SIGNIFICANT CHANGE UP
GLUCOSE SERPL-MCNC: 121 MG/DL — HIGH (ref 70–99)
GRAM STN FLD: SIGNIFICANT CHANGE UP
HCT VFR BLD CALC: 30.5 % — LOW (ref 34.5–45)
HGB BLD-MCNC: 10.1 G/DL — LOW (ref 11.5–15.5)
INR BLD: 1.1 — SIGNIFICANT CHANGE UP (ref 0.88–1.16)
LACTATE SERPL-SCNC: 1 MMOL/L — SIGNIFICANT CHANGE UP (ref 0.5–2)
LDH SERPL L TO P-CCNC: 569 U/L — SIGNIFICANT CHANGE UP
MAGNESIUM SERPL-MCNC: 1.9 MG/DL — SIGNIFICANT CHANGE UP (ref 1.6–2.6)
MCHC RBC-ENTMCNC: 30.3 PG — SIGNIFICANT CHANGE UP (ref 27–34)
MCHC RBC-ENTMCNC: 33.1 G/DL — SIGNIFICANT CHANGE UP (ref 32–36)
MCV RBC AUTO: 91.6 FL — SIGNIFICANT CHANGE UP (ref 80–100)
PH FLD: >7.81 — SIGNIFICANT CHANGE UP
PHOSPHATE SERPL-MCNC: 3.8 MG/DL — SIGNIFICANT CHANGE UP (ref 2.5–4.5)
PLATELET # BLD AUTO: 168 K/UL — SIGNIFICANT CHANGE UP (ref 150–400)
POTASSIUM SERPL-MCNC: 4 MMOL/L — SIGNIFICANT CHANGE UP (ref 3.5–5.3)
POTASSIUM SERPL-SCNC: 4 MMOL/L — SIGNIFICANT CHANGE UP (ref 3.5–5.3)
PROT FLD-MCNC: 3.3 G/DL — SIGNIFICANT CHANGE UP
PROT SERPL-MCNC: 5.6 G/DL — LOW (ref 6–8.3)
PROTHROM AB SERPL-ACNC: 12.2 SEC — SIGNIFICANT CHANGE UP (ref 9.8–12.7)
RBC # BLD: 3.33 M/UL — LOW (ref 3.8–5.2)
RBC # FLD: 16.7 % — SIGNIFICANT CHANGE UP (ref 10.3–16.9)
SODIUM SERPL-SCNC: 134 MMOL/L — LOW (ref 135–145)
SPECIMEN SOURCE: SIGNIFICANT CHANGE UP
WBC # BLD: 11.9 K/UL — HIGH (ref 3.8–10.5)
WBC # FLD AUTO: 11.9 K/UL — HIGH (ref 3.8–10.5)

## 2018-08-10 PROCEDURE — 71045 X-RAY EXAM CHEST 1 VIEW: CPT | Mod: 26,77

## 2018-08-10 PROCEDURE — 71045 X-RAY EXAM CHEST 1 VIEW: CPT | Mod: 26

## 2018-08-10 PROCEDURE — 93010 ELECTROCARDIOGRAM REPORT: CPT

## 2018-08-10 PROCEDURE — 32557 INSERT CATH PLEURA W/ IMAGE: CPT | Mod: LT

## 2018-08-10 PROCEDURE — 99291 CRITICAL CARE FIRST HOUR: CPT

## 2018-08-10 RX ORDER — MAGNESIUM SULFATE 500 MG/ML
2 VIAL (ML) INJECTION ONCE
Qty: 0 | Refills: 0 | Status: COMPLETED | OUTPATIENT
Start: 2018-08-10 | End: 2018-08-10

## 2018-08-10 RX ORDER — ACETAMINOPHEN WITH CODEINE 300MG-30MG
1 TABLET ORAL ONCE
Qty: 0 | Refills: 0 | Status: DISCONTINUED | OUTPATIENT
Start: 2018-08-10 | End: 2018-08-10

## 2018-08-10 RX ORDER — METOPROLOL TARTRATE 50 MG
12.5 TABLET ORAL EVERY 12 HOURS
Qty: 0 | Refills: 0 | Status: DISCONTINUED | OUTPATIENT
Start: 2018-08-10 | End: 2018-08-15

## 2018-08-10 RX ORDER — FUROSEMIDE 40 MG
40 TABLET ORAL DAILY
Qty: 0 | Refills: 0 | Status: DISCONTINUED | OUTPATIENT
Start: 2018-08-10 | End: 2018-08-18

## 2018-08-10 RX ORDER — LANOLIN ALCOHOL/MO/W.PET/CERES
3 CREAM (GRAM) TOPICAL AT BEDTIME
Qty: 0 | Refills: 0 | Status: DISCONTINUED | OUTPATIENT
Start: 2018-08-10 | End: 2018-08-22

## 2018-08-10 RX ADMIN — LIDOCAINE 1 PATCH: 4 CREAM TOPICAL at 00:00

## 2018-08-10 RX ADMIN — Medication 2 TABLET(S): at 12:50

## 2018-08-10 RX ADMIN — Medication 2 TABLET(S): at 08:49

## 2018-08-10 RX ADMIN — Medication 1 TABLET(S): at 22:44

## 2018-08-10 RX ADMIN — ATORVASTATIN CALCIUM 20 MILLIGRAM(S): 80 TABLET, FILM COATED ORAL at 21:49

## 2018-08-10 RX ADMIN — Medication 2: at 22:27

## 2018-08-10 RX ADMIN — HEPARIN SODIUM 5000 UNIT(S): 5000 INJECTION INTRAVENOUS; SUBCUTANEOUS at 06:11

## 2018-08-10 RX ADMIN — Medication 2 TABLET(S): at 03:00

## 2018-08-10 RX ADMIN — LIDOCAINE 1 PATCH: 4 CREAM TOPICAL at 18:18

## 2018-08-10 RX ADMIN — HEPARIN SODIUM 5000 UNIT(S): 5000 INJECTION INTRAVENOUS; SUBCUTANEOUS at 18:23

## 2018-08-10 RX ADMIN — SENNA PLUS 2 TABLET(S): 8.6 TABLET ORAL at 21:49

## 2018-08-10 RX ADMIN — Medication 100 MILLIGRAM(S): at 21:50

## 2018-08-10 RX ADMIN — Medication 2 TABLET(S): at 10:00

## 2018-08-10 RX ADMIN — Medication 1 TABLET(S): at 21:49

## 2018-08-10 RX ADMIN — Medication 1 MILLIGRAM(S): at 11:32

## 2018-08-10 RX ADMIN — Medication 2 TABLET(S): at 18:43

## 2018-08-10 RX ADMIN — Medication 2: at 10:39

## 2018-08-10 RX ADMIN — Medication 2 TABLET(S): at 02:39

## 2018-08-10 RX ADMIN — Medication 50 GRAM(S): at 04:44

## 2018-08-10 RX ADMIN — Medication 2 TABLET(S): at 14:04

## 2018-08-10 RX ADMIN — Medication 2 TABLET(S): at 18:13

## 2018-08-10 NOTE — OCCUPATIONAL THERAPY INITIAL EVALUATION ADULT - GENERAL OBSERVATIONS, REHAB EVAL
Right hand dominant. Chart reviewed, patient cleared for therapy by ERIC Bailey. Received seated in recliner chair, NAD, +ivan, +tele, +NC, premedicated for incisional pain. Right hand dominant. Chart reviewed, patient cleared for therapy by ERIC Bailey. Received seated in recliner chair, NAD, +ivan, +tele, +NC, 2+ pitting edema BLE, premedicated for incisional pain.

## 2018-08-10 NOTE — OCCUPATIONAL THERAPY INITIAL EVALUATION ADULT - PLANNED THERAPY INTERVENTIONS, OT EVAL
cognitive, visual perceptual/ADL retraining/balance training/ROM/bed mobility training/strengthening/transfer training/IADL retraining/fine motor coordination training

## 2018-08-10 NOTE — PROGRESS NOTE ADULT - ASSESSMENT
Problems  1. S/p TAAA on 7/26  2. Concern for Spinal cord ischemia with residual LLE weakness  3. Respiratory insufficiency   4 Thrombocytopenia - - improving    Plan   Neuro -- pain control  continue to follow neuro exam   CVS - permissive hypertensive for concern for spinal cord ischemia   Pulm -stable resp status.   Will continue aggressive pulm toileting  GI - tolerating PO diet   - monitor UOP, continue diuresis   Endo - glycemic control  Heme - heparin for DVT proph  Plt count normalized      Critical post op.    Critical care time spent 0min

## 2018-08-10 NOTE — PROGRESS NOTE ADULT - SUBJECTIVE AND OBJECTIVE BOX
67yo with history of CAD, TIA,  open AAA s/p repair in 2004 and 3/2017  EVAR with coverage of left hypogastric artery and graft extension into the left EIA with Dr Solorzano for management of left iliac artery aneurysm.   Recent imaging showing descending aortic aneurysm with degenerated aorta with significant mural thrombus.        CT done in April 2018 : Diffusely aneurysmal descending thoracic aorta increased in size, with thick atheromatous plaques and mural thrombus.  Suprarenal abdominal aorta was also aneurysmal and increased in size. Patent Celiac and SMA and renals.  Infra-renal endoluminal stent with non-occlusive thrombus.     7/26  open thoraco-abdominal aorta replacement with separate re-implantation of meso-renals, bypass to celiac/SMA and bilateral renals.  Placement of Lumbar Drain    Post op issues with   1. Spinal cord ischemia with residual left LLE weakness  2. Ischemic feet  3. Respiratory insufficiency     PMH :  THORACOABDOMINAL ANEURYS  COPD (chronic obstructive pulmonary disease)  HLD (hyperlipidemia)  HTN (hypertension)  AAA (abdominal aortic aneurysm)  CAD (coronary artery disease)  Thoracic aortic aneurysm without rupture  Lumbar drain implantation  Thoracoabdominal surgical exposure  S/P AAA (abdominal aortic aneurysm) repair  History of abdominal aortic aneurysm (AAA)  History of cholecystectomy    ICU Vital Signs Last 24 Hrs  T(C): 36.1 (08-10-18 @ 13:25), Max: 36.8 (08-09-18 @ 18:50)  T(F): 97 (08-10-18 @ 13:25), Max: 98.3 (08-09-18 @ 18:50)  HR: 100 (08-10-18 @ 13:00) (90 - 114)  BP: 135/60 (08-10-18 @ 13:00) (126/61 - 162/72)  BP(mean): 86 (08-10-18 @ 13:00) (83 - 107)  ABP: 135/54 (08-09-18 @ 16:00) (120/59 - 148/57)  ABP(mean): 76 (08-09-18 @ 16:00) (74 - 83)  RR: 23 (08-10-18 @ 13:00) (17 - 29)  SpO2: 98% (08-10-18 @ 13:00) (95% - 100%)    I&O's Summary    09 Aug 2018 07:01  -  10 Aug 2018 07:00  --------------------------------------------------------  IN: 955 mL / OUT: 1725 mL / NET: -770 mL    10 Aug 2018 07:01  -  10 Aug 2018 13:29  --------------------------------------------------------  IN: 0 mL / OUT: 310 mL / NET: -310 mL        ABG - ( 09 Aug 2018 02:58 )  pH: 7.48  /  pCO2: 38    /  pO2: 83    / HCO3: 27    / Base Excess: 3.7   /  SaO2: 97                              10.1   11.9  )-----------( 168      ( 10 Aug 2018 03:57 )             30.5     10 Aug 2018 03:57    134    |  94     |  30     ----------------------------<  121    4.0     |  28     |  0.96     Ca    8.5        10 Aug 2018 03:57  Phos  3.8       10 Aug 2018 03:57  Mg     1.9       10 Aug 2018 03:57    TPro  5.6    /  Alb  3.1    /  TBili  1.5    /  DBili  x      /  AST  31     /  ALT  30     /  AlkPhos  57     10 Aug 2018 03:57    PT/INR - ( 10 Aug 2018 03:57 )   PT: 12.2 sec;   INR: 1.10     PTT - ( 10 Aug 2018 03:57 )  PTT:26.4 sec    MEDICATIONS  (STANDING):  atorvastatin 20 milliGRAM(s) Oral at bedtime  docusate sodium 100 milliGRAM(s) Oral three times a day  folic acid 1 milliGRAM(s) Oral daily  furosemide    Tablet 40 milliGRAM(s) Oral daily  heparin  Injectable 5000 Unit(s) SubCutaneous every 12 hours  insulin lispro (HumaLOG) corrective regimen sliding scale   SubCutaneous Before meals and at bedtime  lidocaine   Patch 1 Patch Transdermal daily  pantoprazole    Tablet 40 milliGRAM(s) Oral before breakfast  senna 2 Tablet(s) Oral at bedtime      Home Medications:  Advair Diskus 100 mcg-50 mcg inhalation powder: 1 puff(s) inhaled 2 times a day (21 May 2018 07:27)  amLODIPine 5 mg oral tablet: 1 tab(s) orally once a day (21 May 2018 07:27)  aspirin 81 mg oral tablet: 1 tab(s) orally once a day (21 May 2018 07:27)  omeprazole 20 mg oral delayed release tablet: 1 tab(s) orally once a day (21 May 2018 07:27)  simvastatin 20 mg oral tablet: 1 tab(s) orally once a day (at bedtime) (21 May 2018 07:27)  Spiriva Respimat 1.25 mcg/inh inhalation aerosol: 2 puff(s) inhaled once a day (21 May 2018 07:27)  Ventolin HFA 90 mcg/inh inhalation aerosol: 2 puff(s) inhaled 4 times a day, As Needed (21 May 2018 07:27)    PHYSICAL EXAM:  Gen : no acute distress  Neck: No LAD, No JVD  Respiratory: decreased in the bases  Cardiovascular: S1 and S2, RRR, no M/G/R  Gastrointestinal: BS+, soft, NT/ND  Extremities: improving bilateral LE weakness  Vascular: LE ischemia stable  Incision: clean dry/ no sign of infection  Lines: no sign of infection

## 2018-08-10 NOTE — OCCUPATIONAL THERAPY INITIAL EVALUATION ADULT - PERTINENT HX OF CURRENT PROBLEM, REHAB EVAL
Patient underwent Patient underwent open TAAA with replacement of descending aorta with bypass of celiac, SMA and b/l renl arteries on 7/26/18. Post-op complicated by thrombocytopenia and DIC vs post op vs sepsis, LD +spinal infarct.

## 2018-08-10 NOTE — OCCUPATIONAL THERAPY INITIAL EVALUATION ADULT - STANDING BALANCE: DYNAMIC, REHAB EVAL
patient ambulated approximately 7 feet x2 with Rw, mod Ax2 and chair follow +seated rest break/poor balance

## 2018-08-10 NOTE — OCCUPATIONAL THERAPY INITIAL EVALUATION ADULT - RANGE OF MOTION EXAMINATION, UPPER EXTREMITY
limited left shoulder flexion/external rotation 2/2 incisional pain/bilateral UE Active ROM was WFL  (within functional limits)

## 2018-08-11 LAB
ALBUMIN SERPL ELPH-MCNC: 2.5 G/DL — LOW (ref 3.3–5)
ALP SERPL-CCNC: 82 U/L — SIGNIFICANT CHANGE UP (ref 40–120)
ALT FLD-CCNC: 31 U/L — SIGNIFICANT CHANGE UP (ref 10–45)
ANION GAP SERPL CALC-SCNC: 10 MMOL/L — SIGNIFICANT CHANGE UP (ref 5–17)
APTT BLD: 26.7 SEC — LOW (ref 27.5–37.4)
AST SERPL-CCNC: 30 U/L — SIGNIFICANT CHANGE UP (ref 10–40)
BILIRUB SERPL-MCNC: 1.3 MG/DL — HIGH (ref 0.2–1.2)
BUN SERPL-MCNC: 22 MG/DL — SIGNIFICANT CHANGE UP (ref 7–23)
CALCIUM SERPL-MCNC: 8.4 MG/DL — SIGNIFICANT CHANGE UP (ref 8.4–10.5)
CHLORIDE SERPL-SCNC: 98 MMOL/L — SIGNIFICANT CHANGE UP (ref 96–108)
CO2 SERPL-SCNC: 27 MMOL/L — SIGNIFICANT CHANGE UP (ref 22–31)
CREAT SERPL-MCNC: 0.98 MG/DL — SIGNIFICANT CHANGE UP (ref 0.5–1.3)
GLUCOSE BLDC GLUCOMTR-MCNC: 105 MG/DL — HIGH (ref 70–99)
GLUCOSE BLDC GLUCOMTR-MCNC: 154 MG/DL — HIGH (ref 70–99)
GLUCOSE BLDC GLUCOMTR-MCNC: 206 MG/DL — HIGH (ref 70–99)
GLUCOSE BLDC GLUCOMTR-MCNC: 93 MG/DL — SIGNIFICANT CHANGE UP (ref 70–99)
GLUCOSE SERPL-MCNC: 99 MG/DL — SIGNIFICANT CHANGE UP (ref 70–99)
HCT VFR BLD CALC: 30.6 % — LOW (ref 34.5–45)
HGB BLD-MCNC: 10 G/DL — LOW (ref 11.5–15.5)
INR BLD: 1.12 — SIGNIFICANT CHANGE UP (ref 0.88–1.16)
LACTATE SERPL-SCNC: 0.8 MMOL/L — SIGNIFICANT CHANGE UP (ref 0.5–2)
MAGNESIUM SERPL-MCNC: 1.8 MG/DL — SIGNIFICANT CHANGE UP (ref 1.6–2.6)
MCHC RBC-ENTMCNC: 30.2 PG — SIGNIFICANT CHANGE UP (ref 27–34)
MCHC RBC-ENTMCNC: 32.7 G/DL — SIGNIFICANT CHANGE UP (ref 32–36)
MCV RBC AUTO: 92.4 FL — SIGNIFICANT CHANGE UP (ref 80–100)
NIGHT BLUE STAIN TISS: SIGNIFICANT CHANGE UP
PHOSPHATE SERPL-MCNC: 3.8 MG/DL — SIGNIFICANT CHANGE UP (ref 2.5–4.5)
PLATELET # BLD AUTO: 216 K/UL — SIGNIFICANT CHANGE UP (ref 150–400)
POTASSIUM SERPL-MCNC: 4.4 MMOL/L — SIGNIFICANT CHANGE UP (ref 3.5–5.3)
POTASSIUM SERPL-SCNC: 4.4 MMOL/L — SIGNIFICANT CHANGE UP (ref 3.5–5.3)
PROT SERPL-MCNC: 5.7 G/DL — LOW (ref 6–8.3)
PROTHROM AB SERPL-ACNC: 12.5 SEC — SIGNIFICANT CHANGE UP (ref 9.8–12.7)
RBC # BLD: 3.31 M/UL — LOW (ref 3.8–5.2)
RBC # FLD: 16 % — SIGNIFICANT CHANGE UP (ref 10.3–16.9)
SODIUM SERPL-SCNC: 135 MMOL/L — SIGNIFICANT CHANGE UP (ref 135–145)
SPECIMEN SOURCE: SIGNIFICANT CHANGE UP
WBC # BLD: 8.7 K/UL — SIGNIFICANT CHANGE UP (ref 3.8–10.5)
WBC # FLD AUTO: 8.7 K/UL — SIGNIFICANT CHANGE UP (ref 3.8–10.5)

## 2018-08-11 PROCEDURE — 99292 CRITICAL CARE ADDL 30 MIN: CPT

## 2018-08-11 PROCEDURE — 71045 X-RAY EXAM CHEST 1 VIEW: CPT | Mod: 26

## 2018-08-11 PROCEDURE — 99291 CRITICAL CARE FIRST HOUR: CPT

## 2018-08-11 RX ORDER — ASPIRIN/CALCIUM CARB/MAGNESIUM 324 MG
81 TABLET ORAL DAILY
Qty: 0 | Refills: 0 | Status: DISCONTINUED | OUTPATIENT
Start: 2018-08-11 | End: 2018-08-22

## 2018-08-11 RX ORDER — MAGNESIUM SULFATE 500 MG/ML
2 VIAL (ML) INJECTION ONCE
Qty: 0 | Refills: 0 | Status: COMPLETED | OUTPATIENT
Start: 2018-08-11 | End: 2018-08-11

## 2018-08-11 RX ADMIN — Medication 12.5 MILLIGRAM(S): at 17:51

## 2018-08-11 RX ADMIN — CHLORHEXIDINE GLUCONATE 1 APPLICATION(S): 213 SOLUTION TOPICAL at 05:02

## 2018-08-11 RX ADMIN — Medication 81 MILLIGRAM(S): at 12:28

## 2018-08-11 RX ADMIN — LIDOCAINE 1 PATCH: 4 CREAM TOPICAL at 12:28

## 2018-08-11 RX ADMIN — ATORVASTATIN CALCIUM 20 MILLIGRAM(S): 80 TABLET, FILM COATED ORAL at 22:46

## 2018-08-11 RX ADMIN — Medication 2 TABLET(S): at 05:01

## 2018-08-11 RX ADMIN — HEPARIN SODIUM 5000 UNIT(S): 5000 INJECTION INTRAVENOUS; SUBCUTANEOUS at 05:02

## 2018-08-11 RX ADMIN — Medication 3 MILLIGRAM(S): at 00:50

## 2018-08-11 RX ADMIN — Medication 2 TABLET(S): at 06:25

## 2018-08-11 RX ADMIN — Medication 12.5 MILLIGRAM(S): at 05:01

## 2018-08-11 RX ADMIN — Medication 1 MILLIGRAM(S): at 12:28

## 2018-08-11 RX ADMIN — LIDOCAINE 1 PATCH: 4 CREAM TOPICAL at 07:12

## 2018-08-11 RX ADMIN — Medication 40 MILLIGRAM(S): at 05:02

## 2018-08-11 RX ADMIN — Medication 2: at 22:56

## 2018-08-11 RX ADMIN — PANTOPRAZOLE SODIUM 40 MILLIGRAM(S): 20 TABLET, DELAYED RELEASE ORAL at 06:35

## 2018-08-11 RX ADMIN — SENNA PLUS 2 TABLET(S): 8.6 TABLET ORAL at 22:46

## 2018-08-11 RX ADMIN — Medication 100 MILLIGRAM(S): at 22:46

## 2018-08-11 RX ADMIN — Medication 100 MILLIGRAM(S): at 05:02

## 2018-08-11 RX ADMIN — Medication 50 GRAM(S): at 05:24

## 2018-08-11 RX ADMIN — Medication 2 TABLET(S): at 13:00

## 2018-08-11 RX ADMIN — HEPARIN SODIUM 5000 UNIT(S): 5000 INJECTION INTRAVENOUS; SUBCUTANEOUS at 17:51

## 2018-08-11 RX ADMIN — Medication 2 TABLET(S): at 21:21

## 2018-08-11 RX ADMIN — Medication 2 TABLET(S): at 22:47

## 2018-08-11 RX ADMIN — Medication 2 TABLET(S): at 12:28

## 2018-08-11 RX ADMIN — Medication 3 MILLIGRAM(S): at 22:45

## 2018-08-11 NOTE — PROGRESS NOTE ADULT - SUBJECTIVE AND OBJECTIVE BOX
CTICU  CRITICAL  CARE  attending     Hand off received 					   Pertinent clinical, laboratory, radiographic, hemodynamic, echocardiographic, respiratory data, microbiologic data and chart were reviewed and analyzed frequently throughout the course of the day and night  Patient seen and examined with CTS/ SH attending at bedside  Pt is a 68y , Female, HEALTH ISSUES - PROBLEM Dx:      , FAMILY HISTORY:  No pertinent family history in first degree relatives  PAST MEDICAL & SURGICAL HISTORY:  History of seizures: 1980  COPD (chronic obstructive pulmonary disease)  HLD (hyperlipidemia)  HTN (hypertension)  AAA (abdominal aortic aneurysm)  CAD (coronary artery disease)  S/P AAA (abdominal aortic aneurysm) repair  History of abdominal aortic aneurysm (AAA): 2004  History of cholecystectomy    Patient is a 68y old  Female who presents with a chief complaint of Descending thoracic aortic aneurysm (25 Jul 2018 17:14)      14 system review was unremarkable  acute changes include acute respiratory failure  Vital signs, hemodynamic and respiratory parameters were reviewed from the bedside nursing flowsheet.  ICU Vital Signs Last 24 Hrs  T(C): 36.4 (11 Aug 2018 17:01), Max: 37.1 (11 Aug 2018 01:00)  T(F): 97.5 (11 Aug 2018 17:01), Max: 98.7 (11 Aug 2018 01:00)  HR: 94 (11 Aug 2018 20:00) (88 - 114)  BP: 141/67 (11 Aug 2018 20:00) (121/62 - 154/76)  BP(mean): 103 (11 Aug 2018 20:00) (79 - 120)  ABP: --  ABP(mean): --  RR: 22 (11 Aug 2018 20:00) (15 - 30)  SpO2: 98% (11 Aug 2018 20:00) (93% - 99%)    Adult Advanced Hemodynamics Last 24 Hrs  CVP(mm Hg): --  CVP(cm H2O): --  CO: --  CI: --  PA: --  PA(mean): --  PCWP: --  SVR: --  SVRI: --  PVR: --  PVRI: --,     Intake and output was reviewed and the fluid balance was calculated  Daily     Daily   I&O's Summary    10 Aug 2018 07:01  -  11 Aug 2018 07:00  --------------------------------------------------------  IN: 0 mL / OUT: 3110 mL / NET: -3110 mL    11 Aug 2018 07:01  -  11 Aug 2018 20:03  --------------------------------------------------------  IN: 380 mL / OUT: 840 mL / NET: -460 mL        All lines and drain sites were assessed  [facility]      POCT Blood Glucose.: 105 mg/dL (11 Aug 2018 11:18)    No acute change in mental status  Auscultation of the chest reveals equal bs  Abdomen is soft  Extremities are warm and well perfused  Wounds appear clean and unremarkable  Antibiotics are periop    labs  CBC Full  -  ( 11 Aug 2018 04:15 )  WBC Count : 8.7 K/uL  Hemoglobin : 10.0 g/dL  Hematocrit : 30.6 %  Platelet Count - Automated : 216 K/uL  Mean Cell Volume : 92.4 fL  Mean Cell Hemoglobin : 30.2 pg  Mean Cell Hemoglobin Concentration : 32.7 g/dL  Auto Neutrophil # : x  Auto Lymphocyte # : x  Auto Monocyte # : x  Auto Eosinophil # : x  Auto Basophil # : x  Auto Neutrophil % : x  Auto Lymphocyte % : x  Auto Monocyte % : x  Auto Eosinophil % : x  Auto Basophil % : x    08-11    135  |  98  |  22  ----------------------------<  99  4.4   |  27  |  0.98    Ca    8.4      11 Aug 2018 04:15  Phos  3.8     08-11  Mg     1.8     08-11    TPro  5.7<L>  /  Alb  2.5<L>  /  TBili  1.3<H>  /  DBili  x   /  AST  30  /  ALT  31  /  AlkPhos  82  08-11    PT/INR - ( 11 Aug 2018 04:15 )   PT: 12.5 sec;   INR: 1.12          PTT - ( 11 Aug 2018 04:15 )  PTT:26.7 sec  The current medications were reviewed   MEDICATIONS  (STANDING):  aspirin enteric coated 81 milliGRAM(s) Oral daily  atorvastatin 20 milliGRAM(s) Oral at bedtime  chlorhexidine 2% Cloths 1 Application(s) Topical daily  chlorhexidine 2% Cloths 1 Application(s) Topical daily  dextrose 50% Injectable 50 milliLiter(s) IV Push every 15 minutes  dextrose 50% Injectable 25 milliLiter(s) IV Push every 15 minutes  dextrose 50% Injectable 50 milliLiter(s) IV Push every 15 minutes  dextrose 50% Injectable 25 milliLiter(s) IV Push every 15 minutes  docusate sodium 100 milliGRAM(s) Oral three times a day  folic acid 1 milliGRAM(s) Oral daily  furosemide    Tablet 40 milliGRAM(s) Oral daily  heparin  Injectable 5000 Unit(s) SubCutaneous every 12 hours  insulin lispro (HumaLOG) corrective regimen sliding scale   SubCutaneous Before meals and at bedtime  lidocaine   Patch 1 Patch Transdermal daily  melatonin 3 milliGRAM(s) Oral at bedtime  metoprolol tartrate 12.5 milliGRAM(s) Oral every 12 hours  pantoprazole    Tablet 40 milliGRAM(s) Oral before breakfast  senna 2 Tablet(s) Oral at bedtime  sodium chloride 0.9%. 1000 milliLiter(s) (10 mL/Hr) IV Continuous <Continuous>    MEDICATIONS  (PRN):  acetaminophen   Tablet. 650 milliGRAM(s) Oral every 6 hours PRN Mild Pain (1 - 3)  acetaminophen 300 mG/codeine 30 mG 2 Tablet(s) Oral every 4 hours PRN Moderate Pain (4 - 6)  dextrose 40% Gel 15 Gram(s) Oral once PRN Blood Glucose LESS THAN 70 milliGRAM(s)/deciliter  glucagon  Injectable 1 milliGRAM(s) IntraMuscular once PRN Glucose LESS THAN 70 milligrams/deciliter       PROBLEM LIST/ ASSESSMENT:  HEALTH ISSUES - PROBLEM Dx:      ,   Patient is a 68y old  Female who presents with a chief complaint of Descending thoracic aortic aneurysm (25 Jul 2018 17:14)     s/p cardiac surgery      My plan includes :  close hemodynamic, ventilatory and drain monitoring and management per post op routine    Monitor for arrhythmias and monitor parameters for organ perfusion  monitor neurologic status  Head of the bed should remain elevated to 45 deg .   chest PT and IS will be encouraged  monitor adequacy of oxygenation and ventilation and attempt to wean oxygen  Nutritional goals will be met using po eventually , ensure adequate caloric intake and montior the same  Stress ulcer and VTE prophylaxis will be achieved    Glycemic control is satisfactory  Electrolytes have been repleted as necessary and wound care has been carried out. Pain control has been achieved.   agressive physical therapy and early mobility and ambulation goals will be met   The family was updated about the course and plan  CRITICAL CARE TIME SPENT in evaluation and management, reassessments, review and interpretation of labs and x-rays, ventilator and hemodynamic management, formulating a plan and coordinating care: ___90____ MIN.  Time does not include procedural time.  CTICU ATTENDING     					    Tuan Hutchins MD

## 2018-08-12 LAB
ALBUMIN SERPL ELPH-MCNC: 2.8 G/DL — LOW (ref 3.3–5)
ALP SERPL-CCNC: 79 U/L — SIGNIFICANT CHANGE UP (ref 40–120)
ALT FLD-CCNC: 31 U/L — SIGNIFICANT CHANGE UP (ref 10–45)
ANION GAP SERPL CALC-SCNC: 12 MMOL/L — SIGNIFICANT CHANGE UP (ref 5–17)
APTT BLD: 24.8 SEC — LOW (ref 27.5–37.4)
AST SERPL-CCNC: 32 U/L — SIGNIFICANT CHANGE UP (ref 10–40)
BILIRUB SERPL-MCNC: 0.9 MG/DL — SIGNIFICANT CHANGE UP (ref 0.2–1.2)
BUN SERPL-MCNC: 26 MG/DL — HIGH (ref 7–23)
CALCIUM SERPL-MCNC: 8.5 MG/DL — SIGNIFICANT CHANGE UP (ref 8.4–10.5)
CHLORIDE SERPL-SCNC: 96 MMOL/L — SIGNIFICANT CHANGE UP (ref 96–108)
CO2 SERPL-SCNC: 28 MMOL/L — SIGNIFICANT CHANGE UP (ref 22–31)
CREAT SERPL-MCNC: 1.02 MG/DL — SIGNIFICANT CHANGE UP (ref 0.5–1.3)
GLUCOSE BLDC GLUCOMTR-MCNC: 92 MG/DL — SIGNIFICANT CHANGE UP (ref 70–99)
GLUCOSE SERPL-MCNC: 112 MG/DL — HIGH (ref 70–99)
HCT VFR BLD CALC: 28.7 % — LOW (ref 34.5–45)
HGB BLD-MCNC: 9.3 G/DL — LOW (ref 11.5–15.5)
INR BLD: 1.08 — SIGNIFICANT CHANGE UP (ref 0.88–1.16)
LACTATE SERPL-SCNC: 0.9 MMOL/L — SIGNIFICANT CHANGE UP (ref 0.5–2)
MAGNESIUM SERPL-MCNC: 2 MG/DL — SIGNIFICANT CHANGE UP (ref 1.6–2.6)
MCHC RBC-ENTMCNC: 30.3 PG — SIGNIFICANT CHANGE UP (ref 27–34)
MCHC RBC-ENTMCNC: 32.4 G/DL — SIGNIFICANT CHANGE UP (ref 32–36)
MCV RBC AUTO: 93.5 FL — SIGNIFICANT CHANGE UP (ref 80–100)
PHOSPHATE SERPL-MCNC: 4 MG/DL — SIGNIFICANT CHANGE UP (ref 2.5–4.5)
PLATELET # BLD AUTO: 244 K/UL — SIGNIFICANT CHANGE UP (ref 150–400)
POTASSIUM SERPL-MCNC: 4.2 MMOL/L — SIGNIFICANT CHANGE UP (ref 3.5–5.3)
POTASSIUM SERPL-SCNC: 4.2 MMOL/L — SIGNIFICANT CHANGE UP (ref 3.5–5.3)
PROT SERPL-MCNC: 5.5 G/DL — LOW (ref 6–8.3)
PROTHROM AB SERPL-ACNC: 12 SEC — SIGNIFICANT CHANGE UP (ref 9.8–12.7)
RBC # BLD: 3.07 M/UL — LOW (ref 3.8–5.2)
RBC # FLD: 16.2 % — SIGNIFICANT CHANGE UP (ref 10.3–16.9)
SODIUM SERPL-SCNC: 136 MMOL/L — SIGNIFICANT CHANGE UP (ref 135–145)
WBC # BLD: 8.8 K/UL — SIGNIFICANT CHANGE UP (ref 3.8–10.5)
WBC # FLD AUTO: 8.8 K/UL — SIGNIFICANT CHANGE UP (ref 3.8–10.5)

## 2018-08-12 PROCEDURE — 71045 X-RAY EXAM CHEST 1 VIEW: CPT | Mod: 26,77

## 2018-08-12 PROCEDURE — 99233 SBSQ HOSP IP/OBS HIGH 50: CPT

## 2018-08-12 PROCEDURE — 71045 X-RAY EXAM CHEST 1 VIEW: CPT | Mod: 26

## 2018-08-12 RX ORDER — POVIDONE-IODINE 5 %
1 AEROSOL (ML) TOPICAL
Qty: 0 | Refills: 0 | Status: DISCONTINUED | OUTPATIENT
Start: 2018-08-12 | End: 2018-08-22

## 2018-08-12 RX ORDER — DIAZEPAM 5 MG
5 TABLET ORAL ONCE
Qty: 0 | Refills: 0 | Status: DISCONTINUED | OUTPATIENT
Start: 2018-08-12 | End: 2018-08-12

## 2018-08-12 RX ORDER — MAGNESIUM SULFATE 500 MG/ML
2 VIAL (ML) INJECTION ONCE
Qty: 0 | Refills: 0 | Status: COMPLETED | OUTPATIENT
Start: 2018-08-12 | End: 2018-08-12

## 2018-08-12 RX ORDER — KETOROLAC TROMETHAMINE 30 MG/ML
15 SYRINGE (ML) INJECTION ONCE
Qty: 0 | Refills: 0 | Status: DISCONTINUED | OUTPATIENT
Start: 2018-08-12 | End: 2018-08-12

## 2018-08-12 RX ADMIN — Medication 12.5 MILLIGRAM(S): at 07:13

## 2018-08-12 RX ADMIN — LIDOCAINE 1 PATCH: 4 CREAM TOPICAL at 11:34

## 2018-08-12 RX ADMIN — Medication 100 MILLIGRAM(S): at 21:59

## 2018-08-12 RX ADMIN — Medication 1 APPLICATION(S): at 18:47

## 2018-08-12 RX ADMIN — Medication 2 TABLET(S): at 22:00

## 2018-08-12 RX ADMIN — Medication 1 MILLIGRAM(S): at 11:32

## 2018-08-12 RX ADMIN — HEPARIN SODIUM 5000 UNIT(S): 5000 INJECTION INTRAVENOUS; SUBCUTANEOUS at 18:49

## 2018-08-12 RX ADMIN — CHLORHEXIDINE GLUCONATE 1 APPLICATION(S): 213 SOLUTION TOPICAL at 01:05

## 2018-08-12 RX ADMIN — Medication 15 MILLIGRAM(S): at 01:00

## 2018-08-12 RX ADMIN — Medication 15 MILLIGRAM(S): at 01:30

## 2018-08-12 RX ADMIN — Medication 3 MILLIGRAM(S): at 22:00

## 2018-08-12 RX ADMIN — Medication 2 TABLET(S): at 07:13

## 2018-08-12 RX ADMIN — Medication 40 MILLIGRAM(S): at 07:12

## 2018-08-12 RX ADMIN — SENNA PLUS 2 TABLET(S): 8.6 TABLET ORAL at 22:00

## 2018-08-12 RX ADMIN — Medication 12.5 MILLIGRAM(S): at 18:49

## 2018-08-12 RX ADMIN — Medication 2 TABLET(S): at 08:00

## 2018-08-12 RX ADMIN — Medication 2 TABLET(S): at 23:00

## 2018-08-12 RX ADMIN — LIDOCAINE 1 PATCH: 4 CREAM TOPICAL at 00:41

## 2018-08-12 RX ADMIN — Medication 2 TABLET(S): at 12:29

## 2018-08-12 RX ADMIN — Medication 81 MILLIGRAM(S): at 11:32

## 2018-08-12 RX ADMIN — Medication 100 MILLIGRAM(S): at 18:49

## 2018-08-12 RX ADMIN — Medication 2 TABLET(S): at 11:32

## 2018-08-12 RX ADMIN — Medication 50 GRAM(S): at 05:17

## 2018-08-12 RX ADMIN — Medication 5 MILLIGRAM(S): at 13:55

## 2018-08-12 RX ADMIN — HEPARIN SODIUM 5000 UNIT(S): 5000 INJECTION INTRAVENOUS; SUBCUTANEOUS at 07:13

## 2018-08-12 RX ADMIN — Medication 100 MILLIGRAM(S): at 07:13

## 2018-08-12 RX ADMIN — LIDOCAINE 1 PATCH: 4 CREAM TOPICAL at 23:34

## 2018-08-12 RX ADMIN — PANTOPRAZOLE SODIUM 40 MILLIGRAM(S): 20 TABLET, DELAYED RELEASE ORAL at 07:12

## 2018-08-12 RX ADMIN — ATORVASTATIN CALCIUM 20 MILLIGRAM(S): 80 TABLET, FILM COATED ORAL at 21:59

## 2018-08-12 NOTE — PROGRESS NOTE ADULT - ASSESSMENT
Problems  1. S/p TAAA on 7/26  2. Concern for Spinal cord ischemia with residual LLE weakness    Plan   Neuro -- pain control, improving bilateral LE weakness.  Participating with PT  CVS - permissive hypertensive for concern for spinal cord ischemia   on BB for sinus tachycardia  Pulm -stable resp status.  s/p drainage of Left sided effusion   Will continue aggressive pulm toileting  GI - tolerating PO diet   - monitor UOP, continue diuresis   Heme - heparin for DVT proph  Plt count normalized  Vascular - ischemic toes, will follow up with vascular if further intervention is warranted

## 2018-08-12 NOTE — PROGRESS NOTE ADULT - SUBJECTIVE AND OBJECTIVE BOX
7/26  open thoraco-abdominal aorta replacement with separate re-implantation of meso-renals, bypass to celiac/SMA and bilateral renals.  Placement of Lumbar Drain    Post op issues with   1. Spinal cord ischemia with residual left LLE weakness  2. Ischemic feet  3. Respiratory insufficiency   4. Deconditioned    PMH :  THORACOABDOMINAL ANEURYS  COPD (chronic obstructive pulmonary disease)  HLD (hyperlipidemia)  HTN (hypertension)  AAA (abdominal aortic aneurysm)  CAD (coronary artery disease)  History of abdominal aortic aneurysm (AAA)  History of cholecystectomy    ROS incisional pain   All other systems reviewed and negative.    ICU Vital Signs Last 24 Hrs  T(C): 36.3 (08-12-18 @ 09:23), Max: 36.7 (08-12-18 @ 05:00)  T(F): 97.4 (08-12-18 @ 09:23), Max: 98.1 (08-12-18 @ 05:00)  HR: 86 (08-12-18 @ 09:00) (85 - 114)  BP: 136/59 (08-12-18 @ 09:00) (121/62 - 149/63)  BP(mean): 95 (08-12-18 @ 09:00) (79 - 112)  RR: 20 (08-12-18 @ 09:00) (15 - 30)  SpO2: 96% (08-12-18 @ 09:00) (93% - 100%)    I&O's Summary    11 Aug 2018 07:01  -  12 Aug 2018 07:00  --------------------------------------------------------  IN: 600 mL / OUT: 1820 mL / NET: -1220 mL    12 Aug 2018 07:01  -  12 Aug 2018 10:15  --------------------------------------------------------  IN: 0 mL / OUT: 140 mL / NET: -140 mL                        9.3    8.8   )-----------( 244      ( 12 Aug 2018 02:56 )             28.7     12 Aug 2018 02:56    136    |  96     |  26     ----------------------------<  112    4.2     |  28     |  1.02     Ca    8.5        12 Aug 2018 02:56  Phos  4.0       12 Aug 2018 02:56  Mg     2.0       12 Aug 2018 02:56    TPro  5.5    /  Alb  2.8    /  TBili  0.9    /  DBili  x      /  AST  32     /  ALT  31     /  AlkPhos  79     12 Aug 2018 02:56    PT/INR - ( 12 Aug 2018 02:56 )   PT: 12.0 sec;   INR: 1.08     PTT - ( 12 Aug 2018 02:56 )  PTT:24.8 sec    MEDICATIONS  (STANDING):  aspirin enteric coated 81 milliGRAM(s) Oral daily  atorvastatin 20 milliGRAM(s) Oral at bedtime  docusate sodium 100 milliGRAM(s) Oral three times a day  folic acid 1 milliGRAM(s) Oral daily  furosemide    Tablet 40 milliGRAM(s) Oral daily  heparin  Injectable 5000 Unit(s) SubCutaneous every 12 hours  insulin lispro (HumaLOG) corrective regimen sliding scale   SubCutaneous Before meals and at bedtime  lidocaine   Patch 1 Patch Transdermal daily  melatonin 3 milliGRAM(s) Oral at bedtime  metoprolol tartrate 12.5 milliGRAM(s) Oral every 12 hours  pantoprazole    Tablet 40 milliGRAM(s) Oral before breakfast  senna 2 Tablet(s) Oral at bedtime      Home Medications:  Advair Diskus 100 mcg-50 mcg inhalation powder: 1 puff(s) inhaled 2 times a day (21 May 2018 07:27)  amLODIPine 5 mg oral tablet: 1 tab(s) orally once a day (21 May 2018 07:27)  aspirin 81 mg oral tablet: 1 tab(s) orally once a day (21 May 2018 07:27)  omeprazole 20 mg oral delayed release tablet: 1 tab(s) orally once a day (21 May 2018 07:27)  simvastatin 20 mg oral tablet: 1 tab(s) orally once a day (at bedtime) (21 May 2018 07:27)  Spiriva Respimat 1.25 mcg/inh inhalation aerosol: 2 puff(s) inhaled once a day (21 May 2018 07:27)  Ventolin HFA 90 mcg/inh inhalation aerosol: 2 puff(s) inhaled 4 times a day, As Needed (21 May 2018 07:27)    PHYSICAL EXAM:  Gen : no acute distress  Respiratory: decreased in the bases  Cardiovascular: S1 and S2, tachy, no M/G/R  Gastrointestinal: BS+, soft, NT/ND  Extremities: No peripheral edema  Vascular: 2+ peripheral pulses  Neurological: A/O x 3, no focal deficits  Incision: no erythema or drainage  but there is a focal area of necrosis on the abd portion of incision   Lines: no sign of infection, LUE PICC

## 2018-08-13 LAB
ALBUMIN SERPL ELPH-MCNC: 2.8 G/DL — LOW (ref 3.3–5)
ALP SERPL-CCNC: 81 U/L — SIGNIFICANT CHANGE UP (ref 40–120)
ALT FLD-CCNC: 42 U/L — SIGNIFICANT CHANGE UP (ref 10–45)
ANION GAP SERPL CALC-SCNC: 11 MMOL/L — SIGNIFICANT CHANGE UP (ref 5–17)
APTT BLD: 26.5 SEC — LOW (ref 27.5–37.4)
AST SERPL-CCNC: 43 U/L — HIGH (ref 10–40)
B PERT IGG+IGM PNL SER: SIGNIFICANT CHANGE UP
BILIRUB SERPL-MCNC: 0.9 MG/DL — SIGNIFICANT CHANGE UP (ref 0.2–1.2)
BUN SERPL-MCNC: 28 MG/DL — HIGH (ref 7–23)
CALCIUM SERPL-MCNC: 8.8 MG/DL — SIGNIFICANT CHANGE UP (ref 8.4–10.5)
CHLORIDE SERPL-SCNC: 95 MMOL/L — LOW (ref 96–108)
CO2 SERPL-SCNC: 30 MMOL/L — SIGNIFICANT CHANGE UP (ref 22–31)
COLOR FLD: SIGNIFICANT CHANGE UP
CREAT SERPL-MCNC: 0.96 MG/DL — SIGNIFICANT CHANGE UP (ref 0.5–1.3)
CULTURE RESULTS: NO GROWTH — SIGNIFICANT CHANGE UP
FLUID INTAKE SUBSTANCE CLASS: SIGNIFICANT CHANGE UP
FLUID SEGMENTED GRANULOCYTES: 97 % — SIGNIFICANT CHANGE UP
GLUCOSE SERPL-MCNC: 109 MG/DL — HIGH (ref 70–99)
HCT VFR BLD CALC: 28.7 % — LOW (ref 34.5–45)
HGB BLD-MCNC: 9.3 G/DL — LOW (ref 11.5–15.5)
INR BLD: 1.06 — SIGNIFICANT CHANGE UP (ref 0.88–1.16)
LYMPHOCYTES # FLD: 2 % — SIGNIFICANT CHANGE UP
MAGNESIUM SERPL-MCNC: 2 MG/DL — SIGNIFICANT CHANGE UP (ref 1.6–2.6)
MCHC RBC-ENTMCNC: 30.4 PG — SIGNIFICANT CHANGE UP (ref 27–34)
MCHC RBC-ENTMCNC: 32.4 G/DL — SIGNIFICANT CHANGE UP (ref 32–36)
MCV RBC AUTO: 93.8 FL — SIGNIFICANT CHANGE UP (ref 80–100)
MONOS+MACROS # FLD: 1 % — SIGNIFICANT CHANGE UP
PHOSPHATE SERPL-MCNC: 3.4 MG/DL — SIGNIFICANT CHANGE UP (ref 2.5–4.5)
PLATELET # BLD AUTO: 266 K/UL — SIGNIFICANT CHANGE UP (ref 150–400)
POTASSIUM SERPL-MCNC: 4.6 MMOL/L — SIGNIFICANT CHANGE UP (ref 3.5–5.3)
POTASSIUM SERPL-SCNC: 4.6 MMOL/L — SIGNIFICANT CHANGE UP (ref 3.5–5.3)
PROT SERPL-MCNC: 5.6 G/DL — LOW (ref 6–8.3)
PROTHROM AB SERPL-ACNC: 11.8 SEC — SIGNIFICANT CHANGE UP (ref 9.8–12.7)
RBC # BLD: 3.06 M/UL — LOW (ref 3.8–5.2)
RBC # FLD: 16 % — SIGNIFICANT CHANGE UP (ref 10.3–16.9)
RCV VOL RI: HIGH /UL (ref 0–5)
SODIUM SERPL-SCNC: 136 MMOL/L — SIGNIFICANT CHANGE UP (ref 135–145)
SPECIMEN SOURCE: SIGNIFICANT CHANGE UP
TOTAL NUCLEATED CELL COUNT, BODY FLUID: 2044 /UL — HIGH (ref 0–5)
TUBE TYPE: SIGNIFICANT CHANGE UP
WBC # BLD: 9.3 K/UL — SIGNIFICANT CHANGE UP (ref 3.8–10.5)
WBC # FLD AUTO: 9.3 K/UL — SIGNIFICANT CHANGE UP (ref 3.8–10.5)

## 2018-08-13 PROCEDURE — 93010 ELECTROCARDIOGRAM REPORT: CPT

## 2018-08-13 PROCEDURE — 99291 CRITICAL CARE FIRST HOUR: CPT

## 2018-08-13 PROCEDURE — 71045 X-RAY EXAM CHEST 1 VIEW: CPT | Mod: 26,76

## 2018-08-13 RX ORDER — METRONIDAZOLE 500 MG
500 TABLET ORAL EVERY 12 HOURS
Qty: 0 | Refills: 0 | Status: DISCONTINUED | OUTPATIENT
Start: 2018-08-13 | End: 2018-08-14

## 2018-08-13 RX ORDER — MAGNESIUM SULFATE 500 MG/ML
2 VIAL (ML) INJECTION ONCE
Qty: 0 | Refills: 0 | Status: COMPLETED | OUTPATIENT
Start: 2018-08-13 | End: 2018-08-13

## 2018-08-13 RX ADMIN — PANTOPRAZOLE SODIUM 40 MILLIGRAM(S): 20 TABLET, DELAYED RELEASE ORAL at 06:34

## 2018-08-13 RX ADMIN — Medication 12.5 MILLIGRAM(S): at 06:34

## 2018-08-13 RX ADMIN — CHLORHEXIDINE GLUCONATE 1 APPLICATION(S): 213 SOLUTION TOPICAL at 06:34

## 2018-08-13 RX ADMIN — Medication 1 MILLIGRAM(S): at 13:06

## 2018-08-13 RX ADMIN — Medication 1 APPLICATION(S): at 19:30

## 2018-08-13 RX ADMIN — Medication 2 TABLET(S): at 10:53

## 2018-08-13 RX ADMIN — HEPARIN SODIUM 5000 UNIT(S): 5000 INJECTION INTRAVENOUS; SUBCUTANEOUS at 19:25

## 2018-08-13 RX ADMIN — Medication 12.5 MILLIGRAM(S): at 19:25

## 2018-08-13 RX ADMIN — Medication 81 MILLIGRAM(S): at 13:06

## 2018-08-13 RX ADMIN — SENNA PLUS 2 TABLET(S): 8.6 TABLET ORAL at 22:07

## 2018-08-13 RX ADMIN — Medication 2 TABLET(S): at 06:35

## 2018-08-13 RX ADMIN — Medication 40 MILLIGRAM(S): at 06:34

## 2018-08-13 RX ADMIN — Medication 500 MILLIGRAM(S): at 10:53

## 2018-08-13 RX ADMIN — Medication 2 TABLET(S): at 07:00

## 2018-08-13 RX ADMIN — Medication 2 TABLET(S): at 20:03

## 2018-08-13 RX ADMIN — Medication 100 MILLIGRAM(S): at 22:07

## 2018-08-13 RX ADMIN — Medication 2 TABLET(S): at 20:34

## 2018-08-13 RX ADMIN — Medication 1 APPLICATION(S): at 06:37

## 2018-08-13 RX ADMIN — Medication 3 MILLIGRAM(S): at 22:07

## 2018-08-13 RX ADMIN — Medication 50 GRAM(S): at 06:34

## 2018-08-13 RX ADMIN — Medication 100 MILLIGRAM(S): at 06:34

## 2018-08-13 RX ADMIN — ATORVASTATIN CALCIUM 20 MILLIGRAM(S): 80 TABLET, FILM COATED ORAL at 22:07

## 2018-08-13 RX ADMIN — CHLORHEXIDINE GLUCONATE 1 APPLICATION(S): 213 SOLUTION TOPICAL at 06:35

## 2018-08-13 RX ADMIN — HEPARIN SODIUM 5000 UNIT(S): 5000 INJECTION INTRAVENOUS; SUBCUTANEOUS at 06:34

## 2018-08-13 RX ADMIN — LIDOCAINE 1 PATCH: 4 CREAM TOPICAL at 13:06

## 2018-08-13 RX ADMIN — Medication 100 MILLIGRAM(S): at 17:26

## 2018-08-13 RX ADMIN — Medication 2 TABLET(S): at 11:30

## 2018-08-13 NOTE — CHART NOTE - NSCHARTNOTEFT_GEN_A_CORE
Admitting Diagnosis:   Patient is a 68y old  Female who presents with a chief complaint of Descending thoracic aortic aneurysm (2018 17:14)      PAST MEDICAL & SURGICAL HISTORY:  History of seizures:   COPD (chronic obstructive pulmonary disease)  HLD (hyperlipidemia)  HTN (hypertension)  AAA (abdominal aortic aneurysm)  CAD (coronary artery disease)  S/P AAA (abdominal aortic aneurysm) repair  History of abdominal aortic aneurysm (AAA):   History of cholecystectomy      Current Nutrition Order:  Dysphagia 2 mechanical soft diet/thin liquids, Dash/TLC + ensure enlive TID    PO Intake: Good (%) [   ]  Fair (50-75%) [X] Poor (<25%) [   ]    GI Issues:   Denies N/V/D/C  C/o nausea at times which is improved with zofran     Pain:  C/o chest pain; RN informed     Skin Integrity:  L groin ASW  L thoracotomy   IAD sacral/gluteal region   Upper back skin tear     Labs:       136  |  95<L>  |  28<H>  ----------------------------<  109<H>  4.6   |  30  |  0.96    Ca    8.8      13 Aug 2018 02:48  Phos  3.4     08-13  Mg     2.0     08-13    TPro  5.6<L>  /  Alb  2.8<L>  /  TBili  0.9  /  DBili  x   /  AST  43<H>  /  ALT  42  /  AlkPhos  81  08-13    CAPILLARY BLOOD GLUCOSE          Medications:  MEDICATIONS  (STANDING):  aspirin enteric coated 81 milliGRAM(s) Oral daily  atorvastatin 20 milliGRAM(s) Oral at bedtime  chlorhexidine 2% Cloths 1 Application(s) Topical daily  chlorhexidine 2% Cloths 1 Application(s) Topical daily  docusate sodium 100 milliGRAM(s) Oral three times a day  folic acid 1 milliGRAM(s) Oral daily  furosemide    Tablet 40 milliGRAM(s) Oral daily  heparin  Injectable 5000 Unit(s) SubCutaneous every 12 hours  lidocaine   Patch 1 Patch Transdermal daily  melatonin 3 milliGRAM(s) Oral at bedtime  metoprolol tartrate 12.5 milliGRAM(s) Oral every 12 hours  metroNIDAZOLE    Tablet 500 milliGRAM(s) Oral every 12 hours  pantoprazole    Tablet 40 milliGRAM(s) Oral before breakfast  povidone iodine 10% Solution 1 Application(s) Topical two times a day  senna 2 Tablet(s) Oral at bedtime  sodium chloride 0.9%. 1000 milliLiter(s) (10 mL/Hr) IV Continuous <Continuous>    MEDICATIONS  (PRN):  acetaminophen   Tablet. 650 milliGRAM(s) Oral every 6 hours PRN Mild Pain (1 - 3)  acetaminophen 300 mG/codeine 30 mG 2 Tablet(s) Oral every 4 hours PRN Moderate Pain (4 - 6)      Weight:  50.9kg ()    Daily Weight in k.3 (2018 11:49)    Weight Change:   wt change noted; could be due to fluids; please take current wt- d/w RN    Estimated energy needs:   IBW 52.3kg used for EER and adjusted for post-op/age  25-30kcal/kg (1307-1569kcal), 1.2-1.4g/kg (63-73g), 30-35ml/kg (1569-1830ml)    Subjective:   69y/o F s/o open TAAA w/ replacement of descending aorta - w/separate reimplantation of mesorenals. Concern for possible ischemic colitis noted; flex sig performed and negative. SLP cleared pt for mechanical soft, thin liquid diet and PO initiated. Pt continues on modified diet with reported improved intake; consuming 50-75% + ensure 1-2x/day. Encouraged adequate intake and to take ensure enlive at least BID; pt reports understanding. She c/o nausea at times; controlled with zofran. Denies current GI distress. Pain reported; RN informed. Will follow.       Previous Nutrition Diagnosis:  Increased nutrient needs RT increased demand AEB post-op     Active [X]  Resolved [   ]    If resolved, new PES:     Goal:  Pt to meet >/=75% of EER     Recommendations:  1. Continue mechanical soft, Dash/TLC diet + ensure enlive TID.   2. Provide assistance with meals prn and encourage intake and ONS.   3. Monitor lytes and replete prn.   4. Add MVI.   5. Trend daily wts.     *d/w RN*    Education:   Adequate intake; increased needs; ONS    Risk Level: High [   ] Moderate [X] Low [   ]

## 2018-08-13 NOTE — PROGRESS NOTE ADULT - SUBJECTIVE AND OBJECTIVE BOX
CTICU  CRITICAL  CARE  attending     Hand off received 					   Pertinent clinical, laboratory, radiographic, hemodynamic, echocardiographic, respiratory data, microbiologic data and chart were reviewed and analyzed frequently throughout the course of the day and night  Patient seen and examined with CTS/ SH attending at bedside  Pt is a 68y , Female, HEALTH ISSUES - PROBLEM Dx:      , FAMILY HISTORY:  No pertinent family history in first degree relatives  PAST MEDICAL & SURGICAL HISTORY:  History of seizures: 1980  COPD (chronic obstructive pulmonary disease)  HLD (hyperlipidemia)  HTN (hypertension)  AAA (abdominal aortic aneurysm)  CAD (coronary artery disease)  S/P AAA (abdominal aortic aneurysm) repair  History of abdominal aortic aneurysm (AAA): 2004  History of cholecystectomy    Patient is a 68y old  Female who presents with a chief complaint of Descending thoracic aortic aneurysm (25 Jul 2018 17:14)      14 system review was unremarkable  acute changes include acute respiratory failure  Vital signs, hemodynamic and respiratory parameters were reviewed from the bedside nursing flowsheet.  ICU Vital Signs Last 24 Hrs  T(C): 36.6 (13 Aug 2018 21:56), Max: 36.7 (13 Aug 2018 01:00)  T(F): 97.8 (13 Aug 2018 21:56), Max: 98.1 (13 Aug 2018 01:00)  HR: 92 (13 Aug 2018 22:00) (80 - 118)  BP: 134/67 (13 Aug 2018 22:00) (124/54 - 154/83)  BP(mean): 84 (13 Aug 2018 22:00) (77 - 130)  ABP: --  ABP(mean): --  RR: 21 (13 Aug 2018 22:00) (16 - 33)  SpO2: 100% (13 Aug 2018 22:00) (94% - 100%)    Adult Advanced Hemodynamics Last 24 Hrs  CVP(mm Hg): --  CVP(cm H2O): --  CO: --  CI: --  PA: --  PA(mean): --  PCWP: --  SVR: --  SVRI: --  PVR: --  PVRI: --,     Intake and output was reviewed and the fluid balance was calculated  Daily     Daily   I&O's Summary    12 Aug 2018 07:01  -  13 Aug 2018 07:00  --------------------------------------------------------  IN: 710 mL / OUT: 1170 mL / NET: -460 mL    13 Aug 2018 07:01  -  13 Aug 2018 22:22  --------------------------------------------------------  IN: 250 mL / OUT: 720 mL / NET: -470 mL        All lines and drain sites were assessed  [facility]      POCT Blood Glucose.: 92 mg/dL (12 Aug 2018 08:09)    No acute change in mental status  Auscultation of the chest reveals equal bs  Abdomen is soft  Extremities are warm and well perfused  Wounds appear clean and unremarkable  Antibiotics are periop    labs  CBC Full  -  ( 13 Aug 2018 02:48 )  WBC Count : 9.3 K/uL  Hemoglobin : 9.3 g/dL  Hematocrit : 28.7 %  Platelet Count - Automated : 266 K/uL  Mean Cell Volume : 93.8 fL  Mean Cell Hemoglobin : 30.4 pg  Mean Cell Hemoglobin Concentration : 32.4 g/dL  Auto Neutrophil # : x  Auto Lymphocyte # : x  Auto Monocyte # : x  Auto Eosinophil # : x  Auto Basophil # : x  Auto Neutrophil % : x  Auto Lymphocyte % : x  Auto Monocyte % : x  Auto Eosinophil % : x  Auto Basophil % : x    08-13    136  |  95<L>  |  28<H>  ----------------------------<  109<H>  4.6   |  30  |  0.96    Ca    8.8      13 Aug 2018 02:48  Phos  3.4     08-13  Mg     2.0     08-13    TPro  5.6<L>  /  Alb  2.8<L>  /  TBili  0.9  /  DBili  x   /  AST  43<H>  /  ALT  42  /  AlkPhos  81  08-13    PT/INR - ( 13 Aug 2018 02:48 )   PT: 11.8 sec;   INR: 1.06          PTT - ( 13 Aug 2018 02:48 )  PTT:26.5 sec  The current medications were reviewed   MEDICATIONS  (STANDING):  aspirin enteric coated 81 milliGRAM(s) Oral daily  atorvastatin 20 milliGRAM(s) Oral at bedtime  chlorhexidine 2% Cloths 1 Application(s) Topical daily  chlorhexidine 2% Cloths 1 Application(s) Topical daily  docusate sodium 100 milliGRAM(s) Oral three times a day  folic acid 1 milliGRAM(s) Oral daily  furosemide    Tablet 40 milliGRAM(s) Oral daily  heparin  Injectable 5000 Unit(s) SubCutaneous every 12 hours  lidocaine   Patch 1 Patch Transdermal daily  melatonin 3 milliGRAM(s) Oral at bedtime  metoprolol tartrate 12.5 milliGRAM(s) Oral every 12 hours  metroNIDAZOLE    Tablet 500 milliGRAM(s) Oral every 12 hours  pantoprazole    Tablet 40 milliGRAM(s) Oral before breakfast  povidone iodine 10% Solution 1 Application(s) Topical two times a day  senna 2 Tablet(s) Oral at bedtime  sodium chloride 0.9%. 1000 milliLiter(s) (10 mL/Hr) IV Continuous <Continuous>    MEDICATIONS  (PRN):  acetaminophen   Tablet. 650 milliGRAM(s) Oral every 6 hours PRN Mild Pain (1 - 3)  acetaminophen 300 mG/codeine 30 mG 2 Tablet(s) Oral every 4 hours PRN Moderate Pain (4 - 6)       PROBLEM LIST/ ASSESSMENT:  HEALTH ISSUES - PROBLEM Dx:      ,   Patient is a 68y old  Female who presents with a chief complaint of Descending thoracic aortic aneurysm (25 Jul 2018 17:14)     s/p cardiac surgery      My plan includes :  close hemodynamic, ventilatory and drain monitoring and management per post op routine    Monitor for arrhythmias and monitor parameters for organ perfusion  monitor neurologic status  Head of the bed should remain elevated to 45 deg .   chest PT and IS will be encouraged  monitor adequacy of oxygenation and ventilation and attempt to wean oxygen  Nutritional goals will be met using po eventually , ensure adequate caloric intake and montior the same  Stress ulcer and VTE prophylaxis will be achieved    Glycemic control is satisfactory  Electrolytes have been repleted as necessary and wound care has been carried out. Pain control has been achieved.   agressive physical therapy and early mobility and ambulation goals will be met   The family was updated about the course and plan  CRITICAL CARE TIME SPENT in evaluation and management, reassessments, review and interpretation of labs and x-rays, ventilator and hemodynamic management, formulating a plan and coordinating care: ___90____ MIN.  Time does not include procedural time.  CTICU ATTENDING     					    Tuan Hutchins MD

## 2018-08-14 ENCOUNTER — TRANSCRIPTION ENCOUNTER (OUTPATIENT)
Age: 68
End: 2018-08-14

## 2018-08-14 LAB
ALBUMIN SERPL ELPH-MCNC: 2.8 G/DL — LOW (ref 3.3–5)
ALP SERPL-CCNC: 86 U/L — SIGNIFICANT CHANGE UP (ref 40–120)
ALT FLD-CCNC: 40 U/L — SIGNIFICANT CHANGE UP (ref 10–45)
ANION GAP SERPL CALC-SCNC: 10 MMOL/L — SIGNIFICANT CHANGE UP (ref 5–17)
APTT BLD: 27.3 SEC — LOW (ref 27.5–37.4)
AST SERPL-CCNC: 39 U/L — SIGNIFICANT CHANGE UP (ref 10–40)
BILIRUB SERPL-MCNC: 0.9 MG/DL — SIGNIFICANT CHANGE UP (ref 0.2–1.2)
BUN SERPL-MCNC: 28 MG/DL — HIGH (ref 7–23)
CALCIUM SERPL-MCNC: 8.4 MG/DL — SIGNIFICANT CHANGE UP (ref 8.4–10.5)
CHLORIDE SERPL-SCNC: 96 MMOL/L — SIGNIFICANT CHANGE UP (ref 96–108)
CO2 SERPL-SCNC: 29 MMOL/L — SIGNIFICANT CHANGE UP (ref 22–31)
CREAT SERPL-MCNC: 0.91 MG/DL — SIGNIFICANT CHANGE UP (ref 0.5–1.3)
GLUCOSE SERPL-MCNC: 121 MG/DL — HIGH (ref 70–99)
HCT VFR BLD CALC: 28 % — LOW (ref 34.5–45)
HCT VFR BLD CALC: 28.4 % — LOW (ref 34.5–45)
HGB BLD-MCNC: 9.1 G/DL — LOW (ref 11.5–15.5)
HGB BLD-MCNC: 9.2 G/DL — LOW (ref 11.5–15.5)
INR BLD: 1.11 — SIGNIFICANT CHANGE UP (ref 0.88–1.16)
MAGNESIUM SERPL-MCNC: 2 MG/DL — SIGNIFICANT CHANGE UP (ref 1.6–2.6)
MAGNESIUM SERPL-MCNC: 2.1 MG/DL — SIGNIFICANT CHANGE UP (ref 1.6–2.6)
MCHC RBC-ENTMCNC: 30.2 PG — SIGNIFICANT CHANGE UP (ref 27–34)
MCHC RBC-ENTMCNC: 30.4 PG — SIGNIFICANT CHANGE UP (ref 27–34)
MCHC RBC-ENTMCNC: 32.4 G/DL — SIGNIFICANT CHANGE UP (ref 32–36)
MCHC RBC-ENTMCNC: 32.5 G/DL — SIGNIFICANT CHANGE UP (ref 32–36)
MCV RBC AUTO: 93.1 FL — SIGNIFICANT CHANGE UP (ref 80–100)
MCV RBC AUTO: 93.6 FL — SIGNIFICANT CHANGE UP (ref 80–100)
PHOSPHATE SERPL-MCNC: 3.8 MG/DL — SIGNIFICANT CHANGE UP (ref 2.5–4.5)
PLATELET # BLD AUTO: 302 K/UL — SIGNIFICANT CHANGE UP (ref 150–400)
PLATELET # BLD AUTO: 305 K/UL — SIGNIFICANT CHANGE UP (ref 150–400)
POTASSIUM SERPL-MCNC: 4.3 MMOL/L — SIGNIFICANT CHANGE UP (ref 3.5–5.3)
POTASSIUM SERPL-SCNC: 4.3 MMOL/L — SIGNIFICANT CHANGE UP (ref 3.5–5.3)
PROT SERPL-MCNC: 6.1 G/DL — SIGNIFICANT CHANGE UP (ref 6–8.3)
PROTHROM AB SERPL-ACNC: 12.3 SEC — SIGNIFICANT CHANGE UP (ref 9.8–12.7)
RBC # BLD: 2.99 M/UL — LOW (ref 3.8–5.2)
RBC # BLD: 3.05 M/UL — LOW (ref 3.8–5.2)
RBC # FLD: 15.8 % — SIGNIFICANT CHANGE UP (ref 10.3–16.9)
RBC # FLD: 15.9 % — SIGNIFICANT CHANGE UP (ref 10.3–16.9)
SODIUM SERPL-SCNC: 135 MMOL/L — SIGNIFICANT CHANGE UP (ref 135–145)
WBC # BLD: 7.5 K/UL — SIGNIFICANT CHANGE UP (ref 3.8–10.5)
WBC # BLD: 8.5 K/UL — SIGNIFICANT CHANGE UP (ref 3.8–10.5)
WBC # FLD AUTO: 7.5 K/UL — SIGNIFICANT CHANGE UP (ref 3.8–10.5)
WBC # FLD AUTO: 8.5 K/UL — SIGNIFICANT CHANGE UP (ref 3.8–10.5)

## 2018-08-14 PROCEDURE — 71045 X-RAY EXAM CHEST 1 VIEW: CPT | Mod: 26

## 2018-08-14 PROCEDURE — 99291 CRITICAL CARE FIRST HOUR: CPT

## 2018-08-14 RX ORDER — GABAPENTIN 400 MG/1
100 CAPSULE ORAL EVERY 8 HOURS
Qty: 0 | Refills: 0 | Status: DISCONTINUED | OUTPATIENT
Start: 2018-08-14 | End: 2018-08-16

## 2018-08-14 RX ORDER — POLYETHYLENE GLYCOL 3350 17 G/17G
17 POWDER, FOR SOLUTION ORAL DAILY
Qty: 0 | Refills: 0 | Status: DISCONTINUED | OUTPATIENT
Start: 2018-08-14 | End: 2018-08-22

## 2018-08-14 RX ORDER — MAGNESIUM SULFATE 500 MG/ML
2 VIAL (ML) INJECTION ONCE
Qty: 0 | Refills: 0 | Status: COMPLETED | OUTPATIENT
Start: 2018-08-14 | End: 2018-08-14

## 2018-08-14 RX ADMIN — ATORVASTATIN CALCIUM 20 MILLIGRAM(S): 80 TABLET, FILM COATED ORAL at 22:19

## 2018-08-14 RX ADMIN — Medication 1 APPLICATION(S): at 17:24

## 2018-08-14 RX ADMIN — Medication 3 MILLIGRAM(S): at 22:20

## 2018-08-14 RX ADMIN — CHLORHEXIDINE GLUCONATE 1 APPLICATION(S): 213 SOLUTION TOPICAL at 05:11

## 2018-08-14 RX ADMIN — Medication 2 TABLET(S): at 09:00

## 2018-08-14 RX ADMIN — HEPARIN SODIUM 5000 UNIT(S): 5000 INJECTION INTRAVENOUS; SUBCUTANEOUS at 05:15

## 2018-08-14 RX ADMIN — Medication 40 MILLIGRAM(S): at 05:13

## 2018-08-14 RX ADMIN — PANTOPRAZOLE SODIUM 40 MILLIGRAM(S): 20 TABLET, DELAYED RELEASE ORAL at 05:14

## 2018-08-14 RX ADMIN — Medication 2 TABLET(S): at 03:51

## 2018-08-14 RX ADMIN — LIDOCAINE 1 PATCH: 4 CREAM TOPICAL at 01:00

## 2018-08-14 RX ADMIN — LIDOCAINE 1 PATCH: 4 CREAM TOPICAL at 11:35

## 2018-08-14 RX ADMIN — Medication 100 MILLIGRAM(S): at 14:28

## 2018-08-14 RX ADMIN — HEPARIN SODIUM 5000 UNIT(S): 5000 INJECTION INTRAVENOUS; SUBCUTANEOUS at 17:23

## 2018-08-14 RX ADMIN — GABAPENTIN 100 MILLIGRAM(S): 400 CAPSULE ORAL at 22:20

## 2018-08-14 RX ADMIN — POLYETHYLENE GLYCOL 3350 17 GRAM(S): 17 POWDER, FOR SOLUTION ORAL at 11:36

## 2018-08-14 RX ADMIN — Medication 12.5 MILLIGRAM(S): at 17:23

## 2018-08-14 RX ADMIN — Medication 1 MILLIGRAM(S): at 11:35

## 2018-08-14 RX ADMIN — Medication 81 MILLIGRAM(S): at 11:35

## 2018-08-14 RX ADMIN — Medication 50 GRAM(S): at 17:06

## 2018-08-14 RX ADMIN — Medication 1 APPLICATION(S): at 05:16

## 2018-08-14 RX ADMIN — Medication 12.5 MILLIGRAM(S): at 05:13

## 2018-08-14 RX ADMIN — Medication 100 MILLIGRAM(S): at 05:15

## 2018-08-14 RX ADMIN — GABAPENTIN 100 MILLIGRAM(S): 400 CAPSULE ORAL at 10:14

## 2018-08-14 RX ADMIN — Medication 2 TABLET(S): at 04:20

## 2018-08-14 RX ADMIN — Medication 100 MILLIGRAM(S): at 22:20

## 2018-08-14 RX ADMIN — Medication 2 TABLET(S): at 14:32

## 2018-08-14 RX ADMIN — Medication 1 TABLET(S): at 11:35

## 2018-08-14 RX ADMIN — LIDOCAINE 1 PATCH: 4 CREAM TOPICAL at 22:20

## 2018-08-14 RX ADMIN — Medication 2 TABLET(S): at 08:27

## 2018-08-14 RX ADMIN — Medication 2 TABLET(S): at 14:52

## 2018-08-14 RX ADMIN — GABAPENTIN 100 MILLIGRAM(S): 400 CAPSULE ORAL at 14:28

## 2018-08-14 RX ADMIN — SENNA PLUS 2 TABLET(S): 8.6 TABLET ORAL at 22:19

## 2018-08-14 RX ADMIN — Medication 500 MILLIGRAM(S): at 05:13

## 2018-08-14 RX ADMIN — Medication 500 MILLIGRAM(S): at 17:23

## 2018-08-14 NOTE — DISCHARGE NOTE ADULT - MEDICATION SUMMARY - MEDICATIONS TO TAKE
I will START or STAY ON the medications listed below when I get home from the hospital:    acetaminophen 325 mg oral tablet  -- 2 tab(s) by mouth every 6 hours, As needed, Mild Pain (1 - 3)  -- Indication: For Pain    acetaminophen-codeine 300 mg-30 mg oral tablet  -- 2 tab(s) by mouth every 4 hours, As needed, Moderate Pain (4 - 6)  -- Indication: For Severe pain    aspirin 81 mg oral delayed release tablet  -- 1 tab(s) by mouth once a day  -- Indication: For Heart disease    gabapentin 100 mg oral capsule  -- 2 cap(s) by mouth every 8 hours  -- Indication: For Pain    atorvastatin 20 mg oral tablet  -- 1 tab(s) by mouth once a day (at bedtime)  -- Indication: For Cholesterol    metoprolol tartrate 25 mg oral tablet  -- 1 tab(s) by mouth 2 times a day  -- Indication: For Blood pressure    Advair Diskus 100 mcg-50 mcg inhalation powder  -- 1 puff(s) inhaled 2 times a day  -- Indication: For COPD    Ventolin HFA 90 mcg/inh inhalation aerosol  -- 2 puff(s) inhaled 4 times a day, As Needed  -- Indication: For COPD    Spiriva Respimat 1.25 mcg/inh inhalation aerosol  -- 2 puff(s) inhaled once a day  -- Indication: For COPD    docusate sodium 100 mg oral capsule  -- 1 cap(s) by mouth 3 times a day  -- Indication: For Stool softener- hold for loose stool    senna oral tablet  -- 2 tab(s) by mouth once a day (at bedtime)  -- Indication: For Laxative- hold for loose stool    polyethylene glycol 3350 oral powder for reconstitution  -- 17 gram(s) by mouth once a day  -- Indication: For LAxative- hold for loose stool    melatonin 3 mg oral tablet  -- 1 tab(s) by mouth once a day (at bedtime)  -- Indication: For Insomnia    pantoprazole 40 mg oral delayed release tablet  -- 1 tab(s) by mouth once a day (before a meal)  -- Indication: For stomach protection    Multiple Vitamins oral tablet  -- 1 tab(s) by mouth once a day  -- Indication: For Vitamin    folic acid 1 mg oral tablet  -- 1 tab(s) by mouth once a day  -- Indication: For Vitamin

## 2018-08-14 NOTE — DISCHARGE NOTE ADULT - NS AS ACTIVITY OBS
Walking-Outdoors allowed/Walking-Indoors allowed/Do not make important decisions/Do not drive or operate machinery/No Heavy lifting/straining

## 2018-08-14 NOTE — DISCHARGE NOTE ADULT - PLAN OF CARE
To recover from surgery -Please follow up with Dr. Monson on __________.  The office is located at [facility], [facility], 4th floor. Call us with any questions #[phone].    -Walk daily as tolerated and use your incentive spirometer every hour.    -No driving or strenuous activity/exercise for 6 weeks, or until cleared by your surgeon.    -Gently clean your incisions with anti-bacterial soap and water, pat dry.  You may leave them open to air.    -Call your doctor if you have shortness of breath, chest pain not relieved by pain medication, dizziness, fever >101.5, or increased redness or drainage from incisions. Follow up with vascular surgeon Please follow up with Dr. Thompson on __________ -Please follow up with Dr. Monson on 8/29/18 at 10:45am.  The office is located at [facility], [facility], 4th floor. Call us with any questions #[phone].  -Please follow up with Dr. Reuben Tipton (referring MD) on 9/7/18 at noon. Address: [address] [place] [address], Phone: [phone].    -Walk daily as tolerated and use your incentive spirometer every hour.    -No driving or strenuous activity/exercise for 6 weeks, or until cleared by your surgeon.    -Gently clean your incisions with anti-bacterial soap and water, pat dry.  You may leave them open to air.    -Call your doctor if you have shortness of breath, chest pain not relieved by pain medication, dizziness, fever >101.5, or increased redness or drainage from incisions. Please follow up with Dr. Thompson on 9/4/18 at 11:30. His information is listed above. -Please follow up with Dr. Monson on 8/29/18 at 10:45am.  The office is located at [facility], [facility], 4th floor. Call us with any questions #[phone].  -Please follow up with Dr. Reuben Tipton (referring MD) on 9/7/18 at noon. Address: [address] [place] [address], Phone: [phone].  -Please keep the dressing where your chest tube was on for 48 hours after discharge.   -Walk daily as tolerated and use your incentive spirometer every hour.    -No driving or strenuous activity/exercise for 6 weeks, or until cleared by your surgeon.    -Gently clean your incisions with anti-bacterial soap and water, pat dry.  You may leave them open to air.    -Call your doctor if you have shortness of breath, chest pain not relieved by pain medication, dizziness, fever >101.5, or increased redness or drainage from incisions.

## 2018-08-14 NOTE — DISCHARGE NOTE ADULT - CARE PROVIDER_API CALL
Demarcus Monson (MD), Surgery; Thoracic and Cardiac Surgery  [address]  4th [place] [address]  Phone: [phone]  Fax: [phone]    Priyank Burch), Surgery; Vascular Surgery  [address]  [address]th [place] [address]  Phone: [phone]  Fax: [phone] Demarcus Monson (MD), Surgery; Thoracic and Cardiac Surgery  [address]  [address]th [place] [address]  Phone: [phone]  Fax: [phone]    Priyank Burch), Surgery; Vascular Surgery  [address]  [address]th [place] [address]  Phone: [phone]  Fax: [phone]

## 2018-08-14 NOTE — DISCHARGE NOTE ADULT - CARE PROVIDERS DIRECT ADDRESSES
[email or web address]

## 2018-08-14 NOTE — DISCHARGE NOTE ADULT - MEDICATION SUMMARY - MEDICATIONS TO STOP TAKING
I will STOP taking the medications listed below when I get home from the hospital:    amLODIPine 5 mg oral tablet  -- 1 tab(s) by mouth once a day    simvastatin 20 mg oral tablet  -- 1 tab(s) by mouth once a day (at bedtime)    omeprazole 20 mg oral delayed release tablet  -- 1 tab(s) by mouth once a day

## 2018-08-14 NOTE — DISCHARGE NOTE ADULT - HOSPITAL COURSE
This is a 68 year old female with history of CAD, TIA in 1998, COPD, AAA s/p open repair by Dr. Roberto in 2004, EVAR/AAA/CIAA with Dr. Burch on 3/29/17 found to have diffusely aneurysmal thoracic aorta with mid-portion measuring 5.7x5.1cm with noted increase in size from prior study in 11/2017, referred to Dr. 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  She completed pre-surgical evaluation as an outpatient and was admitted to [facility] on 7/25/18 and underwent thoracotomy thoracoabdominal aneurysm repair with replacement of descending aorta and SMA, celiac, bilateral renal artery bypass.  Intraoperatively, she received 13 u PC, 2u PLT, 5 FFP, 20 Cryo and defecated during the procedure with concern for ischemic colitis.  She arrived to CTICU in stable condition and on POD 1 underwent flexible sigmoidoscopy which demonstrated no evidence of ischemia.  On POD 2, she was noted to have pronounced LLE weakness and underwent CT lumbar spine which revealed no evidence of hematoma.  MAP goal was adjusted for permissive hypertension.  On POD 3, she underwent bronchoscopy in AM.  Rising lactate throughout the day with abdominal distention on exam.  She underwent CTA Chest/Abd/Pelvis which demonstrated patent grafts with no endoleak.  She was volume resuscitated, received 2u PC, 2 PLT, and albumin with improvement of lactate.  On POD 4, she developed profound thrombocytopenia with further exacerbations post-platelet transfusion.  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  On 8/2/18, POD 6, lumbar drain was capped and later uncapped due to worsening LE weakness.  Found to have worsening cyanosis of distal LE,  pulse steroids given; also continued to have persistent thrombocytopenia despite PLT transfusion.  On POD 7, she received cross matched platelet transfusion prior to successful lumbar drain removal.  The remainder of her ICU course has been significant for progressive ischemia of b/l toes and fingertips with no indication for intervention as well as daily physical therapy and occupational therapy.  ***UPDATED TO 8/14/18**** This is a 68 year old female with history of CAD, TIA in 1998, COPD, AAA s/p open repair by Dr. Roberto in 2004, EVAR/AAA/CIAA with Dr. Burch on 3/29/17 found to have diffusely aneurysmal thoracic aorta with mid-portion measuring 5.7x5.1cm with noted increase in size from prior study in 11/2017, referred to Dr. Monson for surgical evaluation.      On 7/25/18 s/p thoracoabdominal aneurysm repair with replacement of descending aorta and SMA, celiac, bilateral renal artery bypass.  Intraoperatively, she received 13U PRBC, 2PLT, 5FFP, 20 Cryo and defecated during the procedure with concern for ischemic colitis.  POD 1 underwent flexible sigmoidoscopy which demonstrated no evidence of ischemia.  On POD 2, she was noted to have pronounced LLE weakness and underwent CT lumbar spine which revealed no evidence of hematoma.  MAP goal was adjusted for permissive hypertension.  On POD 3, she underwent bronchoscopy in AM.  Rising lactate throughout the day with abdominal distention on exam.  She underwent CTA Chest/Abd/Pelvis which demonstrated patent grafts with no endoleak.  She was volume resuscitated, received 2u PC, 2 PLT, and albumin with improvement of lactate.  On POD 4, she developed profound thrombocytopenia with further exacerbations post-platelet transfusion.  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  On POD 6, lumbar drain was capped and later uncapped due to worsening LE weakness.  Found to have worsening cyanosis of distal LE,  pulse steroids given; also continued to have persistent thrombocytopenia despite PLT transfusion.  On POD 7, lumbar drain removed.  The remainder of her ICU course has been significant for progressive ischemia of b/l toes and fingertips with no indication for intervention as well as daily physical therapy and occupational therapy.  ***UPDATED TO 8/14/18**** 68 year old female with PMhx of CAD, TIA in 1998, COPD, AAA s/p open AAA repair by Dr. Roberto in 2004, EVAR/AAA/CIAA with Dr. Burch on 3/29/17 who was referred to Dr. 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 Patient is s/p thoracoabdominal aortic aneurysm repair on 7/26/19 with Dr. Monson, Marcin Robbins and Mikal Robbins,  She received multiple blood products (13u prbc, 2u plt, 5 FFP, 20 cry)o and  arrived to CT ICU stable. Flex sig POD#1 performed to investigate BM episode in OR   revealed no evidence of ischemia. POD#2 patient noted to have LLE weakness, MAPs were kept elevated for perfusion. She was note to have rising lactate on POD#3, Pan CT scan revealed no endoleak and she was volume resuscitated with 2u prbc, 2u plts and albumin with improvement in lactic acidosis. POD#4 patient noted to have thrombocytopenia despite Plt transfusions, heme consulted at that time and patient was given cross matched platelets on POD#7. POD#6 lumbar drain capped temporarily but patient developed worsening LLE weakness. Distal LE cyanosis was noted and she was given pulse steroids. Extubated on 8/5/18. Patient continued to improve, remained in ICU for weakness and transferred to floor, on POD26 IR placed L sided pigtail, draining 170cc. Now patient is POD# 27, pigtail removed, f/u CXR stable. She continues to progress working with PT. She has remained hemodynamically stable.  Discussed with Dr. Mueller, araceli hester on d/c as patient has EF 50%.  Per Dr. Monson patient ready for d/c to [facility] w/ close medical follow up with Dr. Pham and Dr. Tipton (cardiologist).

## 2018-08-14 NOTE — DISCHARGE NOTE ADULT - PATIENT PORTAL LINK FT
You can access the [facility] Portal, offered by [facility], by registering with the following website: [email or web address]

## 2018-08-14 NOTE — PROGRESS NOTE ADULT - SUBJECTIVE AND OBJECTIVE BOX
INTERVAL HPI/OVERNIGHT EVENTS:    7/26 Open TAAA w/replacement of descending aorta - w/separate reimplantation of mesorenals (celiac/SMA/bilateral renal bypass)  Prior open & endovascular abdominal aortic repair  Lumbar drain placed intraop 7/26 - removed 8/3  EF 60%    Abx: Flagyl po (bacterial vaginosis)     69yo Female with Hx CAD, TIA, HTN, COPD, seizures, AAA s/p open AAA repair (Dr. Roberto '04), EVAR/AAA/CIAA (Dr. 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    CTa C/A/P 4/2018: descending thoracoabdominal aortic aneurysm w/degeneration of the aorta at the mid-descending thoracic segment, (5.7cm) w/significant mural thrombus (previously 5.5cm) with widely patent celiac/SMA/renal arteries.      for elective procedure 7/26 - lumbar drain placed on OR - intraop 13 U pRBC/2 platelets/5 FFP/20 Cryo  post-op - awake and following simple commands - moving all extremities but weakness of LLE weaker c/w right  s/p Flex sig - no ischemia of colon    CTa protocol 7/29 - grafts patent; no leak - Interval graft replacement of the ascending thoracic aorta and upper   abdominal aorta with with reimplantation of celiac artery SMA and bilateral renal arteries. Perigraft fluid without endoleak.  Small bilateral pleural effusions with dependent consolidations in the lungs likely atelectatic. Small to moderate ascites which contains a layering hemorrhagic component. Pulmonary emphysema. Mild congestion    profound thrombocytopenia/CARRIE - Heme consulted - suspected microangiopathic process/DIC over TTP  Lumbar drain ultimately removed 8/3    cyanosis of toes/distal extremities noted - vascular - no intervention required   d/c planning initiated  No acute events reported overnight  Patient OOB in chair - reporting some pain complaints this am - pain meds given    PMHx includes but is not limited to:   Hx seizures: 1980  COPD   HLD   HTN   AAA (abdominal aortic aneurysm) s/p interventions as above  CAD  History of cholecystectomy    ICU Vital Signs Last 24 Hrs  T(C): 36.8 (14 Aug 2018 05:01), Max: 36.8 (14 Aug 2018 05:01)  T(F): 98.3 (14 Aug 2018 05:01), Max: 98.3 (14 Aug 2018 05:01)  HR: 82 (14 Aug 2018 08:00) (82 - 118) sinus  BP: 131/58 (14 Aug 2018 08:00) (124/54 - 154/83)  BP(mean): 87 (14 Aug 2018 08:00) (67 - 130)  SpO2: 100% (14 Aug 2018 08:00) (98% - 100%) on 2L - taken off    Qtts: None    I&O's Summary    13 Aug 2018 07:01  -  14 Aug 2018 07:00  --------------------------------------------------------  IN: 250 mL / OUT: 2650 mL / NET: -2400 mL    Physical Exam    Heart  Lungs  Abd  Ext  Chest  Neuro  Skin    LABS:                        9.2    8.5   )-----------( 302      ( 14 Aug 2018 03:46 )             28.4     08-14    135  |  96  |  28<H>  ----------------------------<  121<H>  4.3   |  29  |  0.91    Ca    8.4      14 Aug 2018 03:46  Phos  3.8     08-14  Mg     2.0     08-14    TPro  6.1  /  Alb  2.8<L>  /  TBili  0.9  /  DBili  x   /  AST  39  /  ALT  40  /  AlkPhos  86  08-14    PT/INR - ( 14 Aug 2018 03:46 )   PT: 12.3 sec;   INR: 1.11          PTT - ( 14 Aug 2018 03:46 )  PTT:27.3 sec        RADIOLOGY & ADDITIONAL STUDIES:    I have spent/provided stated minutes of critical care time to this patient: INTERVAL HPI/OVERNIGHT EVENTS:    7/26 Open TAAA w/replacement of descending aorta - w/separate reimplantation of mesorenals (celiac/SMA/bilateral renal bypass)  Prior open & endovascular abdominal aortic repair  Lumbar drain placed intraop 7/26 - removed 8/3  EF 60%    Abx: Flagyl po (bacterial vaginosis)     67yo Female with Hx CAD, TIA, HTN, COPD, seizures, AAA s/p open AAA repair (Dr. Roberto '04), EVAR/AAA/CIAA (Dr. 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    CTa C/A/P 4/2018: descending thoracoabdominal aortic aneurysm w/degeneration of the aorta at the mid-descending thoracic segment, (5.7cm) w/significant mural thrombus (previously 5.5cm) with widely patent celiac/SMA/renal arteries.      for elective procedure 7/26 - lumbar drain placed on OR - intraop 13 U pRBC/2 platelets/5 FFP/20 Cryo  post-op - awake and following simple commands - moving all extremities but weakness of LLE weaker c/w right  s/p Flex sig - no ischemia of colon    CTa protocol 7/29 - grafts patent; no leak - Interval graft replacement of the ascending thoracic aorta and upper   abdominal aorta with with reimplantation of celiac artery SMA and bilateral renal arteries. Perigraft fluid without endoleak.  Small bilateral pleural effusions with dependent consolidations in the lungs likely atelectatic. Small to moderate ascites which contains a layering hemorrhagic component. Pulmonary emphysema. Mild congestion    profound thrombocytopenia/CARRIE - Heme consulted - suspected microangiopathic process/DIC over TTP  Lumbar drain ultimately removed 8/3    cyanosis of toes/distal extremities noted - vascular - no intervention required   d/c planning initiated  No acute events reported overnight  Patient OOB in chair - reporting some pain complaints this am - pain meds given - pain sxs like "electric shocks" per patient and come and go in toes.     PMHx includes but is not limited to:   Hx seizures: 1980  COPD   HLD   HTN   AAA (abdominal aortic aneurysm) s/p interventions as above  CAD  History of cholecystectomy    ICU Vital Signs Last 24 Hrs  T(C): 36.8 (14 Aug 2018 05:01), Max: 36.8 (14 Aug 2018 05:01)  T(F): 98.3 (14 Aug 2018 05:01), Max: 98.3 (14 Aug 2018 05:01)  HR: 82 (14 Aug 2018 08:00) (82 - 118) sinus  BP: 131/58 (14 Aug 2018 08:00) (124/54 - 154/83)  BP(mean): 87 (14 Aug 2018 08:00) (67 - 130)  SpO2: 100% (14 Aug 2018 08:00) (98% - 100%) on 2L - taken off    Qtts: None    I&O's Summary    13 Aug 2018 07:01  -  14 Aug 2018 07:00  --------------------------------------------------------  IN: 250 mL / OUT: 2650 mL / NET: -2400 mL    Physical Exam    Heart - regular (-)rub/gallop  Lungs - CTA  anterior; dec BS at bases - improved after cough - no wheeze  Abd - (+)BS soft (-)r/r/g  Ext - 1-2+ edema bilateral LE edema; blackened/ischemic toes  Chest  Neuro  Skin    LABS:                        9.2    8.5   )-----------( 302      ( 14 Aug 2018 03:46 )             28.4     08-14    135  |  96  |  28<H>  ----------------------------<  121<H>  4.3   |  29  |  0.91    Ca    8.4      14 Aug 2018 03:46  Phos  3.8     08-14  Mg     2.0     08-14    TPro  6.1  /  Alb  2.8<L>  /  TBili  0.9  /  DBili  x   /  AST  39  /  ALT  40  /  AlkPhos  86  08-14    PT/INR - ( 14 Aug 2018 03:46 )   PT: 12.3 sec;   INR: 1.11          PTT - ( 14 Aug 2018 03:46 )  PTT:27.3 sec        RADIOLOGY & ADDITIONAL STUDIES:    I have spent/provided stated minutes of critical care time to this patient: INTERVAL HPI/OVERNIGHT EVENTS:    7/26 Open TAAA w/replacement of descending aorta - w/separate reimplantation of mesorenals (celiac/SMA/bilateral renal bypass)  Prior open & endovascular abdominal aortic repair  Lumbar drain placed intraop 7/26 - removed 8/3  EF 60%    Abx: Flagyl po (bacterial vaginosis)     69yo Female with Hx CAD, TIA, HTN, COPD, seizures, AAA s/p open AAA repair (Dr. Roberto '04), EVAR/AAA/CIAA (Dr. 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    CTa C/A/P 4/2018: descending thoracoabdominal aortic aneurysm w/degeneration of the aorta at the mid-descending thoracic segment, (5.7cm) w/significant mural thrombus (previously 5.5cm) with widely patent celiac/SMA/renal arteries.      for elective procedure 7/26 - lumbar drain placed on OR - intraop 13 U pRBC/2 platelets/5 FFP/20 Cryo  post-op - awake and following simple commands - moving all extremities but weakness of LLE weaker c/w right  s/p Flex sig - no ischemia of colon    CTa protocol 7/29 - grafts patent; no leak - Interval graft replacement of the ascending thoracic aorta and upper   abdominal aorta with with reimplantation of celiac artery SMA and bilateral renal arteries. Perigraft fluid without endoleak.  Small bilateral pleural effusions with dependent consolidations in the lungs likely atelectatic. Small to moderate ascites which contains a layering hemorrhagic component. Pulmonary emphysema. Mild congestion    profound thrombocytopenia/CARRIE - Heme consulted - suspected microangiopathic process/DIC over TTP  Lumbar drain ultimately removed 8/3    cyanosis of toes/distal extremities noted - vascular - no intervention required   d/c planning initiated  No acute events reported overnight  Patient OOB in chair - reporting some pain complaints this am - pain meds given - pain sxs like "electric shocks" per patient and come and go in toes.     PMHx includes but is not limited to:   Hx seizures: 1980  COPD   HLD   HTN   AAA (abdominal aortic aneurysm) s/p interventions as above  CAD  History of cholecystectomy    ICU Vital Signs Last 24 Hrs  T(C): 36.8 (14 Aug 2018 05:01), Max: 36.8 (14 Aug 2018 05:01)  T(F): 98.3 (14 Aug 2018 05:01), Max: 98.3 (14 Aug 2018 05:01)  HR: 82 (14 Aug 2018 08:00) (82 - 118) sinus  BP: 131/58 (14 Aug 2018 08:00) (124/54 - 154/83)  BP(mean): 87 (14 Aug 2018 08:00) (67 - 130)  SpO2: 100% (14 Aug 2018 08:00) (98% - 100%) on 2L - taken off    Qtts: None    I&O's Summary    13 Aug 2018 07:01  -  14 Aug 2018 07:00  --------------------------------------------------------  IN: 250 mL / OUT: 2650 mL / NET: -2400 mL    Physical Exam    Heart - regular (-)rub/gallop  Lungs - CTA  anterior; dec BS at bases - improved after cough - no wheeze  Abd - (+)BS soft (-)r/r/g  Ext - 1-2+ edema bilateral LE edema; blackened/ischemic toes and distal fingertips  Chest - incision site with eschar - no surrounding erythema/discharge or palpable crepitus  Neuro - alert/oriented adn interactive - some dec sensation at toes (distal) and fingertips  Skin - no rash    LABS:                        9.2    8.5   )-----------( 302      ( 14 Aug 2018 03:46 )             28.4     08-14    135  |  96  |  28<H>  ----------------------------<  121<H>  4.3   |  29  |  0.91    Ca    8.4      14 Aug 2018 03:46  Phos  3.8     08-14  Mg     2.0     08-14    TPro  6.1  /  Alb  2.8<L>  /  TBili  0.9  /  DBili  x   /  AST  39  /  ALT  40  /  AlkPhos  86  08-14    PT/INR - ( 14 Aug 2018 03:46 )   PT: 12.3 sec;   INR: 1.11     PTT - ( 14 Aug 2018 03:46 )  PTT:27.3 sec    RADIOLOGY & ADDITIONAL STUDIES: reviewed    Patient with extensive open/complicated repair TAAA with post-op CARRIE/thrombocytopenia and ischemia of tips distal toes/fingers now awaiting d/c planning - neuro rehab    1. CV  hemodynamically stable  sinus rhythm  cont ASA/lasix/Metoprolol/statin    2. Vascular      I have spent/provided stated minutes of critical care time to this patient: INTERVAL HPI/OVERNIGHT EVENTS:    7/26 Open TAAA w/replacement of descending aorta - w/separate reimplantation of mesorenals (celiac/SMA/bilateral renal bypass)  Prior open & endovascular abdominal aortic repair  Lumbar drain placed intraop 7/26 - removed 8/3  EF 60%    Abx: Flagyl po (bacterial vaginosis)     69yo Female with Hx CAD, TIA, HTN, COPD, seizures, AAA s/p open AAA repair (Dr. Roberto '04), EVAR/AAA/CIAA (Dr. 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    CTa C/A/P 4/2018: descending thoracoabdominal aortic aneurysm w/degeneration of the aorta at the mid-descending thoracic segment, (5.7cm) w/significant mural thrombus (previously 5.5cm) with widely patent celiac/SMA/renal arteries.      for elective procedure 7/26 - lumbar drain placed on OR - intraop 13 U pRBC/2 platelets/5 FFP/20 Cryo  post-op - awake and following simple commands - moving all extremities but weakness of LLE weaker c/w right  s/p Flex sig - no ischemia of colon    CTa protocol 7/29 - grafts patent; no leak - Interval graft replacement of the ascending thoracic aorta and upper   abdominal aorta with with reimplantation of celiac artery SMA and bilateral renal arteries. Perigraft fluid without endoleak.  Small bilateral pleural effusions with dependent consolidations in the lungs likely atelectatic. Small to moderate ascites which contains a layering hemorrhagic component. Pulmonary emphysema. Mild congestion    profound thrombocytopenia/CARRIE - Heme consulted - suspected microangiopathic process/DIC over TTP  Lumbar drain ultimately removed 8/3    cyanosis of toes/distal extremities noted - vascular - no intervention required   d/c planning initiated  No acute events reported overnight  Patient OOB in chair - reporting some pain complaints this am - pain meds given - pain sxs like "electric shocks" per patient and come and go in toes.     PMHx includes but is not limited to:   Hx seizures: 1980  COPD   HLD   HTN   AAA (abdominal aortic aneurysm) s/p interventions as above  CAD  History of cholecystectomy    ICU Vital Signs Last 24 Hrs  T(C): 36.8 (14 Aug 2018 05:01), Max: 36.8 (14 Aug 2018 05:01)  T(F): 98.3 (14 Aug 2018 05:01), Max: 98.3 (14 Aug 2018 05:01)  HR: 82 (14 Aug 2018 08:00) (82 - 118) sinus  BP: 131/58 (14 Aug 2018 08:00) (124/54 - 154/83)  BP(mean): 87 (14 Aug 2018 08:00) (67 - 130)  SpO2: 100% (14 Aug 2018 08:00) (98% - 100%) on 2L - taken off    Qtts: None    I&O's Summary    13 Aug 2018 07:01  -  14 Aug 2018 07:00  --------------------------------------------------------  IN: 250 mL / OUT: 2650 mL / NET: -2400 mL    Physical Exam    Heart - regular (-)rub/gallop  Lungs - CTA  anterior; dec BS at bases - improved after cough - no wheeze  Abd - (+)BS soft (-)r/r/g  Ext - 1-2+ edema bilateral LE edema; blackened/ischemic toes and distal fingertips  Chest - incision site with eschar - no surrounding erythema/discharge or palpable crepitus  Neuro - alert/oriented adn interactive - some dec sensation at toes (distal) and fingertips  Skin - no rash    LABS:                        9.2    8.5   )-----------( 302      ( 14 Aug 2018 03:46 )             28.4     08-14    135  |  96  |  28<H>  ----------------------------<  121<H>  4.3   |  29  |  0.91    Ca    8.4      14 Aug 2018 03:46  Phos  3.8     08-14  Mg     2.0     08-14    TPro  6.1  /  Alb  2.8<L>  /  TBili  0.9  /  DBili  x   /  AST  39  /  ALT  40  /  AlkPhos  86  08-14    PT/INR - ( 14 Aug 2018 03:46 )   PT: 12.3 sec;   INR: 1.11     PTT - ( 14 Aug 2018 03:46 )  PTT:27.3 sec    RADIOLOGY & ADDITIONAL STUDIES: reviewed    Patient with extensive open/complicated repair TAAA with post-op CARRIE/thrombocytopenia and ischemia of tips distal toes/fingers now awaiting d/c planning - neuro rehab    1. CV  hemodynamically stable  sinus rhythm  cont ASA/lasix/Metoprolol/statin    2. Vascular  following closely - toes with blackened ischemia - no signs of secondary infection at this time  betadine to area and ongoing observation  neuropathic pain - start Neurontin 100mg TID(low dose and titrate up as able in addition to opiates     3. GI  Last BM per flowsheet 8/12  cont bowel regimen  encourage nutrition and supplements; start MVI per nutrition recs  GI prophylaxis    DVT prophylaxis  d/c planning  d/w patient/staff and CTS    I have spent/provided stated minutes of critical care time to this patient: INTERVAL HPI/OVERNIGHT EVENTS:    7/26 Open TAAA w/replacement of descending aorta - w/separate reimplantation of mesorenals (celiac/SMA/bilateral renal bypass)  Prior open & endovascular abdominal aortic repair  Lumbar drain placed intraop 7/26 - removed 8/3  EF 60%    Abx: Flagyl po (bacterial vaginosis)     69yo Female with Hx CAD, TIA, HTN, COPD, seizures, AAA s/p open AAA repair (Dr. Roberto '04), EVAR/AAA/CIAA (Dr. 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    CTa C/A/P 4/2018: descending thoracoabdominal aortic aneurysm w/degeneration of the aorta at the mid-descending thoracic segment, (5.7cm) w/significant mural thrombus (previously 5.5cm) with widely patent celiac/SMA/renal arteries.      for elective procedure 7/26 - lumbar drain placed on OR - intraop 13 U pRBC/2 platelets/5 FFP/20 Cryo  post-op - awake and following simple commands - moving all extremities but weakness of LLE weaker c/w right  s/p Flex sig - no ischemia of colon    CTa protocol 7/29 - grafts patent; no leak - Interval graft replacement of the ascending thoracic aorta and upper   abdominal aorta with with reimplantation of celiac artery SMA and bilateral renal arteries. Perigraft fluid without endoleak.  Small bilateral pleural effusions with dependent consolidations in the lungs likely atelectatic. Small to moderate ascites which contains a layering hemorrhagic component. Pulmonary emphysema. Mild congestion    profound thrombocytopenia/CARRIE - Heme consulted - suspected microangiopathic process/DIC over TTP  Lumbar drain ultimately removed 8/3    cyanosis of toes/distal extremities noted - vascular - no intervention required   d/c planning initiated  No acute events reported overnight  Patient OOB in chair - reporting some pain complaints this am - pain meds given - pain sxs like "electric shocks" per patient and come and go in toes.     PMHx includes but is not limited to:   Hx seizures: 1980  COPD   HLD   HTN   AAA (abdominal aortic aneurysm) s/p interventions as above  CAD  History of cholecystectomy    ICU Vital Signs Last 24 Hrs  T(C): 36.8 (14 Aug 2018 05:01), Max: 36.8 (14 Aug 2018 05:01)  T(F): 98.3 (14 Aug 2018 05:01), Max: 98.3 (14 Aug 2018 05:01)  HR: 82 (14 Aug 2018 08:00) (82 - 118) sinus  BP: 131/58 (14 Aug 2018 08:00) (124/54 - 154/83)  BP(mean): 87 (14 Aug 2018 08:00) (67 - 130)  SpO2: 100% (14 Aug 2018 08:00) (98% - 100%) on 2L - taken off    Qtts: None    I&O's Summary    13 Aug 2018 07:01  -  14 Aug 2018 07:00  --------------------------------------------------------  IN: 250 mL / OUT: 2650 mL / NET: -2400 mL    Physical Exam    Heart - regular (-)rub/gallop  Lungs - CTA  anterior; dec BS at bases - improved after cough - no wheeze  Abd - (+)BS soft (-)r/r/g  Ext - 1-2+ edema bilateral LE edema; blackened/ischemic toes and distal fingertips  Chest - incision site with eschar - no surrounding erythema/discharge or palpable crepitus  Neuro - alert/oriented adn interactive - some dec sensation at toes (distal) and fingertips  Skin - no rash    LABS:                        9.2    8.5   )-----------( 302      ( 14 Aug 2018 03:46 )             28.4     08-14    135  |  96  |  28<H>  ----------------------------<  121<H>  4.3   |  29  |  0.91    Ca    8.4      14 Aug 2018 03:46  Phos  3.8     08-14  Mg     2.0     08-14    TPro  6.1  /  Alb  2.8<L>  /  TBili  0.9  /  DBili  x   /  AST  39  /  ALT  40  /  AlkPhos  86  08-14    PT/INR - ( 14 Aug 2018 03:46 )   PT: 12.3 sec;   INR: 1.11     PTT - ( 14 Aug 2018 03:46 )  PTT:27.3 sec    RADIOLOGY & ADDITIONAL STUDIES: reviewed    Patient with extensive open/complicated repair TAAA with post-op CARRIE/thrombocytopenia and ischemia of tips distal toes/fingers now awaiting d/c planning - neuro rehab    1. CV  hemodynamically stable  sinus rhythm  cont ASA/lasix/Metoprolol/statin    2. Vascular  following closely - toes with blackened ischemia - no signs of secondary infection at this time  betadine to area and ongoing observation  neuropathic pain - start Neurontin 100mg TID(low dose and titrate up as able in addition to opiates     3. GI  Last BM per flowsheet 8/12  cont bowel regimen  encourage nutrition and supplements; start MVI per nutrition recs  GI prophylaxis    remove pigtail today and titrate off supplemental oxygen  DVT prophylaxis  d/c planning  d/w patient/staff and CTS    I have spent/provided stated minutes of critical care time to this patient:

## 2018-08-14 NOTE — DISCHARGE NOTE ADULT - CARE PLAN
Principal Discharge DX:	Aortic aneurysm  Goal:	To recover from surgery  Assessment and plan of treatment:	-Please follow up with Dr. Monson on __________.  The office is located at [facility], 4th floor. Call us with any questions #[phone].    -Walk daily as tolerated and use your incentive spirometer every hour.    -No driving or strenuous activity/exercise for 6 weeks, or until cleared by your surgeon.    -Gently clean your incisions with anti-bacterial soap and water, pat dry.  You may leave them open to air.    -Call your doctor if you have shortness of breath, chest pain not relieved by pain medication, dizziness, fever >101.5, or increased redness or drainage from incisions.  Secondary Diagnosis:	AAA (abdominal aortic aneurysm)  Goal:	Follow up with vascular surgeon  Assessment and plan of treatment:	Please follow up with Dr. Thompson on __________ Principal Discharge DX:	Aortic aneurysm  Goal:	To recover from surgery  Assessment and plan of treatment:	-Please follow up with Dr. Monson on 8/29/18 at 10:45am.  The office is located at [facility], [facility], 4th floor. Call us with any questions #[phone].  -Please follow up with Dr. Reuben Tipton (referring MD) on 9/7/18 at noon. Address: [address] [place] [address], Phone: [phone].    -Walk daily as tolerated and use your incentive spirometer every hour.    -No driving or strenuous activity/exercise for 6 weeks, or until cleared by your surgeon.    -Gently clean your incisions with anti-bacterial soap and water, pat dry.  You may leave them open to air.    -Call your doctor if you have shortness of breath, chest pain not relieved by pain medication, dizziness, fever >101.5, or increased redness or drainage from incisions.  Secondary Diagnosis:	AAA (abdominal aortic aneurysm)  Goal:	Follow up with vascular surgeon  Assessment and plan of treatment:	Please follow up with Dr. Thompson on 9/4/18 at 11:30. His information is listed above. Principal Discharge DX:	Aortic aneurysm  Goal:	To recover from surgery  Assessment and plan of treatment:	-Please follow up with Dr. Monson on 8/29/18 at 10:45am.  The office is located at [facility], [facility], 4th floor. Call us with any questions #[phone].  -Please follow up with Dr. Reuben Tipton (referring MD) on 9/7/18 at noon. Address: [address] [place] [address], Phone: [phone].  -Please keep the dressing where your chest tube was on for 48 hours after discharge.   -Walk daily as tolerated and use your incentive spirometer every hour.    -No driving or strenuous activity/exercise for 6 weeks, or until cleared by your surgeon.    -Gently clean your incisions with anti-bacterial soap and water, pat dry.  You may leave them open to air.    -Call your doctor if you have shortness of breath, chest pain not relieved by pain medication, dizziness, fever >101.5, or increased redness or drainage from incisions.  Secondary Diagnosis:	AAA (abdominal aortic aneurysm)  Goal:	Follow up with vascular surgeon  Assessment and plan of treatment:	Please follow up with Dr. Thompson on 9/4/18 at 11:30. His information is listed above.

## 2018-08-15 LAB
ANION GAP SERPL CALC-SCNC: 10 MMOL/L — SIGNIFICANT CHANGE UP (ref 5–17)
BUN SERPL-MCNC: 25 MG/DL — HIGH (ref 7–23)
CALCIUM SERPL-MCNC: 8.6 MG/DL — SIGNIFICANT CHANGE UP (ref 8.4–10.5)
CHLORIDE SERPL-SCNC: 96 MMOL/L — SIGNIFICANT CHANGE UP (ref 96–108)
CO2 SERPL-SCNC: 29 MMOL/L — SIGNIFICANT CHANGE UP (ref 22–31)
CREAT SERPL-MCNC: 1 MG/DL — SIGNIFICANT CHANGE UP (ref 0.5–1.3)
GLUCOSE SERPL-MCNC: 105 MG/DL — HIGH (ref 70–99)
HCT VFR BLD CALC: 29.1 % — LOW (ref 34.5–45)
HGB BLD-MCNC: 9.3 G/DL — LOW (ref 11.5–15.5)
MAGNESIUM SERPL-MCNC: 2.1 MG/DL — SIGNIFICANT CHANGE UP (ref 1.6–2.6)
MCHC RBC-ENTMCNC: 30.3 PG — SIGNIFICANT CHANGE UP (ref 27–34)
MCHC RBC-ENTMCNC: 32 G/DL — SIGNIFICANT CHANGE UP (ref 32–36)
MCV RBC AUTO: 94.8 FL — SIGNIFICANT CHANGE UP (ref 80–100)
PLATELET # BLD AUTO: 311 K/UL — SIGNIFICANT CHANGE UP (ref 150–400)
POTASSIUM SERPL-MCNC: 4.5 MMOL/L — SIGNIFICANT CHANGE UP (ref 3.5–5.3)
POTASSIUM SERPL-SCNC: 4.5 MMOL/L — SIGNIFICANT CHANGE UP (ref 3.5–5.3)
RBC # BLD: 3.07 M/UL — LOW (ref 3.8–5.2)
RBC # FLD: 16.5 % — SIGNIFICANT CHANGE UP (ref 10.3–16.9)
SODIUM SERPL-SCNC: 135 MMOL/L — SIGNIFICANT CHANGE UP (ref 135–145)
WBC # BLD: 7.1 K/UL — SIGNIFICANT CHANGE UP (ref 3.8–10.5)
WBC # FLD AUTO: 7.1 K/UL — SIGNIFICANT CHANGE UP (ref 3.8–10.5)

## 2018-08-15 PROCEDURE — 71045 X-RAY EXAM CHEST 1 VIEW: CPT | Mod: 26

## 2018-08-15 RX ORDER — SODIUM CHLORIDE 9 MG/ML
3 INJECTION INTRAMUSCULAR; INTRAVENOUS; SUBCUTANEOUS EVERY 8 HOURS
Qty: 0 | Refills: 0 | Status: DISCONTINUED | OUTPATIENT
Start: 2018-08-15 | End: 2018-08-22

## 2018-08-15 RX ORDER — METOPROLOL TARTRATE 50 MG
25 TABLET ORAL
Qty: 0 | Refills: 0 | Status: DISCONTINUED | OUTPATIENT
Start: 2018-08-15 | End: 2018-08-22

## 2018-08-15 RX ADMIN — Medication 2 TABLET(S): at 04:16

## 2018-08-15 RX ADMIN — CHLORHEXIDINE GLUCONATE 1 APPLICATION(S): 213 SOLUTION TOPICAL at 07:16

## 2018-08-15 RX ADMIN — GABAPENTIN 100 MILLIGRAM(S): 400 CAPSULE ORAL at 07:17

## 2018-08-15 RX ADMIN — Medication 1 APPLICATION(S): at 06:00

## 2018-08-15 RX ADMIN — Medication 100 MILLIGRAM(S): at 21:29

## 2018-08-15 RX ADMIN — Medication 2 TABLET(S): at 10:30

## 2018-08-15 RX ADMIN — LIDOCAINE 1 PATCH: 4 CREAM TOPICAL at 12:27

## 2018-08-15 RX ADMIN — GABAPENTIN 100 MILLIGRAM(S): 400 CAPSULE ORAL at 21:29

## 2018-08-15 RX ADMIN — POLYETHYLENE GLYCOL 3350 17 GRAM(S): 17 POWDER, FOR SOLUTION ORAL at 12:27

## 2018-08-15 RX ADMIN — Medication 3 MILLIGRAM(S): at 22:58

## 2018-08-15 RX ADMIN — ATORVASTATIN CALCIUM 20 MILLIGRAM(S): 80 TABLET, FILM COATED ORAL at 21:29

## 2018-08-15 RX ADMIN — SENNA PLUS 2 TABLET(S): 8.6 TABLET ORAL at 21:28

## 2018-08-15 RX ADMIN — LIDOCAINE 1 PATCH: 4 CREAM TOPICAL at 23:00

## 2018-08-15 RX ADMIN — SODIUM CHLORIDE 3 MILLILITER(S): 9 INJECTION INTRAMUSCULAR; INTRAVENOUS; SUBCUTANEOUS at 23:00

## 2018-08-15 RX ADMIN — Medication 2 TABLET(S): at 21:28

## 2018-08-15 RX ADMIN — Medication 40 MILLIGRAM(S): at 07:17

## 2018-08-15 RX ADMIN — Medication 2 TABLET(S): at 17:11

## 2018-08-15 RX ADMIN — Medication 25 MILLIGRAM(S): at 17:08

## 2018-08-15 RX ADMIN — Medication 25 MILLIGRAM(S): at 07:15

## 2018-08-15 RX ADMIN — Medication 81 MILLIGRAM(S): at 12:27

## 2018-08-15 RX ADMIN — Medication 2 TABLET(S): at 04:52

## 2018-08-15 RX ADMIN — HEPARIN SODIUM 5000 UNIT(S): 5000 INJECTION INTRAVENOUS; SUBCUTANEOUS at 17:08

## 2018-08-15 RX ADMIN — GABAPENTIN 100 MILLIGRAM(S): 400 CAPSULE ORAL at 14:50

## 2018-08-15 RX ADMIN — Medication 2 TABLET(S): at 11:00

## 2018-08-15 RX ADMIN — SODIUM CHLORIDE 3 MILLILITER(S): 9 INJECTION INTRAMUSCULAR; INTRAVENOUS; SUBCUTANEOUS at 14:48

## 2018-08-15 RX ADMIN — Medication 1 TABLET(S): at 12:27

## 2018-08-15 RX ADMIN — HEPARIN SODIUM 5000 UNIT(S): 5000 INJECTION INTRAVENOUS; SUBCUTANEOUS at 07:15

## 2018-08-15 RX ADMIN — Medication 2 TABLET(S): at 17:15

## 2018-08-15 RX ADMIN — Medication 100 MILLIGRAM(S): at 07:15

## 2018-08-15 RX ADMIN — Medication 100 MILLIGRAM(S): at 14:50

## 2018-08-15 RX ADMIN — Medication 1 MILLIGRAM(S): at 12:27

## 2018-08-15 RX ADMIN — PANTOPRAZOLE SODIUM 40 MILLIGRAM(S): 20 TABLET, DELAYED RELEASE ORAL at 07:17

## 2018-08-15 NOTE — ADVANCED PRACTICE NURSE CONSULT - ASSESSMENT
67yo Female with Hx CAD, TIA, HTN, COPD, seizures, AAA s/p open AAA repair (Dr. Roberto '04), EVAR/AAA/CIAA (Dr. 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 Pt with a fungal rash to bilat buttocks.

## 2018-08-15 NOTE — PROGRESS NOTE ADULT - SUBJECTIVE AND OBJECTIVE BOX
Patient discussed on morning rounds with Dr. Mendez       Operation / Date: 7/26/18 Thoracoabdominal aortic aneurysm repair     SUBJECTIVE ASSESSMENT:  Patient seen this afternoon at bedside, doing well and not offering any complaints at this time. Denies any abdominal pain, chest pain or shortness of breath.     Vital Signs Last 24 Hrs  T(C): 36.3 (15 Aug 2018 17:05), Max: 37.3 (15 Aug 2018 01:01)  T(F): 97.4 (15 Aug 2018 17:05), Max: 99.1 (15 Aug 2018 01:01)  HR: 86 (15 Aug 2018 16:05) (84 - 110)  BP: 155/72 (15 Aug 2018 16:05) (124/66 - 155/72)  BP(mean): 104 (15 Aug 2018 16:05) (75 - 108)  RR: 20 (15 Aug 2018 16:05) (18 - 35)  SpO2: 98% (15 Aug 2018 16:05) (90% - 99%)  I&O's Detail    14 Aug 2018 07:01  -  15 Aug 2018 07:00  --------------------------------------------------------  IN:    IV PiggyBack: 50 mL    Oral Fluid: 1030 mL  Total IN: 1080 mL    OUT:    Chest Tube: 10 mL    Voided: 1000 mL  Total OUT: 1010 mL    Total NET: 70 mL      15 Aug 2018 07:01  -  15 Aug 2018 18:17  --------------------------------------------------------  IN:  Total IN: 0 mL    OUT:    Voided: 750 mL  Total OUT: 750 mL    Total NET: -750 mL    CHEST TUBE:  No  COY DRAIN:  No  EPICARDIAL WIRES: No  TIE DOWNS: Yes  BARBA: No    PHYSICAL EXAM:    General: Patient sitting comfortably in a chair, no acute distress     Neurological: Alert and oriented. No focal neurological deficits     Cardiovascular: S1S2, RRR, no murmurs appreciated on exam     Respiratory: Poor inspiratory effort, decreased breath sounds at bases bilaterally     Gastrointestinal: Abdomen soft, non tender, non distended     Extremities: Warm and well perfused. 1+ pitting edema bilaterally. No calf tenderness   R fingertips ischemic with darkening   Bilateral toes cyanotic     Vascular: Peripheral pulses 1+ bilaterally     Incision Sites:  Thoracoabdominal incision C/D/I with scabbing .   Chest tube sites with tie downs in place     LABS:                        9.3    7.1   )-----------( 311      ( 15 Aug 2018 03:51 )             29.1       COUMADIN:  No    PT/INR - ( 14 Aug 2018 03:46 )   PT: 12.3 sec;   INR: 1.11          PTT - ( 14 Aug 2018 03:46 )  PTT:27.3 sec    08-15    135  |  96  |  25<H>  ----------------------------<  105<H>  4.5   |  29  |  1.00    Ca    8.6      15 Aug 2018 03:51  Phos  3.8     08-14  Mg     2.1     08-15    TPro  6.1  /  Alb  2.8<L>  /  TBili  0.9  /  DBili  x   /  AST  39  /  ALT  40  /  AlkPhos  86  08-14    MEDICATIONS  (STANDING):  aspirin enteric coated 81 milliGRAM(s) Oral daily  atorvastatin 20 milliGRAM(s) Oral at bedtime  docusate sodium 100 milliGRAM(s) Oral three times a day  folic acid 1 milliGRAM(s) Oral daily  furosemide    Tablet 40 milliGRAM(s) Oral daily  gabapentin 100 milliGRAM(s) Oral every 8 hours  heparin  Injectable 5000 Unit(s) SubCutaneous every 12 hours  lidocaine   Patch 1 Patch Transdermal daily  melatonin 3 milliGRAM(s) Oral at bedtime  metoprolol tartrate 25 milliGRAM(s) Oral two times a day  multivitamin 1 Tablet(s) Oral daily  pantoprazole    Tablet 40 milliGRAM(s) Oral before breakfast  polyethylene glycol 3350 17 Gram(s) Oral daily  povidone iodine 10% Solution 1 Application(s) Topical two times a day  senna 2 Tablet(s) Oral at bedtime  sodium chloride 0.9% lock flush 3 milliLiter(s) IV Push every 8 hours    MEDICATIONS  (PRN):  acetaminophen   Tablet. 650 milliGRAM(s) Oral every 6 hours PRN Mild Pain (1 - 3)  acetaminophen 300 mG/codeine 30 mG 2 Tablet(s) Oral every 4 hours PRN Moderate Pain (4 - 6)    RADIOLOGY & ADDITIONAL TESTS:    A/P: 68 year old female with PMhx of CAD, TIA in 1998, COPD, AAA s/p open AAA repair by Dr. Roberto in 2004, EVAR/AAA/CIAA with Dr. Burch on 3/29/17 who was referred to Dr. 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  She completed outpatient pre-operative evaluation and was deemed a surgical candidate for thoracoabdominal aneurysm repair and admitted to [facility] on 7/25/18 under the care of Dr. Monson in anticipated of planned procedure on 7/26.  Patient underwent thoracoabdominal aortic aneurysm repair with Dr. Monson, Marcin Robbins and Mikal Robbins on 7/26, intraop patient defecated on the table after induction concerning or colitis. She received 13u prbc, 2u plt, 5 FFP, 20 cryo. Arrived to CT ICU stable. Flex sig performed on POD#1 revealed no evidence of ischemia. POD#2 patient noted to have LLE weakness, MAPs were kept elevated for perfusion. She was note to have rising lactate on POD#3, Pan CT scan revealed no endoleak and she was volume resuscitated with 2u prbc, 2u plts and albumin with improvement in lactic acidosis. POD#4 patient noted to have thrombocytopenia despite Plt transfusions, heme consulted at that time and patient was given cross matched platelets on POD#7. POD#6 lumbar drain capped temporarily but patient developed worsening LLE weakness. Distal LE cyanosis was noted and she was given pulse steroids. Patient continued to improve, remained in ICU for weakness and transferred to  on POD#20.     Neurovascular: No delirium. Pain well controlled with current regimen.  -PRN's.    Cardiovascular: Hemodynamically stable. HR controlled.  -BP. HR. EKG. TTE. Cardiac Panel. Lipid Panel. BNP.   -ASA. Plavix. BBlocker. Statin.      Respiratory: 02 Sat = 98% on RA.  -If on oxygen wean to RA from for O2 Sat > 93%.  -Encourage C+DB and Use of IS 10x / hr while awake.  -CXR.    GI: Stable.  -NPO after MN.  -PPX.  -PO Diet.    Renal / :  -Monitor renal function.  -Monitor I/O's.    Endocrine:    -A1c.  -TSH.    Hematologic:  -CBC.  -Coagulation Panel.    ID:  -Tempature.  -CBC.  -Observe for SIRS/Sepsis Syndrome.    Prophylaxis:  -DVT prophylaxis with 5000 SubQ Heparin q8h.  -SCD's    Disposition:  -ICU for frequent monitoring.: Patient discussed on morning rounds with Dr. Mendez       Operation / Date: 7/26/18 Thoracoabdominal aortic aneurysm repair     SUBJECTIVE ASSESSMENT:  Patient seen this afternoon at bedside, doing well and not offering any complaints at this time. Denies any abdominal pain, chest pain or shortness of breath.     Vital Signs Last 24 Hrs  T(C): 36.3 (15 Aug 2018 17:05), Max: 37.3 (15 Aug 2018 01:01)  T(F): 97.4 (15 Aug 2018 17:05), Max: 99.1 (15 Aug 2018 01:01)  HR: 86 (15 Aug 2018 16:05) (84 - 110)  BP: 155/72 (15 Aug 2018 16:05) (124/66 - 155/72)  BP(mean): 104 (15 Aug 2018 16:05) (75 - 108)  RR: 20 (15 Aug 2018 16:05) (18 - 35)  SpO2: 98% (15 Aug 2018 16:05) (90% - 99%)  I&O's Detail    14 Aug 2018 07:01  -  15 Aug 2018 07:00  --------------------------------------------------------  IN:    IV PiggyBack: 50 mL    Oral Fluid: 1030 mL  Total IN: 1080 mL    OUT:    Chest Tube: 10 mL    Voided: 1000 mL  Total OUT: 1010 mL    Total NET: 70 mL      15 Aug 2018 07:01  -  15 Aug 2018 18:17  --------------------------------------------------------  IN:  Total IN: 0 mL    OUT:    Voided: 750 mL  Total OUT: 750 mL    Total NET: -750 mL    CHEST TUBE:  No  COY DRAIN:  No  EPICARDIAL WIRES: No  TIE DOWNS: Yes  BARBA: No    PHYSICAL EXAM:    General: Patient sitting comfortably in a chair, no acute distress     Neurological: Alert and oriented. No focal neurological deficits     Cardiovascular: S1S2, RRR, no murmurs appreciated on exam     Respiratory: Poor inspiratory effort, decreased breath sounds at bases bilaterally     Gastrointestinal: Abdomen soft, non tender, non distended     Extremities: Warm and well perfused. 1+ pitting edema bilaterally. No calf tenderness   R fingertips ischemic with darkening   Bilateral toes cyanotic     Vascular: Peripheral pulses 1+ bilaterally     Incision Sites:  Thoracoabdominal incision C/D/I with scabbing .   Chest tube sites with tie downs in place     LABS:                        9.3    7.1   )-----------( 311      ( 15 Aug 2018 03:51 )             29.1       COUMADIN:  No    PT/INR - ( 14 Aug 2018 03:46 )   PT: 12.3 sec;   INR: 1.11          PTT - ( 14 Aug 2018 03:46 )  PTT:27.3 sec    08-15    135  |  96  |  25<H>  ----------------------------<  105<H>  4.5   |  29  |  1.00    Ca    8.6      15 Aug 2018 03:51  Phos  3.8     08-14  Mg     2.1     08-15    TPro  6.1  /  Alb  2.8<L>  /  TBili  0.9  /  DBili  x   /  AST  39  /  ALT  40  /  AlkPhos  86  08-14    MEDICATIONS  (STANDING):  aspirin enteric coated 81 milliGRAM(s) Oral daily  atorvastatin 20 milliGRAM(s) Oral at bedtime  docusate sodium 100 milliGRAM(s) Oral three times a day  folic acid 1 milliGRAM(s) Oral daily  furosemide    Tablet 40 milliGRAM(s) Oral daily  gabapentin 100 milliGRAM(s) Oral every 8 hours  heparin  Injectable 5000 Unit(s) SubCutaneous every 12 hours  lidocaine   Patch 1 Patch Transdermal daily  melatonin 3 milliGRAM(s) Oral at bedtime  metoprolol tartrate 25 milliGRAM(s) Oral two times a day  multivitamin 1 Tablet(s) Oral daily  pantoprazole    Tablet 40 milliGRAM(s) Oral before breakfast  polyethylene glycol 3350 17 Gram(s) Oral daily  povidone iodine 10% Solution 1 Application(s) Topical two times a day  senna 2 Tablet(s) Oral at bedtime  sodium chloride 0.9% lock flush 3 milliLiter(s) IV Push every 8 hours    MEDICATIONS  (PRN):  acetaminophen   Tablet. 650 milliGRAM(s) Oral every 6 hours PRN Mild Pain (1 - 3)  acetaminophen 300 mG/codeine 30 mG 2 Tablet(s) Oral every 4 hours PRN Moderate Pain (4 - 6)    RADIOLOGY & ADDITIONAL TESTS:    A/P: 68 year old female with PMhx of CAD, TIA in 1998, COPD, AAA s/p open AAA repair by Dr. Roberto in 2004, EVAR/AAA/CIAA with Dr. Burch on 3/29/17 who was referred to Dr. oMnson for 5.4cm descending thoracic aortic aneurysm.  She completed outpatient pre-operative evaluation and was deemed a surgical candidate for thoracoabdominal aneurysm repair and admitted to [facility] on 7/25/18 under the care of Dr. Monson in anticipated of planned procedure on 7/26.  Patient underwent thoracoabdominal aortic aneurysm repair with Dr. Monson, Marcin Robbins and Mikal Robbins on 7/26, intraop patient defecated on the table after induction concerning or colitis. She received 13u prbc, 2u plt, 5 FFP, 20 cryo. Arrived to CT ICU stable. Flex sig performed on POD#1 revealed no evidence of ischemia. POD#2 patient noted to have LLE weakness, MAPs were kept elevated for perfusion. She was note to have rising lactate on POD#3, Pan CT scan revealed no endoleak and she was volume resuscitated with 2u prbc, 2u plts and albumin with improvement in lactic acidosis. POD#4 patient noted to have thrombocytopenia despite Plt transfusions, heme consulted at that time and patient was given cross matched platelets on POD#7. POD#6 lumbar drain capped temporarily but patient developed worsening LLE weakness. Distal LE cyanosis was noted and she was given pulse steroids. Extubated on 8/5/18. Patient continued to improve, remained in ICU for weakness and transferred to  on POD#20.     Neurovascular: Stable LLE weakness. Pain well controlled with current regimen.  - continue tylenol PRN   - Continue gababpentin 100mg TID     Cardiovascular: Hemodynamically stable. HR controlled.  - s/p thoracoabdominal aortic aneurysm repair, Hx of CAD - continue asa 81mg, atorvastatin 20mg qhs, metoprolol 25mg BID     Respiratory: 02 Sat = 98% on 2L NC   - Continue to wean O2 to maintain SaO2 > 93%   - CXR stable, follow up in AM   - Encourage C+DB and Use of IS 10x / hr while awake.    GI: Stable.  - Continue PO diet   - Continue protonix 40mg for GI ppx   - Continue bowel regimen     Renal / : Cr 1.0, no active issues   - Continue diuresis with lasix 40mg daily   - Monitor renal function.  - Monitor I/O's.    Endocrine: hgba1c 5.6, TSH 1.613   - no active issues      Hematologic: thrombocytopenia, now resolved   - Continue betadyne on toes for ischemia     Prophylaxis:  -DVT prophylaxis with 5000 SubQ Heparin BID   -SCD's    Disposition: LTAC when medically ready

## 2018-08-15 NOTE — ADVANCED PRACTICE NURSE CONSULT - RECOMMEDATIONS
Recommend antifungal moisture barrier ointment to site bid and prn. Spoke with house staff and ERIC Laurent.

## 2018-08-16 LAB
ANION GAP SERPL CALC-SCNC: 12 MMOL/L — SIGNIFICANT CHANGE UP (ref 5–17)
APPEARANCE UR: ABNORMAL
APTT BLD: 28 SEC — SIGNIFICANT CHANGE UP (ref 27.5–37.4)
BACTERIA # UR AUTO: ABNORMAL /HPF
BILIRUB UR-MCNC: NEGATIVE — SIGNIFICANT CHANGE UP
BUN SERPL-MCNC: 24 MG/DL — HIGH (ref 7–23)
CALCIUM SERPL-MCNC: 8.9 MG/DL — SIGNIFICANT CHANGE UP (ref 8.4–10.5)
CHLORIDE SERPL-SCNC: 96 MMOL/L — SIGNIFICANT CHANGE UP (ref 96–108)
CO2 SERPL-SCNC: 27 MMOL/L — SIGNIFICANT CHANGE UP (ref 22–31)
COLOR SPEC: YELLOW — SIGNIFICANT CHANGE UP
CREAT SERPL-MCNC: 1.04 MG/DL — SIGNIFICANT CHANGE UP (ref 0.5–1.3)
DIFF PNL FLD: ABNORMAL
GLUCOSE SERPL-MCNC: 98 MG/DL — SIGNIFICANT CHANGE UP (ref 70–99)
GLUCOSE UR QL: NEGATIVE — SIGNIFICANT CHANGE UP
HCT VFR BLD CALC: 32.1 % — LOW (ref 34.5–45)
HGB BLD-MCNC: 10 G/DL — LOW (ref 11.5–15.5)
INR BLD: 1.14 — SIGNIFICANT CHANGE UP (ref 0.88–1.16)
KETONES UR-MCNC: NEGATIVE — SIGNIFICANT CHANGE UP
LEUKOCYTE ESTERASE UR-ACNC: ABNORMAL
MAGNESIUM SERPL-MCNC: 1.9 MG/DL — SIGNIFICANT CHANGE UP (ref 1.6–2.6)
MCHC RBC-ENTMCNC: 29.9 PG — SIGNIFICANT CHANGE UP (ref 27–34)
MCHC RBC-ENTMCNC: 31.2 G/DL — LOW (ref 32–36)
MCV RBC AUTO: 96.1 FL — SIGNIFICANT CHANGE UP (ref 80–100)
NITRITE UR-MCNC: NEGATIVE — SIGNIFICANT CHANGE UP
PH UR: 6.5 — SIGNIFICANT CHANGE UP (ref 5–8)
PLATELET # BLD AUTO: 336 K/UL — SIGNIFICANT CHANGE UP (ref 150–400)
POTASSIUM SERPL-MCNC: 4.7 MMOL/L — SIGNIFICANT CHANGE UP (ref 3.5–5.3)
POTASSIUM SERPL-SCNC: 4.7 MMOL/L — SIGNIFICANT CHANGE UP (ref 3.5–5.3)
PROT UR-MCNC: NEGATIVE MG/DL — SIGNIFICANT CHANGE UP
PROTHROM AB SERPL-ACNC: 12.7 SEC — SIGNIFICANT CHANGE UP (ref 9.8–12.7)
RBC # BLD: 3.34 M/UL — LOW (ref 3.8–5.2)
RBC # FLD: 16.3 % — SIGNIFICANT CHANGE UP (ref 10.3–16.9)
RBC CASTS # UR COMP ASSIST: < 5 /HPF — SIGNIFICANT CHANGE UP
SODIUM SERPL-SCNC: 135 MMOL/L — SIGNIFICANT CHANGE UP (ref 135–145)
SP GR SPEC: <=1.005 — SIGNIFICANT CHANGE UP (ref 1–1.03)
UROBILINOGEN FLD QL: 0.2 E.U./DL — SIGNIFICANT CHANGE UP
WBC # BLD: 6.9 K/UL — SIGNIFICANT CHANGE UP (ref 3.8–10.5)
WBC # FLD AUTO: 6.9 K/UL — SIGNIFICANT CHANGE UP (ref 3.8–10.5)
WBC UR QL: ABNORMAL /HPF

## 2018-08-16 PROCEDURE — 71045 X-RAY EXAM CHEST 1 VIEW: CPT | Mod: 26

## 2018-08-16 RX ORDER — GABAPENTIN 400 MG/1
100 CAPSULE ORAL ONCE
Qty: 0 | Refills: 0 | Status: COMPLETED | OUTPATIENT
Start: 2018-08-16 | End: 2018-08-16

## 2018-08-16 RX ORDER — OXYCODONE AND ACETAMINOPHEN 5; 325 MG/1; MG/1
2 TABLET ORAL EVERY 4 HOURS
Qty: 0 | Refills: 0 | Status: DISCONTINUED | OUTPATIENT
Start: 2018-08-16 | End: 2018-08-16

## 2018-08-16 RX ORDER — ACETAMINOPHEN WITH CODEINE 300MG-30MG
1 TABLET ORAL ONCE
Qty: 0 | Refills: 0 | Status: DISCONTINUED | OUTPATIENT
Start: 2018-08-16 | End: 2018-08-16

## 2018-08-16 RX ORDER — GABAPENTIN 400 MG/1
200 CAPSULE ORAL EVERY 8 HOURS
Qty: 0 | Refills: 0 | Status: DISCONTINUED | OUTPATIENT
Start: 2018-08-16 | End: 2018-08-22

## 2018-08-16 RX ORDER — ACETAMINOPHEN WITH CODEINE 300MG-30MG
2 TABLET ORAL EVERY 4 HOURS
Qty: 0 | Refills: 0 | Status: DISCONTINUED | OUTPATIENT
Start: 2018-08-16 | End: 2018-08-22

## 2018-08-16 RX ORDER — MAGNESIUM OXIDE 400 MG ORAL TABLET 241.3 MG
800 TABLET ORAL ONCE
Qty: 0 | Refills: 0 | Status: COMPLETED | OUTPATIENT
Start: 2018-08-16 | End: 2018-08-16

## 2018-08-16 RX ORDER — CHLORHEXIDINE GLUCONATE 213 G/1000ML
1 SOLUTION TOPICAL DAILY
Qty: 0 | Refills: 0 | Status: DISCONTINUED | OUTPATIENT
Start: 2018-08-16 | End: 2018-08-22

## 2018-08-16 RX ADMIN — Medication 2 TABLET(S): at 23:30

## 2018-08-16 RX ADMIN — SODIUM CHLORIDE 3 MILLILITER(S): 9 INJECTION INTRAMUSCULAR; INTRAVENOUS; SUBCUTANEOUS at 22:22

## 2018-08-16 RX ADMIN — SENNA PLUS 2 TABLET(S): 8.6 TABLET ORAL at 22:18

## 2018-08-16 RX ADMIN — Medication 100 MILLIGRAM(S): at 13:57

## 2018-08-16 RX ADMIN — Medication 25 MILLIGRAM(S): at 17:22

## 2018-08-16 RX ADMIN — Medication 100 MILLIGRAM(S): at 22:18

## 2018-08-16 RX ADMIN — PANTOPRAZOLE SODIUM 40 MILLIGRAM(S): 20 TABLET, DELAYED RELEASE ORAL at 06:11

## 2018-08-16 RX ADMIN — GABAPENTIN 100 MILLIGRAM(S): 400 CAPSULE ORAL at 06:11

## 2018-08-16 RX ADMIN — CHLORHEXIDINE GLUCONATE 1 APPLICATION(S): 213 SOLUTION TOPICAL at 06:13

## 2018-08-16 RX ADMIN — Medication 3 MILLIGRAM(S): at 22:18

## 2018-08-16 RX ADMIN — Medication 1 TABLET(S): at 12:29

## 2018-08-16 RX ADMIN — Medication 2 TABLET(S): at 17:55

## 2018-08-16 RX ADMIN — POLYETHYLENE GLYCOL 3350 17 GRAM(S): 17 POWDER, FOR SOLUTION ORAL at 12:29

## 2018-08-16 RX ADMIN — Medication 2 TABLET(S): at 00:32

## 2018-08-16 RX ADMIN — ATORVASTATIN CALCIUM 20 MILLIGRAM(S): 80 TABLET, FILM COATED ORAL at 22:18

## 2018-08-16 RX ADMIN — MAGNESIUM OXIDE 400 MG ORAL TABLET 800 MILLIGRAM(S): 241.3 TABLET ORAL at 09:28

## 2018-08-16 RX ADMIN — GABAPENTIN 200 MILLIGRAM(S): 400 CAPSULE ORAL at 22:18

## 2018-08-16 RX ADMIN — Medication 2 TABLET(S): at 22:20

## 2018-08-16 RX ADMIN — SODIUM CHLORIDE 3 MILLILITER(S): 9 INJECTION INTRAMUSCULAR; INTRAVENOUS; SUBCUTANEOUS at 06:14

## 2018-08-16 RX ADMIN — LIDOCAINE 1 PATCH: 4 CREAM TOPICAL at 12:30

## 2018-08-16 RX ADMIN — Medication 2 TABLET(S): at 17:23

## 2018-08-16 RX ADMIN — GABAPENTIN 100 MILLIGRAM(S): 400 CAPSULE ORAL at 13:59

## 2018-08-16 RX ADMIN — Medication 25 MILLIGRAM(S): at 06:11

## 2018-08-16 RX ADMIN — HEPARIN SODIUM 5000 UNIT(S): 5000 INJECTION INTRAVENOUS; SUBCUTANEOUS at 06:12

## 2018-08-16 RX ADMIN — Medication 81 MILLIGRAM(S): at 12:29

## 2018-08-16 RX ADMIN — SODIUM CHLORIDE 3 MILLILITER(S): 9 INJECTION INTRAMUSCULAR; INTRAVENOUS; SUBCUTANEOUS at 13:55

## 2018-08-16 RX ADMIN — Medication 1 TABLET(S): at 13:00

## 2018-08-16 RX ADMIN — Medication 1 APPLICATION(S): at 17:23

## 2018-08-16 RX ADMIN — Medication 40 MILLIGRAM(S): at 06:11

## 2018-08-16 RX ADMIN — Medication 1 MILLIGRAM(S): at 12:29

## 2018-08-16 RX ADMIN — HEPARIN SODIUM 5000 UNIT(S): 5000 INJECTION INTRAVENOUS; SUBCUTANEOUS at 17:23

## 2018-08-16 RX ADMIN — Medication 100 MILLIGRAM(S): at 06:15

## 2018-08-16 NOTE — PROGRESS NOTE ADULT - ASSESSMENT
A/P:  Neurovascular: No delirium. Pain well controlled with current regimen.  -PRN's.    Cardiovascular: Hemodynamically stable. HR controlled.  -BP. HR. EKG. TTE. Cardiac Panel. Lipid Panel. BNP.   -ASA. Plavix. BBlocker. Statin.      Respiratory: 02 Sat = 98% on RA.  -If on oxygen wean to RA from for O2 Sat > 93%.  -Encourage C+DB and Use of IS 10x / hr while awake.  -CXR.    GI: Stable.  -NPO after MN.  -PPX.  -PO Diet.    Renal / :  -Monitor renal function.  -Monitor I/O's.    Endocrine:    -A1c.  -TSH.    Hematologic:  -CBC.  -Coagulation Panel.    ID:  -Temperature.  -CBC.  -Observe for SIRS/Sepsis Syndrome.    Prophylaxis:  -DVT prophylaxis with 5000 SubQ Heparin q8h.  -SCD's    Disposition:  -ICU for frequent monitoring. A/P:  Neurovascular: No delirium. Pain not adequately controlled, restart tylenol #3/inc Gabapentin 100mg tid, cont lidocaine patch  -melatonin 3mg hs    Cardiovascular: Hemodynamically stable. HR controlled.  -lopressor 25mg bid, Aspirin 81mg daily, lipitor 20mg hs    Respiratory: 02 Sat = 98% on RA.  -Encourage C+DB and Use of IS 10x / hr while awake.  - daily CXR.    GI: Stable.  -PPX.  -PO Diet  Bowel regimen (senna/colace)    Renal / :  -Monitor renal function.  -Monitor I/O's  -lasix 40mg daily    Endocrine:    -blood glucose controlled    Hematologic:  -H/H stable  -Thrombocytopenia improved\    ID:  -afebrile  -wbc wnl    Prophylaxis:  -DVT prophylaxis with 5000 SubQ Heparin q12h.  -SCD's    Disposition:  -Acute rehab vs [facility] 68 year old female with PMhx of CAD, TIA in 1998, COPD, AAA s/p open AAA repair by Dr. Roberto in 2004, EVAR/AAA/CIAA with Dr. Burch on 3/29/17 who was referred to Dr. 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 Patient is s/p thoracoabdominal aortic aneurysm repair on 7/26/19 with Dr. Monson, Marcin Robbins and Mikal Robbins,  She received multiple blood products (13u prbc, 2u plt, 5 FFP, 20 cry)o and  arrived to CT ICU stable. Flex sig POD#1 performed to investigate BM episode in OR   revealed no evidence of ischemia. POD#2 patient noted to have LLE weakness, MAPs were kept elevated for perfusion. She was note to have rising lactate on POD#3, Pan CT scan revealed no endoleak and she was volume resuscitated with 2u prbc, 2u plts and albumin with improvement in lactic acidosis. POD#4 patient noted to have thrombocytopenia despite Plt transfusions, heme consulted at that time and patient was given cross matched platelets on POD#7. POD#6 lumbar drain capped temporarily but patient developed worsening LLE weakness. Distal LE cyanosis was noted and she was given pulse steroids. Extubated on 8/5/18. Patient continued to improve, remained in ICU for weakness and transferred to  on POD#20 and continues to progress working with PT.    Plan:  Neurovascular: No delirium. Pain not adequately controlled, restart tylenol #3/inc Gabapentin 100mg tid, cont lidocaine patch  -melatonin 3mg hs    Cardiovascular: Hemodynamically stable. HR controlled.  -lopressor 25mg bid, Aspirin 81mg daily, lipitor 20mg hs    Respiratory: 02 Sat = 98% on RA.  -Encourage C+DB and Use of IS 10x / hr while awake.  - daily CXR.    GI: Stable.  -PPX.  -PO Diet  Bowel regimen (senna/colace)    Renal / :  -Monitor renal function.  -Monitor I/O's  -lasix 40mg daily    Endocrine:    -blood glucose controlled    Hematologic:  -H/H stable  -Thrombocytopenia improved\    ID:  -afebrile  -wbc wnl    Prophylaxis:  -DVT prophylaxis with 5000 SubQ Heparin q12h.  -SCD's    Disposition:  -Acute rehab vs [facility]

## 2018-08-16 NOTE — PROGRESS NOTE ADULT - SUBJECTIVE AND OBJECTIVE BOX
Patient discussed on morning rounds with       Operation / Date:     SUBJECTIVE ASSESSMENT:  68y Female         Vital Signs Last 24 Hrs  T(C): 36.2 (16 Aug 2018 16:47), Max: 36.9 (15 Aug 2018 21:41)  T(F): 97.1 (16 Aug 2018 16:47), Max: 98.5 (15 Aug 2018 21:41)  HR: 100 (16 Aug 2018 17:21) (84 - 106)  BP: 145/67 (16 Aug 2018 17:21) (131/60 - 155/701)  BP(mean): 97 (16 Aug 2018 17:21) (84 - 101)  RR: 16 (16 Aug 2018 17:21) (16 - 18)  SpO2: 100% (16 Aug 2018 17:21) (93% - 100%)  I&O's Detail    15 Aug 2018 07:01  -  16 Aug 2018 07:00  --------------------------------------------------------  IN:    Oral Fluid: 320 mL  Total IN: 320 mL    OUT:    Voided: 2800 mL  Total OUT: 2800 mL    Total NET: -2480 mL      16 Aug 2018 07:01  -  16 Aug 2018 18:29  --------------------------------------------------------  IN:  Total IN: 0 mL    OUT:    Voided: 350 mL  Total OUT: 350 mL    Total NET: -350 mL          CHEST TUBE:  Yes/No. AIR LEAKS: Yes/No. Suction / H2O SEAL.   COY DRAIN:  Yes/No.  EPICARDIAL WIRES: Yes/No.  TIE DOWNS: Yes/No.  BARBA: Yes/No.    PHYSICAL EXAM:    General:     Neurological:    Cardiovascular:    Respiratory:    Gastrointestinal:    Extremities:    Vascular:    Incision Sites:    LABS:                        10.0   6.9   )-----------( 336      ( 16 Aug 2018 05:42 )             32.1       COUMADIN:  Yes/No. REASON: .    PT/INR - ( 16 Aug 2018 05:42 )   PT: 12.7 sec;   INR: 1.14          PTT - ( 16 Aug 2018 05:42 )  PTT:28.0 sec    08-16    135  |  96  |  24<H>  ----------------------------<  98  4.7   |  27  |  1.04    Ca    8.9      16 Aug 2018 05:42  Mg     1.9     08-16    MEDICATIONS  (STANDING):  aspirin enteric coated 81 milliGRAM(s) Oral daily  atorvastatin 20 milliGRAM(s) Oral at bedtime  chlorhexidine 2% Cloths 1 Application(s) Topical daily  chlorhexidine 2% Cloths 1 Application(s) Topical daily  docusate sodium 100 milliGRAM(s) Oral three times a day  folic acid 1 milliGRAM(s) Oral daily  furosemide    Tablet 40 milliGRAM(s) Oral daily  gabapentin 200 milliGRAM(s) Oral every 8 hours  heparin  Injectable 5000 Unit(s) SubCutaneous every 12 hours  lidocaine   Patch 1 Patch Transdermal daily  melatonin 3 milliGRAM(s) Oral at bedtime  metoprolol tartrate 25 milliGRAM(s) Oral two times a day  multivitamin 1 Tablet(s) Oral daily  pantoprazole    Tablet 40 milliGRAM(s) Oral before breakfast  polyethylene glycol 3350 17 Gram(s) Oral daily  povidone iodine 10% Solution 1 Application(s) Topical two times a day  senna 2 Tablet(s) Oral at bedtime  sodium chloride 0.9% lock flush 3 milliLiter(s) IV Push every 8 hours    MEDICATIONS  (PRN):  acetaminophen   Tablet. 650 milliGRAM(s) Oral every 6 hours PRN Mild Pain (1 - 3)  acetaminophen 300 mG/codeine 30 mG 2 Tablet(s) Oral every 4 hours PRN Moderate Pain (4 - 6)        RADIOLOGY & ADDITIONAL TESTS: Patient discussed on morning rounds with Dr. Monson    Operation / Date: 7/26/18 Thoracoabdominal aortic aneurysm repair     SUBJECTIVE ASSESSMENT:  68year old female who appeared to be uncomfortable and upon questioning her admitted she has a lot of pain which responds to tylenol #3 not percocet.  Patient was reassured this would be ordered.    Vital Signs Last 24 Hrs  T(C): 36.2 (16 Aug 2018 16:47), Max: 36.9 (15 Aug 2018 21:41)  T(F): 97.1 (16 Aug 2018 16:47), Max: 98.5 (15 Aug 2018 21:41)  HR: 100 (16 Aug 2018 17:21) (84 - 106)  BP: 145/67 (16 Aug 2018 17:21) (131/60 - 155/701)  BP(mean): 97 (16 Aug 2018 17:21) (84 - 101)  RR: 16 (16 Aug 2018 17:21) (16 - 18)  SpO2: 100% (16 Aug 2018 17:21) (93% - 100%)  I&O's Detail    15 Aug 2018 07:01  -  16 Aug 2018 07:00  --------------------------------------------------------  IN:    Oral Fluid: 320 mL  Total IN: 320 mL    OUT:    Voided: 2800 mL  Total OUT: 2800 mL    Total NET: -2480 mL      16 Aug 2018 07:01  -  16 Aug 2018 18:29  --------------------------------------------------------  IN:  Total IN: 0 mL    OUT:    Voided: 350 mL  Total OUT: 350 mL    Total NET: -350 mL    CHEST TUBE: No  COY DRAIN:No.  EPICARDIAL WIRES:No.  TIE DOWNS: No.  BARBA: No.    PHYSICAL EXAM:    General: Patient seen OOB in NAD  Neurological:  Alert and oriented x3, no gross deficits appreciated                   Cardiovascular:     RRR, S1S2, no murmur  Respiratory: b/l air entry with decreased breath sound Left base, mild rales b/l, no rhonchi, no wheeze  Gastrointestinal:  soft, nontender, positive bowel sounds  Extremities:  2+ LE edema  Vascular: radial 2+ b/l,  femoral R>L, DP R>L)  Incision Sites:Left thoracotomy site with scab along the incision line c/d/i no drainage., Left PICC in place, healing tape burn Left posterior chest    LABS:                        10.0   6.9   )-----------( 336      ( 16 Aug 2018 05:42 )             32.1       COUMADIN: No. REASON: .    PT/INR - ( 16 Aug 2018 05:42 )   PT: 12.7 sec;   INR: 1.14          PTT - ( 16 Aug 2018 05:42 )  PTT:28.0 sec    08-16    135  |  96  |  24<H>  ----------------------------<  98  4.7   |  27  |  1.04    Ca    8.9      16 Aug 2018 05:42  Mg     1.9     08-16    MEDICATIONS  (STANDING):  aspirin enteric coated 81 milliGRAM(s) Oral daily  atorvastatin 20 milliGRAM(s) Oral at bedtime  chlorhexidine 2% Cloths 1 Application(s) Topical daily  chlorhexidine 2% Cloths 1 Application(s) Topical daily  docusate sodium 100 milliGRAM(s) Oral three times a day  folic acid 1 milliGRAM(s) Oral daily  furosemide    Tablet 40 milliGRAM(s) Oral daily  gabapentin 200 milliGRAM(s) Oral every 8 hours  heparin  Injectable 5000 Unit(s) SubCutaneous every 12 hours  lidocaine   Patch 1 Patch Transdermal daily  melatonin 3 milliGRAM(s) Oral at bedtime  metoprolol tartrate 25 milliGRAM(s) Oral two times a day  multivitamin 1 Tablet(s) Oral daily  pantoprazole    Tablet 40 milliGRAM(s) Oral before breakfast  polyethylene glycol 3350 17 Gram(s) Oral daily  povidone iodine 10% Solution 1 Application(s) Topical two times a day  senna 2 Tablet(s) Oral at bedtime  sodium chloride 0.9% lock flush 3 milliLiter(s) IV Push every 8 hours    MEDICATIONS  (PRN):  acetaminophen   Tablet. 650 milliGRAM(s) Oral every 6 hours PRN Mild Pain (1 - 3)  acetaminophen 300 mG/codeine 30 mG 2 Tablet(s) Oral every 4 hours PRN Moderate Pain (4 - 6)    RADIOLOGY & ADDITIONAL TESTS:    < from: Xray Chest 1 View- PORTABLE-Routine (08.16.18 @ 06:56) >  FINDINGS: Heart size is stable. Left arm PICC line again noted to be in   place. Right lung is clear. Stable left pleural effusion. No   pneumothorax. There is stable interstitial edema.    IMPRESSION: No significant interval change.    < end of copied text >

## 2018-08-17 LAB
ANION GAP SERPL CALC-SCNC: 11 MMOL/L — SIGNIFICANT CHANGE UP (ref 5–17)
BUN SERPL-MCNC: 24 MG/DL — HIGH (ref 7–23)
CALCIUM SERPL-MCNC: 8.6 MG/DL — SIGNIFICANT CHANGE UP (ref 8.4–10.5)
CHLORIDE SERPL-SCNC: 95 MMOL/L — LOW (ref 96–108)
CO2 SERPL-SCNC: 28 MMOL/L — SIGNIFICANT CHANGE UP (ref 22–31)
CREAT SERPL-MCNC: 1.02 MG/DL — SIGNIFICANT CHANGE UP (ref 0.5–1.3)
GLUCOSE SERPL-MCNC: 92 MG/DL — SIGNIFICANT CHANGE UP (ref 70–99)
HCT VFR BLD CALC: 29.2 % — LOW (ref 34.5–45)
HGB BLD-MCNC: 9.4 G/DL — LOW (ref 11.5–15.5)
MAGNESIUM SERPL-MCNC: 1.7 MG/DL — SIGNIFICANT CHANGE UP (ref 1.6–2.6)
MCHC RBC-ENTMCNC: 30.4 PG — SIGNIFICANT CHANGE UP (ref 27–34)
MCHC RBC-ENTMCNC: 32.2 G/DL — SIGNIFICANT CHANGE UP (ref 32–36)
MCV RBC AUTO: 94.5 FL — SIGNIFICANT CHANGE UP (ref 80–100)
PHOSPHATE SERPL-MCNC: 3.7 MG/DL — SIGNIFICANT CHANGE UP (ref 2.5–4.5)
PLATELET # BLD AUTO: 373 K/UL — SIGNIFICANT CHANGE UP (ref 150–400)
POTASSIUM SERPL-MCNC: 4.6 MMOL/L — SIGNIFICANT CHANGE UP (ref 3.5–5.3)
POTASSIUM SERPL-SCNC: 4.6 MMOL/L — SIGNIFICANT CHANGE UP (ref 3.5–5.3)
RBC # BLD: 3.09 M/UL — LOW (ref 3.8–5.2)
RBC # FLD: 16 % — SIGNIFICANT CHANGE UP (ref 10.3–16.9)
SODIUM SERPL-SCNC: 134 MMOL/L — LOW (ref 135–145)
WBC # BLD: 7.2 K/UL — SIGNIFICANT CHANGE UP (ref 3.8–10.5)
WBC # FLD AUTO: 7.2 K/UL — SIGNIFICANT CHANGE UP (ref 3.8–10.5)

## 2018-08-17 PROCEDURE — 99233 SBSQ HOSP IP/OBS HIGH 50: CPT

## 2018-08-17 PROCEDURE — 71045 X-RAY EXAM CHEST 1 VIEW: CPT | Mod: 26

## 2018-08-17 RX ORDER — MAGNESIUM SULFATE 500 MG/ML
2 VIAL (ML) INJECTION ONCE
Qty: 0 | Refills: 0 | Status: COMPLETED | OUTPATIENT
Start: 2018-08-17 | End: 2018-08-17

## 2018-08-17 RX ADMIN — HEPARIN SODIUM 5000 UNIT(S): 5000 INJECTION INTRAVENOUS; SUBCUTANEOUS at 17:39

## 2018-08-17 RX ADMIN — Medication 100 MILLIGRAM(S): at 14:29

## 2018-08-17 RX ADMIN — Medication 100 MILLIGRAM(S): at 21:51

## 2018-08-17 RX ADMIN — Medication 2 TABLET(S): at 08:52

## 2018-08-17 RX ADMIN — ATORVASTATIN CALCIUM 20 MILLIGRAM(S): 80 TABLET, FILM COATED ORAL at 21:54

## 2018-08-17 RX ADMIN — Medication 1 TABLET(S): at 12:18

## 2018-08-17 RX ADMIN — SODIUM CHLORIDE 3 MILLILITER(S): 9 INJECTION INTRAMUSCULAR; INTRAVENOUS; SUBCUTANEOUS at 05:44

## 2018-08-17 RX ADMIN — Medication 2 TABLET(S): at 09:26

## 2018-08-17 RX ADMIN — CHLORHEXIDINE GLUCONATE 1 APPLICATION(S): 213 SOLUTION TOPICAL at 05:42

## 2018-08-17 RX ADMIN — GABAPENTIN 200 MILLIGRAM(S): 400 CAPSULE ORAL at 14:29

## 2018-08-17 RX ADMIN — Medication 2 TABLET(S): at 17:05

## 2018-08-17 RX ADMIN — Medication 81 MILLIGRAM(S): at 12:18

## 2018-08-17 RX ADMIN — Medication 1 MILLIGRAM(S): at 12:18

## 2018-08-17 RX ADMIN — Medication 100 MILLIGRAM(S): at 05:42

## 2018-08-17 RX ADMIN — Medication 1 APPLICATION(S): at 17:38

## 2018-08-17 RX ADMIN — Medication 25 MILLIGRAM(S): at 05:42

## 2018-08-17 RX ADMIN — Medication 25 MILLIGRAM(S): at 17:39

## 2018-08-17 RX ADMIN — SODIUM CHLORIDE 3 MILLILITER(S): 9 INJECTION INTRAMUSCULAR; INTRAVENOUS; SUBCUTANEOUS at 21:43

## 2018-08-17 RX ADMIN — Medication 1 TABLET(S): at 00:38

## 2018-08-17 RX ADMIN — Medication 40 MILLIGRAM(S): at 05:42

## 2018-08-17 RX ADMIN — Medication 50 GRAM(S): at 08:52

## 2018-08-17 RX ADMIN — SODIUM CHLORIDE 3 MILLILITER(S): 9 INJECTION INTRAMUSCULAR; INTRAVENOUS; SUBCUTANEOUS at 14:19

## 2018-08-17 RX ADMIN — LIDOCAINE 1 PATCH: 4 CREAM TOPICAL at 00:27

## 2018-08-17 RX ADMIN — POLYETHYLENE GLYCOL 3350 17 GRAM(S): 17 POWDER, FOR SOLUTION ORAL at 12:18

## 2018-08-17 RX ADMIN — HEPARIN SODIUM 5000 UNIT(S): 5000 INJECTION INTRAVENOUS; SUBCUTANEOUS at 05:42

## 2018-08-17 RX ADMIN — CHLORHEXIDINE GLUCONATE 1 APPLICATION(S): 213 SOLUTION TOPICAL at 12:18

## 2018-08-17 RX ADMIN — LIDOCAINE 1 PATCH: 4 CREAM TOPICAL at 12:17

## 2018-08-17 RX ADMIN — SENNA PLUS 2 TABLET(S): 8.6 TABLET ORAL at 21:51

## 2018-08-17 RX ADMIN — Medication 1 APPLICATION(S): at 05:42

## 2018-08-17 RX ADMIN — PANTOPRAZOLE SODIUM 40 MILLIGRAM(S): 20 TABLET, DELAYED RELEASE ORAL at 07:06

## 2018-08-17 RX ADMIN — Medication 2 TABLET(S): at 16:34

## 2018-08-17 RX ADMIN — GABAPENTIN 200 MILLIGRAM(S): 400 CAPSULE ORAL at 21:51

## 2018-08-17 RX ADMIN — Medication 3 MILLIGRAM(S): at 21:51

## 2018-08-17 RX ADMIN — GABAPENTIN 200 MILLIGRAM(S): 400 CAPSULE ORAL at 05:42

## 2018-08-17 NOTE — PROGRESS NOTE ADULT - SUBJECTIVE AND OBJECTIVE BOX
Patient discussed on morning rounds with Dr. Mendez       Operation / Date: 7/26/18 Thoracoabdominal aortic aneurysm repair     SUBJECTIVE ASSESSMENT:  Patient seen this afternoon at bedside, doing well and not offering any complaints at this time. States her incisional pain is continuing to improve. Denies any abdominal pain, chest pain or shortness of breath.       Vital Signs Last 24 Hrs  T(C): 36.5 (17 Aug 2018 14:13), Max: 36.6 (17 Aug 2018 10:18)  T(F): 97.7 (17 Aug 2018 14:13), Max: 97.8 (17 Aug 2018 10:18)  HR: 98 (17 Aug 2018 13:10) (86 - 100)  BP: 122/58 (17 Aug 2018 13:10) (122/58 - 159/72)  BP(mean): 84 (17 Aug 2018 13:10) (84 - 104)  RR: 16 (17 Aug 2018 09:02) (15 - 19)  SpO2: 96% (17 Aug 2018 13:10) (96% - 100%)  I&O's Detail    16 Aug 2018 07:01  -  17 Aug 2018 07:00  --------------------------------------------------------  IN:    Oral Fluid: 100 mL  Total IN: 100 mL    OUT:    Voided: 350 mL  Total OUT: 350 mL    Total NET: -250 mL    CHEST TUBE:  No  COY DRAIN:  No  EPICARDIAL WIRES: No  TIE DOWNS: Yes  BARBA: No    PHYSICAL EXAM:    General: Patient sitting comfortably in a chair, no acute distress     Neurological: Alert and oriented. No focal neurological deficits     Cardiovascular: S1S2, RRR, no murmurs appreciated on exam     Respiratory: Poor inspiratory effort, decreased breath sounds at bases bilaterally     Gastrointestinal: Abdomen soft, non tender, non distended     Extremities: Warm and well perfused. 1+ pitting edema bilaterally. No calf tenderness   R fingertips ischemic with darkening   Bilateral toes cyanotic     Vascular: Peripheral pulses 1+ bilaterally     Incision Sites:  Thoracoabdominal incision C/D/I with scabbing .   Chest tube sites with tie downs in place     LABS:                        9.4    7.2   )-----------( 373      ( 17 Aug 2018 05:59 )             29.2       COUMADIN:  No    PT/INR - ( 16 Aug 2018 05:42 )   PT: 12.7 sec;   INR: 1.14          PTT - ( 16 Aug 2018 05:42 )  PTT:28.0 sec    08-17    134<L>  |  95<L>  |  24<H>  ----------------------------<  92  4.6   |  28  |  1.02    Ca    8.6      17 Aug 2018 05:59  Phos  3.7     08-17  Mg     1.7     08-17        Urinalysis Basic - ( 16 Aug 2018 20:04 )    Color: Yellow / Appearance: Cloudy / SG: <=1.005 / pH: x  Gluc: x / Ketone: NEGATIVE  / Bili: Negative / Urobili: 0.2 E.U./dL   Blood: x / Protein: NEGATIVE mg/dL / Nitrite: NEGATIVE   Leuk Esterase: Large / RBC: < 5 /HPF / WBC Many /HPF   Sq Epi: x / Non Sq Epi: x / Bacteria: Many /HPF        MEDICATIONS  (STANDING):  aspirin enteric coated 81 milliGRAM(s) Oral daily  atorvastatin 20 milliGRAM(s) Oral at bedtime  docusate sodium 100 milliGRAM(s) Oral three times a day  folic acid 1 milliGRAM(s) Oral daily  furosemide    Tablet 40 milliGRAM(s) Oral daily  gabapentin 200 milliGRAM(s) Oral every 8 hours  heparin  Injectable 5000 Unit(s) SubCutaneous every 12 hours  lidocaine   Patch 1 Patch Transdermal daily  melatonin 3 milliGRAM(s) Oral at bedtime  metoprolol tartrate 25 milliGRAM(s) Oral two times a day  multivitamin 1 Tablet(s) Oral daily  pantoprazole    Tablet 40 milliGRAM(s) Oral before breakfast  polyethylene glycol 3350 17 Gram(s) Oral daily  senna 2 Tablet(s) Oral at bedtime  trimethoprim  160 mG/sulfamethoxazole 800 mG 1 Tablet(s) Oral every 12 hours    MEDICATIONS  (PRN):  acetaminophen   Tablet. 650 milliGRAM(s) Oral every 6 hours PRN Mild Pain (1 - 3)  acetaminophen 300 mG/codeine 30 mG 2 Tablet(s) Oral every 4 hours PRN Moderate Pain (4 - 6)    RADIOLOGY & ADDITIONAL TESTS:    A/P: 68 year old female with PMhx of CAD, TIA in 1998, COPD, AAA s/p open AAA repair by Dr. Roberto in 2004, EVAR/AAA/CIAA with Dr. Burch on 3/29/17 who was referred to Dr. 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  She completed outpatient pre-operative evaluation and was deemed a surgical candidate for thoracoabdominal aneurysm repair and admitted to [facility] on 7/25/18 under the care of Dr. Monson in anticipated of planned procedure on 7/26.  Patient underwent thoracoabdominal aortic aneurysm repair with Dr. Monson, Marcin Robbins and Mikal Robbins on 7/26, intraop patient defecated on the table after induction concerning or colitis. She received 13u prbc, 2u plt, 5 FFP, 20 cryo. Arrived to CT ICU stable. Flex sig performed on POD#1 revealed no evidence of ischemia. POD#2 patient noted to have LLE weakness, MAPs were kept elevated for perfusion. She was note to have rising lactate on POD#3, Pan CT scan revealed no endoleak and she was volume resuscitated with 2u prbc, 2u plts and albumin with improvement in lactic acidosis. POD#4 patient noted to have thrombocytopenia despite Plt transfusions, heme consulted at that time and patient was given cross matched platelets on POD#7. POD#6 lumbar drain capped temporarily but patient developed worsening LLE weakness. Distal LE cyanosis was noted and she was given pulse steroids. Extubated on 8/5/18. Patient continued to improve, remained in ICU for weakness and transferred to  on POD#20. No acute events overnight     Neurovascular: Stable LLE weakness. Pain well controlled with current regimen.  - continue tylenol PRN   - Continue gababpentin 200mg TID     Cardiovascular: Hemodynamically stable. HR controlled.  - s/p thoracoabdominal aortic aneurysm repair, Hx of CAD - continue asa 81mg, atorvastatin 20mg qhs, metoprolol 25mg BID     Respiratory: 02 Sat = 98% on RA   - CXR with possible left sided effusion, bedside ultrasound small 100-200cc. Continue diuresis 40mg and follow up CXR in AM   - Encourage C+DB and Use of IS 10x / hr while awake.    GI: Stable.  - Continue PO diet   - Continue protonix 40mg for GI ppx   - Continue bowel regimen     Renal / : Cr 1.02, no active issues   - Continue diuresis with lasix 40mg daily   - Monitor renal function.  - Monitor I/O's.    Endocrine: hgba1c 5.6, TSH 1.613   - no active issues      Hematologic: thrombocytopenia, now resolved   - Continue betadyne on toes for ischemia     ID: +UTI   -  continue bactrim BID for total 3 days     Prophylaxis:  -DVT prophylaxis with 5000 SubQ Heparin BID   -SCD's    Disposition: LTAC when medically ready

## 2018-08-17 NOTE — PROGRESS NOTE ADULT - SUBJECTIVE AND OBJECTIVE BOX
CTICU  CRITICAL  CARE  attending     Hand off received 					   Pertinent clinical, laboratory, radiographic, hemodynamic, echocardiographic, respiratory data, microbiologic data and chart were reviewed and analyzed frequently throughout the course of the day and night  Patient seen and examined with CTS/ SH attending at bedside  Pt is a 68y , Female, HEALTH ISSUES - PROBLEM Dx:      , FAMILY HISTORY:  No pertinent family history in first degree relatives  PAST MEDICAL & SURGICAL HISTORY:  History of seizures:   COPD (chronic obstructive pulmonary disease)  HLD (hyperlipidemia)  HTN (hypertension)  AAA (abdominal aortic aneurysm)  CAD (coronary artery disease)  S/P AAA (abdominal aortic aneurysm) repair  History of abdominal aortic aneurysm (AAA):   History of cholecystectomy    Patient is a 68y old  Female who presents with a chief complaint of Thoracoabdominal Aortic Aneurysm (14 Aug 2018 19:39)      14 system review was unremarkable  acute changes include acute respiratory failure  Vital signs, hemodynamic and respiratory parameters were reviewed from the bedside nursing flowsheet.  ICU Vital Signs Last 24 Hrs  T(C): 36.4 (17 Aug 2018 18:38), Max: 36.6 (17 Aug 2018 10:18)  T(F): 97.6 (17 Aug 2018 18:38), Max: 97.8 (17 Aug 2018 10:18)  HR: 89 (17 Aug 2018 20:23) (89 - 98)  BP: 136/62 (17 Aug 2018 20:23) (122/58 - 159/72)  BP(mean): 89 (17 Aug 2018 20:23) (84 - 104)  ABP: --  ABP(mean): --  RR: 18 (17 Aug 2018 20:23) (14 - 18)  SpO2: 98% (17 Aug 2018 20:23) (90% - 100%)    Adult Advanced Hemodynamics Last 24 Hrs  CVP(mm Hg): --  CVP(cm H2O): --  CO: --  CI: --  PA: --  PA(mean): --  PCWP: --  SVR: --  SVRI: --  PVR: --  PVRI: --,     Intake and output was reviewed and the fluid balance was calculated  Daily     Daily Weight in k.6 (17 Aug 2018 03:57)  I&O's Summary    16 Aug 2018 07:01  -  17 Aug 2018 07:00  --------------------------------------------------------  IN: 100 mL / OUT: 350 mL / NET: -250 mL        All lines and drain sites were assessed  Glycemic trend was reviewedCAPILLARY BLOOD GLUCOSE        No acute change in mental status  Auscultation of the chest reveals equal bs  Abdomen is soft  Extremities are warm and well perfused  Wounds appear clean and unremarkable  Antibiotics are periop    labs  CBC Full  -  ( 17 Aug 2018 05:59 )  WBC Count : 7.2 K/uL  Hemoglobin : 9.4 g/dL  Hematocrit : 29.2 %  Platelet Count - Automated : 373 K/uL  Mean Cell Volume : 94.5 fL  Mean Cell Hemoglobin : 30.4 pg  Mean Cell Hemoglobin Concentration : 32.2 g/dL  Auto Neutrophil # : x  Auto Lymphocyte # : x  Auto Monocyte # : x  Auto Eosinophil # : x  Auto Basophil # : x  Auto Neutrophil % : x  Auto Lymphocyte % : x  Auto Monocyte % : x  Auto Eosinophil % : x  Auto Basophil % : x    08-    134<L>  |  95<L>  |  24<H>  ----------------------------<  92  4.6   |  28  |  1.02    Ca    8.6      17 Aug 2018 05:59  Phos  3.7     -  Mg     1.7     -      PT/INR - ( 16 Aug 2018 05:42 )   PT: 12.7 sec;   INR: 1.14          PTT - ( 16 Aug 2018 05:42 )  PTT:28.0 sec  The current medications were reviewed   MEDICATIONS  (STANDING):  aspirin enteric coated 81 milliGRAM(s) Oral daily  atorvastatin 20 milliGRAM(s) Oral at bedtime  chlorhexidine 2% Cloths 1 Application(s) Topical daily  chlorhexidine 2% Cloths 1 Application(s) Topical daily  docusate sodium 100 milliGRAM(s) Oral three times a day  folic acid 1 milliGRAM(s) Oral daily  furosemide    Tablet 40 milliGRAM(s) Oral daily  gabapentin 200 milliGRAM(s) Oral every 8 hours  heparin  Injectable 5000 Unit(s) SubCutaneous every 12 hours  lidocaine   Patch 1 Patch Transdermal daily  melatonin 3 milliGRAM(s) Oral at bedtime  metoprolol tartrate 25 milliGRAM(s) Oral two times a day  multivitamin 1 Tablet(s) Oral daily  pantoprazole    Tablet 40 milliGRAM(s) Oral before breakfast  polyethylene glycol 3350 17 Gram(s) Oral daily  povidone iodine 10% Solution 1 Application(s) Topical two times a day  senna 2 Tablet(s) Oral at bedtime  sodium chloride 0.9% lock flush 3 milliLiter(s) IV Push every 8 hours  trimethoprim  160 mG/sulfamethoxazole 800 mG 1 Tablet(s) Oral every 12 hours    MEDICATIONS  (PRN):  acetaminophen   Tablet. 650 milliGRAM(s) Oral every 6 hours PRN Mild Pain (1 - 3)  acetaminophen 300 mG/codeine 30 mG 2 Tablet(s) Oral every 4 hours PRN Moderate Pain (4 - 6)       PROBLEM LIST/ ASSESSMENT:  HEALTH ISSUES - PROBLEM Dx:      ,   Patient is a 68y old  Female who presents with a chief complaint of Thoracoabdominal Aortic Aneurysm (14 Aug 2018 19:39)     s/p cardiac surgery      My plan includes :  close hemodynamic, ventilatory and drain monitoring and management per post op routine    Monitor for arrhythmias and monitor parameters for organ perfusion  monitor neurologic status  Head of the bed should remain elevated to 45 deg .   chest PT and IS will be encouraged  monitor adequacy of oxygenation and ventilation and attempt to wean oxygen  Nutritional goals will be met using po eventually , ensure adequate caloric intake and montior the same  Stress ulcer and VTE prophylaxis will be achieved    Glycemic control is satisfactory  Electrolytes have been repleted as necessary and wound care has been carried out. Pain control has been achieved.   agressive physical therapy and early mobility and ambulation goals will be met   The family was updated about the course and plan  CRITICAL CARE TIME SPENT in evaluation and management, reassessments, review and interpretation of labs and x-rays, ventilator and hemodynamic management, formulating a plan and coordinating care: ___90____ MIN.  Time does not include procedural time.  CTICU ATTENDING     					    Tuan Hutchins MD

## 2018-08-18 LAB
ANION GAP SERPL CALC-SCNC: 12 MMOL/L — SIGNIFICANT CHANGE UP (ref 5–17)
ANION GAP SERPL CALC-SCNC: 13 MMOL/L — SIGNIFICANT CHANGE UP (ref 5–17)
ANION GAP SERPL CALC-SCNC: 14 MMOL/L — SIGNIFICANT CHANGE UP (ref 5–17)
BUN SERPL-MCNC: 27 MG/DL — HIGH (ref 7–23)
BUN SERPL-MCNC: 31 MG/DL — HIGH (ref 7–23)
BUN SERPL-MCNC: 32 MG/DL — HIGH (ref 7–23)
CALCIUM SERPL-MCNC: 8.4 MG/DL — SIGNIFICANT CHANGE UP (ref 8.4–10.5)
CALCIUM SERPL-MCNC: 8.8 MG/DL — SIGNIFICANT CHANGE UP (ref 8.4–10.5)
CALCIUM SERPL-MCNC: 9 MG/DL — SIGNIFICANT CHANGE UP (ref 8.4–10.5)
CHLORIDE SERPL-SCNC: 92 MMOL/L — LOW (ref 96–108)
CHLORIDE SERPL-SCNC: 93 MMOL/L — LOW (ref 96–108)
CHLORIDE SERPL-SCNC: 95 MMOL/L — LOW (ref 96–108)
CO2 SERPL-SCNC: 25 MMOL/L — SIGNIFICANT CHANGE UP (ref 22–31)
CO2 SERPL-SCNC: 26 MMOL/L — SIGNIFICANT CHANGE UP (ref 22–31)
CO2 SERPL-SCNC: 27 MMOL/L — SIGNIFICANT CHANGE UP (ref 22–31)
CREAT SERPL-MCNC: 1.21 MG/DL — SIGNIFICANT CHANGE UP (ref 0.5–1.3)
CREAT SERPL-MCNC: 1.3 MG/DL — SIGNIFICANT CHANGE UP (ref 0.5–1.3)
CREAT SERPL-MCNC: 1.32 MG/DL — HIGH (ref 0.5–1.3)
GLUCOSE BLDC GLUCOMTR-MCNC: 101 MG/DL — HIGH (ref 70–99)
GLUCOSE BLDC GLUCOMTR-MCNC: 117 MG/DL — HIGH (ref 70–99)
GLUCOSE BLDC GLUCOMTR-MCNC: 160 MG/DL — HIGH (ref 70–99)
GLUCOSE SERPL-MCNC: 107 MG/DL — HIGH (ref 70–99)
GLUCOSE SERPL-MCNC: 112 MG/DL — HIGH (ref 70–99)
GLUCOSE SERPL-MCNC: 155 MG/DL — HIGH (ref 70–99)
HCT VFR BLD CALC: 31.2 % — LOW (ref 34.5–45)
HGB BLD-MCNC: 9.8 G/DL — LOW (ref 11.5–15.5)
MAGNESIUM SERPL-MCNC: 2 MG/DL — SIGNIFICANT CHANGE UP (ref 1.6–2.6)
MAGNESIUM SERPL-MCNC: 2.1 MG/DL — SIGNIFICANT CHANGE UP (ref 1.6–2.6)
MCHC RBC-ENTMCNC: 30 PG — SIGNIFICANT CHANGE UP (ref 27–34)
MCHC RBC-ENTMCNC: 31.4 G/DL — LOW (ref 32–36)
MCV RBC AUTO: 95.4 FL — SIGNIFICANT CHANGE UP (ref 80–100)
PHOSPHATE SERPL-MCNC: 3.6 MG/DL — SIGNIFICANT CHANGE UP (ref 2.5–4.5)
PLATELET # BLD AUTO: 391 K/UL — SIGNIFICANT CHANGE UP (ref 150–400)
POTASSIUM SERPL-MCNC: 5 MMOL/L — SIGNIFICANT CHANGE UP (ref 3.5–5.3)
POTASSIUM SERPL-MCNC: 5.4 MMOL/L — HIGH (ref 3.5–5.3)
POTASSIUM SERPL-MCNC: 5.5 MMOL/L — HIGH (ref 3.5–5.3)
POTASSIUM SERPL-SCNC: 5 MMOL/L — SIGNIFICANT CHANGE UP (ref 3.5–5.3)
POTASSIUM SERPL-SCNC: 5.4 MMOL/L — HIGH (ref 3.5–5.3)
POTASSIUM SERPL-SCNC: 5.5 MMOL/L — HIGH (ref 3.5–5.3)
RBC # BLD: 3.27 M/UL — LOW (ref 3.8–5.2)
RBC # FLD: 16.4 % — SIGNIFICANT CHANGE UP (ref 10.3–16.9)
SODIUM SERPL-SCNC: 132 MMOL/L — LOW (ref 135–145)
SODIUM SERPL-SCNC: 132 MMOL/L — LOW (ref 135–145)
SODIUM SERPL-SCNC: 133 MMOL/L — LOW (ref 135–145)
WBC # BLD: 7.4 K/UL — SIGNIFICANT CHANGE UP (ref 3.8–10.5)
WBC # FLD AUTO: 7.4 K/UL — SIGNIFICANT CHANGE UP (ref 3.8–10.5)

## 2018-08-18 PROCEDURE — 71045 X-RAY EXAM CHEST 1 VIEW: CPT | Mod: 26

## 2018-08-18 RX ORDER — FUROSEMIDE 40 MG
20 TABLET ORAL ONCE
Qty: 0 | Refills: 0 | Status: COMPLETED | OUTPATIENT
Start: 2018-08-18 | End: 2018-08-18

## 2018-08-18 RX ORDER — SODIUM POLYSTYRENE SULFONATE 4.1 MEQ/G
30 POWDER, FOR SUSPENSION ORAL ONCE
Qty: 0 | Refills: 0 | Status: COMPLETED | OUTPATIENT
Start: 2018-08-18 | End: 2018-08-18

## 2018-08-18 RX ORDER — SODIUM CHLORIDE 9 MG/ML
1000 INJECTION INTRAMUSCULAR; INTRAVENOUS; SUBCUTANEOUS
Qty: 0 | Refills: 0 | Status: DISCONTINUED | OUTPATIENT
Start: 2018-08-18 | End: 2018-08-20

## 2018-08-18 RX ADMIN — PANTOPRAZOLE SODIUM 40 MILLIGRAM(S): 20 TABLET, DELAYED RELEASE ORAL at 06:58

## 2018-08-18 RX ADMIN — Medication 100 MILLIGRAM(S): at 05:41

## 2018-08-18 RX ADMIN — Medication 2 TABLET(S): at 19:34

## 2018-08-18 RX ADMIN — LIDOCAINE 1 PATCH: 4 CREAM TOPICAL at 00:04

## 2018-08-18 RX ADMIN — Medication 25 MILLIGRAM(S): at 05:41

## 2018-08-18 RX ADMIN — Medication 20 MILLIGRAM(S): at 23:57

## 2018-08-18 RX ADMIN — Medication 1 TABLET(S): at 12:41

## 2018-08-18 RX ADMIN — Medication 2 TABLET(S): at 18:25

## 2018-08-18 RX ADMIN — Medication 1 TABLET(S): at 12:42

## 2018-08-18 RX ADMIN — Medication 2 TABLET(S): at 09:59

## 2018-08-18 RX ADMIN — ATORVASTATIN CALCIUM 20 MILLIGRAM(S): 80 TABLET, FILM COATED ORAL at 21:37

## 2018-08-18 RX ADMIN — HEPARIN SODIUM 5000 UNIT(S): 5000 INJECTION INTRAVENOUS; SUBCUTANEOUS at 05:41

## 2018-08-18 RX ADMIN — Medication 1 APPLICATION(S): at 18:35

## 2018-08-18 RX ADMIN — Medication 40 MILLIGRAM(S): at 05:41

## 2018-08-18 RX ADMIN — Medication 2 TABLET(S): at 10:59

## 2018-08-18 RX ADMIN — Medication 1 APPLICATION(S): at 05:46

## 2018-08-18 RX ADMIN — Medication 1 TABLET(S): at 03:53

## 2018-08-18 RX ADMIN — HEPARIN SODIUM 5000 UNIT(S): 5000 INJECTION INTRAVENOUS; SUBCUTANEOUS at 17:27

## 2018-08-18 RX ADMIN — GABAPENTIN 200 MILLIGRAM(S): 400 CAPSULE ORAL at 14:10

## 2018-08-18 RX ADMIN — SODIUM CHLORIDE 3 MILLILITER(S): 9 INJECTION INTRAMUSCULAR; INTRAVENOUS; SUBCUTANEOUS at 21:38

## 2018-08-18 RX ADMIN — LIDOCAINE 1 PATCH: 4 CREAM TOPICAL at 12:40

## 2018-08-18 RX ADMIN — SODIUM CHLORIDE 50 MILLILITER(S): 9 INJECTION INTRAMUSCULAR; INTRAVENOUS; SUBCUTANEOUS at 14:14

## 2018-08-18 RX ADMIN — GABAPENTIN 200 MILLIGRAM(S): 400 CAPSULE ORAL at 21:37

## 2018-08-18 RX ADMIN — Medication 2 TABLET(S): at 23:42

## 2018-08-18 RX ADMIN — Medication 3 MILLIGRAM(S): at 21:44

## 2018-08-18 RX ADMIN — Medication 81 MILLIGRAM(S): at 12:41

## 2018-08-18 RX ADMIN — SENNA PLUS 2 TABLET(S): 8.6 TABLET ORAL at 21:37

## 2018-08-18 RX ADMIN — CHLORHEXIDINE GLUCONATE 1 APPLICATION(S): 213 SOLUTION TOPICAL at 06:58

## 2018-08-18 RX ADMIN — SODIUM CHLORIDE 3 MILLILITER(S): 9 INJECTION INTRAMUSCULAR; INTRAVENOUS; SUBCUTANEOUS at 13:58

## 2018-08-18 RX ADMIN — Medication 100 MILLIGRAM(S): at 21:37

## 2018-08-18 RX ADMIN — SODIUM CHLORIDE 3 MILLILITER(S): 9 INJECTION INTRAMUSCULAR; INTRAVENOUS; SUBCUTANEOUS at 06:58

## 2018-08-18 RX ADMIN — SODIUM POLYSTYRENE SULFONATE 30 GRAM(S): 4.1 POWDER, FOR SUSPENSION ORAL at 23:57

## 2018-08-18 RX ADMIN — Medication 2 TABLET(S): at 21:37

## 2018-08-18 RX ADMIN — CHLORHEXIDINE GLUCONATE 1 APPLICATION(S): 213 SOLUTION TOPICAL at 10:01

## 2018-08-18 RX ADMIN — Medication 25 MILLIGRAM(S): at 17:28

## 2018-08-18 RX ADMIN — Medication 1 MILLIGRAM(S): at 12:42

## 2018-08-18 RX ADMIN — Medication 1 TABLET(S): at 23:57

## 2018-08-18 RX ADMIN — GABAPENTIN 200 MILLIGRAM(S): 400 CAPSULE ORAL at 05:41

## 2018-08-18 NOTE — PROGRESS NOTE ADULT - ASSESSMENT
A/P: 68 year old female with PMHx of CAD, TIA in 1998, COPD, AAA s/p open AAA repair by Dr. Roberto in 2004, EVAR/AAA/CIAA with Dr. Burch on 3/29/17 who was referred to Dr. 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  She completed outpatient pre-operative evaluation and was deemed a surgical candidate for thoracoabdominal aneurysm repair and admitted to [facility] on 7/25/18 under the care of Dr. Monson in anticipated of planned procedure on 7/26.  Patient underwent thoracoabdominal aortic aneurysm repair with Dr. Monson, Marcin Robbins and Mikal Robbins on 7/26, intraop patient defecated on the table after induction concerning or colitis. She received 13u prbc, 2u plt, 5 FFP, 20 cryo. Arrived to CT ICU stable. Flex sig performed on POD#1 revealed no evidence of ischemia. POD#2 patient noted to have LLE weakness, MAPs were kept elevated for perfusion. She was note to have rising lactate on POD#3, Pan CT scan revealed no endoleak and she was volume resuscitated with 2u prbc, 2u plts and albumin with improvement in lactic acidosis. POD#4 patient noted to have thrombocytopenia despite Plt transfusions, heme consulted at that time and patient was given cross matched platelets on POD#7. POD#6 lumbar drain capped temporarily but patient developed worsening LLE weakness. Distal LE cyanosis was noted and she was given pulse steroids. Extubated on 8/5/18. Patient continued to improve, remained in ICU for weakness and transferred to  on POD#20. No acute events overnight.     Neurovascular: Stable LLE weakness. Pain well controlled with current regimen.  - continue tylenol PRN   - Continue gababpentin 200mg TID     Cardiovascular: Hemodynamically stable. HR controlled.  - s/p thoracoabdominal aortic aneurysm repair, Hx of CAD   - continue asa 81mg, atorvastatin 20mg qhs, metoprolol 25mg BID     Respiratory: 02 Sat = 98% on RA   - Small L pleural effusion (100-200cc); Continue diuresis 40mg and follow up CXR in AM   - Encourage C+DB and Use of IS 10x/hr while awake.    GI: Stable.  - Continue PO diet   - Continue protonix 40mg for GI ppx   - Continue bowel regimen     Renal / : Cr , no active issues   - Continue diuresis with lasix 40mg daily   - Monitor renal function.  - Monitor I/O's.    Endocrine: hgba1c 5.6, TSH 1.613   - no active issues      Hematologic: thrombocytopenia, now resolved   - Continue betadyne on toes for ischemia     ID: +UTI   -  continue bactrim BID for total 3 days     Prophylaxis:  -DVT prophylaxis with 5000 SubQ Heparin BID   -SCD's    Disposition: LTAC when medically ready A/P: 68 year old female with PMHx of CAD, TIA in 1998, COPD, AAA s/p open AAA repair by Dr. Roberto in 2004, EVAR/AAA/CIAA with Dr. Burch on 3/29/17 who was referred to Dr. 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  She completed outpatient pre-operative evaluation and was deemed a surgical candidate for thoracoabdominal aneurysm repair and admitted to [facility] on 7/25/18 under the care of Dr. Monson in anticipated of planned procedure on 7/26.  Patient underwent thoracoabdominal aortic aneurysm repair with Dr. Monson, Marcin Robbins and Mikal Robbins on 7/26, intraop patient defecated on the table after induction concerning or colitis. She received 13u prbc, 2u plt, 5 FFP, 20 cryo. Arrived to CT ICU stable. Flex sig performed on POD#1 revealed no evidence of ischemia. POD#2 patient noted to have LLE weakness, MAPs were kept elevated for perfusion. She was note to have rising lactate on POD#3, Pan CT scan revealed no endoleak and she was volume resuscitated with 2u prbc, 2u plts and albumin with improvement in lactic acidosis. POD#4 patient noted to have thrombocytopenia despite Plt transfusions, heme consulted at that time and patient was given cross matched platelets on POD#7. POD#6 lumbar drain capped temporarily but patient developed worsening LLE weakness. Distal LE cyanosis was noted and she was given pulse steroids. Extubated on 8/5/18. Patient continued to improve, remained in ICU for weakness and transferred to  on POD#20. No acute events overnight.     Neurovascular: Stable LLE weakness. Pain well controlled with current regimen.  - continue tylenol PRN   - Continue gababpentin 200mg TID     Cardiovascular: Hemodynamically stable. HR controlled.  - s/p thoracoabdominal aortic aneurysm repair, Hx of CAD   - continue asa 81mg, atorvastatin 20mg qhs, metoprolol 25mg BID     Respiratory: 02 Sat = 98% on RA   - Small L pleural effusion (100-200cc); Continue diuresis 40mg and follow up CXR in AM   - Encourage C+DB and Use of IS 10x/hr while awake.    GI: Stable.  - Continue PO diet   - Continue protonix 40mg for GI ppx   - Continue bowel regimen     Renal / : Cr 1.29  - Continue diuresis with lasix 40mg daily   - Monitor renal function  - Monitor I/O's    Endocrine: hgba1c 5.6, TSH 1.613   - no active issues      Hematologic: thrombocytopenia, now resolved   - Continue betadyne on toes for ischemia     ID: +UTI   -  continue bactrim BID for total 3 days     Prophylaxis:  - DVT prophylaxis with 5000 SubQ Heparin BID   - SCD's    Disposition:   - LTAC when medically ready

## 2018-08-18 NOTE — PROGRESS NOTE ADULT - SUBJECTIVE AND OBJECTIVE BOX
Patient discussed on morning rounds with Dr. Sanches    Operation / Date: 7/26/18 Thoracoabdominal aortic aneurysm repair     SUBJECTIVE ASSESSMENT:  Patient seen in bed this AM, doing well and not offering any complaints at this time. States her incisional pain is continuing to improve. Denies any abdominal pain, chest pain, shortness of breath, or palpitations.     Vital Signs Last 24 Hrs  T(C): 36.4 (18 Aug 2018 10:30), Max: 37.1 (18 Aug 2018 06:05)  T(F): 97.6 (18 Aug 2018 10:30), Max: 98.8 (18 Aug 2018 06:05)  HR: 88 (18 Aug 2018 09:24) (88 - 98)  BP: 134/61 (18 Aug 2018 09:24) (122/58 - 157/70)  BP(mean): 88 (18 Aug 2018 09:24) (84 - 102)  RR: 20 (18 Aug 2018 09:24) (14 - 20)  SpO2: 99% (18 Aug 2018 09:24) (90% - 99%)  I&O's Detail    17 Aug 2018 07:01  -  18 Aug 2018 07:00  --------------------------------------------------------  IN:  Total IN: 0 mL    OUT:    Voided: 870 mL  Total OUT: 870 mL    Total NET: -870 mL    18 Aug 2018 07:01  -  18 Aug 2018 12:44  --------------------------------------------------------  IN:    Oral Fluid: 185 mL  Total IN: 185 mL    OUT:  Total OUT: 0 mL    Total NET: 185 mL    CHEST TUBE:  No  COY DRAIN:  No  EPICARDIAL WIRES: No  TIE DOWNS: Yes  BARBA: No    PHYSICAL EXAM:  General: Patient resting comfortably in bed, no acute distress   Neurological: AA&Ox3, no focal neurological deficits   Cardiovascular: RRR, S1S2 no murmurs appreciated  Respiratory: Poor inspiratory effort, decreased breath sounds at bases bilaterally   Gastrointestinal: soft, NTND  Extremities: WWP, 1+ pitting edema bilaterally. No calf tenderness   R fingertips ischemic with darkening Bilateral toes cyanotic   Vascular: Peripheral pulses 1+ bilaterally   Incision Sites:  Thoracoabdominal incision c/d/i with scabbing   Chest tube sites with tie downs in place     LABS:             9.8    7.4   )-----------( 391      ( 18 Aug 2018 06:45 )             31.2     COUMADIN:  No    08-18    133<L>  |  95<L>  |  27<H>  ----------------------------<  107<H>  5.4<H>   |  26  |  1.21    Ca    9.0      18 Aug 2018 06:45  Phos  3.7     08-17  Mg     2.1     08-18    Urinalysis Basic - ( 16 Aug 2018 20:04 )    Color: Yellow / Appearance: Cloudy / SG: <=1.005 / pH: x  Gluc: x / Ketone: NEGATIVE  / Bili: Negative / Urobili: 0.2 E.U./dL   Blood: x / Protein: NEGATIVE mg/dL / Nitrite: NEGATIVE   Leuk Esterase: Large / RBC: < 5 /HPF / WBC Many /HPF   Sq Epi: x / Non Sq Epi: x / Bacteria: Many /HPF    MEDICATIONS  (STANDING):  aspirin enteric coated 81 milliGRAM(s) Oral daily  atorvastatin 20 milliGRAM(s) Oral at bedtime  chlorhexidine 2% Cloths 1 Application(s) Topical daily  chlorhexidine 2% Cloths 1 Application(s) Topical daily  docusate sodium 100 milliGRAM(s) Oral three times a day  folic acid 1 milliGRAM(s) Oral daily  furosemide    Tablet 40 milliGRAM(s) Oral daily  gabapentin 200 milliGRAM(s) Oral every 8 hours  heparin  Injectable 5000 Unit(s) SubCutaneous every 12 hours  lidocaine   Patch 1 Patch Transdermal daily  melatonin 3 milliGRAM(s) Oral at bedtime  metoprolol tartrate 25 milliGRAM(s) Oral two times a day  multivitamin 1 Tablet(s) Oral daily  pantoprazole    Tablet 40 milliGRAM(s) Oral before breakfast  polyethylene glycol 3350 17 Gram(s) Oral daily  povidone iodine 10% Solution 1 Application(s) Topical two times a day  senna 2 Tablet(s) Oral at bedtime  sodium chloride 0.9% lock flush 3 milliLiter(s) IV Push every 8 hours  trimethoprim  160 mG/sulfamethoxazole 800 mG 1 Tablet(s) Oral every 12 hours    MEDICATIONS  (PRN):  acetaminophen   Tablet. 650 milliGRAM(s) Oral every 6 hours PRN Mild Pain (1 - 3)  acetaminophen 300 mG/codeine 30 mG 2 Tablet(s) Oral every 4 hours PRN Moderate Pain (4 - 6)

## 2018-08-19 LAB
ANION GAP SERPL CALC-SCNC: 13 MMOL/L — SIGNIFICANT CHANGE UP (ref 5–17)
ANION GAP SERPL CALC-SCNC: 15 MMOL/L — SIGNIFICANT CHANGE UP (ref 5–17)
BUN SERPL-MCNC: 27 MG/DL — HIGH (ref 7–23)
BUN SERPL-MCNC: 27 MG/DL — HIGH (ref 7–23)
BUN SERPL-MCNC: 28 MG/DL — HIGH (ref 7–23)
BUN SERPL-MCNC: 28 MG/DL — HIGH (ref 7–23)
CALCIUM SERPL-MCNC: 8.5 MG/DL — SIGNIFICANT CHANGE UP (ref 8.4–10.5)
CALCIUM SERPL-MCNC: 8.8 MG/DL — SIGNIFICANT CHANGE UP (ref 8.4–10.5)
CALCIUM SERPL-MCNC: 8.9 MG/DL — SIGNIFICANT CHANGE UP (ref 8.4–10.5)
CALCIUM SERPL-MCNC: 9.1 MG/DL — SIGNIFICANT CHANGE UP (ref 8.4–10.5)
CHLORIDE SERPL-SCNC: 90 MMOL/L — LOW (ref 96–108)
CHLORIDE SERPL-SCNC: 93 MMOL/L — LOW (ref 96–108)
CHLORIDE SERPL-SCNC: 94 MMOL/L — LOW (ref 96–108)
CHLORIDE SERPL-SCNC: 95 MMOL/L — LOW (ref 96–108)
CO2 SERPL-SCNC: 27 MMOL/L — SIGNIFICANT CHANGE UP (ref 22–31)
CO2 SERPL-SCNC: 27 MMOL/L — SIGNIFICANT CHANGE UP (ref 22–31)
CO2 SERPL-SCNC: 28 MMOL/L — SIGNIFICANT CHANGE UP (ref 22–31)
CO2 SERPL-SCNC: 29 MMOL/L — SIGNIFICANT CHANGE UP (ref 22–31)
CREAT SERPL-MCNC: 1.28 MG/DL — SIGNIFICANT CHANGE UP (ref 0.5–1.3)
CREAT SERPL-MCNC: 1.29 MG/DL — SIGNIFICANT CHANGE UP (ref 0.5–1.3)
CREAT SERPL-MCNC: 1.37 MG/DL — HIGH (ref 0.5–1.3)
CREAT SERPL-MCNC: 1.41 MG/DL — HIGH (ref 0.5–1.3)
GLUCOSE SERPL-MCNC: 100 MG/DL — HIGH (ref 70–99)
GLUCOSE SERPL-MCNC: 116 MG/DL — HIGH (ref 70–99)
GLUCOSE SERPL-MCNC: 128 MG/DL — HIGH (ref 70–99)
GLUCOSE SERPL-MCNC: 158 MG/DL — HIGH (ref 70–99)
HCT VFR BLD CALC: 30.7 % — LOW (ref 34.5–45)
HCT VFR BLD CALC: 31.3 % — LOW (ref 34.5–45)
HGB BLD-MCNC: 10 G/DL — LOW (ref 11.5–15.5)
HGB BLD-MCNC: 9.8 G/DL — LOW (ref 11.5–15.5)
LACTATE SERPL-SCNC: 1.9 MMOL/L — SIGNIFICANT CHANGE UP (ref 0.5–2)
LACTATE SERPL-SCNC: 2 MMOL/L — SIGNIFICANT CHANGE UP (ref 0.5–2)
LACTATE SERPL-SCNC: 2.1 MMOL/L — HIGH (ref 0.5–2)
LDH SERPL L TO P-CCNC: 368 U/L — HIGH (ref 50–242)
MAGNESIUM SERPL-MCNC: 1.7 MG/DL — SIGNIFICANT CHANGE UP (ref 1.6–2.6)
MAGNESIUM SERPL-MCNC: 1.8 MG/DL — SIGNIFICANT CHANGE UP (ref 1.6–2.6)
MAGNESIUM SERPL-MCNC: 1.8 MG/DL — SIGNIFICANT CHANGE UP (ref 1.6–2.6)
MAGNESIUM SERPL-MCNC: 2.1 MG/DL — SIGNIFICANT CHANGE UP (ref 1.6–2.6)
MCHC RBC-ENTMCNC: 30.4 PG — SIGNIFICANT CHANGE UP (ref 27–34)
MCHC RBC-ENTMCNC: 30.6 PG — SIGNIFICANT CHANGE UP (ref 27–34)
MCHC RBC-ENTMCNC: 31.9 G/DL — LOW (ref 32–36)
MCHC RBC-ENTMCNC: 31.9 G/DL — LOW (ref 32–36)
MCV RBC AUTO: 95.3 FL — SIGNIFICANT CHANGE UP (ref 80–100)
MCV RBC AUTO: 95.7 FL — SIGNIFICANT CHANGE UP (ref 80–100)
PHOSPHATE SERPL-MCNC: 4.5 MG/DL — SIGNIFICANT CHANGE UP (ref 2.5–4.5)
PHOSPHATE SERPL-MCNC: 4.7 MG/DL — HIGH (ref 2.5–4.5)
PHOSPHATE SERPL-MCNC: 4.9 MG/DL — HIGH (ref 2.5–4.5)
PHOSPHATE SERPL-MCNC: 5.1 MG/DL — HIGH (ref 2.5–4.5)
PLATELET # BLD AUTO: 373 K/UL — SIGNIFICANT CHANGE UP (ref 150–400)
PLATELET # BLD AUTO: 440 K/UL — HIGH (ref 150–400)
POTASSIUM SERPL-MCNC: 4.6 MMOL/L — SIGNIFICANT CHANGE UP (ref 3.5–5.3)
POTASSIUM SERPL-MCNC: 4.8 MMOL/L — SIGNIFICANT CHANGE UP (ref 3.5–5.3)
POTASSIUM SERPL-MCNC: 4.9 MMOL/L — SIGNIFICANT CHANGE UP (ref 3.5–5.3)
POTASSIUM SERPL-MCNC: 5.6 MMOL/L — HIGH (ref 3.5–5.3)
POTASSIUM SERPL-SCNC: 4.6 MMOL/L — SIGNIFICANT CHANGE UP (ref 3.5–5.3)
POTASSIUM SERPL-SCNC: 4.8 MMOL/L — SIGNIFICANT CHANGE UP (ref 3.5–5.3)
POTASSIUM SERPL-SCNC: 4.9 MMOL/L — SIGNIFICANT CHANGE UP (ref 3.5–5.3)
POTASSIUM SERPL-SCNC: 5.6 MMOL/L — HIGH (ref 3.5–5.3)
RBC # BLD: 3.22 M/UL — LOW (ref 3.8–5.2)
RBC # BLD: 3.27 M/UL — LOW (ref 3.8–5.2)
RBC # FLD: 16.6 % — SIGNIFICANT CHANGE UP (ref 10.3–16.9)
RBC # FLD: 16.6 % — SIGNIFICANT CHANGE UP (ref 10.3–16.9)
SODIUM SERPL-SCNC: 132 MMOL/L — LOW (ref 135–145)
SODIUM SERPL-SCNC: 134 MMOL/L — LOW (ref 135–145)
SODIUM SERPL-SCNC: 135 MMOL/L — SIGNIFICANT CHANGE UP (ref 135–145)
SODIUM SERPL-SCNC: 136 MMOL/L — SIGNIFICANT CHANGE UP (ref 135–145)
WBC # BLD: 6.9 K/UL — SIGNIFICANT CHANGE UP (ref 3.8–10.5)
WBC # BLD: 7 K/UL — SIGNIFICANT CHANGE UP (ref 3.8–10.5)
WBC # FLD AUTO: 6.9 K/UL — SIGNIFICANT CHANGE UP (ref 3.8–10.5)
WBC # FLD AUTO: 7 K/UL — SIGNIFICANT CHANGE UP (ref 3.8–10.5)

## 2018-08-19 PROCEDURE — 71045 X-RAY EXAM CHEST 1 VIEW: CPT | Mod: 26

## 2018-08-19 RX ORDER — FUROSEMIDE 40 MG
20 TABLET ORAL ONCE
Qty: 0 | Refills: 0 | Status: COMPLETED | OUTPATIENT
Start: 2018-08-19 | End: 2018-08-19

## 2018-08-19 RX ORDER — SODIUM CHLORIDE 9 MG/ML
1000 INJECTION, SOLUTION INTRAVENOUS
Qty: 0 | Refills: 0 | Status: DISCONTINUED | OUTPATIENT
Start: 2018-08-19 | End: 2018-08-22

## 2018-08-19 RX ORDER — SODIUM POLYSTYRENE SULFONATE 4.1 MEQ/G
15 POWDER, FOR SUSPENSION ORAL ONCE
Qty: 0 | Refills: 0 | Status: COMPLETED | OUTPATIENT
Start: 2018-08-19 | End: 2018-08-19

## 2018-08-19 RX ORDER — MAGNESIUM OXIDE 400 MG ORAL TABLET 241.3 MG
800 TABLET ORAL ONCE
Qty: 0 | Refills: 0 | Status: COMPLETED | OUTPATIENT
Start: 2018-08-19 | End: 2018-08-19

## 2018-08-19 RX ADMIN — Medication 3 MILLIGRAM(S): at 21:52

## 2018-08-19 RX ADMIN — Medication 2 TABLET(S): at 10:20

## 2018-08-19 RX ADMIN — CHLORHEXIDINE GLUCONATE 1 APPLICATION(S): 213 SOLUTION TOPICAL at 06:42

## 2018-08-19 RX ADMIN — Medication 2 TABLET(S): at 15:12

## 2018-08-19 RX ADMIN — LIDOCAINE 1 PATCH: 4 CREAM TOPICAL at 23:13

## 2018-08-19 RX ADMIN — SODIUM CHLORIDE 3 MILLILITER(S): 9 INJECTION INTRAMUSCULAR; INTRAVENOUS; SUBCUTANEOUS at 21:13

## 2018-08-19 RX ADMIN — SODIUM POLYSTYRENE SULFONATE 15 GRAM(S): 4.1 POWDER, FOR SUSPENSION ORAL at 08:51

## 2018-08-19 RX ADMIN — Medication 25 MILLIGRAM(S): at 06:43

## 2018-08-19 RX ADMIN — Medication 1 MILLIGRAM(S): at 11:55

## 2018-08-19 RX ADMIN — Medication 20 MILLIGRAM(S): at 08:50

## 2018-08-19 RX ADMIN — Medication 2 TABLET(S): at 09:50

## 2018-08-19 RX ADMIN — Medication 2 TABLET(S): at 15:50

## 2018-08-19 RX ADMIN — CHLORHEXIDINE GLUCONATE 1 APPLICATION(S): 213 SOLUTION TOPICAL at 12:20

## 2018-08-19 RX ADMIN — Medication 1 APPLICATION(S): at 18:07

## 2018-08-19 RX ADMIN — GABAPENTIN 200 MILLIGRAM(S): 400 CAPSULE ORAL at 15:12

## 2018-08-19 RX ADMIN — Medication 1 TABLET(S): at 23:13

## 2018-08-19 RX ADMIN — Medication 1 TABLET(S): at 11:55

## 2018-08-19 RX ADMIN — Medication 81 MILLIGRAM(S): at 11:55

## 2018-08-19 RX ADMIN — GABAPENTIN 200 MILLIGRAM(S): 400 CAPSULE ORAL at 21:53

## 2018-08-19 RX ADMIN — GABAPENTIN 200 MILLIGRAM(S): 400 CAPSULE ORAL at 06:42

## 2018-08-19 RX ADMIN — Medication 2 TABLET(S): at 19:19

## 2018-08-19 RX ADMIN — SODIUM CHLORIDE 3 MILLILITER(S): 9 INJECTION INTRAMUSCULAR; INTRAVENOUS; SUBCUTANEOUS at 06:43

## 2018-08-19 RX ADMIN — HEPARIN SODIUM 5000 UNIT(S): 5000 INJECTION INTRAVENOUS; SUBCUTANEOUS at 06:42

## 2018-08-19 RX ADMIN — MAGNESIUM OXIDE 400 MG ORAL TABLET 800 MILLIGRAM(S): 241.3 TABLET ORAL at 18:06

## 2018-08-19 RX ADMIN — Medication 2 TABLET(S): at 23:13

## 2018-08-19 RX ADMIN — LIDOCAINE 1 PATCH: 4 CREAM TOPICAL at 00:04

## 2018-08-19 RX ADMIN — POLYETHYLENE GLYCOL 3350 17 GRAM(S): 17 POWDER, FOR SOLUTION ORAL at 11:55

## 2018-08-19 RX ADMIN — Medication 25 MILLIGRAM(S): at 18:07

## 2018-08-19 RX ADMIN — SODIUM CHLORIDE 3 MILLILITER(S): 9 INJECTION INTRAMUSCULAR; INTRAVENOUS; SUBCUTANEOUS at 14:22

## 2018-08-19 RX ADMIN — LIDOCAINE 1 PATCH: 4 CREAM TOPICAL at 11:55

## 2018-08-19 RX ADMIN — ATORVASTATIN CALCIUM 20 MILLIGRAM(S): 80 TABLET, FILM COATED ORAL at 21:53

## 2018-08-19 RX ADMIN — HEPARIN SODIUM 5000 UNIT(S): 5000 INJECTION INTRAVENOUS; SUBCUTANEOUS at 18:07

## 2018-08-19 RX ADMIN — PANTOPRAZOLE SODIUM 40 MILLIGRAM(S): 20 TABLET, DELAYED RELEASE ORAL at 06:43

## 2018-08-19 NOTE — PROGRESS NOTE ADULT - ASSESSMENT
A/P: 68 year old female with PMHx of CAD, TIA in 1998, COPD, AAA s/p open AAA repair by Dr. Roberto in 2004, EVAR/AAA/CIAA with Dr. Burch on 3/29/17 who was referred to Dr. 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  She completed outpatient pre-operative evaluation and was deemed a surgical candidate for thoracoabdominal aneurysm repair and admitted to [facility] on 7/25/18 under the care of Dr. Monson in anticipated of planned procedure on 7/26.  Patient underwent thoracoabdominal aortic aneurysm repair with Dr. Monson, Marcin Robbins and Mikal Robbins on 7/26, intraop patient defecated on the table after induction concerning or colitis. She received 13u prbc, 2u plt, 5 FFP, 20 cryo. Arrived to CT ICU stable. Flex sig performed on POD#1 revealed no evidence of ischemia. POD#2 patient noted to have LLE weakness, MAPs were kept elevated for perfusion. She was note to have rising lactate on POD#3, Pan CT scan revealed no endoleak and she was volume resuscitated with 2u prbc, 2u plts and albumin with improvement in lactic acidosis. POD#4 patient noted to have thrombocytopenia despite Plt transfusions, heme consulted at that time and patient was given cross matched platelets on POD#7. POD#6 lumbar drain capped temporarily but patient developed worsening LLE weakness. Distal LE cyanosis was noted and she was given pulse steroids. Extubated on 8/5/18. Patient continued to improve, remained in ICU for weakness and transferred to  on POD#20. No acute events overnight.     Neurovascular: Stable LLE weakness. Pain well controlled with current regimen.  - continue tylenol PRN   - Continue gababpentin 200mg TID     Cardiovascular: Hemodynamically stable. HR controlled.  - s/p thoracoabdominal aortic aneurysm repair, Hx of CAD   - continue asa 81mg, atorvastatin 20mg qhs, metoprolol 25mg BID     Respiratory: 02 Sat = 98% on RA   - Small L pleural effusion (100-200cc); Continue diuresis 40mg and follow up CXR in AM   - Encourage C+DB and Use of IS 10x/hr while awake.    GI: Stable.  - Continue PO diet   - Continue protonix 40mg for GI ppx   - Continue bowel regimen     Renal / : Cr 1.29  - Continue diuresis with lasix 40mg daily   - Monitor renal function  - Monitor I/O's    Endocrine: hgba1c 5.6, TSH 1.613   - no active issues      Hematologic: thrombocytopenia, now resolved   - Continue betadyne on toes for ischemia     ID: +UTI   -  continue bactrim BID for total 3 days     Prophylaxis:  - DVT prophylaxis with 5000 SubQ Heparin BID   - SCD's    Disposition:   - LTAC when medically ready A/P: 68 year old female with PMHx of CAD, TIA in 1998, COPD, AAA s/p open AAA repair by Dr. Roberto in 2004, EVAR/AAA/CIAA with Dr. Burch on 3/29/17 who was referred to Dr. 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  She completed outpatient pre-operative evaluation and was deemed a surgical candidate for thoracoabdominal aneurysm repair and admitted to [facility] on 7/25/18 under the care of Dr. Monson in anticipated of planned procedure on 7/26.  Patient underwent thoracoabdominal aortic aneurysm repair with Dr. Monson, Marcin Robbins and Mikal Robbins on 7/26, intraop patient defecated on the table after induction concerning or colitis. She received 13u prbc, 2u plt, 5 FFP, 20 cryo. Arrived to CT ICU stable. Flex sig performed on POD#1 revealed no evidence of ischemia. POD#2 patient noted to have LLE weakness, MAPs were kept elevated for perfusion. She was note to have rising lactate on POD#3, Pan CT scan revealed no endoleak and she was volume resuscitated with 2u prbc, 2u plts and albumin with improvement in lactic acidosis. POD#4 patient noted to have thrombocytopenia despite Plt transfusions, heme consulted at that time and patient was given cross matched platelets on POD#7. POD#6 lumbar drain capped temporarily but patient developed worsening LLE weakness. Distal LE cyanosis was noted and she was given pulse steroids. Extubated on 8/5/18. Patient continued to improve, remained in ICU for weakness and transferred to  on POD#20. No acute events overnight.     Neurovascular: Stable LLE weakness. Pain well controlled with current regimen  - continue tylenol PRN   - Continue gababpentin 200mg TID     Cardiovascular: Hemodynamically stable. HR controlled.  - s/p thoracoabdominal aortic aneurysm repair, Hx of CAD   - continue asa 81mg, atorvastatin 20mg qhs, metoprolol 25mg BID     Respiratory: 02 Sat = 98% on RA   - Small L pleural effusion (100-200cc); Continue diuresis 40mg  - Encourage C+DB and Use of IS 10x/hr while awake    GI: Stable.  - Continue PO diet   - Continue protonix 40mg for GI ppx   - Continue bowel regimen     Renal / : Cr 1.29  - Hyperkalemia: s/p kayexalate 30mg and 15mg and lasix 20mg IVx2     * K is now 4.9, no EKG changes, f/u labs at 4pm	  - Continue diuresis with lasix 40mg daily   - Monitor renal function  - Monitor I/O's    Endocrine: hgba1c 5.6, TSH 1.613   - no active issues      Hematologic: thrombocytopenia, now resolved   - Continue betadyne on toes for ischemia     ID: +UTI   -  continue bactrim BID for total 7 days     Prophylaxis:  - DVT prophylaxis with 5000 SubQ Heparin BID   - SCD's    Disposition:   - LTAC when medically ready

## 2018-08-19 NOTE — PROGRESS NOTE ADULT - SUBJECTIVE AND OBJECTIVE BOX
Patient discussed on morning rounds with Dr. Sanches    Operation / Date:   7/26/18 Thoracoabdominal aortic aneurysm repair     SUBJECTIVE ASSESSMENT:  Patient seen in bed this AM, states she has had too many bowel movements. Denies any abdominal pain, chest pain, shortness of breath, or palpitations.     Vital Signs Last 24 Hrs  T(C): 37 (19 Aug 2018 09:14), Max: 37 (19 Aug 2018 09:14)  T(F): 98.6 (19 Aug 2018 09:14), Max: 98.6 (19 Aug 2018 09:14)  HR: 102 (19 Aug 2018 08:58) (78 - 102)  BP: 149/69 (19 Aug 2018 08:58) (126/74 - 149/69)  BP(mean): 99 (19 Aug 2018 08:58) (88 - 99)  RR: 16 (19 Aug 2018 08:58) (16 - 20)  SpO2: 96% (19 Aug 2018 08:58) (93% - 98%)  I&O's Detail    18 Aug 2018 07:01  -  19 Aug 2018 07:00  --------------------------------------------------------  IN:    Oral Fluid: 565 mL    sodium chloride 0.9%.: 800 mL  Total IN: 1365 mL    OUT:    Voided: 650 mL  Total OUT: 650 mL    Total NET: 715 mL    19 Aug 2018 07:01  -  19 Aug 2018 12:37  --------------------------------------------------------  IN:  Total IN: 0 mL    OUT:    Voided: 450 mL  Total OUT: 450 mL    Total NET: -450 mL    CHEST TUBE:  No  COY DRAIN:  No  EPICARDIAL WIRES: No  TIE DOWNS: Yes  BARBA: No    PHYSICAL EXAM:  General: Patient resting comfortably in bed, no acute distress   Neurological: AA&Ox3, no focal neurological deficits   Cardiovascular: RRR, S1S2 no murmurs appreciated  Respiratory: Poor inspiratory effort, decreased breath sounds at bases bilaterally   Gastrointestinal: soft, NTND  Extremities: WWP, 1+ pitting edema bilaterally. No calf tenderness   R fingertips ischemic with darkening Bilateral toes cyanotic   Vascular: Peripheral pulses 1+ bilaterally   Incision Sites:  Thoracoabdominal incision c/d/i with scabbing   Chest tube sites with tie downs in place     LABS:             10.0   7.0   )-----------( 373      ( 19 Aug 2018 10:23 )             31.3     COUMADIN:  No    08-19    136  |  94<L>  |  27<H>  ----------------------------<  116<H>  4.9   |  29  |  1.29    Ca    9.1      19 Aug 2018 10:23  Phos  5.1     08-19  Mg     1.8     08-19    MEDICATIONS  (STANDING):  aspirin enteric coated 81 milliGRAM(s) Oral daily  atorvastatin 20 milliGRAM(s) Oral at bedtime  chlorhexidine 2% Cloths 1 Application(s) Topical daily  chlorhexidine 2% Cloths 1 Application(s) Topical daily  docusate sodium 100 milliGRAM(s) Oral three times a day  folic acid 1 milliGRAM(s) Oral daily  gabapentin 200 milliGRAM(s) Oral every 8 hours  heparin  Injectable 5000 Unit(s) SubCutaneous every 12 hours  lidocaine   Patch 1 Patch Transdermal daily  melatonin 3 milliGRAM(s) Oral at bedtime  metoprolol tartrate 25 milliGRAM(s) Oral two times a day  multivitamin 1 Tablet(s) Oral daily  pantoprazole    Tablet 40 milliGRAM(s) Oral before breakfast  polyethylene glycol 3350 17 Gram(s) Oral daily  povidone iodine 10% Solution 1 Application(s) Topical two times a day  senna 2 Tablet(s) Oral at bedtime  sodium chloride 0.45%. 1000 milliLiter(s) (50 mL/Hr) IV Continuous <Continuous>  sodium chloride 0.9% lock flush 3 milliLiter(s) IV Push every 8 hours  sodium chloride 0.9%. 1000 milliLiter(s) (50 mL/Hr) IV Continuous <Continuous>  trimethoprim  160 mG/sulfamethoxazole 800 mG 1 Tablet(s) Oral every 12 hours    MEDICATIONS  (PRN):  acetaminophen   Tablet. 650 milliGRAM(s) Oral every 6 hours PRN Mild Pain (1 - 3)  acetaminophen 300 mG/codeine 30 mG 2 Tablet(s) Oral every 4 hours PRN Moderate Pain (4 - 6)

## 2018-08-20 LAB
ANION GAP SERPL CALC-SCNC: 11 MMOL/L — SIGNIFICANT CHANGE UP (ref 5–17)
ANION GAP SERPL CALC-SCNC: 14 MMOL/L — SIGNIFICANT CHANGE UP (ref 5–17)
BUN SERPL-MCNC: 23 MG/DL — SIGNIFICANT CHANGE UP (ref 7–23)
BUN SERPL-MCNC: 24 MG/DL — HIGH (ref 7–23)
CALCIUM SERPL-MCNC: 8.7 MG/DL — SIGNIFICANT CHANGE UP (ref 8.4–10.5)
CALCIUM SERPL-MCNC: 8.9 MG/DL — SIGNIFICANT CHANGE UP (ref 8.4–10.5)
CHLORIDE SERPL-SCNC: 89 MMOL/L — LOW (ref 96–108)
CHLORIDE SERPL-SCNC: 95 MMOL/L — LOW (ref 96–108)
CO2 SERPL-SCNC: 25 MMOL/L — SIGNIFICANT CHANGE UP (ref 22–31)
CO2 SERPL-SCNC: 29 MMOL/L — SIGNIFICANT CHANGE UP (ref 22–31)
CREAT SERPL-MCNC: 1.27 MG/DL — SIGNIFICANT CHANGE UP (ref 0.5–1.3)
CREAT SERPL-MCNC: 1.32 MG/DL — HIGH (ref 0.5–1.3)
GLUCOSE SERPL-MCNC: 119 MG/DL — HIGH (ref 70–99)
GLUCOSE SERPL-MCNC: 88 MG/DL — SIGNIFICANT CHANGE UP (ref 70–99)
HCT VFR BLD CALC: 29.7 % — LOW (ref 34.5–45)
HGB BLD-MCNC: 9.5 G/DL — LOW (ref 11.5–15.5)
MAGNESIUM SERPL-MCNC: 1.7 MG/DL — SIGNIFICANT CHANGE UP (ref 1.6–2.6)
MAGNESIUM SERPL-MCNC: 1.8 MG/DL — SIGNIFICANT CHANGE UP (ref 1.6–2.6)
MCHC RBC-ENTMCNC: 30.4 PG — SIGNIFICANT CHANGE UP (ref 27–34)
MCHC RBC-ENTMCNC: 32 G/DL — SIGNIFICANT CHANGE UP (ref 32–36)
MCV RBC AUTO: 94.9 FL — SIGNIFICANT CHANGE UP (ref 80–100)
PHOSPHATE SERPL-MCNC: 3.9 MG/DL — SIGNIFICANT CHANGE UP (ref 2.5–4.5)
PHOSPHATE SERPL-MCNC: 5.2 MG/DL — HIGH (ref 2.5–4.5)
PLATELET # BLD AUTO: 468 K/UL — HIGH (ref 150–400)
POTASSIUM SERPL-MCNC: 4.8 MMOL/L — SIGNIFICANT CHANGE UP (ref 3.5–5.3)
POTASSIUM SERPL-MCNC: 5 MMOL/L — SIGNIFICANT CHANGE UP (ref 3.5–5.3)
POTASSIUM SERPL-SCNC: 4.8 MMOL/L — SIGNIFICANT CHANGE UP (ref 3.5–5.3)
POTASSIUM SERPL-SCNC: 5 MMOL/L — SIGNIFICANT CHANGE UP (ref 3.5–5.3)
RBC # BLD: 3.13 M/UL — LOW (ref 3.8–5.2)
RBC # FLD: 16.7 % — SIGNIFICANT CHANGE UP (ref 10.3–16.9)
SODIUM SERPL-SCNC: 128 MMOL/L — LOW (ref 135–145)
SODIUM SERPL-SCNC: 135 MMOL/L — SIGNIFICANT CHANGE UP (ref 135–145)
WBC # BLD: 6.6 K/UL — SIGNIFICANT CHANGE UP (ref 3.8–10.5)
WBC # FLD AUTO: 6.6 K/UL — SIGNIFICANT CHANGE UP (ref 3.8–10.5)

## 2018-08-20 PROCEDURE — 99233 SBSQ HOSP IP/OBS HIGH 50: CPT

## 2018-08-20 PROCEDURE — 71045 X-RAY EXAM CHEST 1 VIEW: CPT | Mod: 26

## 2018-08-20 RX ORDER — MAGNESIUM SULFATE 500 MG/ML
2 VIAL (ML) INJECTION ONCE
Qty: 0 | Refills: 0 | Status: COMPLETED | OUTPATIENT
Start: 2018-08-20 | End: 2018-08-20

## 2018-08-20 RX ADMIN — SODIUM CHLORIDE 3 MILLILITER(S): 9 INJECTION INTRAMUSCULAR; INTRAVENOUS; SUBCUTANEOUS at 07:01

## 2018-08-20 RX ADMIN — Medication 1 TABLET(S): at 11:23

## 2018-08-20 RX ADMIN — SODIUM CHLORIDE 3 MILLILITER(S): 9 INJECTION INTRAMUSCULAR; INTRAVENOUS; SUBCUTANEOUS at 21:38

## 2018-08-20 RX ADMIN — Medication 2 TABLET(S): at 11:27

## 2018-08-20 RX ADMIN — CHLORHEXIDINE GLUCONATE 1 APPLICATION(S): 213 SOLUTION TOPICAL at 13:57

## 2018-08-20 RX ADMIN — Medication 50 GRAM(S): at 19:05

## 2018-08-20 RX ADMIN — HEPARIN SODIUM 5000 UNIT(S): 5000 INJECTION INTRAVENOUS; SUBCUTANEOUS at 17:38

## 2018-08-20 RX ADMIN — SODIUM CHLORIDE 50 MILLILITER(S): 9 INJECTION, SOLUTION INTRAVENOUS at 21:36

## 2018-08-20 RX ADMIN — Medication 2 TABLET(S): at 19:11

## 2018-08-20 RX ADMIN — GABAPENTIN 200 MILLIGRAM(S): 400 CAPSULE ORAL at 21:35

## 2018-08-20 RX ADMIN — Medication 1 APPLICATION(S): at 07:13

## 2018-08-20 RX ADMIN — Medication 25 MILLIGRAM(S): at 17:38

## 2018-08-20 RX ADMIN — GABAPENTIN 200 MILLIGRAM(S): 400 CAPSULE ORAL at 13:58

## 2018-08-20 RX ADMIN — Medication 81 MILLIGRAM(S): at 11:23

## 2018-08-20 RX ADMIN — Medication 2 TABLET(S): at 07:52

## 2018-08-20 RX ADMIN — Medication 2 TABLET(S): at 07:02

## 2018-08-20 RX ADMIN — Medication 2 TABLET(S): at 07:12

## 2018-08-20 RX ADMIN — Medication 2 TABLET(S): at 20:25

## 2018-08-20 RX ADMIN — HEPARIN SODIUM 5000 UNIT(S): 5000 INJECTION INTRAVENOUS; SUBCUTANEOUS at 07:12

## 2018-08-20 RX ADMIN — LIDOCAINE 1 PATCH: 4 CREAM TOPICAL at 21:38

## 2018-08-20 RX ADMIN — Medication 3 MILLIGRAM(S): at 21:34

## 2018-08-20 RX ADMIN — Medication 1 APPLICATION(S): at 17:38

## 2018-08-20 RX ADMIN — LIDOCAINE 1 PATCH: 4 CREAM TOPICAL at 11:23

## 2018-08-20 RX ADMIN — POLYETHYLENE GLYCOL 3350 17 GRAM(S): 17 POWDER, FOR SOLUTION ORAL at 11:23

## 2018-08-20 RX ADMIN — Medication 1 MILLIGRAM(S): at 11:23

## 2018-08-20 RX ADMIN — Medication 2 TABLET(S): at 12:30

## 2018-08-20 RX ADMIN — ATORVASTATIN CALCIUM 20 MILLIGRAM(S): 80 TABLET, FILM COATED ORAL at 21:35

## 2018-08-20 RX ADMIN — PANTOPRAZOLE SODIUM 40 MILLIGRAM(S): 20 TABLET, DELAYED RELEASE ORAL at 07:12

## 2018-08-20 RX ADMIN — GABAPENTIN 200 MILLIGRAM(S): 400 CAPSULE ORAL at 07:12

## 2018-08-20 RX ADMIN — CHLORHEXIDINE GLUCONATE 1 APPLICATION(S): 213 SOLUTION TOPICAL at 07:11

## 2018-08-20 RX ADMIN — Medication 25 MILLIGRAM(S): at 07:12

## 2018-08-20 RX ADMIN — Medication 100 MILLIGRAM(S): at 13:57

## 2018-08-20 RX ADMIN — SODIUM CHLORIDE 3 MILLILITER(S): 9 INJECTION INTRAMUSCULAR; INTRAVENOUS; SUBCUTANEOUS at 13:56

## 2018-08-20 NOTE — CHART NOTE - NSCHARTNOTEFT_GEN_A_CORE
Admitting Diagnosis:   Patient is a 68y old  Female who presents with a chief complaint of Thoracoabdominal Aortic Aneurysm (14 Aug 2018 19:39)      PAST MEDICAL & SURGICAL HISTORY:  History of seizures:   COPD (chronic obstructive pulmonary disease)  HLD (hyperlipidemia)  HTN (hypertension)  AAA (abdominal aortic aneurysm)  CAD (coronary artery disease)  S/P AAA (abdominal aortic aneurysm) repair  History of abdominal aortic aneurysm (AAA):   History of cholecystectomy      Current Nutrition Order:  Dysphagia 2 mechanical soft diet/thin liquids, Dash/TLC + Ensure Enlive TID (1050 kcal, 60g protein, 540mL free H2O)     PO Intake: Good (%) [ X  ]  Fair (50-75%) [  ] Poor (<25%) [   ]    GI Issues:   Denies N/V/D/C  +Multiple BMs     Pain:  No pain reported at this time     Skin Integrity:  L groin ASW  L thoracotomy   IAD sacral/gluteal region   Upper back skin tear     Labs:       135  |  95<L>  |  23  ----------------------------<  88  5.0   |  29  |  1.32<H>    Ca    8.7      20 Aug 2018 05:58  Phos  5.2     08-20  Mg     1.8     08-20      CAPILLARY BLOOD GLUCOSE          Medications:  MEDICATIONS  (STANDING):  aspirin enteric coated 81 milliGRAM(s) Oral daily  atorvastatin 20 milliGRAM(s) Oral at bedtime  chlorhexidine 2% Cloths 1 Application(s) Topical daily  chlorhexidine 2% Cloths 1 Application(s) Topical daily  docusate sodium 100 milliGRAM(s) Oral three times a day  folic acid 1 milliGRAM(s) Oral daily  gabapentin 200 milliGRAM(s) Oral every 8 hours  heparin  Injectable 5000 Unit(s) SubCutaneous every 12 hours  lidocaine   Patch 1 Patch Transdermal daily  melatonin 3 milliGRAM(s) Oral at bedtime  metoprolol tartrate 25 milliGRAM(s) Oral two times a day  multivitamin 1 Tablet(s) Oral daily  pantoprazole    Tablet 40 milliGRAM(s) Oral before breakfast  polyethylene glycol 3350 17 Gram(s) Oral daily  povidone iodine 10% Solution 1 Application(s) Topical two times a day  senna 2 Tablet(s) Oral at bedtime  sodium chloride 0.45%. 1000 milliLiter(s) (50 mL/Hr) IV Continuous <Continuous>  sodium chloride 0.9% lock flush 3 milliLiter(s) IV Push every 8 hours    MEDICATIONS  (PRN):  acetaminophen   Tablet. 650 milliGRAM(s) Oral every 6 hours PRN Mild Pain (1 - 3)  acetaminophen 300 mG/codeine 30 mG 2 Tablet(s) Oral every 4 hours PRN Moderate Pain (4 - 6)      Weight:  50.9kg ()    Daily Weight in k.3 (2018 11:49)  61.1kg ()    Weight Change:   wt change noted; could be due to fluids; please take current wt 2-3x/week    Estimated energy needs:   IBW 52.3kg used for EER and adjusted for post-op/age  25-30kcal/kg (1307-1569kcal), 1.2-1.4g/kg (63-73g), 30-35ml/kg (1569-1830ml)    Subjective:   69y/o F s/o open TAAA w/ replacement of descending aorta - w/separate reimplantation of mesorenals. Pt seen in room, awake, alert, pleasant, breathing comfortably on NC. Pt continues on modified diet with reported improved intake; consuming 100% at breakfast (pancakes, eggs) and 1 Ensure/day (encouraged her to take at least 2 per day). She denies N/V/C/D or pain at this time. Pt w/some concern about types of foods to consume- reviewed diet ed and discussed bringing her the alternative DASH menu. She was receptive and appreciative. Na/K/Mg WNL, Phos 5.2 (H), lactate dehydrogenase elevated    Previous Nutrition Diagnosis:  Increased nutrient needs RT increased demand AEB post-op     Active [X]  Resolved [   ]    If resolved, new PES:     Goal:  Pt to meet >/=75% of EER     Recommendations:  1. Continue mechanical soft, Dash/TLC diet + ensure enlive TID.   2. Provide assistance with meals prn and encourage intake and ONS.   3. Monitor lytes and replete prn.   4. Reinforce diet education   5. Trend daily wts.     Education:   Adequate intake; DASH diet    Risk Level: High [   ] Moderate [X] Low [   ].

## 2018-08-20 NOTE — PROGRESS NOTE ADULT - SUBJECTIVE AND OBJECTIVE BOX
Patient discussed on morning rounds with Dr. Monson    Operation / Date: 7/26/18 Thoracoabdominal aortic aneurysm repair     SUBJECTIVE ASSESSMENT:  68year old female who reports she was feeling hungry this morning and ate all her breakfast.    Vital Signs Last 24 Hrs  T(C): 36.3 (20 Aug 2018 14:09), Max: 36.8 (20 Aug 2018 00:54)  T(F): 97.3 (20 Aug 2018 14:09), Max: 98.2 (20 Aug 2018 00:54)  HR: 86 (20 Aug 2018 09:14) (86 - 104)  BP: 126/62 (20 Aug 2018 09:14) (126/62 - 153/70)  BP(mean): 89 (20 Aug 2018 09:14) (86 - 100)  RR: 18 (20 Aug 2018 09:14) (16 - 18)  SpO2: 95% (20 Aug 2018 09:14) (95% - 98%)  I&O's Detail    19 Aug 2018 07:01  -  20 Aug 2018 07:00  --------------------------------------------------------  IN:    Oral Fluid: 350 mL    sodium chloride 0.45%.: 550 mL  Total IN: 900 mL    OUT:    Voided: 925 mL  Total OUT: 925 mL    Total NET: -25 mL      20 Aug 2018 07:01  -  20 Aug 2018 15:03  --------------------------------------------------------  IN:    Oral Fluid: 360 mL    sodium chloride 0.45%.: 50 mL  Total IN: 410 mL    OUT:    Voided: 600 mL  Total OUT: 600 mL    Total NET: -190 mL    CHEST TUBE: No  COY DRAIN:No.  EPICARDIAL WIRES:No.  TIE DOWNS: No.  BARBA: No.    PHYSICAL EXAM:    General: Patient seen OOB in NAD  Neurological:  Alert and oriented x3, no gross deficits appreciated                   Cardiovascular:     RRR, S1S2, no murmur  Respiratory: b/l air entry with decreased breath sound Left base, mild rales b/l, no rhonchi, no wheeze  Gastrointestinal:  soft, nontender, positive bowel sounds  Extremities:  2+ LE edema  Vascular: radial 2+ b/l,  femoral R>L, DP R>L)  Incision Sites:Left thoracotomy site with scab exfoliating along the incision line c/d/i no drainage., Left PICC in place, healing tape burn Left posterior chest  bilateral toes ischemic changes with darkened skin    LABS:                        9.5    6.6   )-----------( 468      ( 20 Aug 2018 05:58 )             29.7     COUMADIN: No. REASON: .    08-20    135  |  95<L>  |  23  ----------------------------<  88  5.0   |  29  |  1.32<H>    Ca    8.7      20 Aug 2018 05:58  Phos  5.2     08-20  Mg     1.8     08-20    MEDICATIONS  (STANDING):  aspirin enteric coated 81 milliGRAM(s) Oral daily  atorvastatin 20 milliGRAM(s) Oral at bedtime  chlorhexidine 2% Cloths 1 Application(s) Topical daily  chlorhexidine 2% Cloths 1 Application(s) Topical daily  docusate sodium 100 milliGRAM(s) Oral three times a day  folic acid 1 milliGRAM(s) Oral daily  gabapentin 200 milliGRAM(s) Oral every 8 hours  heparin  Injectable 5000 Unit(s) SubCutaneous every 12 hours  lidocaine   Patch 1 Patch Transdermal daily  melatonin 3 milliGRAM(s) Oral at bedtime  metoprolol tartrate 25 milliGRAM(s) Oral two times a day  multivitamin 1 Tablet(s) Oral daily  pantoprazole    Tablet 40 milliGRAM(s) Oral before breakfast  polyethylene glycol 3350 17 Gram(s) Oral daily  povidone iodine 10% Solution 1 Application(s) Topical two times a day  senna 2 Tablet(s) Oral at bedtime  sodium chloride 0.45%. 1000 milliLiter(s) (50 mL/Hr) IV Continuous <Continuous>  sodium chloride 0.9% lock flush 3 milliLiter(s) IV Push every 8 hours    MEDICATIONS  (PRN):  acetaminophen   Tablet. 650 milliGRAM(s) Oral every 6 hours PRN Mild Pain (1 - 3)  acetaminophen 300 mG/codeine 30 mG 2 Tablet(s) Oral every 4 hours PRN Moderate Pain (4 - 6)        RADIOLOGY & ADDITIONAL TESTS:

## 2018-08-20 NOTE — PROGRESS NOTE ADULT - ASSESSMENT
68 year old female with PMhx of CAD, TIA in 1998, COPD, AAA s/p open AAA repair by Dr. Roberto in 2004, EVAR/AAA/CIAA with Dr. Burch on 3/29/17 who was referred to Dr. 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 Patient is s/p thoracoabdominal aortic aneurysm repair on 7/26/19 with Dr. Monson, Marcin Robbins and Mikal Robbins,  She received multiple blood products (13u prbc, 2u plt, 5 FFP, 20 cry)o and  arrived to CT ICU stable. Flex sig POD#1 performed to investigate BM episode in OR   revealed no evidence of ischemia. POD#2 patient noted to have LLE weakness, MAPs were kept elevated for perfusion. She was note to have rising lactate on POD#3, Pan CT scan revealed no endoleak and she was volume resuscitated with 2u prbc, 2u plts and albumin with improvement in lactic acidosis. POD#4 patient noted to have thrombocytopenia despite Plt transfusions, heme consulted at that time and patient was given cross matched platelets on POD#7. POD#6 lumbar drain capped temporarily but patient developed worsening LLE weakness. Distal LE cyanosis was noted and she was given pulse steroids. Extubated on 8/5/18. Patient continued to improve, remained in ICU for weakness and transferred to  on POD#20 and continues to progress working with PT.    Plan:  Neurovascular: No delirium. Pain not adequately controlled, restart tylenol #3/inc Gabapentin 100mg tid, cont lidocaine patch  -melatonin 3mg hs    Cardiovascular: Hemodynamically stable. HR controlled.  -lopressor 25mg bid, Aspirin 81mg daily, lipitor 20mg hs    Vascular:continue iodine paint to toes    Respiratory: 02 Sat = 98% on RA.  -Encourage C+DB and Use of IS 10x / hr while awake.  - daily CXR.    GI: Stable.  -PPX.  -PO Diet  -Bowel regimen (senna/colace)    Renal / :  -Monitor renal function.  -Monitor I/O's      Endocrine:    -blood glucose controlled    Hematologic:  -H/H stable  -Thrombocytopenia improved\  -MVI, folic acid    ID:  -afebrile  -wbc wnl  -bactrim d/c'd for hyperkalemia (completed 5 days of treatment for UTI)    Prophylaxis:  -DVT prophylaxis with 5000 SubQ Heparin q12h.  -SCD's    Disposition:  -was denied from Acute rehab now being considered for IAN 68 year old female with PMhx of CAD, TIA in 1998, COPD, AAA s/p open AAA repair by Dr. Roberto in 2004, EVAR/AAA/CIAA with Dr. Burch on 3/29/17 who was referred to Dr. 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 Patient is s/p thoracoabdominal aortic aneurysm repair on 7/26/19 with Dr. Monson, Marcin oRbbins and Mikal Robbins,  She received multiple blood products (13u prbc, 2u plt, 5 FFP, 20 cry)o and  arrived to CT ICU stable. Flex sig POD#1 performed to investigate BM episode in OR   revealed no evidence of ischemia. POD#2 patient noted to have LLE weakness, MAPs were kept elevated for perfusion. She was note to have rising lactate on POD#3, Pan CT scan revealed no endoleak and she was volume resuscitated with 2u prbc, 2u plts and albumin with improvement in lactic acidosis. POD#4 patient noted to have thrombocytopenia despite Plt transfusions, heme consulted at that time and patient was given cross matched platelets on POD#7. POD#6 lumbar drain capped temporarily but patient developed worsening LLE weakness. Distal LE cyanosis was noted and she was given pulse steroids. Extubated on 8/5/18. Patient continued to improve, remained in ICU for weakness and transferred to floor now, POD# 25 and continues to progress working with PT.    Plan:  Neurovascular: No delirium. Pain not adequately controlled, restart tylenol #3/inc Gabapentin 100mg tid, cont lidocaine patch  -melatonin 3mg hs    Cardiovascular: Hemodynamically stable. HR controlled.  -lopressor 25mg bid, Aspirin 81mg daily, lipitor 20mg hs    Vascular:continue iodine paint to toes    Respiratory: 02 Sat = 98% on RA.  -Encourage C+DB and Use of IS 10x / hr while awake.  - daily CXR.    GI: Stable.  -PPX.  -PO Diet  -Bowel regimen (senna/colace)    Renal / :  -Monitor renal function.  -Monitor I/O's  -check repear K+ later in the day      Endocrine:    -blood glucose controlled    Hematologic:  -H/H stable  -Thrombocytopenia improved\  -MVI, folic acid    ID:  -afebrile  -wbc wnl  -bactrim d/c'd for hyperkalemia (completed 5 days of treatment for UTI)    Prophylaxis:  -DVT prophylaxis with 5000 SubQ Heparin q12h.  -SCD's    Disposition:  -was denied from Acute rehab now being considered for IAN

## 2018-08-20 NOTE — PROGRESS NOTE ADULT - SUBJECTIVE AND OBJECTIVE BOX
CTICU  CRITICAL  CARE  attending     Hand off received 					   Pertinent clinical, laboratory, radiographic, hemodynamic, echocardiographic, respiratory data, microbiologic data and chart were reviewed and analyzed frequently throughout the course of the day and night  Patient seen and examined with CTS/ SH attending at bedside  Pt is a 68y , Female, HEALTH ISSUES - PROBLEM Dx:      , FAMILY HISTORY:  No pertinent family history in first degree relatives  PAST MEDICAL & SURGICAL HISTORY:  History of seizures: 1980  COPD (chronic obstructive pulmonary disease)  HLD (hyperlipidemia)  HTN (hypertension)  AAA (abdominal aortic aneurysm)  CAD (coronary artery disease)  S/P AAA (abdominal aortic aneurysm) repair  History of abdominal aortic aneurysm (AAA): 2004  History of cholecystectomy    Patient is a 68y old  Female who presents with a chief complaint of Thoracoabdominal Aortic Aneurysm (14 Aug 2018 19:39)      14 system review was unremarkable  acute changes include acute respiratory failure  Vital signs, hemodynamic and respiratory parameters were reviewed from the bedside nursing flowsheet.  ICU Vital Signs Last 24 Hrs  T(C): 36.2 (20 Aug 2018 09:37), Max: 36.8 (20 Aug 2018 00:54)  T(F): 97.2 (20 Aug 2018 09:37), Max: 98.2 (20 Aug 2018 00:54)  HR: 86 (20 Aug 2018 09:14) (86 - 104)  BP: 126/62 (20 Aug 2018 09:14) (126/62 - 153/70)  BP(mean): 89 (20 Aug 2018 09:14) (86 - 100)  ABP: --  ABP(mean): --  RR: 18 (20 Aug 2018 09:14) (16 - 18)  SpO2: 95% (20 Aug 2018 09:14) (95% - 98%)    Adult Advanced Hemodynamics Last 24 Hrs  CVP(mm Hg): --  CVP(cm H2O): --  CO: --  CI: --  PA: --  PA(mean): --  PCWP: --  SVR: --  SVRI: --  PVR: --  PVRI: --,     Intake and output was reviewed and the fluid balance was calculated  Daily     Daily   I&O's Summary    19 Aug 2018 07:01  -  20 Aug 2018 07:00  --------------------------------------------------------  IN: 900 mL / OUT: 925 mL / NET: -25 mL    20 Aug 2018 07:01  -  20 Aug 2018 12:43  --------------------------------------------------------  IN: 410 mL / OUT: 600 mL / NET: -190 mL        All lines and drain sites were assessed  [facility]      POCT Blood Glucose.: 101 mg/dL (18 Aug 2018 19:57)    No acute change in mental status  Auscultation of the chest reveals equal bs  Abdomen is soft  Extremities are warm and well perfused  Wounds appear clean and unremarkable  Antibiotics are periop    labs  CBC Full  -  ( 20 Aug 2018 05:58 )  WBC Count : 6.6 K/uL  Hemoglobin : 9.5 g/dL  Hematocrit : 29.7 %  Platelet Count - Automated : 468 K/uL  Mean Cell Volume : 94.9 fL  Mean Cell Hemoglobin : 30.4 pg  Mean Cell Hemoglobin Concentration : 32.0 g/dL  Auto Neutrophil # : x  Auto Lymphocyte # : x  Auto Monocyte # : x  Auto Eosinophil # : x  Auto Basophil # : x  Auto Neutrophil % : x  Auto Lymphocyte % : x  Auto Monocyte % : x  Auto Eosinophil % : x  Auto Basophil % : x    08-20    135  |  95<L>  |  23  ----------------------------<  88  5.0   |  29  |  1.32<H>    Ca    8.7      20 Aug 2018 05:58  Phos  5.2     08-20  Mg     1.8     08-20        The current medications were reviewed   MEDICATIONS  (STANDING):  aspirin enteric coated 81 milliGRAM(s) Oral daily  atorvastatin 20 milliGRAM(s) Oral at bedtime  chlorhexidine 2% Cloths 1 Application(s) Topical daily  chlorhexidine 2% Cloths 1 Application(s) Topical daily  docusate sodium 100 milliGRAM(s) Oral three times a day  folic acid 1 milliGRAM(s) Oral daily  gabapentin 200 milliGRAM(s) Oral every 8 hours  heparin  Injectable 5000 Unit(s) SubCutaneous every 12 hours  lidocaine   Patch 1 Patch Transdermal daily  melatonin 3 milliGRAM(s) Oral at bedtime  metoprolol tartrate 25 milliGRAM(s) Oral two times a day  multivitamin 1 Tablet(s) Oral daily  pantoprazole    Tablet 40 milliGRAM(s) Oral before breakfast  polyethylene glycol 3350 17 Gram(s) Oral daily  povidone iodine 10% Solution 1 Application(s) Topical two times a day  senna 2 Tablet(s) Oral at bedtime  sodium chloride 0.45%. 1000 milliLiter(s) (50 mL/Hr) IV Continuous <Continuous>  sodium chloride 0.9% lock flush 3 milliLiter(s) IV Push every 8 hours    MEDICATIONS  (PRN):  acetaminophen   Tablet. 650 milliGRAM(s) Oral every 6 hours PRN Mild Pain (1 - 3)  acetaminophen 300 mG/codeine 30 mG 2 Tablet(s) Oral every 4 hours PRN Moderate Pain (4 - 6)       PROBLEM LIST/ ASSESSMENT:  HEALTH ISSUES - PROBLEM Dx:      ,   Patient is a 68y old  Female who presents with a chief complaint of Thoracoabdominal Aortic Aneurysm (14 Aug 2018 19:39)     s/p cardiac surgery      My plan includes :  close hemodynamic, ventilatory and drain monitoring and management per post op routine    Monitor for arrhythmias and monitor parameters for organ perfusion  monitor neurologic status  Head of the bed should remain elevated to 45 deg .   chest PT and IS will be encouraged  monitor adequacy of oxygenation and ventilation and attempt to wean oxygen  Nutritional goals will be met using po eventually , ensure adequate caloric intake and montior the same  Stress ulcer and VTE prophylaxis will be achieved    Glycemic control is satisfactory  Electrolytes have been repleted as necessary and wound care has been carried out. Pain control has been achieved.   agressive physical therapy and early mobility and ambulation goals will be met   The family was updated about the course and plan  CRITICAL CARE TIME SPENT in evaluation and management, reassessments, review and interpretation of labs and x-rays, ventilator and hemodynamic management, formulating a plan and coordinating care: ___90____ MIN.  Time does not include procedural time.  CTICU ATTENDING     					    Tuan Hutchins MD

## 2018-08-21 LAB
ALBUMIN FLD-MCNC: 2.4 G/DL — SIGNIFICANT CHANGE UP
ANION GAP SERPL CALC-SCNC: 10 MMOL/L — SIGNIFICANT CHANGE UP (ref 5–17)
B PERT IGG+IGM PNL SER: SIGNIFICANT CHANGE UP
BUN SERPL-MCNC: 23 MG/DL — SIGNIFICANT CHANGE UP (ref 7–23)
CALCIUM SERPL-MCNC: 9.6 MG/DL — SIGNIFICANT CHANGE UP (ref 8.4–10.5)
CHLORIDE SERPL-SCNC: 93 MMOL/L — LOW (ref 96–108)
CO2 SERPL-SCNC: 28 MMOL/L — SIGNIFICANT CHANGE UP (ref 22–31)
COLOR FLD: YELLOW — SIGNIFICANT CHANGE UP
COMMENT - FLUIDS: SIGNIFICANT CHANGE UP
COMMENT - FLUIDS: SIGNIFICANT CHANGE UP
CREAT SERPL-MCNC: 1.3 MG/DL — SIGNIFICANT CHANGE UP (ref 0.5–1.3)
EOSINOPHIL # FLD: 1 % — SIGNIFICANT CHANGE UP
FLUID INTAKE SUBSTANCE CLASS: SIGNIFICANT CHANGE UP
FLUID SEGMENTED GRANULOCYTES: 1 % — SIGNIFICANT CHANGE UP
GLUCOSE FLD-MCNC: 94 MG/DL — SIGNIFICANT CHANGE UP
GLUCOSE SERPL-MCNC: 100 MG/DL — HIGH (ref 70–99)
HCT VFR BLD CALC: 28.6 % — LOW (ref 34.5–45)
HGB BLD-MCNC: 9.3 G/DL — LOW (ref 11.5–15.5)
LDH SERPL L TO P-CCNC: 287 U/L — SIGNIFICANT CHANGE UP
LYMPHOCYTES # FLD: 18 % — SIGNIFICANT CHANGE UP
MAGNESIUM SERPL-MCNC: 2.3 MG/DL — SIGNIFICANT CHANGE UP (ref 1.6–2.6)
MCHC RBC-ENTMCNC: 30.9 PG — SIGNIFICANT CHANGE UP (ref 27–34)
MCHC RBC-ENTMCNC: 32.5 G/DL — SIGNIFICANT CHANGE UP (ref 32–36)
MCV RBC AUTO: 95 FL — SIGNIFICANT CHANGE UP (ref 80–100)
MONOS+MACROS # FLD: 6 % — SIGNIFICANT CHANGE UP
PH FLD: 7.7 — SIGNIFICANT CHANGE UP
PHOSPHATE SERPL-MCNC: 4 MG/DL — SIGNIFICANT CHANGE UP (ref 2.5–4.5)
PLATELET # BLD AUTO: 479 K/UL — HIGH (ref 150–400)
POTASSIUM SERPL-MCNC: 5.1 MMOL/L — SIGNIFICANT CHANGE UP (ref 3.5–5.3)
POTASSIUM SERPL-SCNC: 5.1 MMOL/L — SIGNIFICANT CHANGE UP (ref 3.5–5.3)
PROT FLD-MCNC: 4.5 G/DL — SIGNIFICANT CHANGE UP
RBC # BLD: 3.01 M/UL — LOW (ref 3.8–5.2)
RBC # FLD: 16.6 % — SIGNIFICANT CHANGE UP (ref 10.3–16.9)
RCV VOL RI: 2250 /UL — HIGH (ref 0–5)
SODIUM SERPL-SCNC: 131 MMOL/L — LOW (ref 135–145)
SPECIMEN SOURCE FLD: SIGNIFICANT CHANGE UP
TOTAL NUCLEATED CELL COUNT, BODY FLUID: 26 /UL — HIGH (ref 0–5)
TUBE TYPE: SIGNIFICANT CHANGE UP
WBC # BLD: 6.9 K/UL — SIGNIFICANT CHANGE UP (ref 3.8–10.5)
WBC # FLD AUTO: 6.9 K/UL — SIGNIFICANT CHANGE UP (ref 3.8–10.5)

## 2018-08-21 PROCEDURE — 71045 X-RAY EXAM CHEST 1 VIEW: CPT | Mod: 26

## 2018-08-21 PROCEDURE — 32557 INSERT CATH PLEURA W/ IMAGE: CPT | Mod: LT

## 2018-08-21 RX ADMIN — POLYETHYLENE GLYCOL 3350 17 GRAM(S): 17 POWDER, FOR SOLUTION ORAL at 12:47

## 2018-08-21 RX ADMIN — Medication 100 MILLIGRAM(S): at 21:38

## 2018-08-21 RX ADMIN — CHLORHEXIDINE GLUCONATE 1 APPLICATION(S): 213 SOLUTION TOPICAL at 06:04

## 2018-08-21 RX ADMIN — ATORVASTATIN CALCIUM 20 MILLIGRAM(S): 80 TABLET, FILM COATED ORAL at 21:39

## 2018-08-21 RX ADMIN — SODIUM CHLORIDE 3 MILLILITER(S): 9 INJECTION INTRAMUSCULAR; INTRAVENOUS; SUBCUTANEOUS at 14:03

## 2018-08-21 RX ADMIN — Medication 1 TABLET(S): at 12:47

## 2018-08-21 RX ADMIN — Medication 25 MILLIGRAM(S): at 06:03

## 2018-08-21 RX ADMIN — Medication 2 TABLET(S): at 02:18

## 2018-08-21 RX ADMIN — Medication 3 MILLIGRAM(S): at 21:39

## 2018-08-21 RX ADMIN — Medication 25 MILLIGRAM(S): at 18:19

## 2018-08-21 RX ADMIN — CHLORHEXIDINE GLUCONATE 1 APPLICATION(S): 213 SOLUTION TOPICAL at 21:39

## 2018-08-21 RX ADMIN — SODIUM CHLORIDE 3 MILLILITER(S): 9 INJECTION INTRAMUSCULAR; INTRAVENOUS; SUBCUTANEOUS at 21:39

## 2018-08-21 RX ADMIN — Medication 100 MILLIGRAM(S): at 14:03

## 2018-08-21 RX ADMIN — HEPARIN SODIUM 5000 UNIT(S): 5000 INJECTION INTRAVENOUS; SUBCUTANEOUS at 18:19

## 2018-08-21 RX ADMIN — Medication 2 TABLET(S): at 18:25

## 2018-08-21 RX ADMIN — Medication 2 TABLET(S): at 23:35

## 2018-08-21 RX ADMIN — Medication 2 TABLET(S): at 18:51

## 2018-08-21 RX ADMIN — LIDOCAINE 1 PATCH: 4 CREAM TOPICAL at 12:47

## 2018-08-21 RX ADMIN — PANTOPRAZOLE SODIUM 40 MILLIGRAM(S): 20 TABLET, DELAYED RELEASE ORAL at 06:03

## 2018-08-21 RX ADMIN — Medication 1 APPLICATION(S): at 18:51

## 2018-08-21 RX ADMIN — Medication 2 TABLET(S): at 11:56

## 2018-08-21 RX ADMIN — Medication 81 MILLIGRAM(S): at 12:47

## 2018-08-21 RX ADMIN — Medication 100 MILLIGRAM(S): at 06:03

## 2018-08-21 RX ADMIN — Medication 2 TABLET(S): at 03:30

## 2018-08-21 RX ADMIN — GABAPENTIN 200 MILLIGRAM(S): 400 CAPSULE ORAL at 21:39

## 2018-08-21 RX ADMIN — GABAPENTIN 200 MILLIGRAM(S): 400 CAPSULE ORAL at 06:03

## 2018-08-21 RX ADMIN — Medication 2 TABLET(S): at 00:07

## 2018-08-21 RX ADMIN — GABAPENTIN 200 MILLIGRAM(S): 400 CAPSULE ORAL at 14:03

## 2018-08-21 RX ADMIN — Medication 2 TABLET(S): at 14:03

## 2018-08-21 RX ADMIN — SODIUM CHLORIDE 3 MILLILITER(S): 9 INJECTION INTRAMUSCULAR; INTRAVENOUS; SUBCUTANEOUS at 06:29

## 2018-08-21 RX ADMIN — Medication 2 TABLET(S): at 22:37

## 2018-08-21 RX ADMIN — SENNA PLUS 2 TABLET(S): 8.6 TABLET ORAL at 21:39

## 2018-08-21 RX ADMIN — Medication 2 TABLET(S): at 06:06

## 2018-08-21 RX ADMIN — Medication 1 APPLICATION(S): at 06:04

## 2018-08-21 RX ADMIN — HEPARIN SODIUM 5000 UNIT(S): 5000 INJECTION INTRAVENOUS; SUBCUTANEOUS at 06:03

## 2018-08-21 RX ADMIN — Medication 1 MILLIGRAM(S): at 12:47

## 2018-08-21 NOTE — PROGRESS NOTE ADULT - ASSESSMENT
This is a 68 year old female with PMhx of CAD, TIA in 1998, COPD, AAA s/p open AAA repair by Dr. Roberto in 2004, EVAR/AAA/CIAA with Dr. Burch on 3/29/17 who was referred to Dr. 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 Patient is s/p thoracoabdominal aortic aneurysm repair on 7/26/19 with Dr. Monson, Marcin Robbins and Mikal Robbins,  She received multiple blood products (13u prbc, 2u plt, 5 FFP, 20 cry)o and  arrived to CT ICU stable. Flex sig POD#1 performed to investigate BM episode in OR   revealed no evidence of ischemia. POD#2 patient noted to have LLE weakness, MAPs were kept elevated for perfusion. She was note to have rising lactate on POD#3, Pan CT scan revealed no endoleak and she was volume resuscitated with 2u prbc, 2u plts and albumin with improvement in lactic acidosis. POD#4 patient noted to have thrombocytopenia despite Plt transfusions, heme consulted at that time and patient was given cross matched platelets on POD#7. POD#6 lumbar drain capped temporarily but patient developed worsening LLE weakness. Distal LE cyanosis was noted and she was given pulse steroids. Extubated on 8/5/18. Patient continued to improve, remained in ICU for weakness and transferred to floor now, POD# 26 and continues to progress working with PT.    Plan:  Neurovascular: No delirium. Pain not adequately controlled, restart tylenol #3/inc Gabapentin 100mg tid, cont lidocaine patch  -melatonin 3mg hs    Cardiovascular: Hemodynamically stable. HR controlled.  -lopressor 25mg bid, Aspirin 81mg daily, lipitor 20mg hs    Vascular:continue iodine paint to toes    Respiratory: left pleural effusion on CXR  -IR pigtail today  -Encourage C+DB and Use of IS 10x / hr while awake.  - daily CXR.    GI: Stable.  -NPO for pigtail   -PPX.  -PO Diet  -Bowel regimen (senna/colace)    Renal / : post op CARRIE likely secondary to ischemic ATN, now resolving, BUN/Cr continues to improve  -continue to trend and monitor renal function.  -Monitor I/O's    Endocrine:  no active issues  -blood glucose controlled    Hematologic: post op thrombocytopenia, now resolved.   -H/H stable  -MVI, folic acid    ID: No active issues  -afebrile  -wbc wnl    Prophylaxis:  -DVT prophylaxis with 5000 SubQ Heparin q12h.  -SCD's    Disposition:  -pending IAN

## 2018-08-21 NOTE — PROGRESS NOTE ADULT - SUBJECTIVE AND OBJECTIVE BOX
Patient discussed on morning rounds with Dr. Monson    Operation / Date: 7/26/18 Thoracoabdominal aortic aneurysm repair     SUBJECTIVE ASSESSMENT:  Patient is feeling well.  NELI events overnight or complaints at this time.  Denies HA, dizziness, CP, SOB, palpitations, N/V/D/C, leg swelling.      Vital Signs Last 24 Hrs  T(C): 36.2 (21 Aug 2018 09:34), Max: 36.7 (20 Aug 2018 17:10)  T(F): 97.1 (21 Aug 2018 09:34), Max: 98 (20 Aug 2018 17:10)  HR: 94 (21 Aug 2018 12:20) (80 - 108)  BP: 147/65 (21 Aug 2018 12:20) (122/60 - 147/65)  BP(mean): 94 (21 Aug 2018 12:20) (86 - 95)  RR: 16 (21 Aug 2018 12:20) (16 - 18)  SpO2: 96% (21 Aug 2018 12:20) (94% - 97%)  I&O's Detail    20 Aug 2018 07:01  -  21 Aug 2018 07:00  --------------------------------------------------------  IN:    Oral Fluid: 360 mL    sodium chloride 0.45%.: 550 mL  Total IN: 910 mL    OUT:    Voided: 3100 mL  Total OUT: 3100 mL    Total NET: -2190 mL      21 Aug 2018 07:01  -  21 Aug 2018 13:46  --------------------------------------------------------  IN:  Total IN: 0 mL    OUT:    Chest Tube: 110 mL    Voided: 900 mL  Total OUT: 1010 mL    Total NET: -1010 mL    CHEST TUBE:  No.   COY DRAIN:  No.  EPICARDIAL WIRES: No.  TIE DOWNS: Yes.  BARBA: No.    PHYSICAL EXAM:    General: OOB in chair, comfortable, NAD    Neurological: A&O X 3 strength 5/5 right LE, strength 2-3/5.  No other deficits    Cardiovascular: S1S2 RRR No M/G/R    Respiratory: diminished BS at left base. Right lung CTA No W/R/R    Gastrointestinal: soft, NT/ND    Extremities: No LE edema, no calf tenderness    Vascular: DP and radial pulses 1 + b/l    Incision Sites: left thoracotomy incision healing well, no drainage, no dehiscence.     LABS:                        9.3    6.9   )-----------( 479      ( 21 Aug 2018 06:00 )             28.6       COUMADIN:  No.         08-21    131<L>  |  93<L>  |  23  ----------------------------<  100<H>  5.1   |  28  |  1.30    Ca    9.6      21 Aug 2018 06:00  Phos  4.0     08-21  Mg     2.3     08-21    MEDICATIONS  (STANDING):  aspirin enteric coated 81 milliGRAM(s) Oral daily  atorvastatin 20 milliGRAM(s) Oral at bedtime  chlorhexidine 2% Cloths 1 Application(s) Topical daily  chlorhexidine 2% Cloths 1 Application(s) Topical daily  docusate sodium 100 milliGRAM(s) Oral three times a day  folic acid 1 milliGRAM(s) Oral daily  gabapentin 200 milliGRAM(s) Oral every 8 hours  heparin  Injectable 5000 Unit(s) SubCutaneous every 12 hours  lidocaine   Patch 1 Patch Transdermal daily  melatonin 3 milliGRAM(s) Oral at bedtime  metoprolol tartrate 25 milliGRAM(s) Oral two times a day  multivitamin 1 Tablet(s) Oral daily  pantoprazole    Tablet 40 milliGRAM(s) Oral before breakfast  polyethylene glycol 3350 17 Gram(s) Oral daily  povidone iodine 10% Solution 1 Application(s) Topical two times a day  senna 2 Tablet(s) Oral at bedtime  sodium chloride 0.45%. 1000 milliLiter(s) (50 mL/Hr) IV Continuous <Continuous>  sodium chloride 0.9% lock flush 3 milliLiter(s) IV Push every 8 hours    MEDICATIONS  (PRN):  acetaminophen   Tablet. 650 milliGRAM(s) Oral every 6 hours PRN Mild Pain (1 - 3)  acetaminophen 300 mG/codeine 30 mG 2 Tablet(s) Oral every 4 hours PRN Moderate Pain (4 - 6)        RADIOLOGY & ADDITIONAL TESTS:

## 2018-08-22 VITALS
TEMPERATURE: 98 F | SYSTOLIC BLOOD PRESSURE: 166 MMHG | RESPIRATION RATE: 18 BRPM | OXYGEN SATURATION: 93 % | DIASTOLIC BLOOD PRESSURE: 72 MMHG | HEART RATE: 114 BPM

## 2018-08-22 LAB
ANION GAP SERPL CALC-SCNC: 13 MMOL/L — SIGNIFICANT CHANGE UP (ref 5–17)
BUN SERPL-MCNC: 24 MG/DL — HIGH (ref 7–23)
CALCIUM SERPL-MCNC: 9.5 MG/DL — SIGNIFICANT CHANGE UP (ref 8.4–10.5)
CHLORIDE SERPL-SCNC: 94 MMOL/L — LOW (ref 96–108)
CO2 SERPL-SCNC: 24 MMOL/L — SIGNIFICANT CHANGE UP (ref 22–31)
CREAT SERPL-MCNC: 1.06 MG/DL — SIGNIFICANT CHANGE UP (ref 0.5–1.3)
GLUCOSE SERPL-MCNC: 100 MG/DL — HIGH (ref 70–99)
GRAM STN FLD: SIGNIFICANT CHANGE UP
HCT VFR BLD CALC: 29 % — LOW (ref 34.5–45)
HGB BLD-MCNC: 9.4 G/DL — LOW (ref 11.5–15.5)
MAGNESIUM SERPL-MCNC: 1.9 MG/DL — SIGNIFICANT CHANGE UP (ref 1.6–2.6)
MCHC RBC-ENTMCNC: 30.5 PG — SIGNIFICANT CHANGE UP (ref 27–34)
MCHC RBC-ENTMCNC: 32.4 G/DL — SIGNIFICANT CHANGE UP (ref 32–36)
MCV RBC AUTO: 94.2 FL — SIGNIFICANT CHANGE UP (ref 80–100)
NIGHT BLUE STAIN TISS: SIGNIFICANT CHANGE UP
PHOSPHATE SERPL-MCNC: 5 MG/DL — HIGH (ref 2.5–4.5)
PLATELET # BLD AUTO: 491 K/UL — HIGH (ref 150–400)
POTASSIUM SERPL-MCNC: 5 MMOL/L — SIGNIFICANT CHANGE UP (ref 3.5–5.3)
POTASSIUM SERPL-SCNC: 5 MMOL/L — SIGNIFICANT CHANGE UP (ref 3.5–5.3)
RBC # BLD: 3.08 M/UL — LOW (ref 3.8–5.2)
RBC # FLD: 16.4 % — SIGNIFICANT CHANGE UP (ref 10.3–16.9)
SODIUM SERPL-SCNC: 131 MMOL/L — LOW (ref 135–145)
SPECIMEN SOURCE: SIGNIFICANT CHANGE UP
WBC # BLD: 6.9 K/UL — SIGNIFICANT CHANGE UP (ref 3.8–10.5)
WBC # FLD AUTO: 6.9 K/UL — SIGNIFICANT CHANGE UP (ref 3.8–10.5)

## 2018-08-22 PROCEDURE — 85598 HEXAGNAL PHOSPH PLTLT NEUTRL: CPT

## 2018-08-22 PROCEDURE — C1894: CPT

## 2018-08-22 PROCEDURE — 84157 ASSAY OF PROTEIN OTHER: CPT

## 2018-08-22 PROCEDURE — 84100 ASSAY OF PHOSPHORUS: CPT

## 2018-08-22 PROCEDURE — 86923 COMPATIBILITY TEST ELECTRIC: CPT

## 2018-08-22 PROCEDURE — 82042 OTHER SOURCE ALBUMIN QUAN EA: CPT

## 2018-08-22 PROCEDURE — 80048 BASIC METABOLIC PNL TOTAL CA: CPT

## 2018-08-22 PROCEDURE — 87015 SPECIMEN INFECT AGNT CONCNTJ: CPT

## 2018-08-22 PROCEDURE — 89051 BODY FLUID CELL COUNT: CPT

## 2018-08-22 PROCEDURE — 85240 CLOT FACTOR VIII AHG 1 STAGE: CPT

## 2018-08-22 PROCEDURE — 36430 TRANSFUSION BLD/BLD COMPNT: CPT

## 2018-08-22 PROCEDURE — C1768: CPT

## 2018-08-22 PROCEDURE — P9047: CPT

## 2018-08-22 PROCEDURE — 85652 RBC SED RATE AUTOMATED: CPT

## 2018-08-22 PROCEDURE — 71045 X-RAY EXAM CHEST 1 VIEW: CPT | Mod: 26

## 2018-08-22 PROCEDURE — 87070 CULTURE OTHR SPECIMN AEROBIC: CPT

## 2018-08-22 PROCEDURE — 97116 GAIT TRAINING THERAPY: CPT

## 2018-08-22 PROCEDURE — 86140 C-REACTIVE PROTEIN: CPT

## 2018-08-22 PROCEDURE — P9035: CPT

## 2018-08-22 PROCEDURE — 74018 RADEX ABDOMEN 1 VIEW: CPT

## 2018-08-22 PROCEDURE — 36415 COLL VENOUS BLD VENIPUNCTURE: CPT

## 2018-08-22 PROCEDURE — C1769: CPT

## 2018-08-22 PROCEDURE — 84132 ASSAY OF SERUM POTASSIUM: CPT

## 2018-08-22 PROCEDURE — 83036 HEMOGLOBIN GLYCOSYLATED A1C: CPT

## 2018-08-22 PROCEDURE — 71275 CT ANGIOGRAPHY CHEST: CPT

## 2018-08-22 PROCEDURE — 71045 X-RAY EXAM CHEST 1 VIEW: CPT

## 2018-08-22 PROCEDURE — 83880 ASSAY OF NATRIURETIC PEPTIDE: CPT

## 2018-08-22 PROCEDURE — 87389 HIV-1 AG W/HIV-1&-2 AB AG IA: CPT

## 2018-08-22 PROCEDURE — 82803 BLOOD GASES ANY COMBINATION: CPT

## 2018-08-22 PROCEDURE — 85610 PROTHROMBIN TIME: CPT

## 2018-08-22 PROCEDURE — 84295 ASSAY OF SERUM SODIUM: CPT

## 2018-08-22 PROCEDURE — 85397 CLOTTING FUNCT ACTIVITY: CPT

## 2018-08-22 PROCEDURE — 92526 ORAL FUNCTION THERAPY: CPT | Mod: GN

## 2018-08-22 PROCEDURE — 87206 SMEAR FLUORESCENT/ACID STAI: CPT

## 2018-08-22 PROCEDURE — 83986 ASSAY PH BODY FLUID NOS: CPT

## 2018-08-22 PROCEDURE — 97530 THERAPEUTIC ACTIVITIES: CPT

## 2018-08-22 PROCEDURE — 87116 MYCOBACTERIA CULTURE: CPT

## 2018-08-22 PROCEDURE — 88304 TISSUE EXAM BY PATHOLOGIST: CPT

## 2018-08-22 PROCEDURE — 83615 LACTATE (LD) (LDH) ENZYME: CPT

## 2018-08-22 PROCEDURE — 92610 EVALUATE SWALLOWING FUNCTION: CPT | Mod: GN

## 2018-08-22 PROCEDURE — 83735 ASSAY OF MAGNESIUM: CPT

## 2018-08-22 PROCEDURE — 82330 ASSAY OF CALCIUM: CPT

## 2018-08-22 PROCEDURE — 97166 OT EVAL MOD COMPLEX 45 MIN: CPT

## 2018-08-22 PROCEDURE — 80053 COMPREHEN METABOLIC PANEL: CPT

## 2018-08-22 PROCEDURE — 76000 FLUOROSCOPY <1 HR PHYS/QHP: CPT

## 2018-08-22 PROCEDURE — 85045 AUTOMATED RETICULOCYTE COUNT: CPT

## 2018-08-22 PROCEDURE — 85025 COMPLETE CBC W/AUTO DIFF WBC: CPT

## 2018-08-22 PROCEDURE — 86706 HEP B SURFACE ANTIBODY: CPT

## 2018-08-22 PROCEDURE — 94002 VENT MGMT INPAT INIT DAY: CPT

## 2018-08-22 PROCEDURE — 84484 ASSAY OF TROPONIN QUANT: CPT

## 2018-08-22 PROCEDURE — 97110 THERAPEUTIC EXERCISES: CPT

## 2018-08-22 PROCEDURE — 85027 COMPLETE CBC AUTOMATED: CPT

## 2018-08-22 PROCEDURE — 86850 RBC ANTIBODY SCREEN: CPT

## 2018-08-22 PROCEDURE — 85379 FIBRIN DEGRADATION QUANT: CPT

## 2018-08-22 PROCEDURE — 87205 SMEAR GRAM STAIN: CPT

## 2018-08-22 PROCEDURE — 86880 COOMBS TEST DIRECT: CPT

## 2018-08-22 PROCEDURE — 82553 CREATINE MB FRACTION: CPT

## 2018-08-22 PROCEDURE — 86146 BETA-2 GLYCOPROTEIN ANTIBODY: CPT

## 2018-08-22 PROCEDURE — 93306 TTE W/DOPPLER COMPLETE: CPT

## 2018-08-22 PROCEDURE — 94003 VENT MGMT INPAT SUBQ DAY: CPT

## 2018-08-22 PROCEDURE — 82962 GLUCOSE BLOOD TEST: CPT

## 2018-08-22 PROCEDURE — 86803 HEPATITIS C AB TEST: CPT

## 2018-08-22 PROCEDURE — P9016: CPT

## 2018-08-22 PROCEDURE — C1889: CPT

## 2018-08-22 PROCEDURE — 81001 URINALYSIS AUTO W/SCOPE: CPT

## 2018-08-22 PROCEDURE — 32557 INSERT CATH PLEURA W/ IMAGE: CPT

## 2018-08-22 PROCEDURE — 87075 CULTR BACTERIA EXCEPT BLOOD: CPT

## 2018-08-22 PROCEDURE — 86022 PLATELET ANTIBODIES: CPT

## 2018-08-22 PROCEDURE — 87340 HEPATITIS B SURFACE AG IA: CPT

## 2018-08-22 PROCEDURE — 80076 HEPATIC FUNCTION PANEL: CPT

## 2018-08-22 PROCEDURE — 76700 US EXAM ABDOM COMPLETE: CPT

## 2018-08-22 PROCEDURE — 86900 BLOOD TYPING SEROLOGIC ABO: CPT

## 2018-08-22 PROCEDURE — C1729: CPT

## 2018-08-22 PROCEDURE — 85362 FIBRIN DEGRADATION PRODUCTS: CPT

## 2018-08-22 PROCEDURE — 85220 BLOOC CLOT FACTOR V TEST: CPT

## 2018-08-22 PROCEDURE — 94640 AIRWAY INHALATION TREATMENT: CPT

## 2018-08-22 PROCEDURE — 93970 EXTREMITY STUDY: CPT

## 2018-08-22 PROCEDURE — C9248: CPT

## 2018-08-22 PROCEDURE — 83605 ASSAY OF LACTIC ACID: CPT

## 2018-08-22 PROCEDURE — 93880 EXTRACRANIAL BILAT STUDY: CPT

## 2018-08-22 PROCEDURE — 82945 GLUCOSE OTHER FLUID: CPT

## 2018-08-22 PROCEDURE — P9045: CPT

## 2018-08-22 PROCEDURE — P9012: CPT

## 2018-08-22 PROCEDURE — 87102 FUNGUS ISOLATION CULTURE: CPT

## 2018-08-22 PROCEDURE — 74174 CTA ABD&PLVS W/CONTRAST: CPT

## 2018-08-22 PROCEDURE — 83010 ASSAY OF HAPTOGLOBIN QUANT: CPT

## 2018-08-22 PROCEDURE — P9017: CPT

## 2018-08-22 PROCEDURE — 85730 THROMBOPLASTIN TIME PARTIAL: CPT

## 2018-08-22 PROCEDURE — 72131 CT LUMBAR SPINE W/O DYE: CPT

## 2018-08-22 PROCEDURE — 85384 FIBRINOGEN ACTIVITY: CPT

## 2018-08-22 PROCEDURE — 93005 ELECTROCARDIOGRAM TRACING: CPT

## 2018-08-22 PROCEDURE — 82550 ASSAY OF CK (CPK): CPT

## 2018-08-22 PROCEDURE — 97163 PT EVAL HIGH COMPLEX 45 MIN: CPT

## 2018-08-22 PROCEDURE — 86901 BLOOD TYPING SEROLOGIC RH(D): CPT

## 2018-08-22 RX ORDER — METOPROLOL TARTRATE 50 MG
1 TABLET ORAL
Qty: 0 | Refills: 0 | COMMUNITY
Start: 2018-08-22

## 2018-08-22 RX ORDER — OMEPRAZOLE 10 MG/1
1 CAPSULE, DELAYED RELEASE ORAL
Qty: 0 | Refills: 0 | COMMUNITY

## 2018-08-22 RX ORDER — SIMVASTATIN 20 MG/1
1 TABLET, FILM COATED ORAL
Qty: 0 | Refills: 0 | COMMUNITY

## 2018-08-22 RX ORDER — DOCUSATE SODIUM 100 MG
1 CAPSULE ORAL
Qty: 0 | Refills: 0 | COMMUNITY
Start: 2018-08-22

## 2018-08-22 RX ORDER — ASPIRIN/CALCIUM CARB/MAGNESIUM 324 MG
1 TABLET ORAL
Qty: 0 | Refills: 0 | COMMUNITY

## 2018-08-22 RX ORDER — ACETAMINOPHEN WITH CODEINE 300MG-30MG
2 TABLET ORAL
Qty: 0 | Refills: 0 | COMMUNITY
Start: 2018-08-22

## 2018-08-22 RX ORDER — POLYETHYLENE GLYCOL 3350 17 G/17G
17 POWDER, FOR SOLUTION ORAL
Qty: 0 | Refills: 0 | COMMUNITY
Start: 2018-08-22

## 2018-08-22 RX ORDER — ATORVASTATIN CALCIUM 80 MG/1
1 TABLET, FILM COATED ORAL
Qty: 0 | Refills: 0 | COMMUNITY
Start: 2018-08-22

## 2018-08-22 RX ORDER — ASPIRIN/CALCIUM CARB/MAGNESIUM 324 MG
1 TABLET ORAL
Qty: 0 | Refills: 0 | COMMUNITY
Start: 2018-08-22

## 2018-08-22 RX ORDER — ACETAMINOPHEN 500 MG
2 TABLET ORAL
Qty: 0 | Refills: 0 | COMMUNITY
Start: 2018-08-22

## 2018-08-22 RX ORDER — SENNA PLUS 8.6 MG/1
2 TABLET ORAL
Qty: 0 | Refills: 0 | COMMUNITY
Start: 2018-08-22

## 2018-08-22 RX ORDER — PANTOPRAZOLE SODIUM 20 MG/1
1 TABLET, DELAYED RELEASE ORAL
Qty: 0 | Refills: 0 | COMMUNITY
Start: 2018-08-22

## 2018-08-22 RX ORDER — AMLODIPINE BESYLATE 2.5 MG/1
1 TABLET ORAL
Qty: 0 | Refills: 0 | COMMUNITY

## 2018-08-22 RX ORDER — LANOLIN ALCOHOL/MO/W.PET/CERES
1 CREAM (GRAM) TOPICAL
Qty: 0 | Refills: 0 | COMMUNITY
Start: 2018-08-22

## 2018-08-22 RX ORDER — GABAPENTIN 400 MG/1
2 CAPSULE ORAL
Qty: 0 | Refills: 0 | COMMUNITY
Start: 2018-08-22

## 2018-08-22 RX ORDER — FOLIC ACID 0.8 MG
1 TABLET ORAL
Qty: 0 | Refills: 0 | COMMUNITY
Start: 2018-08-22

## 2018-08-22 RX ADMIN — POLYETHYLENE GLYCOL 3350 17 GRAM(S): 17 POWDER, FOR SOLUTION ORAL at 11:51

## 2018-08-22 RX ADMIN — SODIUM CHLORIDE 3 MILLILITER(S): 9 INJECTION INTRAMUSCULAR; INTRAVENOUS; SUBCUTANEOUS at 05:22

## 2018-08-22 RX ADMIN — CHLORHEXIDINE GLUCONATE 1 APPLICATION(S): 213 SOLUTION TOPICAL at 11:52

## 2018-08-22 RX ADMIN — GABAPENTIN 200 MILLIGRAM(S): 400 CAPSULE ORAL at 05:22

## 2018-08-22 RX ADMIN — Medication 81 MILLIGRAM(S): at 11:51

## 2018-08-22 RX ADMIN — Medication 2 TABLET(S): at 09:41

## 2018-08-22 RX ADMIN — Medication 2 TABLET(S): at 05:16

## 2018-08-22 RX ADMIN — SODIUM CHLORIDE 3 MILLILITER(S): 9 INJECTION INTRAMUSCULAR; INTRAVENOUS; SUBCUTANEOUS at 14:00

## 2018-08-22 RX ADMIN — Medication 1 MILLIGRAM(S): at 11:51

## 2018-08-22 RX ADMIN — Medication 1 APPLICATION(S): at 05:23

## 2018-08-22 RX ADMIN — LIDOCAINE 1 PATCH: 4 CREAM TOPICAL at 11:51

## 2018-08-22 RX ADMIN — PANTOPRAZOLE SODIUM 40 MILLIGRAM(S): 20 TABLET, DELAYED RELEASE ORAL at 06:28

## 2018-08-22 RX ADMIN — HEPARIN SODIUM 5000 UNIT(S): 5000 INJECTION INTRAVENOUS; SUBCUTANEOUS at 05:22

## 2018-08-22 RX ADMIN — Medication 2 TABLET(S): at 04:22

## 2018-08-22 RX ADMIN — Medication 1 TABLET(S): at 11:51

## 2018-08-22 RX ADMIN — CHLORHEXIDINE GLUCONATE 1 APPLICATION(S): 213 SOLUTION TOPICAL at 05:22

## 2018-08-22 RX ADMIN — Medication 25 MILLIGRAM(S): at 05:22

## 2018-08-22 RX ADMIN — Medication 100 MILLIGRAM(S): at 14:21

## 2018-08-22 RX ADMIN — GABAPENTIN 200 MILLIGRAM(S): 400 CAPSULE ORAL at 14:21

## 2018-08-22 RX ADMIN — Medication 2 TABLET(S): at 10:15

## 2018-08-22 RX ADMIN — LIDOCAINE 1 PATCH: 4 CREAM TOPICAL at 00:00

## 2018-08-22 RX ADMIN — Medication 100 MILLIGRAM(S): at 05:22

## 2018-08-22 NOTE — PROGRESS NOTE ADULT - SUBJECTIVE AND OBJECTIVE BOX
Patient discussed on morning rounds with Dr. Monson     Operation / Date: 7/26/18 Thoracoabdominal aortic aneurysm repair, EF 50%    Surgeon: Dr. Monson    Referring Physician: Dr. Reuben Tipton    SUBJECTIVE ASSESSMENT:  68y Female seen and examined. No acute events OVN, no acute complaints. Is working with PT, using IS pulling 1000cc, tolerating PO diet, having normal BMs. Denies fever, chest pain, palpitations SOB, abdominal pain, n/v.      Hospital Course:  68 year old female with PMhx of CAD, TIA in 1998, COPD, AAA s/p open AAA repair by Dr. Roberto in 2004, EVAR/AAA/CIAA with Dr. Burch on 3/29/17 who was referred to Dr. 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 Patient is s/p thoracoabdominal aortic aneurysm repair on 7/26/19 with Dr. Monson, Marcin Robbins and Mikal Robbins,  She received multiple blood products (13u prbc, 2u plt, 5 FFP, 20 cry)o and  arrived to CT ICU stable. Flex sig POD#1 performed to investigate BM episode in OR   revealed no evidence of ischemia. POD#2 patient noted to have LLE weakness, MAPs were kept elevated for perfusion. She was note to have rising lactate on POD#3, Pan CT scan revealed no endoleak and she was volume resuscitated with 2u prbc, 2u plts and albumin with improvement in lactic acidosis. POD#4 patient noted to have thrombocytopenia despite Plt transfusions, heme consulted at that time and patient was given cross matched platelets on POD#7. POD#6 lumbar drain capped temporarily but patient developed worsening LLE weakness. Distal LE cyanosis was noted and she was given pulse steroids. Extubated on 8/5/18. Patient continued to improve, remained in ICU for weakness and transferred to floor, on POD26 IR placed L sided pigtail, draining 170cc. Now patient is POD# 27, pigtail removed, f/u CXR stable. She continues to progress working with PT. She has remained hemodynamically stable.  Discussed with Dr. Mueller, araceli hester on d/c as patient has EF 50%.  Per Dr. Monson patient ready for d/c to [facility] w/ close medical follow up with Dr. Pham and Dr. Tipton (cardiologist).     Vital Signs Last 24 Hrs  T(C): 36.2 (22 Aug 2018 09:39), Max: 36.8 (22 Aug 2018 01:07)  T(F): 97.2 (22 Aug 2018 09:39), Max: 98.2 (22 Aug 2018 01:07)  HR: 92 (22 Aug 2018 09:39) (82 - 94)  BP: 140/68 (22 Aug 2018 09:18) (140/68 - 161/62)  BP(mean): 98 (22 Aug 2018 09:18) (90 - 98)  RR: 16 (22 Aug 2018 09:18) (16 - 16)  SpO2: 96% (22 Aug 2018 09:18) (95% - 96%)  I&O's Detail    21 Aug 2018 07:01  -  22 Aug 2018 07:00  --------------------------------------------------------  IN:    sodium chloride 0.45%.: 1150 mL  Total IN: 1150 mL    OUT:    Chest Tube: 170 mL    Voided: 4100 mL  Total OUT: 4270 mL    Total NET: -3120 mL      22 Aug 2018 07:01  -  22 Aug 2018 10:46  --------------------------------------------------------  IN:    sodium chloride 0.45%.: 50 mL  Total IN: 50 mL    OUT:    Voided: 200 mL  Total OUT: 200 mL    Total NET: -150 mL      EPICARDIAL WIRES REMOVED: Yes  TIE DOWNS REMOVED: Yes    PHYSICAL EXAM:  General: Patient lying comfortably in bed, no acute distress     Neurological: Alert and oriented x 3.  LLE 3/5 strength. UE b/l and RLE 5/5 strength.  No other neuro deficits.     Cardiovascular: S1S2, RRR, no murmurs appreciated on exam     Respiratory: Decreased L base , no wheeze/rhonchi/rales    Gastrointestinal: Abdomen soft, non tender, non distended     Extremities: Warm and well perfused. No edema, no calf tenderness     Vascular: 1+ Peripheral pulses b/l     Incision Sites:  left thoracotomy incision healing well, no drainage, no dehiscence.       LABS:                        9.4    6.9   )-----------( 491      ( 22 Aug 2018 06:19 )             29.0       COUMADIN:  No        08-22    131<L>  |  94<L>  |  24<H>  ----------------------------<  100<H>  5.0   |  24  |  1.06    Ca    9.5      22 Aug 2018 06:19  Phos  5.0     08-22  Mg     1.9     08-22            MEDICATIONS  (STANDING):  acetaminophen 325 mg oral tablet  -- 2 tab(s) by mouth every 6 hours, As needed, Mild Pain (1 - 3)  -- Indication: For Pain    acetaminophen-codeine 300 mg-30 mg oral tablet  -- 2 tab(s) by mouth every 4 hours, As needed, Moderate Pain (4 - 6)  -- Indication: For Severe pain    aspirin 81 mg oral delayed release tablet  -- 1 tab(s) by mouth once a day  -- Indication: For Heart disease    gabapentin 100 mg oral capsule  -- 2 cap(s) by mouth every 8 hours  -- Indication: For Pain    atorvastatin 20 mg oral tablet  -- 1 tab(s) by mouth once a day (at bedtime)  -- Indication: For Cholesterol    metoprolol tartrate 25 mg oral tablet  -- 1 tab(s) by mouth 2 times a day  -- Indication: For Blood pressure    Advair Diskus 100 mcg-50 mcg inhalation powder  -- 1 puff(s) inhaled 2 times a day  -- Indication: For COPD    Ventolin HFA 90 mcg/inh inhalation aerosol  -- 2 puff(s) inhaled 4 times a day, As Needed  -- Indication: For COPD    Spiriva Respimat 1.25 mcg/inh inhalation aerosol  -- 2 puff(s) inhaled once a day  -- Indication: For COPD    docusate sodium 100 mg oral capsule  -- 1 cap(s) by mouth 3 times a day  -- Indication: For Stool softener- hold for loose stool    senna oral tablet  -- 2 tab(s) by mouth once a day (at bedtime)  -- Indication: For Laxative- hold for loose stool    polyethylene glycol 3350 oral powder for reconstitution  -- 17 gram(s) by mouth once a day  -- Indication: For LAxative- hold for loose stool    melatonin 3 mg oral tablet  -- 1 tab(s) by mouth once a day (at bedtime)  -- Indication: For Insomnia    pantoprazole 40 mg oral delayed release tablet  -- 1 tab(s) by mouth once a day (before a meal)  -- Indication: For stomach protection    Multiple Vitamins oral tablet  -- 1 tab(s) by mouth once a day  -- Indication: For Vitamin    folic acid 1 mg oral tablet  -- 1 tab(s) by mouth once a day  -- Indication: For Vitamin.           Discharge CXR:  CXR pending official read: s/p pigtail removal, no obvious PTX, L sided atelectasis vs small residual pleural effusion.

## 2018-08-22 NOTE — PROGRESS NOTE ADULT - PROVIDER SPECIALTY LIST ADULT
CT Surgery
Colorectal Surgery
Critical Care
Gastroenterology
Heme/Onc
Intervent Radiology
Neurosurgery
Neurosurgery
Surgery
Vascular Surgery
Critical Care
Vascular Surgery

## 2018-08-22 NOTE — PROGRESS NOTE ADULT - ASSESSMENT
-Please follow up with Dr. Monson on 8/29/18 at 10:45am.  The office is located at [facility], [facility], 4th floor. Call us with any questions #[phone].  -Please follow up with Dr. Reuben Tipton (referring MD) on 9/7/18 at noon. Address: [address] [place] [address], Phone: [phone].  -Please keep the dressing where your chest tube was on for 48 hours after discharge.   -Walk daily as tolerated and use your incentive spirometer every hour.  Please follow up with Dr. Thompson on 9/4/18 at 11:30. His information is listed above.  -No driving or strenuous activity/exercise for 6 weeks, or until cleared by your surgeon.  -Gently clean your incisions with anti-bacterial soap and water, pat dry.  You may leave them open to air.  -Call your doctor if you have shortness of breath, chest pain not relieved by pain medication, dizziness, fever >101.5, or increased redness or drainage from incisions.

## 2018-08-22 NOTE — PROGRESS NOTE ADULT - ASSESSMENT
68 year old female s/p Left chest tube placement for pleural effusion. Chest tube is functioning well. CXR this AM shows a small residual left pleural effusion. Will continue to follow.    Misti Sun MD

## 2018-08-22 NOTE — PROGRESS NOTE ADULT - SUBJECTIVE AND OBJECTIVE BOX
68 year old female s/p Left chest tube placement for pleural effusion. WBC is 6.9. CT output has been 170cc overnight. Drain output is serosanguinous.
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
## 2018-08-24 DIAGNOSIS — Z88.0 ALLERGY STATUS TO PENICILLIN: ICD-10-CM

## 2018-08-24 DIAGNOSIS — G95.11 ACUTE INFARCTION OF SPINAL CORD (EMBOLIC) (NONEMBOLIC): ICD-10-CM

## 2018-08-24 DIAGNOSIS — N18.3 CHRONIC KIDNEY DISEASE, STAGE 3 (MODERATE): ICD-10-CM

## 2018-08-24 DIAGNOSIS — I73.89 OTHER SPECIFIED PERIPHERAL VASCULAR DISEASES: ICD-10-CM
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
## 2018-08-24 DIAGNOSIS — Z79.51 LONG TERM (CURRENT) USE OF INHALED STEROIDS: ICD-10-CM

## 2018-08-24 DIAGNOSIS — I71.6 THORACOABDOMINAL AORTIC ANEURYSM, WITHOUT RUPTURE: ICD-10-CM

## 2018-08-24 DIAGNOSIS — J44.9 CHRONIC OBSTRUCTIVE PULMONARY DISEASE, UNSPECIFIED: ICD-10-CM

## 2018-08-24 DIAGNOSIS — Z79.82 LONG TERM (CURRENT) USE OF ASPIRIN: ICD-10-CM

## 2018-08-24 DIAGNOSIS — E78.5 HYPERLIPIDEMIA, UNSPECIFIED: ICD-10-CM

## 2018-08-24 DIAGNOSIS — Z88.6 ALLERGY STATUS TO ANALGESIC AGENT: ICD-10-CM

## 2018-08-24 DIAGNOSIS — K64.9 UNSPECIFIED HEMORRHOIDS: ICD-10-CM

## 2018-08-24 DIAGNOSIS — Z86.79 PERSONAL HISTORY OF OTHER DISEASES OF THE CIRCULATORY SYSTEM: ICD-10-CM

## 2018-08-24 DIAGNOSIS — J96.01 ACUTE RESPIRATORY FAILURE WITH HYPOXIA: ICD-10-CM

## 2018-08-24 DIAGNOSIS — I12.9 HYPERTENSIVE CHRONIC KIDNEY DISEASE WITH STAGE 1 THROUGH STAGE 4 CHRONIC KIDNEY DISEASE, OR UNSPECIFIED CHRONIC KIDNEY DISEASE: ICD-10-CM

## 2018-08-24 DIAGNOSIS — J90 PLEURAL EFFUSION, NOT ELSEWHERE CLASSIFIED: ICD-10-CM
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
## 2018-08-24 DIAGNOSIS — Z98.890 OTHER SPECIFIED POSTPROCEDURAL STATES: ICD-10-CM

## 2018-08-24 DIAGNOSIS — D65 DISSEMINATED INTRAVASCULAR COAGULATION [DEFIBRINATION SYNDROME]: ICD-10-CM

## 2018-08-24 DIAGNOSIS — E87.2 ACIDOSIS: ICD-10-CM

## 2018-08-24 DIAGNOSIS — E87.5 HYPERKALEMIA: ICD-10-CM

## 2018-08-24 DIAGNOSIS — N39.0 URINARY TRACT INFECTION, SITE NOT SPECIFIED: ICD-10-CM

## 2018-08-24 DIAGNOSIS — I25.10 ATHEROSCLEROTIC HEART DISEASE OF NATIVE CORONARY ARTERY WITHOUT ANGINA PECTORIS: ICD-10-CM

## 2018-08-24 DIAGNOSIS — T38.895A ADVERSE EFFECT OF OTHER HORMONES AND SYNTHETIC SUBSTITUTES, INITIAL ENCOUNTER: ICD-10-CM

## 2018-08-24 DIAGNOSIS — N17.0 ACUTE KIDNEY FAILURE WITH TUBULAR NECROSIS: ICD-10-CM

## 2018-08-24 LAB
CULTURE RESULTS: NO GROWTH — SIGNIFICANT CHANGE UP
SPECIMEN SOURCE: SIGNIFICANT CHANGE UP

## 2018-09-07 PROBLEM — I71.6 THORACOABDOMINAL AORTIC ANEURYSM (TAAA) WITHOUT RUPTURE: Status: RESOLVED | Noted: 2017-02-16 | Resolved: 2018-09-07

## 2018-09-07 PROBLEM — Z98.890 HISTORY OF THORACOTOMY: Status: ACTIVE | Noted: 2018-09-07

## 2018-09-07 PROBLEM — Z86.79 HISTORY OF THORACOABDOMINAL AORTIC ANEURYSM (TAAA): Status: ACTIVE | Noted: 2018-09-07

## 2018-09-07 PROBLEM — Z98.890 STATUS POST CARDIAC SURGERY: Status: ACTIVE | Noted: 2018-09-07

## 2018-09-07 RX ORDER — PANTOPRAZOLE 40 MG/1
40 TABLET, DELAYED RELEASE ORAL
Refills: 0 | Status: ACTIVE | COMMUNITY

## 2018-09-07 RX ORDER — ACETAMINOPHEN 325 MG/1
325 TABLET ORAL
Refills: 0 | Status: ACTIVE | COMMUNITY

## 2018-09-07 RX ORDER — ASPIRIN 81 MG/1
81 TABLET, CHEWABLE ORAL
Refills: 0 | Status: COMPLETED | COMMUNITY
End: 2018-09-07

## 2018-09-07 RX ORDER — ACETAMINOPHEN AND CODEINE PHOSPHATE 300; 15 MG/1; MG/1
300-15 TABLET ORAL
Qty: 60 | Refills: 0 | Status: COMPLETED | COMMUNITY
Start: 2017-04-11 | End: 2018-09-07

## 2018-09-07 RX ORDER — AMLODIPINE BESYLATE 5 MG/1
5 TABLET ORAL
Refills: 0 | Status: COMPLETED | COMMUNITY
End: 2018-09-07

## 2018-09-07 RX ORDER — TIOTROPIUM BROMIDE INHALATION SPRAY 1.56 UG/1
1.25 SPRAY, METERED RESPIRATORY (INHALATION)
Refills: 0 | Status: ACTIVE | COMMUNITY

## 2018-09-07 RX ORDER — GLUCOSAMINE HCL/CHONDROITIN SU 500-400 MG
3 CAPSULE ORAL
Refills: 0 | Status: ACTIVE | COMMUNITY

## 2018-09-07 RX ORDER — ALBUTEROL SULFATE 90 UG/1
108 (90 BASE) AEROSOL, METERED RESPIRATORY (INHALATION)
Qty: 18 | Refills: 0 | Status: COMPLETED | COMMUNITY
Start: 2018-05-22

## 2018-09-07 RX ORDER — FLUTICASONE PROPIONATE AND SALMETEROL 50; 100 UG/1; UG/1
100-50 POWDER RESPIRATORY (INHALATION)
Qty: 60 | Refills: 0 | Status: COMPLETED | COMMUNITY
Start: 2018-04-24

## 2018-09-07 RX ORDER — FLUTICASONE PROPIONATE 50 UG/1
50 SPRAY, METERED NASAL
Refills: 0 | Status: COMPLETED | COMMUNITY
End: 2018-09-07

## 2018-09-07 RX ORDER — PEDI MULTIVIT NO.17 W-FLUORIDE 1 MG
1 TABLET,CHEWABLE ORAL DAILY
Refills: 0 | Status: ACTIVE | COMMUNITY

## 2018-09-07 RX ORDER — FLUTICASONE PROPION/SALMETEROL 100-50 MCG
100-50 BLISTER, WITH INHALATION DEVICE INHALATION
Refills: 0 | Status: COMPLETED | COMMUNITY
End: 2018-09-07

## 2018-09-07 RX ORDER — GABAPENTIN 100 MG
100 TABLET ORAL
Refills: 0 | Status: ACTIVE | COMMUNITY

## 2018-09-07 RX ORDER — METOPROLOL TARTRATE 25 MG/1
25 TABLET, FILM COATED ORAL
Refills: 0 | Status: ACTIVE | COMMUNITY

## 2018-09-07 RX ORDER — MENTHOL/CAMPHOR 0.5 %-0.5%
1000 LOTION (ML) TOPICAL
Refills: 0 | Status: COMPLETED | COMMUNITY
End: 2018-09-07

## 2018-09-07 RX ORDER — ATORVASTATIN CALCIUM 20 MG/1
20 TABLET, FILM COATED ORAL
Qty: 30 | Refills: 3 | Status: ACTIVE | COMMUNITY

## 2018-09-07 RX ORDER — ALBUTEROL SULFATE 90 UG/1
108 (90 BASE) AEROSOL, METERED RESPIRATORY (INHALATION)
Refills: 3 | Status: ACTIVE | COMMUNITY

## 2018-09-07 RX ORDER — FOLIC ACID 1 MG/1
1 TABLET ORAL DAILY
Qty: 90 | Refills: 3 | Status: ACTIVE | COMMUNITY

## 2018-09-07 RX ORDER — CYCLOSPORINE 0.5 MG/ML
0.05 EMULSION OPHTHALMIC
Qty: 6 | Refills: 0 | Status: COMPLETED | COMMUNITY
Start: 2018-05-09

## 2018-09-07 RX ORDER — FLUTICASONE PROPIONATE AND SALMETEROL 50; 100 UG/1; UG/1
100-50 POWDER RESPIRATORY (INHALATION)
Refills: 0 | Status: ACTIVE | COMMUNITY

## 2018-09-07 RX ORDER — MULTIVITAMIN
TABLET ORAL
Refills: 0 | Status: COMPLETED | COMMUNITY
End: 2018-09-07

## 2018-09-07 RX ORDER — ASPIRIN ENTERIC COATED TABLETS 81 MG 81 MG/1
81 TABLET, DELAYED RELEASE ORAL
Refills: 0 | Status: ACTIVE | COMMUNITY

## 2018-09-07 RX ORDER — ACETAMINOPHEN AND CODEINE PHOSPHATE 300; 30 MG/1; MG/1
300-30 TABLET ORAL
Refills: 0 | Status: ACTIVE | COMMUNITY

## 2018-09-07 RX ORDER — SIMVASTATIN 20 MG/1
20 TABLET, FILM COATED ORAL
Refills: 0 | Status: COMPLETED | COMMUNITY
End: 2018-09-07

## 2018-09-10 ENCOUNTER — FORM ENCOUNTER (OUTPATIENT)
Age: 68
End: 2018-09-10

## 2018-09-11 ENCOUNTER — APPOINTMENT (OUTPATIENT)
Dept: CARDIOTHORACIC SURGERY | Facility: CLINIC | Age: 68
End: 2018-09-11
Payer: MEDICARE

## 2018-09-11 ENCOUNTER — APPOINTMENT (OUTPATIENT)
Dept: VASCULAR SURGERY | Facility: CLINIC | Age: 68
End: 2018-09-11
Payer: MEDICARE

## 2018-09-11 ENCOUNTER — OUTPATIENT (OUTPATIENT)
Dept: OUTPATIENT SERVICES | Facility: HOSPITAL | Age: 68
LOS: 1 days | End: 2018-09-11
Payer: MEDICARE

## 2018-09-11 VITALS
RESPIRATION RATE: 19 BRPM | DIASTOLIC BLOOD PRESSURE: 59 MMHG | HEART RATE: 82 BPM | SYSTOLIC BLOOD PRESSURE: 128 MMHG | OXYGEN SATURATION: 99 % | TEMPERATURE: 97.2 F

## 2018-09-11 DIAGNOSIS — Z86.79 PERSONAL HISTORY OF OTHER DISEASES OF THE CIRCULATORY SYSTEM: Chronic | ICD-10-CM

## 2018-09-11 DIAGNOSIS — Z98.890 OTHER SPECIFIED POSTPROCEDURAL STATES: ICD-10-CM

## 2018-09-11 DIAGNOSIS — Z09 ENCOUNTER FOR FOLLOW-UP EXAMINATION AFTER COMPLETED TREATMENT FOR CONDITIONS OTHER THAN MALIGNANT NEOPLASM: ICD-10-CM

## 2018-09-11 DIAGNOSIS — Z90.49 ACQUIRED ABSENCE OF OTHER SPECIFIED PARTS OF DIGESTIVE TRACT: Chronic | ICD-10-CM

## 2018-09-11 DIAGNOSIS — Z86.79 OTHER SPECIFIED POSTPROCEDURAL STATES: ICD-10-CM

## 2018-09-11 DIAGNOSIS — I71.6 THORACOABDOMINAL AORTIC ANEURYSM, W/OUT RUPTURE: ICD-10-CM

## 2018-09-11 DIAGNOSIS — Z98.890 OTHER SPECIFIED POSTPROCEDURAL STATES: Chronic | ICD-10-CM

## 2018-09-11 DIAGNOSIS — Z86.79 PERSONAL HISTORY OF OTHER DISEASES OF THE CIRCULATORY SYSTEM: ICD-10-CM

## 2018-09-11 PROCEDURE — 71046 X-RAY EXAM CHEST 2 VIEWS: CPT

## 2018-09-11 PROCEDURE — 99024 POSTOP FOLLOW-UP VISIT: CPT

## 2018-09-11 PROCEDURE — 71046 X-RAY EXAM CHEST 2 VIEWS: CPT | Mod: 26

## 2018-09-12 PROBLEM — Z98.890 STATUS POST THORACIC AORTIC ANEURYSM REPAIR: Status: ACTIVE | Noted: 2018-09-12

## 2018-09-12 PROBLEM — Z09 POSTOP CHECK: Status: ACTIVE | Noted: 2018-09-07

## 2018-09-12 RX ORDER — POLYETHYLENE GLYCOL 3350 17 G/17G
17 POWDER, FOR SOLUTION ORAL DAILY
Qty: 30 | Refills: 0 | Status: ACTIVE | COMMUNITY
Start: 2018-09-12

## 2018-09-12 RX ORDER — DOCUSATE SODIUM 100 MG/1
100 CAPSULE ORAL 3 TIMES DAILY
Qty: 90 | Refills: 0 | Status: ACTIVE | COMMUNITY
Start: 2018-09-12

## 2018-09-12 RX ORDER — CIPROFLOXACIN HYDROCHLORIDE 250 MG/1
250 TABLET, FILM COATED ORAL
Refills: 0 | Status: ACTIVE | COMMUNITY
Start: 2018-09-12

## 2018-09-12 RX ORDER — SILVER SULFADIAZINE 10 MG/G
1 CREAM TOPICAL
Refills: 0 | Status: ACTIVE | COMMUNITY
Start: 2018-09-12

## 2018-09-19 LAB
CULTURE RESULTS: SIGNIFICANT CHANGE UP
SPECIMEN SOURCE: SIGNIFICANT CHANGE UP

## 2018-09-29 LAB
CULTURE RESULTS: SIGNIFICANT CHANGE UP
SPECIMEN SOURCE: SIGNIFICANT CHANGE UP

## 2018-10-09 ENCOUNTER — APPOINTMENT (OUTPATIENT)
Dept: VASCULAR SURGERY | Facility: CLINIC | Age: 68
End: 2018-10-09
Payer: MEDICARE

## 2018-10-09 DIAGNOSIS — Z86.79 OTHER SPECIFIED POSTPROCEDURAL STATES: ICD-10-CM

## 2018-10-09 DIAGNOSIS — Z95.828 PRESENCE OF OTHER VASCULAR IMPLANTS AND GRAFTS: ICD-10-CM

## 2018-10-09 DIAGNOSIS — Z98.890 OTHER SPECIFIED POSTPROCEDURAL STATES: ICD-10-CM

## 2018-10-09 PROCEDURE — 99024 POSTOP FOLLOW-UP VISIT: CPT

## 2018-10-10 LAB
CULTURE RESULTS: SIGNIFICANT CHANGE UP
SPECIMEN SOURCE: SIGNIFICANT CHANGE UP

## 2018-10-15 PROBLEM — Z95.828 HISTORY OF ENDOVASCULAR STENT GRAFT FOR ABDOMINAL AORTIC ANEURYSM (AAA): Status: ACTIVE | Noted: 2018-05-03

## 2018-10-15 PROBLEM — Z98.890 S/P ABDOMINAL AORTIC ANEURYSM REPAIR: Status: ACTIVE | Noted: 2018-05-03

## 2018-10-30 ENCOUNTER — APPOINTMENT (OUTPATIENT)
Dept: VASCULAR SURGERY | Facility: CLINIC | Age: 68
End: 2018-10-30
Payer: MEDICARE

## 2018-10-30 PROCEDURE — 99212 OFFICE O/P EST SF 10 MIN: CPT

## 2018-12-11 ENCOUNTER — APPOINTMENT (OUTPATIENT)
Dept: VASCULAR SURGERY | Facility: CLINIC | Age: 68
End: 2018-12-11
Payer: MEDICARE

## 2018-12-11 DIAGNOSIS — I74.3 EMBOLISM AND THROMBOSIS OF ARTERIES OF THE LOWER EXTREMITIES: ICD-10-CM

## 2018-12-11 PROCEDURE — 99213 OFFICE O/P EST LOW 20 MIN: CPT

## 2018-12-12 PROBLEM — I74.3: Status: ACTIVE | Noted: 2018-12-12

## 2019-02-26 ENCOUNTER — APPOINTMENT (OUTPATIENT)
Dept: VASCULAR SURGERY | Facility: CLINIC | Age: 69
End: 2019-02-26
Payer: MEDICARE

## 2019-02-26 PROCEDURE — 93978 VASCULAR STUDY: CPT

## 2019-02-26 PROCEDURE — 99212 OFFICE O/P EST SF 10 MIN: CPT

## 2019-02-27 NOTE — REVIEW OF SYSTEMS
[Feeling Tired] : feeling tired [Skin Wound] : skin wound [Limb Weakness] : limb weakness [Negative] : Heme/Lymph [As Noted in HPI] : as noted in HPI [Difficulty Walking] : difficulty walking

## 2019-03-01 NOTE — HISTORY OF PRESENT ILLNESS
[FreeTextEntry1] : 68yoF s/p thoracoabdominal aortic aneurysm repair by Laura Burch and Iona returns for f/u evaluation of her thoracoabdominal incision dehiscence, and intraoperative embolization to her toes. She has been ambulating w/walker support but is still not fully rehabilitated, still ambulating slowly w/support and negotiating stairs more deliberately.  Pt's toes/abd wound appear to have healed, though she still reports intermittent electrical, stabbing pains along the thoraco-abdominal incision.\par \par Pt now home, followed by [facility] for wound care/dressing changes.

## 2019-03-01 NOTE — PHYSICAL EXAM
[Normal Thyroid] : the thyroid was normal [Normal Breath Sounds] : Normal breath sounds [Normal Rate and Rhythm] : normal rate and rhythm [2+] : left 2+ [Ankle Swelling (On Exam)] : present [Calm] : calm [Alert] : alert [JVD] : no jugular venous distention  [Varicose Veins Of Lower Extremities] : not present [] : not present [Abdomen Masses] : No abdominal masses [Abdomen Tenderness] : ~T ~M No abdominal tenderness [Purpura] : no purpura  [Petechiae] : no petechiae [Skin Ulcer] : no ulcer [Skin Induration] : no induration [de-identified] : appears well and alert but weak, able to transfer w/o assistance, but requires walker support [de-identified] : NC/AT, anicteric [de-identified] : chest wall healed [de-identified] : Toes fully healed [de-identified] : Abdominal incision fully healed [de-identified] : Gross neurovascular intact in LEs

## 2019-03-01 NOTE — ASSESSMENT
[FreeTextEntry1] : S/p open thoracoabdominal aortic aneurysm repair recovering well but still not fully rehabilitated, requires walker support to transfer and ambulate even at a slow pace.  Pt requires continue exercise for conditioning, but is obviously not ready to return to work at this time.  Recommend she continue to exercise w/walker support, and will need to return in 6mos for CTA c/a/p for surveillance.\par \par I personally discussed this patient with the Physician Assistant at the time of the visit. I agree with the assessment and plan as written

## 2019-07-01 ENCOUNTER — OUTPATIENT (OUTPATIENT)
Dept: OUTPATIENT SERVICES | Facility: HOSPITAL | Age: 69
LOS: 1 days | End: 2019-07-01
Payer: MEDICARE

## 2019-07-01 DIAGNOSIS — I25.9 CHRONIC ISCHEMIC HEART DISEASE, UNSPECIFIED: ICD-10-CM

## 2019-07-01 DIAGNOSIS — Z90.49 ACQUIRED ABSENCE OF OTHER SPECIFIED PARTS OF DIGESTIVE TRACT: Chronic | ICD-10-CM

## 2019-07-01 DIAGNOSIS — Z98.890 OTHER SPECIFIED POSTPROCEDURAL STATES: Chronic | ICD-10-CM

## 2019-07-01 DIAGNOSIS — Z86.79 PERSONAL HISTORY OF OTHER DISEASES OF THE CIRCULATORY SYSTEM: Chronic | ICD-10-CM

## 2019-07-01 PROCEDURE — 93306 TTE W/DOPPLER COMPLETE: CPT

## 2019-07-01 PROCEDURE — 93005 ELECTROCARDIOGRAM TRACING: CPT

## 2019-07-01 PROCEDURE — 93010 ELECTROCARDIOGRAM REPORT: CPT

## 2019-07-01 PROCEDURE — 93306 TTE W/DOPPLER COMPLETE: CPT | Mod: 26

## 2019-10-02 NOTE — PRE-OP CHECKLIST - BP NONINVASIVE SYSTOLIC (MM HG)
- History of subdural hematoma with slight enlargement this admission, likely contributing to vomiting; repeat CT scan of head on 9/17/2019 showed stability   - Neurosurgery evaluation and recommendations appreciated  Recommend continued Keppra 1000 mg twice daily and follow up in 2 weeks with repeat CT head at that time      - Continue to monitor neurologic exam   - Repeat Head CT on 9/30/19 was stable 134

## 2020-01-23 ENCOUNTER — OUTPATIENT (OUTPATIENT)
Dept: OUTPATIENT SERVICES | Facility: HOSPITAL | Age: 70
LOS: 1 days | End: 2020-01-23
Payer: MEDICARE

## 2020-01-23 ENCOUNTER — APPOINTMENT (OUTPATIENT)
Dept: CT IMAGING | Facility: HOSPITAL | Age: 70
End: 2020-01-23
Payer: MEDICARE

## 2020-01-23 DIAGNOSIS — Z90.49 ACQUIRED ABSENCE OF OTHER SPECIFIED PARTS OF DIGESTIVE TRACT: Chronic | ICD-10-CM

## 2020-01-23 DIAGNOSIS — Z98.890 OTHER SPECIFIED POSTPROCEDURAL STATES: Chronic | ICD-10-CM

## 2020-01-23 DIAGNOSIS — Z86.79 PERSONAL HISTORY OF OTHER DISEASES OF THE CIRCULATORY SYSTEM: Chronic | ICD-10-CM

## 2020-01-23 PROCEDURE — 74174 CTA ABD&PLVS W/CONTRAST: CPT

## 2020-01-23 PROCEDURE — 71275 CT ANGIOGRAPHY CHEST: CPT | Mod: 26

## 2020-01-23 PROCEDURE — 71275 CT ANGIOGRAPHY CHEST: CPT

## 2020-01-23 PROCEDURE — 74174 CTA ABD&PLVS W/CONTRAST: CPT | Mod: 26

## 2020-01-27 NOTE — PROVIDER CONTACT NOTE (CRITICAL VALUE NOTIFICATION) - NS PROVIDER READ BACK TO LAB
Susan Darleen  : 1953  [place]  Secondary: Dashawn Roy at [place] [id number] [facility], [address] [place] [address],[address], [facility] [address].  [phone]   [phone]        OUTPATIENT PHYSICAL THERAPY: Daily Treatment Note 2020  Visit Count:  4    ICD-10: Treatment Diagnosis:   · Pain in left knee (M25.562)  · Stiffness of left knee, not elsewhere classified (M25.662)  · Pain in right knee (M25.561)  · Stiffness of right knee, not elsewhere classified (M25.661)  Precautions/Allergies:   Latex; Adhesive; Lortab [hydrocodone-acetaminophen]; and Oxycontin [oxycodone]   TREATMENT PLAN:  Effective Dates: 1/15/2020 TO 2020 (90 days). Frequency/Duration: 2 times a week for 90 Day(s)    Pre-treatment Symptoms/Complaints:  2020: Patient reports he feels off today. He reports his stomach doesn't feel good and he feels tired. He reports he is sleeping better. Pain: Initial: Pain Intensity 1: 4  Pain Location 1: Knee  Pain Orientation 1: Right, Left  Pain Intervention(s) 1: Rest, Medication (see MAR)  Post Session:  3/10   Medications Last Reviewed:  2020  Updated Objective Findings:  None Today  TREATMENT:     THERAPEUTIC EXERCISE: (30 minutes):  Exercises per grid below to improve mobility, strength, balance and coordination. Required minimal visual, verbal and manual cues to promote proper body alignment, promote proper body posture and promote proper body mechanics. Progressed resistance, range, repetitions and complexity of movement as indicated.    Date:  2020   Activity/Exercise Parameters   Nu-step 8 minutes  Level 5   Step ups 6 inch  15 reps  L LE leading  No UE support   Step downs 4 inch  15 reps  L LE on step  1 UE support   Tiltboard: right/left 20 reps  No UE support   Tiltboard: forward/backward 20 reps  No UE support   Calf stretch on slant board 10 reps  3 second hold  B FRIEDA   Leg press 60 pounds  L LE  20 reps    160 pounds  B LE  20 reps   Leg curls 30 pounds  L LE  20 reps    60 pounds  B LE  20 reps   Leg extension 30 pounds  L LE  20 reps    60 pounds  B LE  20 reps      MANUAL THERAPY: (10 minutes): Joint mobilization and Soft tissue mobilization was utilized and necessary because of the patient's restricted joint motion, loss of articular motion and restricted motion of soft tissue. Treatment/Session Summary:    · Response to Treatment:  Patient completed all activities with improving overall mobility. · Communication/Consultation:  None today  · Equipment provided today:  None today  · Recommendations/Intent for next treatment session: Next visit will focus on improving overall mobility and pain with daily tasks.     Total Treatment Billable Duration:  40 minutes  PT Patient Time In/Time Out  Time In: 1787  Time Out: HERRERA Goodwin    Future Appointments   Date Time Provider Mandi Brown   1/27/2020  1:45 PM Ramaggielle Irving, PT [facility]   1/29/2020  1:45 PM Raenelle Perch, PT [facility]   2/4/2020  1:45 PM Raenelle Perch, PT [facility]   2/6/2020  1:45 PM Raenelle Perch, PT [facility]   2/11/2020  1:45 PM Raenelle Perch, PT [facility]   2/13/2020  1:45 PM Adolph Peterson Courser, PT [facility] yes

## 2020-01-28 ENCOUNTER — APPOINTMENT (OUTPATIENT)
Dept: VASCULAR SURGERY | Facility: CLINIC | Age: 70
End: 2020-01-28
Payer: MEDICARE

## 2020-01-28 VITALS — SYSTOLIC BLOOD PRESSURE: 144 MMHG | DIASTOLIC BLOOD PRESSURE: 69 MMHG | HEART RATE: 89 BPM | RESPIRATION RATE: 20 BRPM

## 2020-01-28 DIAGNOSIS — I71.6 THORACOABDOMINAL AORTIC ANEURYSM, W/OUT RUPTURE: ICD-10-CM

## 2020-01-28 PROCEDURE — 99213 OFFICE O/P EST LOW 20 MIN: CPT

## 2020-01-28 NOTE — REVIEW OF SYSTEMS
[Feeling Tired] : feeling tired [Skin Wound] : skin wound [As Noted in HPI] : as noted in HPI [Limb Weakness] : limb weakness [Difficulty Walking] : difficulty walking [Negative] : Heme/Lymph

## 2020-02-03 NOTE — DATA REVIEWED
[FreeTextEntry1] : 01/23/2020-CTA c/a/p demonstrates widely patent debranch grafts and endograft w/o evidence of endoleak or aneurysm sac growth

## 2020-02-03 NOTE — HISTORY OF PRESENT ILLNESS
[FreeTextEntry1] : 68yoF s/p thoracoabdominal aortic aneurysm repair by Laura Burch and Iona returns for f/u evaluation of her thoracoabdominal and infrarenal aortic repair w/debranch and endograft, currently w/o any new complaints at this time.  She reports that her appetite has returned, and she is walking w/o limitation w/use of her cane.\par \par Pt had a CTA c/a/p 1wk prior and is presenting to discuss results.

## 2020-02-03 NOTE — PHYSICAL EXAM
[Normal Thyroid] : the thyroid was normal [Normal Breath Sounds] : Normal breath sounds [Normal Rate and Rhythm] : normal rate and rhythm [2+] : left 2+ [Ankle Swelling (On Exam)] : present [Alert] : alert [Calm] : calm [Ankle Swelling On The Right] : mild [No HSM] : no hepatosplenomegaly [No Rash or Lesion] : No rash or lesion [] : not present [JVD] : no jugular venous distention  [Varicose Veins Of Lower Extremities] : not present [Abdomen Masses] : No abdominal masses [Abdomen Tenderness] : ~T ~M No abdominal tenderness [Purpura] : no purpura  [Petechiae] : no petechiae [Skin Ulcer] : no ulcer [de-identified] : appears well and alert, ambulates w/cane [Skin Induration] : no induration [de-identified] : R great toe w/dry ulceration at the edge of the nailbed, no erythema/fluctuance/drainage [de-identified] : NC/AT, anicteric [de-identified] : Gross neurovascular intact in LEs

## 2020-02-03 NOTE — ASSESSMENT
[FreeTextEntry1] : 68yoF s/p thoracoabdominal aortic aneurysm repair by Laura Burch and Iona returns for f/u evaluation of her thoracoabdominal and infrarenal aortic repair w/debranch and endograft, currently w/o any new complaints at this time.  CTA c/a/p from 1wk prior demonstrates widely patent debranch grafts and endograft w/o evidence of endoleak or aneurysm sac growth.\par \par Will re-image pt in 1y for interval changes, but encouraged pt to continue exercise and all meds.\par \par I personally discussed this patient with the Physician Assistant at the time of the visit. I agree with the assessment and plan as written

## 2021-02-16 ENCOUNTER — APPOINTMENT (OUTPATIENT)
Dept: VASCULAR SURGERY | Facility: CLINIC | Age: 71
End: 2021-02-16
Payer: MEDICARE

## 2021-02-16 PROCEDURE — 99213 OFFICE O/P EST LOW 20 MIN: CPT

## 2021-02-16 PROCEDURE — 99072 ADDL SUPL MATRL&STAF TM PHE: CPT

## 2021-02-16 PROCEDURE — 93978 VASCULAR STUDY: CPT

## 2021-02-19 NOTE — PHYSICAL EXAM
[Normal Thyroid] : the thyroid was normal [Normal Breath Sounds] : Normal breath sounds [Normal Rate and Rhythm] : normal rate and rhythm [2+] : left 2+ [Ankle Swelling (On Exam)] : present [Ankle Swelling On The Right] : mild [No HSM] : no hepatosplenomegaly [No Rash or Lesion] : No rash or lesion [Alert] : alert [Calm] : calm [JVD] : no jugular venous distention  [Varicose Veins Of Lower Extremities] : not present [] : not present [Abdomen Masses] : No abdominal masses [Abdomen Tenderness] : ~T ~M No abdominal tenderness [Purpura] : no purpura  [Petechiae] : no petechiae [Skin Ulcer] : no ulcer [Skin Induration] : no induration [de-identified] : appears well and alert, ambulates w/cane [de-identified] : NC/AT, anicteric [de-identified] : +FROM [de-identified] : Gross neurovascular intact in LEs

## 2021-02-19 NOTE — ASSESSMENT
[FreeTextEntry1] : 69 yo F s/p thoracoabdominal aortic aneurysm repair by Laura Burch and Iona returns for f/u evaluation of her thoracoabdominal and infrarenal aortic repair w/debranch and endograft, currently w/o any new complaints at this time.\par Patient is due for CTA C/A/P and to see Dr. Monson.\par We will discuss with Dr. Valverde's office if they plan to set up CTA, otherwise we will obtain prior authorization w/patient's insurance and will schedule for CTA C/A/P for surveillance of her thoracoabdominal aorta.\par \par I personally discussed this patient with the Physician Assistant at the time of the visit. I agree with the assessment and plan as written

## 2021-02-19 NOTE — HISTORY OF PRESENT ILLNESS
[FreeTextEntry1] : 71 yo F s/p thoracoabdominal aortic aneurysm repair by Laura Burch and Iona returns for f/u evaluation of her thoracoabdominal and infrarenal aortic repair w/debranch and endograft, currently w/o any new complaints at this time.  She reports that she is doing well overall, and she is walking w/o limitation w/use of her cane.\par \par

## 2021-02-19 NOTE — REVIEW OF SYSTEMS
[Negative] : Neurological [Feeling Tired] : not feeling tired [Abdominal Pain] : no abdominal pain [Skin Wound] : no skin wound [Limb Weakness] : no limb weakness [Difficulty Walking] : no difficulty walking

## 2021-07-29 ENCOUNTER — NON-APPOINTMENT (OUTPATIENT)
Age: 71
End: 2021-07-29

## 2021-08-19 NOTE — PATIENT PROFILE ADULT. - AS [place] BRADEN MOISTURE
Pharmacist calling from [facility] to report that the Combination medication/PREVPAC is not covered by insurance. The pharmacist is asking for the following medications to be prescribed individually:    Amoxicillin   Clarithromycin   Lansoprazole     Current order is:       cxougfxuc-dgbuhshvm-dirkwuyke (PREVPAC) miscellaneous box 28 each 0 8/17/2021 8/31/2021 --   Sig - Route: Take 1 tablet by mouth 2 times daily for 14 days - Oral   Sent to pharmacy as: Ikkzopyzy-Xvjxnxfuc-Qhvjhhvcp Oral (PREVPAC)       Snehal Cifuentes RN  [facility] Nurse Advisor 9:53 AM 8/18/2021    
Pharmacist notified.  NIDHI Lane, Medical Assistant  
Please Advise.  NIDHI Lane, Medical Assistant    
Please call in separately as pharmacist has requested.   
(4) rarely moist

## 2022-04-20 NOTE — BRIEF OPERATIVE NOTE - BRIEF OP NOTE DRAINS
Echo tech arranged for this.     Segundo Vizcaino PA-C    
lumbar drain
3x chest tubes

## 2022-07-19 ENCOUNTER — APPOINTMENT (OUTPATIENT)
Dept: CT IMAGING | Facility: HOSPITAL | Age: 72
End: 2022-07-19

## 2022-07-19 ENCOUNTER — OUTPATIENT (OUTPATIENT)
Dept: OUTPATIENT SERVICES | Facility: HOSPITAL | Age: 72
LOS: 1 days | End: 2022-07-19
Payer: MEDICARE

## 2022-07-19 ENCOUNTER — APPOINTMENT (OUTPATIENT)
Dept: VASCULAR SURGERY | Facility: CLINIC | Age: 72
End: 2022-07-19

## 2022-07-19 DIAGNOSIS — Z90.49 ACQUIRED ABSENCE OF OTHER SPECIFIED PARTS OF DIGESTIVE TRACT: Chronic | ICD-10-CM

## 2022-07-19 DIAGNOSIS — Z86.79 PERSONAL HISTORY OF OTHER DISEASES OF THE CIRCULATORY SYSTEM: Chronic | ICD-10-CM

## 2022-07-19 DIAGNOSIS — Z98.890 OTHER SPECIFIED POSTPROCEDURAL STATES: Chronic | ICD-10-CM

## 2022-07-19 PROCEDURE — 99213 OFFICE O/P EST LOW 20 MIN: CPT

## 2022-07-19 PROCEDURE — 74174 CTA ABD&PLVS W/CONTRAST: CPT

## 2022-07-19 PROCEDURE — 71275 CT ANGIOGRAPHY CHEST: CPT | Mod: 26

## 2022-07-19 PROCEDURE — 74174 CTA ABD&PLVS W/CONTRAST: CPT | Mod: 26

## 2022-07-19 PROCEDURE — 71275 CT ANGIOGRAPHY CHEST: CPT

## 2022-07-19 PROCEDURE — 82565 ASSAY OF CREATININE: CPT

## 2022-07-25 NOTE — PHYSICAL EXAM
[Normal Thyroid] : the thyroid was normal [Normal Breath Sounds] : Normal breath sounds [Normal Rate and Rhythm] : normal rate and rhythm [2+] : left 2+ [Ankle Swelling (On Exam)] : present [Ankle Swelling On The Right] : mild [No HSM] : no hepatosplenomegaly [No Rash or Lesion] : No rash or lesion [Alert] : alert [Calm] : calm [JVD] : no jugular venous distention  [Varicose Veins Of Lower Extremities] : not present [] : not present [Abdomen Masses] : No abdominal masses [Abdomen Tenderness] : ~T ~M No abdominal tenderness [Purpura] : no purpura  [Petechiae] : no petechiae [Skin Ulcer] : no ulcer [Skin Induration] : no induration [de-identified] : appears well and alert, ambulates w/cane [de-identified] : NC/AT, anicteric [de-identified] : +FROM [de-identified] : Gross neurovascular intact in LEs

## 2022-07-25 NOTE — REVIEW OF SYSTEMS
[Negative] : Heme/Lymph [Feeling Tired] : not feeling tired [Abdominal Pain] : no abdominal pain [Skin Wound] : no skin wound [Limb Weakness] : no limb weakness [Difficulty Walking] : no difficulty walking

## 2022-07-25 NOTE — ASSESSMENT
[FreeTextEntry1] : 72yoF s/p thoracoabdominal aortic aneurysm repair by Laura Burch and Iona returns for f/u evaluation of her thoracoabdominal and infrarenal aortic repair w/debranch and [place] excluder endograft, currently w/o any new complaints related to the aneurysm at this time; she does report recent R shoulder pain and discolored blotches of the skin which her PCP believes are small bruises.  She reports that she is doing well overall, and she is walking w/o limitation w/use of her cane.\par \par Normal abd exam noted today, and CTA c/a/p performed today reveals widely patent visceral bypasses, endograft in place w/o evidence of endoleak, and existing aneurysm sac stable in size at 4cm.  Reassured pt that her repair is stable, and she is safe to RTO in 1y for surveillance duplex of her abd repair.\par \par She is cleared at this time from a vascular standpoint for MRI [of the shoulder] at strengths of 1.5 and/or 3 Kelly as the [place]ORE Excluder is MRI-compatible at those strengths.  She is also cleared for dental work, but should be prophylaxed w/PO abx prior to any dental procedure to prevent seeding of his endograft.

## 2022-07-25 NOTE — PROCEDURE
[FreeTextEntry1] : CTA c/a/p performed today reveals widely patent visceral bypasses, endograft in place w/o evidence of endoleak, and existing aneurysm sac stable in size at 4cm

## 2022-07-25 NOTE — ADDENDUM
[FreeTextEntry1] : This note was written by Abe Rivera, acting as a scribe for Dr. Priyank Burch.  I, Dr. Priyank Burch, have read and attest that all the information, medical decision-making, and discharge instructions within are true and accurate.\par \par I, Dr. Priyank Burch, personally performed the evaluation and management (E/M) services for this established patient who presents today with (an) existing condition(s).  That E/M includes conducting the examination, assessing all conditions, and (re)establishing/reinforcing a plan of care.  Today, my ACP, Abe Rivera, was here to observe my evaluation and management services for this condition to be followed going forward.

## 2022-07-25 NOTE — HISTORY OF PRESENT ILLNESS
[FreeTextEntry1] : 72yoF s/p thoracoabdominal aortic aneurysm repair by Laura Burch and Iona returns for f/u evaluation of her thoracoabdominal and infrarenal aortic repair w/debranch and [place] excluder endograft, currently w/o any new complaints related to the aneurysm at this time; she does report recent R shoulder pain and discolored blotches of the skin which her PCP believes are small bruises.  She reports that she is doing well overall, and she is walking w/o limitation w/use of her cane.\par \par

## 2022-10-18 NOTE — H&P ADULT - BREASTS
For the next few days I want you to use the albuterol every 4-6 hours while she is awake and if this is not helping I would like you to call us and we will consider adding the steroids to prevent needing to go to the ER. 1.  I would like to change to ADVAIR 115  mcg 2 puffs with a spacer TWICE a day. This medication works well when given everyday and it is a preventative medication. If there is a day where she misses a dose just give her 2 extra doses with the next time she gets the flovent so on average she is getting 4 puffs total in a day. We reinforced that this needs to be given everyday and she needs to be monitored taking it to make sure she is taking it and taking it correctly. It needs refilled every month as well. We discussed if they miss a dose that they should just give it later in the day so she get all puff in.    2.  She can use albuterol when she is having trouble breathing. This is the rescue medication. She can use 2-4 puffs with a spacer if you use the inhaler or one nebulizer treatment. You do not want to use albuterol more than every 4 hours without having her seen or calling to get advice. Albuterol can be used for exercise as well and for example you can give her 2 puffs with the spacer before going out on the trampoline and this should help a lot. I feel they need to give her albuterol a few times a day for the next few days to help her breathing as she is wheezing today and to monitor her closely. 3.  We need to keep her away from all cigarette smoke or vaping. 4.  We gave the flu vaccine today in clinic for 22-23 season. I recommend the COVID vaccine for her as well. 5.  We gave a written asthma plan and taught the use of the spacer for her inhalers. 6.  She should continue claritin for her allergy symptoms.   7.   If there are questions they can call call [phone] during the day and for emergencies after hours please call [phone] and ask for the pulmonary doctor on call. 9.  We will see her back in 2 months in [place]  10. I spoke to Mom about a medication called Dupixent which might be a good choice fro her as it can help dramatically with her eczema as well as asthma. She will speak to her dermatologist as well but having to give it as an injection may be hard in this instance. not examined

## 2023-01-17 ENCOUNTER — APPOINTMENT (OUTPATIENT)
Dept: VASCULAR SURGERY | Facility: CLINIC | Age: 73
End: 2023-01-17
Payer: MEDICARE

## 2023-01-17 VITALS
WEIGHT: 116 LBS | HEIGHT: 63 IN | BODY MASS INDEX: 20.55 KG/M2 | HEART RATE: 115 BPM | SYSTOLIC BLOOD PRESSURE: 158 MMHG | DIASTOLIC BLOOD PRESSURE: 76 MMHG

## 2023-01-17 PROCEDURE — 99213 OFFICE O/P EST LOW 20 MIN: CPT

## 2023-01-17 PROCEDURE — 93978 VASCULAR STUDY: CPT

## 2023-01-20 NOTE — PHYSICAL EXAM
[Normal Thyroid] : the thyroid was normal [Normal Breath Sounds] : Normal breath sounds [Normal Rate and Rhythm] : normal rate and rhythm [2+] : left 2+ [Ankle Swelling (On Exam)] : present [Ankle Swelling On The Right] : mild [No HSM] : no hepatosplenomegaly [No Rash or Lesion] : No rash or lesion [Alert] : alert [Calm] : calm [JVD] : no jugular venous distention  [Varicose Veins Of Lower Extremities] : not present [] : not present [Abdomen Masses] : No abdominal masses [Abdomen Tenderness] : ~T ~M No abdominal tenderness [Purpura] : no purpura  [Petechiae] : no petechiae [Skin Ulcer] : no ulcer [Skin Induration] : no induration [de-identified] : appears well and alert, ambulates w/cane [de-identified] : NC/AT, anicteric [de-identified] : +FROM [de-identified] : Gross neurovascular intact in LEs

## 2023-01-20 NOTE — ASSESSMENT
[FreeTextEntry1] : 72yoF s/p thoracoabdominal aortic aneurysm repair by Laura Burch and Iona returns for f/u evaluation of her thoracoabdominal and infrarenal aortic repair w/debranch and [place] excluder endograft, currently w/o any new complaints related to the aneurysm at this time; she does report recent R shoulder pain and discolored blotches of the skin which her PCP believes are small bruises.  She reports that she is doing well overall, and she is walking w/o limitation w/use of her cane.\par \par Normal abd exam noted today, and Abdominal duplex performed today reveals a widely patent aortic endograft w/o endoleak, good perfusion in each kidney, L EIA stent widely patent.  Reassured pt that her repair is stable, and she is safe to RTO in 1y for surveillance duplex of her abd repair; pt should continue to f/u w/Dr. Monson as needed.

## 2023-01-20 NOTE — HISTORY OF PRESENT ILLNESS
[FreeTextEntry1] : 72yoF s/p thoracoabdominal aortic aneurysm repair by Laura Burch and Iona returns for f/u evaluation of her thoracoabdominal and infrarenal aortic repair w/debranch and [place] excluder endograft, currently w/o any new complaints related to the aneurysm at this time; she does report recent R shoulder pain and discolored blotches of the skin which her PCP believes are small bruises.  She reports that she is doing well overall, and she is walking w/o limitation w/use of her cane.\par \par

## 2023-01-20 NOTE — PROCEDURE
[FreeTextEntry1] : Abdominal duplex performed today reveals a widely patent aortic endograft w/o endoleak, good perfusion in each kidney, L EIA stent widely patent

## 2023-03-11 NOTE — PATIENT PROFILE ADULT. - HEALTH/HEALTHCARE ANXIETIES, PROFILE
Please make sure to follow up with your primary care doctor in 3 days.    Urinary Tract Infection    A urinary tract infection (UTI) is an infection of any part of the urinary tract, which includes the kidneys, ureters, bladder, and urethra. Risk factors include ignoring your need to urinate, wiping back to front if female, being an uncircumcised male, and having diabetes or a weak immune system. Symptoms include frequent urination, pain or burning with urination, foul smelling urine, cloudy urine, pain in the lower abdomen, blood in the urine, and fever. If you were prescribed an antibiotic medicine, take it as told by your health care provider. Do not stop taking the antibiotic even if you start to feel better.    SEEK IMMEDIATE MEDICAL CARE IF YOU HAVE THE FOLLOWING SYMPTOMS: severe back or abdominal pain, inability to keep fluids or medicine down, dizziness/lightheadedness, or a change in mental status. surgery

## 2023-04-14 ENCOUNTER — OUTPATIENT (OUTPATIENT)
Dept: OUTPATIENT SERVICES | Facility: HOSPITAL | Age: 73
LOS: 1 days | End: 2023-04-14
Payer: MEDICARE

## 2023-04-14 DIAGNOSIS — I25.10 ATHEROSCLEROTIC HEART DISEASE OF NATIVE CORONARY ARTERY WITHOUT ANGINA PECTORIS: ICD-10-CM

## 2023-04-14 DIAGNOSIS — Z98.890 OTHER SPECIFIED POSTPROCEDURAL STATES: Chronic | ICD-10-CM

## 2023-04-14 DIAGNOSIS — Z90.49 ACQUIRED ABSENCE OF OTHER SPECIFIED PARTS OF DIGESTIVE TRACT: Chronic | ICD-10-CM

## 2023-04-14 DIAGNOSIS — Z86.79 PERSONAL HISTORY OF OTHER DISEASES OF THE CIRCULATORY SYSTEM: Chronic | ICD-10-CM

## 2023-04-14 PROCEDURE — 93306 TTE W/DOPPLER COMPLETE: CPT

## 2023-04-14 PROCEDURE — 93306 TTE W/DOPPLER COMPLETE: CPT | Mod: 26

## 2023-04-14 PROCEDURE — 93010 ELECTROCARDIOGRAM REPORT: CPT

## 2023-04-14 PROCEDURE — 93005 ELECTROCARDIOGRAM TRACING: CPT

## 2024-01-16 ENCOUNTER — APPOINTMENT (OUTPATIENT)
Dept: VASCULAR SURGERY | Facility: CLINIC | Age: 74
End: 2024-01-16

## 2024-03-19 ENCOUNTER — APPOINTMENT (OUTPATIENT)
Dept: VASCULAR SURGERY | Facility: CLINIC | Age: 74
End: 2024-03-19
Payer: MEDICARE

## 2024-03-19 VITALS
BODY MASS INDEX: 20.55 KG/M2 | SYSTOLIC BLOOD PRESSURE: 132 MMHG | DIASTOLIC BLOOD PRESSURE: 66 MMHG | WEIGHT: 116 LBS | HEIGHT: 63 IN | HEART RATE: 85 BPM

## 2024-03-19 DIAGNOSIS — I71.9 AORTIC ANEURYSM OF UNSPECIFIED SITE, W/OUT RUPTURE: ICD-10-CM

## 2024-03-19 PROCEDURE — 99213 OFFICE O/P EST LOW 20 MIN: CPT | Mod: 25

## 2024-03-19 PROCEDURE — 93978 VASCULAR STUDY: CPT

## 2024-03-20 PROBLEM — I71.9 AORTIC ANEURYSM: Status: ACTIVE | Noted: 2018-05-03

## 2024-03-20 NOTE — PHYSICAL EXAM
[Normal Thyroid] : the thyroid was normal [Normal Breath Sounds] : Normal breath sounds [Normal Rate and Rhythm] : normal rate and rhythm [2+] : left 2+ [Ankle Swelling (On Exam)] : present [Ankle Swelling On The Right] : mild [No HSM] : no hepatosplenomegaly [No Rash or Lesion] : No rash or lesion [Alert] : alert [Calm] : calm [JVD] : no jugular venous distention  [Varicose Veins Of Lower Extremities] : not present [] : not present [Abdomen Masses] : No abdominal masses [Abdomen Tenderness] : ~T ~M No abdominal tenderness [Purpura] : no purpura  [Petechiae] : no petechiae [Skin Ulcer] : no ulcer [Skin Induration] : no induration [de-identified] : appears well and alert, ambulates w/cane [de-identified] : NC/AT, anicteric [de-identified] : +FROM [de-identified] : Gross neurovascular intact in LEs

## 2024-03-20 NOTE — ASSESSMENT
[FreeTextEntry1] : 74yoF s/p thoracoabdominal aortic aneurysm repair by Laura Burch and Iona returns for f/u evaluation of her iAAA repair w/debranch and [place] excluder endograft, currently w/o any new complaints related to the aneurysm at this time; pt was recently admitted to the hospital for SOB due to a respiratory infection, is now home and reports improved breathing.  She reports that she is doing well overall, and she is walking w/o limitation w/use of her cane.  Normal abd exam noted today, and abdominal duplex performed today reveals a widely patent aortic endograft w/o endoleak, good perfusion in each kidney, L EIA stent widely patent.  Reassured pt that her repair is stable, and she is safe to RTO in 1y for surveillance duplex of her abd repair; pt should continue to f/u w/Dr. Monson as needed.

## 2024-03-20 NOTE — HISTORY OF PRESENT ILLNESS
[FreeTextEntry1] : 74yoF s/p thoracoabdominal aortic aneurysm repair by Laura Burch and Iona returns for f/u evaluation of her iAAA repair w/debranch and [place] excluder endograft, currently w/o any new complaints related to the aneurysm at this time; pt was recently admitted to the hospital for SOB due to a respiratory infection, is now home and reports improved breathing.  She reports that she is doing well overall, and she is walking w/o limitation w/use of her cane.

## 2024-03-20 NOTE — ADDENDUM
[FreeTextEntry1] : This note was written by Abe Rivera, acting as a scribe for Dr. Priyank Burch.  I, Dr. Priyakn Burch, have read and attest that all the information, medical decision-making, and discharge instructions within are true and accurate.\par  \par  I, Dr. Priyank Burch, personally performed the evaluation and management (E/M) services for this established patient who presents today with (an) existing condition(s).  That E/M includes conducting the examination, assessing all conditions, and (re)establishing/reinforcing a plan of care.  Today, my ACP, Abe Rivera, was here to observe my evaluation and management services for this condition to be followed going forward.

## 2025-03-18 ENCOUNTER — NON-APPOINTMENT (OUTPATIENT)
Age: 75
End: 2025-03-18

## 2025-03-18 ENCOUNTER — APPOINTMENT (OUTPATIENT)
Dept: VASCULAR SURGERY | Facility: CLINIC | Age: 75
End: 2025-03-18

## 2025-03-18 VITALS
HEIGHT: 63 IN | DIASTOLIC BLOOD PRESSURE: 65 MMHG | HEART RATE: 92 BPM | WEIGHT: 116 LBS | BODY MASS INDEX: 20.55 KG/M2 | SYSTOLIC BLOOD PRESSURE: 127 MMHG

## 2025-03-18 DIAGNOSIS — I71.9 AORTIC ANEURYSM OF UNSPECIFIED SITE, W/OUT RUPTURE: ICD-10-CM

## 2025-03-18 DIAGNOSIS — Z86.79 PERSONAL HISTORY OF OTHER DISEASES OF THE CIRCULATORY SYSTEM: ICD-10-CM

## 2025-03-18 PROCEDURE — 99213 OFFICE O/P EST LOW 20 MIN: CPT | Mod: 25

## 2025-03-18 PROCEDURE — 93978 VASCULAR STUDY: CPT
